# Patient Record
Sex: FEMALE | Race: WHITE
[De-identification: names, ages, dates, MRNs, and addresses within clinical notes are randomized per-mention and may not be internally consistent; named-entity substitution may affect disease eponyms.]

---

## 2017-08-09 ENCOUNTER — HOSPITAL ENCOUNTER (INPATIENT)
Dept: HOSPITAL 47 - EC | Age: 57
LOS: 13 days | Discharge: NURSING, REHAB OR LONG-TERM CARE | DRG: 314 | End: 2017-08-22
Attending: HOSPITALIST | Admitting: HOSPITALIST
Payer: MEDICARE

## 2017-08-09 VITALS — BODY MASS INDEX: 53.8 KG/M2

## 2017-08-09 DIAGNOSIS — T80.211A: Primary | ICD-10-CM

## 2017-08-09 DIAGNOSIS — F41.9: ICD-10-CM

## 2017-08-09 DIAGNOSIS — Z79.899: ICD-10-CM

## 2017-08-09 DIAGNOSIS — E78.5: ICD-10-CM

## 2017-08-09 DIAGNOSIS — Z86.73: ICD-10-CM

## 2017-08-09 DIAGNOSIS — Z87.891: ICD-10-CM

## 2017-08-09 DIAGNOSIS — F32.9: ICD-10-CM

## 2017-08-09 DIAGNOSIS — J44.9: ICD-10-CM

## 2017-08-09 DIAGNOSIS — Z88.1: ICD-10-CM

## 2017-08-09 DIAGNOSIS — D63.1: ICD-10-CM

## 2017-08-09 DIAGNOSIS — E87.5: ICD-10-CM
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
## 2017-08-09 DIAGNOSIS — I50.32: ICD-10-CM

## 2017-08-09 DIAGNOSIS — Z83.3: ICD-10-CM

## 2017-08-09 DIAGNOSIS — Z79.02: ICD-10-CM

## 2017-08-09 DIAGNOSIS — A41.81: ICD-10-CM

## 2017-08-09 DIAGNOSIS — N39.0: ICD-10-CM

## 2017-08-09 DIAGNOSIS — I13.2: ICD-10-CM

## 2017-08-09 DIAGNOSIS — T82.41XA: ICD-10-CM

## 2017-08-09 DIAGNOSIS — E11.22: ICD-10-CM

## 2017-08-09 DIAGNOSIS — Z79.4: ICD-10-CM

## 2017-08-09 DIAGNOSIS — E66.2: ICD-10-CM

## 2017-08-09 DIAGNOSIS — E03.9: ICD-10-CM

## 2017-08-09 DIAGNOSIS — E61.1: ICD-10-CM

## 2017-08-09 DIAGNOSIS — N18.6: ICD-10-CM
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
## 2017-08-09 DIAGNOSIS — I42.9: ICD-10-CM

## 2017-08-09 DIAGNOSIS — Z96.651: ICD-10-CM

## 2017-08-09 DIAGNOSIS — Z99.2: ICD-10-CM

## 2017-08-09 DIAGNOSIS — Z82.49: ICD-10-CM

## 2017-08-09 PROCEDURE — 80053 COMPREHEN METABOLIC PANEL: CPT

## 2017-08-09 PROCEDURE — 94760 N-INVAS EAR/PLS OXIMETRY 1: CPT

## 2017-08-09 PROCEDURE — 87077 CULTURE AEROBIC IDENTIFY: CPT

## 2017-08-09 PROCEDURE — 83540 ASSAY OF IRON: CPT

## 2017-08-09 PROCEDURE — 83605 ASSAY OF LACTIC ACID: CPT

## 2017-08-09 PROCEDURE — 94640 AIRWAY INHALATION TREATMENT: CPT

## 2017-08-09 PROCEDURE — 74176 CT ABD & PELVIS W/O CONTRAST: CPT

## 2017-08-09 PROCEDURE — 87086 URINE CULTURE/COLONY COUNT: CPT

## 2017-08-09 PROCEDURE — 76937 US GUIDE VASCULAR ACCESS: CPT

## 2017-08-09 PROCEDURE — 87186 SC STD MICRODIL/AGAR DIL: CPT

## 2017-08-09 PROCEDURE — 83036 HEMOGLOBIN GLYCOSYLATED A1C: CPT

## 2017-08-09 PROCEDURE — 85025 COMPLETE CBC W/AUTO DIFF WBC: CPT

## 2017-08-09 PROCEDURE — 82553 CREATINE MB FRACTION: CPT

## 2017-08-09 PROCEDURE — 87070 CULTURE OTHR SPECIMN AEROBIC: CPT

## 2017-08-09 PROCEDURE — 82728 ASSAY OF FERRITIN: CPT

## 2017-08-09 PROCEDURE — 93306 TTE W/DOPPLER COMPLETE: CPT

## 2017-08-09 PROCEDURE — 80061 LIPID PANEL: CPT

## 2017-08-09 PROCEDURE — 96374 THER/PROPH/DIAG INJ IV PUSH: CPT

## 2017-08-09 PROCEDURE — 84132 ASSAY OF SERUM POTASSIUM: CPT

## 2017-08-09 PROCEDURE — 99285 EMERGENCY DEPT VISIT HI MDM: CPT

## 2017-08-09 PROCEDURE — 77001 FLUOROGUIDE FOR VEIN DEVICE: CPT

## 2017-08-09 PROCEDURE — 80048 BASIC METABOLIC PNL TOTAL CA: CPT

## 2017-08-09 PROCEDURE — 87040 BLOOD CULTURE FOR BACTERIA: CPT

## 2017-08-09 PROCEDURE — 80202 ASSAY OF VANCOMYCIN: CPT

## 2017-08-09 PROCEDURE — 82550 ASSAY OF CK (CPK): CPT

## 2017-08-09 PROCEDURE — 71010: CPT

## 2017-08-09 PROCEDURE — 83550 IRON BINDING TEST: CPT

## 2017-08-09 PROCEDURE — 36558 INSERT TUNNELED CV CATH: CPT

## 2017-08-09 PROCEDURE — 83880 ASSAY OF NATRIURETIC PEPTIDE: CPT

## 2017-08-09 PROCEDURE — 84484 ASSAY OF TROPONIN QUANT: CPT

## 2017-08-09 PROCEDURE — 81001 URINALYSIS AUTO W/SCOPE: CPT

## 2017-08-09 PROCEDURE — 90935 HEMODIALYSIS ONE EVALUATION: CPT

## 2017-08-09 PROCEDURE — 84100 ASSAY OF PHOSPHORUS: CPT

## 2017-08-09 PROCEDURE — 93005 ELECTROCARDIOGRAM TRACING: CPT

## 2017-08-09 NOTE — ED
General Adult HPI





- General


Chief complaint: Chest Pain


Stated complaint: SOB


Time Seen by Provider: 17 23:11


Source: EMS, RN notes reviewed, old records reviewed


Mode of arrival: EMS


Limitations: physical limitation





- History of Present Illness


Initial comments: 





This is a 56-year-old female ER for evaluation.  Patient's brought in transfer 

for multiple medical issues including CHF, anemia, acute on chronic renal 

failure, fever and sepsis.  Patient at this time is complaining of just 

generalized body pain





- Related Data


 Home Medications











 Medication  Instructions  Recorded  Confirmed


 


ALPRAZolam [Xanax] 0.25 mg PO TID PRN 09/13/15 08/09/17


 


Atorvastatin [Lipitor] 80 mg PO HS@2100 09/13/15 08/09/17


 


Carvedilol [Coreg] 12.5 mg PO BID@1100,2300 09/13/15 08/09/17


 


Clopidogrel [Plavix] 75 mg PO DAILY@1100 09/13/15 08/09/17


 


Ferrous Sulfate [Iron (65  mg PO DAILY@1100 09/13/15 08/09/17





Elemental)]   


 


Gabapentin [Neurontin] 100 mg PO TID@1100,1800,2300 09/13/15 08/09/17


 


HYDROcodone/APAP 7.5-325MG [Norco 1 tab PO DAILY PRN 09/13/15 08/09/17





7.5-325]   


 


Levothyroxine Sodium [Synthroid] 350 mcg PO DAILY@0500 09/13/15 08/09/17


 


Montelukast [Singulair] 10 mg PO HS@2000 09/13/15 08/09/17


 


Cinacalcet [Sensipar] 30 mg PO TU@1100 17


 


Acetaminophen Tab [Tylenol Tab] 650 mg PO Q4H PRN 17


 


Albuterol Sulfate [Proair Hfa] 2 puff INHALATION RT-QID PRN 17


 


Bisacodyl 10 mg RECTAL DAILY PRN 17


 


Budesonide [Pulmicort] 0.5 mg INHALATION RT-BID@11,20 17


 


Calcium Acetate [Phoslo] 667 mg PO TID@0700,1200,1700 17/17


 


Famotidine [Pepcid] 20 mg PO BID@1100,1700 17


 


Folic Acid-Vit B Complex-Vit C 1 cap PO DAILY@1100 17





[Nephrocaps]   


 


HYDROcodone/APAP 7.5-325MG [Norco 1 tab PO TID@0500,1300,2100 17





7.5-325]   


 


Hydrocortisone [Anusol-Hc] 1 applic RECTAL BID PRN 17


 


Insulin Detemir [Levemir] 65 unit SQ DAILY@17017


 


Insulin Lispro [humaLOG Kwikpen] 15 unit SQ AC-TID@,,17


 


Ipratropium-Albuterol Nebulize 1 ml INHALATION RT-QID PRN 17





[Duoneb 0.5 mg-3 mg/3 ml Soln]   


 


L.acidoph,Paracasei, B.lactis 1 cap PO DAILY@1100 17





[Probiotic]   


 


Latanoprost [Xalatan 0.005%] 1 drop BOTH EYES HS@17


 


Levofloxacin [Levaquin] 500 mg PO DAILY 17


 


Omalizumab [Xolair] 150 mg SQ Q30D 17


 


Sennosides [Senna] 8.6 mg PO DAILY@17


 


Sodium Bicarbonate Tab 650 mg PO BID@1100,17


 


diphenhydrAMINE HCL [Benadryl] 25 mg PO Q8H PRN 17


 


hydrALAZINE HCL [Apresoline] 50 mg PO TID@0500,1100,1900 17











 Allergies











Allergy/AdvReac Type Severity Reaction Status Date / Time


 


erythromycin base Allergy  Unknown Verified 17 18:58





[Erythromycin Base]     


 


NSAIDS (Non-Steroidal Allergy  Unknown Verified 17 18:58





Anti-Inflamma     














Review of Systems


ROS Statement: 


Those systems with pertinent positive or pertinent negative responses have been 

documented in the HPI.





ROS Other: All systems not noted in ROS Statement are negative.





Past Medical History


Past Medical History: Asthma, Heart Failure, COPD, CVA/TIA, Diabetes Mellitus, 

Eye Disorder, Hyperlipidemia, Hypertension, Osteoarthritis (OA), Pneumonia, 

Renal Disease, Sleep Apnea/CPAP/BIPAP, Thyroid Disorder


Additional Past Medical History / Comment(s): sleep apnea, neuropathy, patient 

has one kidney, Stage III kidney failure


History of Any Multi-Drug Resistant Organisms: None Reported


Past Surgical History: Bariatric Surgery, Hernia Repair, Orthopedic Surgery, 

Tonsillectomy


Additional Past Surgical History / Comment(s): rt kidney removed, Rt knee 

replacement


Past Anesthesia/Blood Transfusion Reactions: No Reported Reaction


Additional Past Anesthesia/Blood Transfusion Reaction / Comment(s): Pt has 

never had a blood transfusion.


Past Psychological History: No Psychological Hx Reported


Smoking Status: Former smoker


Past Alcohol Use History: None Reported


Past Drug Use History: None Reported





- Past Family History


  ** Mother


Additional Family Medical History / Comment(s): skin CA, CHF, DM, thyroid 

disorder, HTN.





  ** Father


Family Medical History: Diabetes Mellitus, Hypertension, Thyroid Disorder


Additional Family Medical History / Comment(s): CHF.  Pt's father is .





General Exam


Limitations: physical limitation


General appearance: alert, anxious, in distress, obese


Head exam: Present: atraumatic, normocephalic, normal inspection


Eye exam: Present: normal appearance, PERRL, EOMI.  Absent: scleral icterus, 

conjunctival injection, periorbital swelling


ENT exam: Present: normal exam, mucous membranes moist


Neck exam: Present: normal inspection.  Absent: tenderness, meningismus, 

lymphadenopathy


Respiratory exam: Present: normal lung sounds bilaterally.  Absent: respiratory 

distress, wheezes, rales, rhonchi, stridor


Cardiovascular Exam: Present: regular rate, normal rhythm, normal heart sounds.

  Absent: systolic murmur, diastolic murmur, rubs, gallop, clicks


GI/Abdominal exam: Present: soft, normal bowel sounds.  Absent: distended, 

tenderness, guarding, rebound, rigid


Extremities exam: Present: normal inspection, full ROM, normal capillary 

refill.  Absent: tenderness, pedal edema, joint swelling, calf tenderness


Back exam: Present: normal inspection


Neurological exam: Present: alert, oriented X3, CN II-XII intact


Psychiatric exam: Present: normal affect, normal mood


Skin exam: Present: warm, dry, intact, normal color.  Absent: rash





Course





 Vital Signs











  17





  23:09 23:18


 


Temperature 102.9 F H 


 


Pulse Rate 88 


 


Respiratory 20 20





Rate  


 


Blood Pressure 147/64 


 


O2 Sat by Pulse 98 





Oximetry  














- Reevaluation(s)


Reevaluation #1: 





17 23:39


Transfer paper work is thoroughly reviewed





EKG Findings





- EKG Comments:


EKG Findings:: EKG shows sinus rhythm rate of 87, , QRS 10 2, 





Medical Decision Making





- Medical Decision Making





56 female the ER with multiple medical issues, and STEMI COPD sepsis.  Patient 

will be admitted for IV antibiotics, cardiopulmonary resuscitation and 

monitoring





Disposition


Clinical Impression: 


 Unstable angina, Elevated troponin, COPD (chronic obstructive pulmonary disease

), Fever





Disposition: ADMITTED AS IP TO THIS HOSP


Condition: Fair


Referrals: 


Itzel Leonard MD [Primary Care Provider] - 1-2 days

## 2017-08-10 LAB
ANION GAP SERPL CALC-SCNC: 10 MMOL/L
BASOPHILS # BLD AUTO: 0.1 K/UL (ref 0–0.2)
BASOPHILS NFR BLD AUTO: 1 %
BILIRUB UR QL STRIP.AUTO: (no result)
BUN SERPL-SCNC: 26 MG/DL (ref 7–17)
CALCIUM SPEC-MCNC: 9.3 MG/DL (ref 8.4–10.2)
CH: 26.1
CHCM: 29.9
CHLORIDE SERPL-SCNC: 94 MMOL/L (ref 98–107)
CHOLEST SERPL-MCNC: 125 MG/DL (ref ?–200)
CK SERPL-CCNC: 60 U/L (ref 30–135)
CK SERPL-CCNC: 64 U/L (ref 30–135)
CO2 SERPL-SCNC: 30 MMOL/L (ref 22–30)
EOSINOPHIL # BLD AUTO: 0.1 K/UL (ref 0–0.7)
EOSINOPHIL NFR BLD AUTO: 1 %
ERYTHROCYTE [DISTWIDTH] IN BLOOD BY AUTOMATED COUNT: 3 M/UL (ref 3.8–5.4)
ERYTHROCYTE [DISTWIDTH] IN BLOOD: 18.3 % (ref 11.5–15.5)
GLUCOSE BLD-MCNC: 147 MG/DL (ref 75–99)
GLUCOSE BLD-MCNC: 148 MG/DL (ref 75–99)
GLUCOSE BLD-MCNC: 155 MG/DL (ref 75–99)
GLUCOSE BLD-MCNC: 214 MG/DL (ref 75–99)
GLUCOSE SERPL-MCNC: 156 MG/DL (ref 74–99)
HCT VFR BLD AUTO: 26.4 % (ref 34–46)
HDLC SERPL-MCNC: 52 MG/DL (ref 40–60)
HDW: 3.32
HEMOGLOBIN A1C: 6.1 % (ref 4.2–6.1)
HGB BLD-MCNC: 8.1 GM/DL (ref 11.4–16)
IRON SERPL-MCNC: 16 UG/DL (ref 37–170)
LUC NFR BLD AUTO: 4 %
LYMPHOCYTES # SPEC AUTO: 0.7 K/UL (ref 1–4.8)
LYMPHOCYTES NFR SPEC AUTO: 7 %
MCH RBC QN AUTO: 26.9 PG (ref 25–35)
MCHC RBC AUTO-ENTMCNC: 30.6 G/DL (ref 31–37)
MCV RBC AUTO: 87.8 FL (ref 80–100)
MONOCYTES # BLD AUTO: 1.4 K/UL (ref 0–1)
MONOCYTES NFR BLD AUTO: 14 %
NEUTROPHILS # BLD AUTO: 7.5 K/UL (ref 1.3–7.7)
NEUTROPHILS NFR BLD AUTO: 74 %
NON-AFRICAN AMERICAN GFR(MDRD): 15
PARTICLE COUNT: (no result)
PH UR: 5.5 [PH] (ref 5–8)
POTASSIUM SERPL-SCNC: 4 MMOL/L (ref 3.5–5.1)
PROT UR QL: (no result)
RBC UR QL: >182 /HPF (ref 0–5)
SODIUM SERPL-SCNC: 134 MMOL/L (ref 137–145)
SP GR UR: 1.02 (ref 1–1.03)
SQUAMOUS UR QL AUTO: 4 /HPF (ref 0–4)
TIBC SERPL-MCNC: 214 UG/DL (ref 265–497)
TROPONIN I SERPL-MCNC: 0.53 NG/ML (ref 0–0.03)
TROPONIN I SERPL-MCNC: 0.7 NG/ML (ref 0–0.03)
UA BILLING (MACRO VS. MICRO): (no result)
UROBILINOGEN UR QL STRIP: 2 MG/DL (ref ?–2)
WBC # BLD AUTO: 0.45 10*3/UL
WBC # BLD AUTO: 10.3 K/UL (ref 3.8–10.6)
WBC #/AREA URNS HPF: 100 /HPF (ref 0–5)
WBC (PEROX): 10.95

## 2017-08-10 PROCEDURE — 5A1D60Z: ICD-10-PCS

## 2017-08-10 RX ADMIN — MORPHINE SULFATE PRN MG: 4 INJECTION, SOLUTION INTRAMUSCULAR; INTRAVENOUS at 00:10

## 2017-08-10 RX ADMIN — IOHEXOL PRN ML: 350 INJECTION, SOLUTION INTRAVENOUS at 18:49

## 2017-08-10 RX ADMIN — IOHEXOL PRN ML: 350 INJECTION, SOLUTION INTRAVENOUS at 19:58

## 2017-08-10 RX ADMIN — ACETAMINOPHEN SCH MLS/HR: 10 INJECTION, SOLUTION INTRAVENOUS at 23:58

## 2017-08-10 RX ADMIN — DARBEPOETIN ALFA SCH MCG: 40 INJECTION, SOLUTION INTRAVENOUS; SUBCUTANEOUS at 16:11

## 2017-08-10 RX ADMIN — CARVEDILOL SCH MG: 12.5 TABLET, FILM COATED ORAL at 21:09

## 2017-08-10 RX ADMIN — ACETAMINOPHEN SCH MLS/HR: 10 INJECTION, SOLUTION INTRAVENOUS at 18:16

## 2017-08-10 RX ADMIN — GABAPENTIN SCH MG: 100 CAPSULE ORAL at 18:13

## 2017-08-10 RX ADMIN — ACETAMINOPHEN SCH MLS/HR: 10 INJECTION, SOLUTION INTRAVENOUS at 10:47

## 2017-08-10 RX ADMIN — INSULIN LISPRO SCH: 100 INJECTION, SOLUTION INTRAVENOUS; SUBCUTANEOUS at 14:39

## 2017-08-10 RX ADMIN — Medication SCH MG: at 17:21

## 2017-08-10 RX ADMIN — INSULIN LISPRO SCH UNIT: 100 INJECTION, SOLUTION INTRAVENOUS; SUBCUTANEOUS at 21:15

## 2017-08-10 RX ADMIN — INSULIN LISPRO SCH: 100 INJECTION, SOLUTION INTRAVENOUS; SUBCUTANEOUS at 10:45

## 2017-08-10 RX ADMIN — MORPHINE SULFATE PRN MG: 4 INJECTION, SOLUTION INTRAMUSCULAR; INTRAVENOUS at 19:57

## 2017-08-10 RX ADMIN — CARVEDILOL SCH MG: 12.5 TABLET, FILM COATED ORAL at 10:49

## 2017-08-10 RX ADMIN — INSULIN LISPRO SCH: 100 INJECTION, SOLUTION INTRAVENOUS; SUBCUTANEOUS at 18:08

## 2017-08-10 RX ADMIN — GABAPENTIN SCH MG: 100 CAPSULE ORAL at 21:09

## 2017-08-10 RX ADMIN — ATORVASTATIN CALCIUM SCH MG: 80 TABLET, FILM COATED ORAL at 21:08

## 2017-08-10 RX ADMIN — BUDESONIDE SCH: 0.5 INHALANT ORAL at 20:13

## 2017-08-10 RX ADMIN — FUROSEMIDE SCH MG: 10 INJECTION, SOLUTION INTRAMUSCULAR; INTRAVENOUS at 10:48

## 2017-08-10 RX ADMIN — INSULIN DETEMIR SCH: 100 INJECTION, SOLUTION SUBCUTANEOUS at 18:08

## 2017-08-10 RX ADMIN — ASPIRIN 325 MG ORAL TABLET SCH MG: 325 PILL ORAL at 10:50

## 2017-08-10 RX ADMIN — FUROSEMIDE SCH MG: 10 INJECTION, SOLUTION INTRAMUSCULAR; INTRAVENOUS at 21:09

## 2017-08-10 NOTE — P.HPIM
History of Present Illness


H&P Date: 08/10/17


Chief Complaint: Fevers


This is a 56-year-old female well-known to our service who recently underwent a 

cardiopulmonary arrest thereafter was started on hemodialysis.  Patient was 

sent to a nursing home for rehabilitation





Patient has a history of diastolic dysfunction with CKD stage III to 4, severe 

obstructive sleep apnea and obesity hypoventilation syndrome prior to the 

previous CODE BLUE





Patient apparently woke up with a fever prior to her hemodialysis yesterday.  

Underwent hemodialysis was noted to have a temperature greater than 101F hence 

was triaged to the emergency room for ongoing care





Patient's only for an body in her is a hemodialysis catheter that is tunneled 

on her right side of the chest





At the time of my evaluation patient states that she has some chills feels 

weak.  States that she was improving was able to walk about 40 feet at the 

nursing home prior to this episode





T-max of heart 1.8 was noted.  No episodes of hypotension is appreciated here.














Review of Systems


All systems: negative (Noted in HPI)





Past Medical History


Past Medical History: Asthma, Heart Failure, COPD, CVA/TIA, Diabetes Mellitus, 

Eye Disorder, Hyperlipidemia, Hypertension, Osteoarthritis (OA), Pneumonia, 

Renal Disease, Sleep Apnea/CPAP/BIPAP, Thyroid Disorder


Additional Past Medical History / Comment(s): sleep apnea, neuropathy, patient 

has one kidney, Stage III kidney failure


History of Any Multi-Drug Resistant Organisms: None Reported


Past Surgical History: Bariatric Surgery, Hernia Repair, Orthopedic Surgery, 

Tonsillectomy


Additional Past Surgical History / Comment(s): rt kidney removed, Rt knee 

replacement


Past Anesthesia/Blood Transfusion Reactions: No Reported Reaction


Additional Past Anesthesia/Blood Transfusion Reaction / Comment(s): pt states 

she [facility], "it made her itchy and they 

gave bendyl"


Past Psychological History: Anxiety, Depression


Smoking Status: Former smoker


Past Alcohol Use History: None Reported


Past Drug Use History: None Reported





- Past Family History


  ** Mother


Additional Family Medical History / Comment(s): skin CA, CHF, DM, thyroid 

disorder, HTN.





  ** Father


Family Medical History: Diabetes Mellitus, Hypertension, Thyroid Disorder


Additional Family Medical History / Comment(s): CHF.  Pt's father is .





Medications and Allergies


 Home Medications











 Medication  Instructions  Recorded  Confirmed  Type


 


ALPRAZolam [Xanax] 0.25 mg PO TID PRN 09/13/15 08/09/17 History


 


Atorvastatin [Lipitor] 80 mg PO HS@2100 09/13/15 08/09/17 History


 


Carvedilol [Coreg] 12.5 mg PO BID@1100,2300 09/13/15 08/09/17 History


 


Clopidogrel [Plavix] 75 mg PO DAILY@1100 09/13/15 08/09/17 History


 


Ferrous Sulfate [Iron (65  mg PO DAILY@1100 09/13/15 08/09/17 History





Elemental)]    


 


Gabapentin [Neurontin] 100 mg PO TID@1100,1800,2300 09/13/15 08/09/17 History


 


HYDROcodone/APAP 7.5-325MG [Norco 1 tab PO DAILY PRN 09/13/15 08/09/17 History





7.5-325]    


 


Levothyroxine Sodium [Synthroid] 350 mcg PO DAILY@0500 09/13/15 08/09/17 History


 


Montelukast [Singulair] 10 mg PO HS@2000 09/13/15 08/09/17 History


 


Cinacalcet [Sensipar] 30 mg PO TU@17 History


 


Acetaminophen Tab [Tylenol Tab] 650 mg PO Q4H PRN 17 History


 


Albuterol Sulfate [Proair Hfa] 2 puff INHALATION RT-QID PRN 17 

History


 


Bisacodyl 10 mg RECTAL DAILY PRN 17 History


 


Budesonide [Pulmicort] 0.5 mg INHALATION RT-BID@11,20 17 History


 


Calcium Acetate [Phoslo] 667 mg PO TID@0700,1200,1700 17 History


 


Famotidine [Pepcid] 20 mg PO BID@1100,1700 17 History


 


Folic Acid-Vit B Complex-Vit C 1 cap PO DAILY@1100 17 History





[Nephrocaps]    


 


HYDROcodone/APAP 7.5-325MG [Norco 1 tab PO TID@0500,1300,2100 17 

History





7.5-325]    


 


Hydrocortisone [Anusol-Hc] 1 applic RECTAL BID PRN 17 History


 


Insulin Detemir [Levemir] 65 unit SQ DAILY@1700 17 History


 


Insulin Lispro [humaLOG Kwikpen] 15 unit SQ AC-TID@07,,17 

History


 


Ipratropium-Albuterol Nebulize 1 ml INHALATION RT-QID PRN 17 

History





[Duoneb 0.5 mg-3 mg/3 ml Soln]    


 


L.acidoph,Paracasei, B.lactis 1 cap PO DAILY@1100 17 History





[Probiotic]    


 


Latanoprost [Xalatan 0.005%] 1 drop BOTH EYES HS@17 History


 


Levofloxacin [Levaquin] 500 mg PO DAILY 17 History


 


Omalizumab [Xolair] 150 mg SQ Q30D 17 History


 


Sennosides [Senna] 8.6 mg PO DAILY@1100 17 History


 


Sodium Bicarbonate Tab 650 mg PO BID@1100,17 History


 


diphenhydrAMINE HCL [Benadryl] 25 mg PO Q8H PRN 17 History


 


hydrALAZINE HCL [Apresoline] 50 mg PO TID@0500,1100,1900 17 

History











 Allergies











Allergy/AdvReac Type Severity Reaction Status Date / Time


 


erythromycin base Allergy  Unknown Verified 17 18:58





[Erythromycin Base]     


 


NSAIDS (Non-Steroidal Allergy  Unknown Verified 17 18:58





Anti-Inflamma     














Physical Exam


Vitals: 


 Vital Signs











  Temp Pulse Resp BP BP Pulse Ox


 


 08/10/17 13:04  98 F     


 


 08/10/17 12:00    18   


 


 08/10/17 11:45  102 F H   18   121/56  95


 


 08/10/17 08:00  102.6 F H   18   166/69  97


 


 08/10/17 04:00    18   


 


 08/10/17 01:22  98.2 F  79  18  155/56   96


 


 08/10/17 00:16  99 F  90  20  168/84   97


 


 17 23:18    20   


 


 17 23:09  102.9 F H  88  20  147/64   98








 Intake and Output











 08/10/17 08/10/17 08/10/17





 06:59 14:59 22:59


 


Intake Total 0 0 


 


Output Total 1  


 


Balance -1 0 


 


Intake:   


 


  IV 0  


 


    ns 0  


 


  Oral  0 


 


Output:   


 


  Stool 1  


 


Other:   


 


  Voiding Method Indwelling Catheter Indwelling Catheter 


 


  Weight 132 kg  132 kg








 Patient Weight











 17





 06:59


 


Weight 132 kg











Gen. appearance alert oriented 3





Neck is supple no JVD.





There is some tenderness over the catheter





Lungs diminished breath sounds no rhonchi wheezing or crackles





Heart S1-S2 are regular rate and rhythm no murmurs appreciated





Abdomen soft nontender no organomegaly slightly obese





Neuro no focal motor or sensory deficits noted.








Results


CBC & Chem 7: 


 08/10/17 09:33





 08/10/17 09:33


Labs: 


 Abnormal Lab Results - Last 24 Hours (Table)











  08/10/17 08/10/17 08/10/17 Range/Units





  01:47 05:53 07:32 


 


RBC     (3.80-5.40)  m/uL


 


Hgb     (11.4-16.0)  gm/dL


 


Hct     (34.0-46.0)  %


 


MCHC     (31.0-37.0)  g/dL


 


RDW     (11.5-15.5)  %


 


Lymphocytes #     (1.0-4.8)  k/uL


 


Monocytes #     (0-1.0)  k/uL


 


Sodium     (137-145)  mmol/L


 


Chloride     ()  mmol/L


 


BUN     (7-17)  mg/dL


 


Creatinine     (0.52-1.04)  mg/dL


 


Glucose     (74-99)  mg/dL


 


POC Glucose (mg/dL)   147 H   (75-99)  mg/dL


 


Iron     ()  ug/dL


 


TIBC     (265-497)  ug/dL


 


% Saturation     (20-50)  %


 


Troponin I  0.703 H*   0.529 H*  (0.000-0.034)  ng/mL














  08/10/17 08/10/17 08/10/17 Range/Units





  09:33 09:33 09:33 


 


RBC  3.00 L    (3.80-5.40)  m/uL


 


Hgb  8.1 L    (11.4-16.0)  gm/dL


 


Hct  26.4 L    (34.0-46.0)  %


 


MCHC  30.6 L    (31.0-37.0)  g/dL


 


RDW  18.3 H    (11.5-15.5)  %


 


Lymphocytes #  0.7 L    (1.0-4.8)  k/uL


 


Monocytes #  1.4 H    (0-1.0)  k/uL


 


Sodium   134 L   (137-145)  mmol/L


 


Chloride   94 L   ()  mmol/L


 


BUN   26 H   (7-17)  mg/dL


 


Creatinine   3.15 H   (0.52-1.04)  mg/dL


 


Glucose   156 H   (74-99)  mg/dL


 


POC Glucose (mg/dL)     (75-99)  mg/dL


 


Iron    16 L  ()  ug/dL


 


TIBC    214 L  (265-497)  ug/dL


 


% Saturation    7.5 L  (20-50)  %


 


Troponin I     (0.000-0.034)  ng/mL














  08/10/17 Range/Units





  11:34 


 


RBC   (3.80-5.40)  m/uL


 


Hgb   (11.4-16.0)  gm/dL


 


Hct   (34.0-46.0)  %


 


MCHC   (31.0-37.0)  g/dL


 


RDW   (11.5-15.5)  %


 


Lymphocytes #   (1.0-4.8)  k/uL


 


Monocytes #   (0-1.0)  k/uL


 


Sodium   (137-145)  mmol/L


 


Chloride   ()  mmol/L


 


BUN   (7-17)  mg/dL


 


Creatinine   (0.52-1.04)  mg/dL


 


Glucose   (74-99)  mg/dL


 


POC Glucose (mg/dL)  155 H  (75-99)  mg/dL


 


Iron   ()  ug/dL


 


TIBC   (265-497)  ug/dL


 


% Saturation   (20-50)  %


 


Troponin I   (0.000-0.034)  ng/mL














Thrombosis Risk Factor Assmnt





- Choose All That Apply


Any of the Below Risk Factors Present?: Yes


Each Factor Represents 1 point: Abnormal pulmonary function (COPD), Age 41-60 

years, Heart failure (<1month), Obesity (BMI >25), Sepsis (< 1month), Swollen 

legs (current)


Other Risk Factors: No


Other congenital or acquired thrombophilia - If yes, enter type in comment: No


Thrombosis Risk Factor Assessment Total Risk Factor Score: 6


Thrombosis Risk Factor Assessment Level: High Risk





Assessment and Plan


Plan: 


#1 fever of unknown origin rule out catheter associated bacteremia with 

suspected sepsis





#2 ESRD on hemodialysis





#3 anemia of ESRD





#4 atypical chest pain





#5 essential hypertension





#6 severe obstructive sleep apnea





#7 obesity hypoventilation syndrome





Plan





Blood cultures 1 peripheral and one from the hemodialysis catheter





Empiric antibiotic therapy with vancomycin and cefepime to cover for 

Pseudomonas and MRSA





Nephrology consultation





Cardiology evaluation for chest pain was done





Repeat blood cultures





If patient appears to have bacteremia from the culture drawn from the HD 

catheter may need removal of the catheter at that time





UA and urine cultures will be also done

## 2017-08-10 NOTE — ECHOF
Referral Reason:chf



MEASUREMENTS

--------

HEIGHT: 162.6 cm

WEIGHT: 132.0 kg

BP: 

IVSd:   1.4 cm     (0.6 - 1.1)

LVIDd:   3.8 cm     (3.9 - 5.3)

LVPWd:   1.3 cm     (0.6 - 1.1)

IVSs:   2.0 cm

LVIDs:   2.3 cm

LVPWs:   1.9 cm

Ao Diam:   3.3 cm     (2.0 - 3.7)

AV Cusp:   2.2 cm     (1.5 - 2.6)

LA Diam:   2.9 cm     (2.7 - 3.8)

MV EXCURSION:   16.659 mm     (> 18.000)

MV EF SLOPE:   71 mm/s     (70 - 150)

EPSS:   0.5 cm

MV E Kyrie:   0.84 m/s

MV DecT:   199 ms

MV A Kyrie:   0.83 m/s

MV E/A Ratio:   1.01 

RAP:   5.00 mmHg

RVSP:   14.73 mmHg







FINDINGS

--------

Sinus rhythm.

This was a technically difficult study with suboptimal 

views. Limited views  Morbid Obesity. No apicals

There is moderate concentric left ventricular 

hypertrophy.   Overall left ventricular systolic 

function is normal with, an EF between 55 - 60 %.

The right ventricle is normal in size and function.

The left atrium is normal in size.

The right atrium was not well visualized.

The aortic valve is trileaflet, and appears 

structurally normal. No aortic stenosis or 

regurgitation.

The mitral valve leaflets are mildly thickened.   Mild 

mitral regurgitation is present.

Mild tricuspid regurgitation present.   The right 

ventricular systolic pressure, as measured by Doppler, 

is 14.73mmHg.

The pulmonic valve was not well visualized.

The aortic root size is normal.

The pericardium is normal.



CONCLUSIONS

--------

1. Sinus rhythm.

2. The mitral valve leaflets are mildly thickened.

3. Mild mitral regurgitation is present.

4. Mild tricuspid regurgitation present.

5. The right ventricular systolic pressure, as measured by 

Doppler, is 14.73mmHg.

6. The pulmonic valve was not well visualized.

7. The aortic root size is normal.

8. The pericardium is normal.

9. This was a technically difficult study with suboptimal 

views. Limited views. No apicals

10. Morbid Obesity

11. There is moderate concentric left ventricular 

hypertrophy.

12. Overall left ventricular systolic function is normal 

with, an EF between 55 - 60 %.

13. The right ventricle is normal in size and function.

14. The left atrium is normal in size.

15. The right atrium was not well visualized.

16. The aortic valve is trileaflet, and appears 

structurally normal. No aortic stenosis or 

regurgitation.





SONOGRAPHER: Wilda Sanderson RDCS

## 2017-08-10 NOTE — P.CRDCN
History of Present Illness


Consult date: 08/10/17


Requesting physician: Amarilis Welch


Consult reason: congestive heart failure


Chief complaint: Fever


History of present illness: 


This is a 56-year-old  female with known history of hypertension, 

diabetes, hyperlipidemia, prior CVA 2, COPD, renal failure, sleep apnea, 

obesity, hypothyroidism, who is currently at an extended care facility.  She 

was initially sent to [facility] because of elevated temperatures, 

because of an abnormal troponin she was transferred here to [facility] for cardiac 

evaluation.  According to the patient, her main complaint is that of feeling 

achy all over.  She denied any overt change in her breathing, no chest 

discomfort.  Patient does state that she has a cough, nonproductive.  Lab data 

[facility], WBC 12.6, hemoglobin 8.6, platelet count 241, troponin 0.86, 

BNP 8301, potassium 3.7, BUN 16, creatinine 2.3, patient was given a dose of IV 

Lasix prior to transfer here.  Temperature [place] 102.0.  Temperature on 

arrival here 102.9, blood pressure 147/60, heart rate in the 80s.  98% on 4 L 

of oxygen.  Temperature this morning 102.6, 97% on 4 L of oxygen.  I do not 

have a chest x-ray report.  Troponin 0.70, 0.52.  At the time of my examination 

this morning, patient complains of not feeling well at all.  She denies any 

overt shortness of breath, states that she feels achy all over.  Echocardiogram 

with Doppler study was performed in March of this year which revealed an 

ejection fraction of 55-60%.








Past Medical History


Past Medical History: Asthma, Heart Failure, COPD, CVA/TIA, Diabetes Mellitus, 

Eye Disorder, Hyperlipidemia, Hypertension, Osteoarthritis (OA), Pneumonia, 

Renal Disease, Sleep Apnea/CPAP/BIPAP, Thyroid Disorder


Additional Past Medical History / Comment(s): sleep apnea, neuropathy, patient 

has one kidney, Stage III kidney failure


History of Any Multi-Drug Resistant Organisms: None Reported


Past Surgical History: Bariatric Surgery, Hernia Repair, Orthopedic Surgery, 

Tonsillectomy


Additional Past Surgical History / Comment(s): rt kidney removed, Rt knee 

replacement


Past Anesthesia/Blood Transfusion Reactions: No Reported Reaction


Additional Past Anesthesia/Blood Transfusion Reaction / Comment(s): pt states 

she had [facility], "it made her itchy and they 

gave bendyl"


Past Psychological History: Anxiety, Depression


Smoking Status: Former smoker


Past Alcohol Use History: None Reported


Past Drug Use History: None Reported





- Past Family History


  ** Mother


Additional Family Medical History / Comment(s): skin CA, CHF, DM, thyroid 

disorder, HTN.





  ** Father


Family Medical History: Diabetes Mellitus, Hypertension, Thyroid Disorder


Additional Family Medical History / Comment(s): CHF.  Pt's father is .





Medications and Allergies


 Home Medications











 Medication  Instructions  Recorded  Confirmed  Type


 


ALPRAZolam [Xanax] 0.25 mg PO TID PRN 09/13/15 08/09/17 History


 


Atorvastatin [Lipitor] 80 mg PO HS@2100 09/13/15 08/09/17 History


 


Carvedilol [Coreg] 12.5 mg PO BID@1100,2300 09/13/15 08/09/17 History


 


Clopidogrel [Plavix] 75 mg PO DAILY@1100 09/13/15 08/09/17 History


 


Ferrous Sulfate [Iron (65  mg PO DAILY@1100 09/13/15 08/09/17 History





Elemental)]    


 


Gabapentin [Neurontin] 100 mg PO TID@1100,1800,2300 09/13/15 08/09/17 History


 


HYDROcodone/APAP 7.5-325MG [Norco 1 tab PO DAILY PRN 09/13/15 08/09/17 History





7.5-325]    


 


Levothyroxine Sodium [Synthroid] 350 mcg PO DAILY@0500 09/13/15 08/09/17 History


 


Montelukast [Singulair] 10 mg PO HS@2000 09/13/15 08/09/17 History


 


Cinacalcet [Sensipar] 30 mg PO TU@1100 17 History


 


Acetaminophen Tab [Tylenol Tab] 650 mg PO Q4H PRN 17 History


 


Albuterol Sulfate [Proair Hfa] 2 puff INHALATION RT-QID PRN 17 

History


 


Bisacodyl 10 mg RECTAL DAILY PRN 17 History


 


Budesonide [Pulmicort] 0.5 mg INHALATION RT-BID@20 17 History


 


Calcium Acetate [Phoslo] 667 mg PO TID@0700,1200,1700 17 History


 


Famotidine [Pepcid] 20 mg PO BID@1100,1700 17 History


 


Folic Acid-Vit B Complex-Vit C 1 cap PO DAILY@1100 17 History





[Nephrocaps]    


 


HYDROcodone/APAP 7.5-325MG [Norco 1 tab PO TID@0500,1300,2100 17 

History





7.5-325]    


 


Hydrocortisone [Anusol-Hc] 1 applic RECTAL BID PRN 17 History


 


Insulin Detemir [Levemir] 65 unit SQ DAILY@1700 17 History


 


Insulin Lispro [humaLOG Kwikpen] 15 unit SQ AC-TID@07,12,17 17 

History


 


Ipratropium-Albuterol Nebulize 1 ml INHALATION RT-QID PRN 17 

History





[Duoneb 0.5 mg-3 mg/3 ml Soln]    


 


L.acidoph,Paracasei, B.lactis 1 cap PO DAILY@1100 17 History





[Probiotic]    


 


Latanoprost [Xalatan 0.005%] 1 drop BOTH EYES HS@17 History


 


Levofloxacin [Levaquin] 500 mg PO DAILY 17 History


 


Omalizumab [Xolair] 150 mg SQ Q30D 17 History


 


Sennosides [Senna] 8.6 mg PO DAILY@1100 17 History


 


Sodium Bicarbonate Tab 650 mg PO BID@1100,17 History


 


diphenhydrAMINE HCL [Benadryl] 25 mg PO Q8H PRN 17 History


 


hydrALAZINE HCL [Apresoline] 50 mg PO TID@0500,1100,1900 17 

History











 Allergies











Allergy/AdvReac Type Severity Reaction Status Date / Time


 


erythromycin base Allergy  Unknown Verified 17 18:58





[Erythromycin Base]     


 


NSAIDS (Non-Steroidal Allergy  Unknown Verified 17 18:58





Anti-Inflamma     














Physical Exam


Vitals: 


 Vital Signs











  Temp Pulse Resp BP Pulse Ox


 


 08/10/17 08:00  102.6 F H   18   97


 


 08/10/17 04:00    18  


 


 08/10/17 01:22  98.2 F  79  18  155/56  96


 


 08/10/17 00:16  99 F  90  20  168/84  97


 


 17 23:18    20  


 


 17 23:09  102.9 F H  88  20  147/64  98








 Intake and Output











 08/09/17 08/10/17 08/10/17





 22:59 06:59 14:59


 


Intake Total  0 0


 


Output Total  1 


 


Balance  -1 0


 


Intake:   


 


  IV  0 


 


    ns  0 


 


  Oral   0


 


Output:   


 


  Stool  1 


 


Other:   


 


  Voiding Method  Indwelling Catheter Indwelling Catheter


 


  Weight  132 kg 














PHYSICAL EXAMINATION: 





HEENT: Head is atraumatic, normocephalic.  Pupils equal, round.  Neck is 

supple.  There is no elevated jugular venous pressure.





HEART EXAMINATION: Heart S1, S2 normal.  No murmur or gallop heard.





CHEST EXAMINATION: Lungs reveal scattered coarse rhonchi throughout, fine 

expiratory wheezing heard.





ABDOMEN:  Soft, obese, nontender. Bowel sounds are heard. No organomegaly noted.


 


EXTREMITIES:1+ peripheral pulses with  evidence of peripheral edema and no calf 

tenderness noted.





NEUROLOGIC patient is awake, alert and oriented -3.


 


.


 








Results


 Cardiac Enzymes











  08/10/17 08/10/17 Range/Units





  01:47 07:32 


 


CK-MB (CK-2)  0.3  <0.2  (0.0-2.4)  ng/mL


 


Troponin I  0.703 H*  0.529 H*  (0.000-0.034)  ng/mL








 Lipids











  08/10/17 Range/Units





  01:47 


 


Triglycerides  143  (<150)  mg/dL


 


Cholesterol  125  (<200)  mg/dL


 


HDL Cholesterol  52  (40-60)  mg/dL








 Current Medications











Generic Name Dose Route Start Last Admin





  Trade Name Freq  PRN Reason Stop Dose Admin


 


Albuterol/Ipratropium  3 ml  17 23:40  





  Duoneb 0.5 Mg-3 Mg/3 Ml Soln  INHALATION   





  RT-Q4H PRN   





  Shortness Of Breath Or Wheezing   


 


Aspirin  325 mg  08/10/17 09:00  





  Aspirin  PO   





  DAILY [facility]   


 


Insulin Human Lispro  15 unit  08/10/17 07:00  





  Humalog  SQ   





  AC-TID@ [facility]   


 


Insulin Human Lispro  0 unit  08/10/17 07:30  





  Humalog  SQ   





  ACHS [facility]   





  Protocol   


 


Morphine Sulfate  4 mg  17 23:40  08/10/17 00:10





  Morphine Sulfate (Inj)  IV   4 mg





  Q5M PRN   Administration





  Chest Pain   


 


Nitroglycerin  0.4 mg  17 23:40  





  Nitrostat  SUBLINGUAL   





  Q5M PRN   





  Chest Pain   








 Intake and Output











 08/09/17 08/10/17 08/10/17





 22:59 06:59 14:59


 


Intake Total  0 0


 


Output Total  1 


 


Balance  -1 0


 


Intake:   


 


  IV  0 


 


    ns  0 


 


  Oral   0


 


Output:   


 


  Stool  1 


 


Other:   


 


  Voiding Method  Indwelling Catheter Indwelling Catheter


 


  Weight  132 kg 














EKG Interpretations (text)





EKG shows a normal sinus rhythm with occasional PVC, no acute changes noted.





Assessment and Plan


Plan: 


Assessment and plan


#1 febrile illness, blood cultures pending.  Temperature 102.9 on arrival here


#2 elevated BNP, mild congestive cardiac failure, diastolic acute on chronic.  

Most recent echo was performed in March of this year which revealed an ejection 

fraction of 55-60%.


#3 abnormal troponin, patient denies having any chest discomfort, could be 

secondary to oxygen supply and demand mismatch and abnormal renal function.


#4 hypertension


Number 5 hyperlipidemia


#6 diabetes


#7 prior CVA 2


#8 COPD


#9 sleep apnea


#10 obesity


#11 hypothyroidism


#12 renal disease








Plan


We will repeat an echocardiogram with Doppler study here.  We will also obtain 

a chest x-ray.  Repeat BNP level.  We will resume the patient's Coreg, Lipitor, 

further recommendations to follow.








DNP note has been reviewed, I agree with a documented findings and plan of 

care.  Patient was seen and examined.

## 2017-08-10 NOTE — XR
EXAMINATION TYPE: XR chest 1V portable

 

DATE OF EXAM: 8/10/2017

 

COMPARISON: Prior chest x-ray 4/6/2017 and chest x-ray from outside institution 8/9/2017

 

HISTORY: Congestive heart failure, shortness of breath

 

TECHNIQUE: Single frontal view of the chest is obtained.

 

FINDINGS:  Right jugular central venous catheter is present with the right atrium. There is no eviden
t pneumothorax or pleural effusion. The heart is enlarged. Some improvement in aeration is noted in t
he left lung as compared to April exam, bandlike area of increased attenuation present likely in the 
lingula or left lower lobe. Central vascularity is prominent.

 

IMPRESSION:  There may be a component of volume overload, pulmonary venous hypertension and interstit
ial edema.

## 2017-08-10 NOTE — P.NPCON
History of Present Illness





- Reason for Consult


end stage renal disease





- History of Present Illness





Reason for consultation: End-stage renal disease





History of present illness:


Patient is a 56-year-old female seen in renal consultation for chronic renal 

failure.  Patient was started on hemodialysis last month after developing ATN 

from sepsis due to pneumonia.  Patient had underlying chronic kidney disease 

stage IV secondary to diabetic kidney disease and solitary left kidney to begin 

with.  She currently has a permacath in place.  Patient completed her 

hemodialysis yesterday.  She was noted to be weak and also complaining of chest 

pain.  She went to [facility] and was then transferred here for further 

cardiac care.  She is noted to have a fever of 102F.  Patient does admit to a 

cough but denies any sputum production.  No vomiting or diarrhea.  She makes 

minimal urine.  Denies shortness of breath.  She does have underlying COPD.  

Cultures have been sent and she is currently maintained on IV antibiotics.  No 

obvious drainage noted from permacath site.





Vital signs are stable.


General: The patient appeared well nourished and normally developed. 


HEENT: Head exam is unremarkable. Neck is without jugular venous distension.


LUNGS: Lungs are clear to auscultation and percussion. Breath sounds decreased.


HEART: Rate and Rhythm are regular. First and second heart sounds normal. No 

murmurs, rubs or gallops. 


ABDOMEN: Abdominal exam reveals normal bowel sounds. Non-tender and non-

distended. No evidence of peritonitis.


EXTREMITITES: No clubbing, cyanosis, or edema.





Past Medical History


Past Medical History: Asthma, Heart Failure, COPD, CVA/TIA, Diabetes Mellitus, 

Eye Disorder, Hyperlipidemia, Hypertension, Osteoarthritis (OA), Pneumonia, 

Renal Disease, Sleep Apnea/CPAP/BIPAP, Thyroid Disorder


Additional Past Medical History / Comment(s): sleep apnea, neuropathy, patient 

has one kidney, Stage III kidney failure


History of Any Multi-Drug Resistant Organisms: None Reported


Past Surgical History: Bariatric Surgery, Hernia Repair, Orthopedic Surgery, 

Tonsillectomy


Additional Past Surgical History / Comment(s): rt kidney removed, Rt knee 

replacement


Past Anesthesia/Blood Transfusion Reactions: No Reported Reaction


Additional Past Anesthesia/Blood Transfusion Reaction / Comment(s): pt states 

she had a blood transfusion [facility], "it made her itchy and they 

gave bendyl"


Past Psychological History: Anxiety, Depression


Smoking Status: Former smoker


Past Alcohol Use History: None Reported


Past Drug Use History: None Reported





- Past Family History


  ** Mother


Additional Family Medical History / Comment(s): skin CA, CHF, DM, thyroid 

disorder, HTN.





  ** Father


Family Medical History: Diabetes Mellitus, Hypertension, Thyroid Disorder


Additional Family Medical History / Comment(s): CHF.  Pt's father is .





Medications and Allergies


 Home Medications











 Medication  Instructions  Recorded  Confirmed  Type


 


ALPRAZolam [Xanax] 0.25 mg PO TID PRN 09/13/15 08/09/17 History


 


Atorvastatin [Lipitor] 80 mg PO HS@2100 09/13/15 08/09/17 History


 


Carvedilol [Coreg] 12.5 mg PO BID@1100,2300 09/13/15 08/09/17 History


 


Clopidogrel [Plavix] 75 mg PO DAILY@1100 09/13/15 08/09/17 History


 


Ferrous Sulfate [Iron (65  mg PO DAILY@1100 09/13/15 08/09/17 History





Elemental)]    


 


Gabapentin [Neurontin] 100 mg PO TID@1100,1800,2300 09/13/15 08/09/17 History


 


HYDROcodone/APAP 7.5-325MG [Norco 1 tab PO DAILY PRN 09/13/15 08/09/17 History





7.5-325]    


 


Levothyroxine Sodium [Synthroid] 350 mcg PO DAILY@0500 09/13/15 08/09/17 History


 


Montelukast [Singulair] 10 mg PO HS@2000 09/13/15 08/09/17 History


 


Cinacalcet [Sensipar] 30 mg PO TU@1100 17 History


 


Acetaminophen Tab [Tylenol Tab] 650 mg PO Q4H PRN 17 History


 


Albuterol Sulfate [Proair Hfa] 2 puff INHALATION RT-QID PRN 17 

History


 


Bisacodyl 10 mg RECTAL DAILY PRN 17 History


 


Budesonide [Pulmicort] 0.5 mg INHALATION RT-BID@11,20 17 History


 


Calcium Acetate [Phoslo] 667 mg PO TID@0700,1200,1700 17 History


 


Famotidine [Pepcid] 20 mg PO BID@1100,1700 17 History


 


Folic Acid-Vit B Complex-Vit C 1 cap PO DAILY@1100 17 History





[Nephrocaps]    


 


HYDROcodone/APAP 7.5-325MG [Norco 1 tab PO TID@0500,1300,2100 17 

History





7.5-325]    


 


Hydrocortisone [Anusol-Hc] 1 applic RECTAL BID PRN 17 History


 


Insulin Detemir [Levemir] 65 unit SQ DAILY@1700 17 History


 


Insulin Lispro [humaLOG Kwikpen] 15 unit SQ AC-TID@07,,17 

History


 


Ipratropium-Albuterol Nebulize 1 ml INHALATION RT-QID PRN 17 

History





[Duoneb 0.5 mg-3 mg/3 ml Soln]    


 


L.acidoph,Paracasei, B.lactis 1 cap PO DAILY@1100 17 History





[Probiotic]    


 


Latanoprost [Xalatan 0.005%] 1 drop BOTH EYES HS@17 History


 


Levofloxacin [Levaquin] 500 mg PO DAILY 17 History


 


Omalizumab [Xolair] 150 mg SQ Q30D 17 History


 


Sennosides [Senna] 8.6 mg PO DAILY@1100 17 History


 


Sodium Bicarbonate Tab 650 mg PO BID@1100,17 History


 


diphenhydrAMINE HCL [Benadryl] 25 mg PO Q8H PRN 17 History


 


hydrALAZINE HCL [Apresoline] 50 mg PO TID@0500,1100,1900 17 

History











 Allergies











Allergy/AdvReac Type Severity Reaction Status Date / Time


 


erythromycin base Allergy  Unknown Verified 17 18:58





[Erythromycin Base]     


 


NSAIDS (Non-Steroidal Allergy  Unknown Verified 17 18:58





Anti-Inflamma     














Physical Exam


Vitals: 


 Vital Signs











  Temp Pulse Resp BP BP Pulse Ox


 


 08/10/17 13:04  98 F     


 


 08/10/17 12:00    18   


 


 08/10/17 11:45  102 F H   18   121/56  95


 


 08/10/17 08:00  102.6 F H   18   166/69  97


 


 08/10/17 04:00    18   


 


 08/10/17 01:22  98.2 F  79  18  155/56   96


 


 08/10/17 00:16  99 F  90  20  168/84   97


 


 17 23:18    20   


 


 17 23:09  102.9 F H  88  20  147/64   98








 Intake and Output











 08/09/17 08/10/17 08/10/17





 22:59 06:59 14:59


 


Intake Total  0 0


 


Output Total  1 


 


Balance  -1 0


 


Intake:   


 


  IV  0 


 


    ns  0 


 


  Oral   0


 


Output:   


 


  Stool  1 


 


Other:   


 


  Voiding Method  Indwelling Catheter Indwelling Catheter


 


  Weight  132 kg 














Results





- Lab Results


 Most recent lab results











Calcium  9.3 mg/dL (8.4-10.2)   08/10/17  09:33    














 08/10/17 09:33





 08/10/17 09:33





Assessment and Plan


Plan: 





Assessment:


#1.  Chronic kidney disease stage IV secondary to solitary left kidney and 

diabetic kidney disease progressed now likely to end-stage renal disease from 

sepsis in 2017.  Currently hemodialysis dependent and maintained on a 

 schedule via permacath.


#2.  Anemia of chronic kidney disease.  Rule out iron deficiency.


#3.  Diastolic CHF.


#4.  Fever.  Patient does have a permacath and need to make sure that line 

sepsis is not the cause.


#5.  Insulin-dependent diabetes mellitus.





Plan:


Hemodialysis tomorrow with goal 3 L ultrafiltration.


Check iron studies.


Start Aranesp.


Check phosphorus level.


Follow-up cultures.


Also check a set of culture from permacath.


Maintain IV antibiotics.





Sedation.  I will continue to follow the patient with you during her hospital 

stay.

## 2017-08-10 NOTE — CT
EXAMINATION TYPE: CT abdomen pelvis wo con

 

DATE OF EXAM: 8/10/2017

 

COMPARISON: NONE

 

HISTORY: Fever, abdominal pain

 

CT DLP: 2504.4 mGycm. Automated exposure control for dose reduction was used.

 

TECHNIQUE:  Helical acquisition of images was performed from the lung bases through the pelvis.

 

FINDINGS: 

LUNG BASES: No significant abnormality is appreciated.

LIVER/GB: No significant abnormality is appreciated.

PANCREAS: No significant abnormality is seen.

SPLEEN: No significant abnormality is seen.

ADRENALS: No significant abnormality is seen.

KIDNEYS: No significant abnormality is seen.

FREE AIR:  No free air is visualized

RETROPERITONEAL ADENOPATHY:  None visualized

REPRODUCTIVE ORGANS: No significant abnormality is seen

URINARY BLADDER:  No significant abnormality is seen.

PELVIC ADENOPATHY:  None visualized.

OSSEOUS STRUCTURES:  No significant abnormality is seen.

BOWEL: Gastric lap band is in place. Hollow viscera examination is unremarkable.

 

IMPRESSION: 

NO ACUTE PROCESS. NO FINDINGS TO CORRELATE WITH THE PATIENT'S FEVER AND RIGHT UPPER QUADRANT PAIN.

## 2017-08-11 LAB
ANION GAP SERPL CALC-SCNC: 11 MMOL/L
BUN SERPL-SCNC: 38 MG/DL (ref 7–17)
CALCIUM SPEC-MCNC: 9.4 MG/DL (ref 8.4–10.2)
CHLORIDE SERPL-SCNC: 93 MMOL/L (ref 98–107)
CO2 SERPL-SCNC: 26 MMOL/L (ref 22–30)
GLUCOSE BLD-MCNC: 154 MG/DL (ref 75–99)
GLUCOSE BLD-MCNC: 172 MG/DL (ref 75–99)
GLUCOSE BLD-MCNC: 202 MG/DL (ref 75–99)
GLUCOSE BLD-MCNC: 249 MG/DL (ref 75–99)
GLUCOSE SERPL-MCNC: 146 MG/DL (ref 74–99)
NON-AFRICAN AMERICAN GFR(MDRD): 14
POTASSIUM SERPL-SCNC: 4.2 MMOL/L (ref 3.5–5.1)
SODIUM SERPL-SCNC: 130 MMOL/L (ref 137–145)

## 2017-08-11 RX ADMIN — ATORVASTATIN CALCIUM SCH MG: 80 TABLET, FILM COATED ORAL at 22:10

## 2017-08-11 RX ADMIN — BUDESONIDE SCH: 0.5 INHALANT ORAL at 19:30

## 2017-08-11 RX ADMIN — Medication SCH MG: at 11:56

## 2017-08-11 RX ADMIN — MORPHINE SULFATE PRN MG: 4 INJECTION, SOLUTION INTRAMUSCULAR; INTRAVENOUS at 08:43

## 2017-08-11 RX ADMIN — Medication SCH MG: at 17:19

## 2017-08-11 RX ADMIN — LEVOTHYROXINE SODIUM SCH MCG: 100 TABLET ORAL at 06:01

## 2017-08-11 RX ADMIN — BUDESONIDE SCH: 0.5 INHALANT ORAL at 07:55

## 2017-08-11 RX ADMIN — MORPHINE SULFATE PRN MG: 4 INJECTION, SOLUTION INTRAMUSCULAR; INTRAVENOUS at 18:52

## 2017-08-11 RX ADMIN — CARVEDILOL SCH MG: 12.5 TABLET, FILM COATED ORAL at 08:28

## 2017-08-11 RX ADMIN — SENNOSIDES SCH MG: 8.6 TABLET, FILM COATED ORAL at 08:29

## 2017-08-11 RX ADMIN — Medication SCH MG: at 07:08

## 2017-08-11 RX ADMIN — ASPIRIN 325 MG ORAL TABLET SCH MG: 325 PILL ORAL at 08:28

## 2017-08-11 RX ADMIN — INSULIN DETEMIR SCH: 100 INJECTION, SOLUTION SUBCUTANEOUS at 17:22

## 2017-08-11 RX ADMIN — INSULIN LISPRO SCH UNIT: 100 INJECTION, SOLUTION INTRAVENOUS; SUBCUTANEOUS at 17:18

## 2017-08-11 RX ADMIN — GABAPENTIN SCH MG: 100 CAPSULE ORAL at 17:18

## 2017-08-11 RX ADMIN — CARVEDILOL SCH MG: 12.5 TABLET, FILM COATED ORAL at 22:11

## 2017-08-11 RX ADMIN — FUROSEMIDE SCH MG: 10 INJECTION, SOLUTION INTRAMUSCULAR; INTRAVENOUS at 08:28

## 2017-08-11 RX ADMIN — INSULIN LISPRO SCH UNIT: 100 INJECTION, SOLUTION INTRAVENOUS; SUBCUTANEOUS at 11:57

## 2017-08-11 RX ADMIN — GABAPENTIN SCH MG: 100 CAPSULE ORAL at 08:28

## 2017-08-11 RX ADMIN — INSULIN LISPRO SCH UNIT: 100 INJECTION, SOLUTION INTRAVENOUS; SUBCUTANEOUS at 17:19

## 2017-08-11 RX ADMIN — INSULIN LISPRO SCH UNIT: 100 INJECTION, SOLUTION INTRAVENOUS; SUBCUTANEOUS at 07:09

## 2017-08-11 RX ADMIN — INSULIN LISPRO SCH UNIT: 100 INJECTION, SOLUTION INTRAVENOUS; SUBCUTANEOUS at 22:17

## 2017-08-11 RX ADMIN — INSULIN LISPRO SCH UNIT: 100 INJECTION, SOLUTION INTRAVENOUS; SUBCUTANEOUS at 07:08

## 2017-08-11 RX ADMIN — FUROSEMIDE SCH MG: 10 INJECTION, SOLUTION INTRAMUSCULAR; INTRAVENOUS at 22:10

## 2017-08-11 RX ADMIN — MORPHINE SULFATE PRN MG: 4 INJECTION, SOLUTION INTRAMUSCULAR; INTRAVENOUS at 03:54

## 2017-08-11 RX ADMIN — GABAPENTIN SCH MG: 100 CAPSULE ORAL at 22:11

## 2017-08-11 RX ADMIN — ACETAMINOPHEN SCH MLS/HR: 10 INJECTION, SOLUTION INTRAVENOUS at 06:32

## 2017-08-11 NOTE — CONS
DATE OF SERVICE:  08/10/2017



REASON FOR CONSULTATION:  Fever.



HISTORY OF PRESENT ILLNESS:  The patient is a 56-year-old  female with 
a past medical history of end-stage renal disease on hemodialysis through a 
right chest wall pulmonary catheter.  Apparently, the patient developed a fever 
yesterday and after dialysis the patient is feeling weak and tired for which 
the patient was sent to the [facility] for evaluation.  Patient did have 
a fever of 102 degrees Fahrenheit there.  He subsequently was transferred to 
the [facility] for further management.  In the ER, the patient did have 
a fever of 102.  Patient did have a chest x-ray which was suggestive of a 
volume overload.  Patient has been complaining of some shortness of breath, 
very minimal cough and not bringing up any sputum.  No significant nausea, 
vomiting or any diarrhea with a small amount of urine.  Subsequently, the 
patient was on cefepime and vancomycin and ID was consulted for further 
recommendation and antibiotic therapy.  Patient overall is not a very good 
historian so most of the information has been obtained from the nursing staff 
and review of her chart.  



REVIEW OF SYSTEMS:  Could not be reliably obtained but the positive points have 
been reported in the HPI.



PAST MEDICAL HISTORY:  Significant for heart failure, COPD, CVA, TIA, diabetes 
mellitus, hypertension, hyperlipidemia, osteoarthritis and pneumonia, end-stage 
renal disease on dialysis.  



PAST SURGICAL HISTORY:  Significant for hernia repair, tonsillectomy, right 
knee replacement, right kidney removed, bariatric surgery and a right chest 
wall pulmonary catheter placement.



SOCIAL HISTORY:  Former smoker, no drinking or drug use.



FAMILY HISTORY:  Mother had history of skin cancer, diabetes and cardiovascular 
heart failure.  Father had history of diabetes and hypertension.



Allergies to NON-STEROIDAL ANTI-INFLAMMATORY MEDICATION and ERYTHROMYCIN. 



Medications the patient currently on:  Tylenol, DuoNeb, Xanax, aspirin, Lipitor
, Pulmicort, PhosLo, Coreg, cefepime 1 q.12, Aranesp, Lasix and Neurontin, 
hydralazine, Levemir, Humalog, Synthroid, vancomycin Pharmacy to dose, morphine 
sulfate, Nitrostat, Senokot.  



On examination, her blood pressure 154/67 with a pulse of 82, temperature of 
100.3 with a T-max of 102.9.  She is 93% on 4 L nasal cannula.

General description is a middle-aged female lying in bed in no distress.  No 
tachypnea or accessory muscle for respiration use.  

HEENT examination shows slight pallor, no scleral icterus.  Oral mucosa is dry.
  Neck shows no thyromegaly.  Lungs, unlabored breathing, clear to auscultation 
anteriorly, no wheeze or crackle. 

HEART: S1, S2, regular rate and rhythm.

ABDOMEN:  Soft, slightly distended.  (    ) No guarding, no rigidity, CBD is ( 
  ).

SKIN EXAMINATION:  No rash or mass palpable.  A right chest wall Pulmo-Cath in 
the (    ) with no evidence of any abdominal infection.

NEUROLOGICAL:  Patient awake, slightly lethargic but easily arousable.  (    ) 
are normal.



LABS:  Hemoglobin is 8.1, white count 10.3 with a BUN of 26, creatinine of 
3.15.  Chest report negative for any pneumonia.  



DIAGNOSTIC IMPRESSION AND PLAN:  Patient with a fever.  Overall (    ).  It 
started when the patient did have dialysis with the possible pulmonary catheter 
infection and need to be stopped on the left.  Patient also noticed to be 
tender in the right upper quadrant area, underlying intra-abdominal pathology 
not entirely excluded.  Patient without evidence of any cellulitis, joint 
swelling and no evidence of pneumonia.  Clinically are on the chest x-ray.



PLAN:

1.  We will obtain a CT of the abdomen and pelvis with oral contrast only.

2.  Continue patient on vancomycin, switch to Fortaz.

3.  Depending on the clinical response and culture (    ).



Thank you for this consultation, will follow this patient along with you.  
KRIS

## 2017-08-12 LAB
ANION GAP SERPL CALC-SCNC: 9 MMOL/L
BUN SERPL-SCNC: 24 MG/DL (ref 7–17)
CALCIUM SPEC-MCNC: 9 MG/DL (ref 8.4–10.2)
CELLS COUNTED: 200
CH: 26.1
CHCM: 29.5
CHLORIDE SERPL-SCNC: 96 MMOL/L (ref 98–107)
CO2 SERPL-SCNC: 28 MMOL/L (ref 22–30)
ERYTHROCYTE [DISTWIDTH] IN BLOOD BY AUTOMATED COUNT: 2.92 M/UL (ref 3.8–5.4)
ERYTHROCYTE [DISTWIDTH] IN BLOOD: 18.2 % (ref 11.5–15.5)
GLUCOSE BLD-MCNC: 125 MG/DL (ref 75–99)
GLUCOSE BLD-MCNC: 142 MG/DL (ref 75–99)
GLUCOSE BLD-MCNC: 163 MG/DL (ref 75–99)
GLUCOSE BLD-MCNC: 171 MG/DL (ref 75–99)
GLUCOSE BLD-MCNC: 241 MG/DL (ref 75–99)
GLUCOSE SERPL-MCNC: 113 MG/DL (ref 74–99)
HCT VFR BLD AUTO: 26 % (ref 34–46)
HDW: 3.47
HGB BLD-MCNC: 7.9 GM/DL (ref 11.4–16)
MCH RBC QN AUTO: 27 PG (ref 25–35)
MCHC RBC AUTO-ENTMCNC: 30.4 G/DL (ref 31–37)
MCV RBC AUTO: 88.8 FL (ref 80–100)
NON-AFRICAN AMERICAN GFR(MDRD): 21
POTASSIUM SERPL-SCNC: 3.9 MMOL/L (ref 3.5–5.1)
SODIUM SERPL-SCNC: 133 MMOL/L (ref 137–145)
WBC # BLD AUTO: 6.4 K/UL (ref 3.8–10.6)
WBC (PEROX): 6.68

## 2017-08-12 RX ADMIN — MORPHINE SULFATE PRN MG: 4 INJECTION, SOLUTION INTRAMUSCULAR; INTRAVENOUS at 20:54

## 2017-08-12 RX ADMIN — BUDESONIDE SCH: 0.5 INHALANT ORAL at 20:54

## 2017-08-12 RX ADMIN — ATORVASTATIN CALCIUM SCH MG: 80 TABLET, FILM COATED ORAL at 20:50

## 2017-08-12 RX ADMIN — Medication SCH MG: at 19:33

## 2017-08-12 RX ADMIN — DOCUSATE SODIUM PRN MG: 100 CAPSULE, LIQUID FILLED ORAL at 20:50

## 2017-08-12 RX ADMIN — LEVOTHYROXINE SODIUM SCH MCG: 100 TABLET ORAL at 06:11

## 2017-08-12 RX ADMIN — GABAPENTIN SCH MG: 100 CAPSULE ORAL at 11:00

## 2017-08-12 RX ADMIN — INSULIN DETEMIR SCH UNIT: 100 INJECTION, SOLUTION SUBCUTANEOUS at 19:31

## 2017-08-12 RX ADMIN — INSULIN LISPRO SCH UNIT: 100 INJECTION, SOLUTION INTRAVENOUS; SUBCUTANEOUS at 07:43

## 2017-08-12 RX ADMIN — ASPIRIN 325 MG ORAL TABLET SCH MG: 325 PILL ORAL at 08:49

## 2017-08-12 RX ADMIN — INSULIN LISPRO SCH UNIT: 100 INJECTION, SOLUTION INTRAVENOUS; SUBCUTANEOUS at 22:59

## 2017-08-12 RX ADMIN — FUROSEMIDE SCH MG: 10 INJECTION, SOLUTION INTRAMUSCULAR; INTRAVENOUS at 08:49

## 2017-08-12 RX ADMIN — INSULIN LISPRO SCH UNIT: 100 INJECTION, SOLUTION INTRAVENOUS; SUBCUTANEOUS at 13:01

## 2017-08-12 RX ADMIN — INSULIN LISPRO SCH: 100 INJECTION, SOLUTION INTRAVENOUS; SUBCUTANEOUS at 18:40

## 2017-08-12 RX ADMIN — CARVEDILOL SCH MG: 12.5 TABLET, FILM COATED ORAL at 13:00

## 2017-08-12 RX ADMIN — INSULIN LISPRO SCH UNIT: 100 INJECTION, SOLUTION INTRAVENOUS; SUBCUTANEOUS at 19:32

## 2017-08-12 RX ADMIN — MORPHINE SULFATE PRN MG: 4 INJECTION, SOLUTION INTRAMUSCULAR; INTRAVENOUS at 00:04

## 2017-08-12 RX ADMIN — GABAPENTIN SCH MG: 100 CAPSULE ORAL at 22:59

## 2017-08-12 RX ADMIN — BUDESONIDE SCH: 0.5 INHALANT ORAL at 07:22

## 2017-08-12 RX ADMIN — INSULIN LISPRO SCH: 100 INJECTION, SOLUTION INTRAVENOUS; SUBCUTANEOUS at 07:43

## 2017-08-12 RX ADMIN — CARVEDILOL SCH MG: 12.5 TABLET, FILM COATED ORAL at 22:59

## 2017-08-12 RX ADMIN — GABAPENTIN SCH MG: 100 CAPSULE ORAL at 19:32

## 2017-08-12 RX ADMIN — Medication SCH MG: at 08:49

## 2017-08-12 RX ADMIN — MORPHINE SULFATE PRN MG: 4 INJECTION, SOLUTION INTRAMUSCULAR; INTRAVENOUS at 14:49

## 2017-08-12 RX ADMIN — FUROSEMIDE SCH MG: 10 INJECTION, SOLUTION INTRAMUSCULAR; INTRAVENOUS at 20:50

## 2017-08-12 RX ADMIN — INSULIN LISPRO SCH UNIT: 100 INJECTION, SOLUTION INTRAVENOUS; SUBCUTANEOUS at 13:00

## 2017-08-12 RX ADMIN — Medication SCH MG: at 13:00

## 2017-08-12 RX ADMIN — SENNOSIDES SCH MG: 8.6 TABLET, FILM COATED ORAL at 13:00

## 2017-08-12 NOTE — PN
DATE OF SERVICE:  08/11/2017



This 56-year-old woman who was admitted with features of fever, is being 
closely monitored at this time.  The patient had bacteremia and the blood 
culture and the gram stain shows gram positive cocci in chains and catheter 
associated sepsis suspected.  Patient admitted for further evaluation and 
treatment at this time.  There is no history of any fevers or rigors.  No 
history of headache, loss of consciousness, seizures. 



PAST MEDICAL HISTORY:  History of asthma, history of COPD, CVA, TIA, diabetes 
mellitus.



CURRENT MEDICATIONS:

1.  DuoNeb q.i.d. and p.r.n.

2.  Xanax 0.25 mg.

3.  Lipitor.

4.  Pulmicort. 

5.  PhosLo.

6.  Ceftazidime 2 gm IV q8.

7.  Neurontin.

9.  Humalog.

10. Synthroid.

11. Vancomycin 

12.  P.r.n. medication.



REVIEW OF SYSTEMS:

CARDIOVASCULAR:  No angina or palpitation.

RESPIRATORY:  As mentioned earlier.

GI:  No nausea..

:  As mentioned earlier..

NERVOUS SYSTEM:  No numbness or weakness.



PHYSICAL EXAMINATION:  Alert and oriented x3.  Pulse 62, blood pressure 113/56, 
respirations 18, temperature 97.8, pulse ox 98% on 4 liters.

HEENT:  Conjunctivae normal.  Oral mucosa moist.

NECK:  No jugular venous distention.  No thyroid enlargement, no lymph node 
enlargement.

CARDIOVASCULAR:  S1/.S2 muffled.

RESPIRATIONS:  Diminished breath sounds, especially at the bases. Scattered 
rhonchi and crackles.  

ABDOMEN:  Soft, nontender.  No mass palpable.

LEGS:  No edema, no swelling.

NERVOUS SYSTEM:  Higher function as mentioned. Moves all four limbs.  Mild 
diffuse weakness.  No focal deficits.



LABS:  WBC 10, hemoglobin 8.1.  Creatinine 3.40.



ASSESSMENT:

1.  Fever with possible sepsis and catheter associated bacteremia, change.

2.  End stage renal disease on hemodialysis.

3.  Anemia secondary to .

4.  Atypical chest pain.

5.  Hypertension.

7.  Obesity hypoventilation syndrome.

8.  Diabetes mellitus type 2.

9.  Congestive heart failure with chronic diastolic dysfunction.

10. Hypothyroidism.



RECOMMENDATIONS AND DISCUSSION:  This 56-year-old woman presented with multiple 
complex medical issues, will monitor the patient closely, continue current 
medication, continue symptomatic treatment, continue broad spectrum IV 
antibiotics, repeat cultures. Also, recommend repeat labs and as well as 
nephrology, infectious disease and cardiology consultations.  Guarded prognosis 
because of multiple complex medical issues.  Further recommendations to follow.
  See orders for details.
MTDD

## 2017-08-12 NOTE — PN
The patient is seen for follow-up for end stage renal disease. She was admitted 
to the hospital yesterday with a fever of 102 degrees.  Her blood cultures are 
growing gram positive cocci.  The patient does have Perm-A-Cath for dialysis. 
She has received Vancomycin and she states she is feeling better. 



On examination today, blood pressure is 118/58.    Heart rate is 76 per minute.
   She is afebrile. 



Examination of the heart, S1, S2.   Examination of the lungs bilateral 
decreased breath sounds at the bases.  Abdomen is soft.  Obese.  Examination of 
the lower extremities shows chronic skin changes, trace edema,  bilaterally.    
CNS exam is grossly intact.   



Labs shows UA showing WBC about 100, potassium 4.2, sodium 130.



ASSESSMENT: 

1.   End stage renal disease on hemodialysis on a Monday, Wednesday, Friday 
schedule.  The patient will be dialyzed today.  

2.   Pyuria, rule out urinary tract infection.

3.   Fever, rule out line infection . The patients blood cultures are growing 
gram positive cocci.  She has received Vancomycin. We are awaiting final 
susceptibility and identification.   

4.   Cardiomyopathy.   

5.   Morbid obesity.  

  



PLAN:   Hemodialysis today.  Continue antibiotics.  Await final culture 
results.   
KRIS

## 2017-08-12 NOTE — P.PN
Subjective





Patient is seen in follow-up for end-stage renal disease.  She is maintained on 

hemodialysis on a Monday Wednesday Friday schedule via right chest permacath.  

Patient presented with fever.  Her blood cultures are positive for group D 

enterococcus.  Urine cultures positive for gram-negative bacilli.  Patient's 

currently resting in bed.  Denies any chest pain or shortness of breath.  She 

underwent hemodialysis yesterday.  No active complaints at this time.





Vital signs are stable.


General: The patient appeared well nourished and normally developed. 


HEENT: Head exam is unremarkable. Neck is without jugular venous distension.


LUNGS: Lungs are clear to auscultation and percussion. Breath sounds decreased.


HEART: Rate and Rhythm are regular. First and second heart sounds normal. No 

murmurs, rubs or gallops. 


ABDOMEN: Abdominal exam reveals normal bowel sounds. Non-tender and non-

distended. No evidence of peritonitis.


EXTREMITITES: No clubbing, cyanosis, or edema.











Objective





- Vital Signs


Vital signs: 


 Vital Signs











Temp  97.3 F L  08/12/17 08:00


 


Pulse  59 L  08/12/17 08:00


 


Resp  20   08/12/17 08:00


 


BP  136/62   08/12/17 08:00


 


Pulse Ox  100   08/12/17 08:00








 Intake & Output











 08/11/17 08/12/17 08/12/17





 18:59 06:59 18:59


 


Intake Total 237 360 


 


Output Total 1 350 


 


Balance 236 10 


 


Weight  132.5 kg 


 


Intake:   


 


  Oral 237 360 


 


Output:   


 


  Urine  350 


 


  Stool 1  


 


Other:   


 


  Voiding Method Indwelling Catheter Indwelling Catheter 














- Labs


CBC & Chem 7: 


 08/12/17 06:58





 08/12/17 06:58


Labs: 


 Abnormal Lab Results - Last 24 Hours (Table)











  08/11/17 08/11/17 08/11/17 Range/Units





  11:49 16:47 20:42 


 


RBC     (3.80-5.40)  m/uL


 


Hgb     (11.4-16.0)  gm/dL


 


Hct     (34.0-46.0)  %


 


MCHC     (31.0-37.0)  g/dL


 


RDW     (11.5-15.5)  %


 


Lymphocytes # (Manual)     (1.0-4.8)  k/uL


 


Monocytes # (Manual)     (0-1.0)  k/uL


 


Sodium     (137-145)  mmol/L


 


Chloride     ()  mmol/L


 


BUN     (7-17)  mg/dL


 


Creatinine     (0.52-1.04)  mg/dL


 


Glucose     (74-99)  mg/dL


 


POC Glucose (mg/dL)  249 H  202 H  154 H  (75-99)  mg/dL














  08/12/17 08/12/17 08/12/17 Range/Units





  06:58 06:58 07:19 


 


RBC  2.92 L    (3.80-5.40)  m/uL


 


Hgb  7.9 L    (11.4-16.0)  gm/dL


 


Hct  26.0 L    (34.0-46.0)  %


 


MCHC  30.4 L    (31.0-37.0)  g/dL


 


RDW  18.2 H    (11.5-15.5)  %


 


Lymphocytes # (Manual)  0.64 L    (1.0-4.8)  k/uL


 


Monocytes # (Manual)  1.28 H    (0-1.0)  k/uL


 


Sodium   133 L   (137-145)  mmol/L


 


Chloride   96 L   ()  mmol/L


 


BUN   24 H   (7-17)  mg/dL


 


Creatinine   2.37 H   (0.52-1.04)  mg/dL


 


Glucose   113 H   (74-99)  mg/dL


 


POC Glucose (mg/dL)    125 H  (75-99)  mg/dL








 Microbiology - Last 24 Hours (Table)











 08/10/17 17:20 Urine Culture - Preliminary





 Urine,Catheterized    Gram Neg Bacilli


 


 08/10/17 18:20 Blood Culture Gram Stain - Preliminary





 Blood Blood Culture - Preliminary





    Group D Enterococcus


 


 08/10/17 09:33 Blood Culture Gram Stain - Preliminary





 Blood Blood Culture - Preliminary





    Group D Enterococcus


 


 08/10/17 09:33 Blood Culture - Final





 Blood 














Assessment and Plan


Plan: 





Assessment:


#1.  Chronic kidney disease stage IV secondary to solitary left kidney and 

diabetic kidney disease progressed now progressed to end-stage renal disease 

from sepsis in July 2017.  Currently hemodialysis dependent and maintained on a 

Monday Wednesday Friday schedule via permacath.


#2.  Anemia of chronic kidney disease.  Iron deficiency noted.


#3.  Diastolic CHF.


#4.  Bacteremia with blood cultures positive for group D enterococcus.  Urine 

culture positive for gram-negative bacilli.  CT of the abdomen benign.


#5.  Insulin-dependent diabetes mellitus.





Plan:


Hemodialysis today with goal 3 L ultrafiltration.


Discontinue permacatheter after dialysis today.


Hold off on IV iron due to bacteremia.


Maintain Aranesp.


Antibiotics per infectious disease recommendations.


Repeat another set of blood cultures after dialysis catheter removed today.


We will need a new access once bacteremia cleared.


Case was discussed with infectious disease.

## 2017-08-12 NOTE — PN
DATE OF SERVICE:  08/11/2017



REASON FOR FOLLOW UP:  Fever and bacteremia. 



INTERVAL HISTORY: The patient overall fever pattern has improved. She is 
afebrile. She is slightly more awake, alert. She is breathing comfortably. 
Denies significant chest pain. Occasional cough. No abdominal pain, nausea, 
vomiting or any diarrhea. 



On examination: Blood pressure 113/53 with pulse 62. Temperature 98.3.  She is 
98% on CPAP. General description is a middle aged female lying in bed in no 
distress. 

RESPIRATORY: Unlabored breathing. Clear to auscultation anteriorly. 

HEART:  S1, S2 regular rate and rhythm. 

ABDOMEN:  Soft, no tenderness. 



LABS:   BUN 38 with creatinine 3.40.  Blood culture positive for Gram-positive 
cocci. 



DIAGNOSTIC IMPRESSION AND PLAN:



Patient admitted to the hospital with sepsis and now with gram positive 
bacteremia. Source is likely PermCath infection in patient with no other 
clinical focus of infection. She is currently on vancomycin, which will be 
continued. Blood culture will be repeated to make sure (    ) bacteremia. 
However, the patient will likely need removal of this PermCath in order to 
completely clear up this infection. 
KRIS

## 2017-08-13 LAB
ANION GAP SERPL CALC-SCNC: 10 MMOL/L
BUN SERPL-SCNC: 17 MG/DL (ref 7–17)
CALCIUM SPEC-MCNC: 9.2 MG/DL (ref 8.4–10.2)
CELLS COUNTED: 200
CH: 26.1
CHCM: 29.4
CHLORIDE SERPL-SCNC: 99 MMOL/L (ref 98–107)
CO2 SERPL-SCNC: 28 MMOL/L (ref 22–30)
ERYTHROCYTE [DISTWIDTH] IN BLOOD BY AUTOMATED COUNT: 2.9 M/UL (ref 3.8–5.4)
ERYTHROCYTE [DISTWIDTH] IN BLOOD: 18.1 % (ref 11.5–15.5)
GLUCOSE BLD-MCNC: 104 MG/DL (ref 75–99)
GLUCOSE BLD-MCNC: 116 MG/DL (ref 75–99)
GLUCOSE BLD-MCNC: 127 MG/DL (ref 75–99)
GLUCOSE BLD-MCNC: 180 MG/DL (ref 75–99)
GLUCOSE SERPL-MCNC: 88 MG/DL (ref 74–99)
HCT VFR BLD AUTO: 25.9 % (ref 34–46)
HDW: 3.46
HGB BLD-MCNC: 7.8 GM/DL (ref 11.4–16)
MCH RBC QN AUTO: 26.8 PG (ref 25–35)
MCHC RBC AUTO-ENTMCNC: 30.1 G/DL (ref 31–37)
MCV RBC AUTO: 89.3 FL (ref 80–100)
NON-AFRICAN AMERICAN GFR(MDRD): 24
POTASSIUM SERPL-SCNC: 3.7 MMOL/L (ref 3.5–5.1)
SODIUM SERPL-SCNC: 137 MMOL/L (ref 137–145)
WBC # BLD AUTO: 6 K/UL (ref 3.8–10.6)
WBC (PEROX): 5.92

## 2017-08-13 RX ADMIN — CARVEDILOL SCH MG: 12.5 TABLET, FILM COATED ORAL at 22:16

## 2017-08-13 RX ADMIN — GABAPENTIN SCH MG: 100 CAPSULE ORAL at 17:56

## 2017-08-13 RX ADMIN — FUROSEMIDE SCH MG: 10 INJECTION, SOLUTION INTRAMUSCULAR; INTRAVENOUS at 22:16

## 2017-08-13 RX ADMIN — FUROSEMIDE SCH MG: 10 INJECTION, SOLUTION INTRAMUSCULAR; INTRAVENOUS at 10:01

## 2017-08-13 RX ADMIN — Medication SCH MG: at 10:22

## 2017-08-13 RX ADMIN — CARVEDILOL SCH MG: 12.5 TABLET, FILM COATED ORAL at 13:01

## 2017-08-13 RX ADMIN — MORPHINE SULFATE PRN MG: 4 INJECTION, SOLUTION INTRAMUSCULAR; INTRAVENOUS at 11:23

## 2017-08-13 RX ADMIN — GABAPENTIN SCH MG: 100 CAPSULE ORAL at 13:01

## 2017-08-13 RX ADMIN — ASPIRIN 325 MG ORAL TABLET SCH MG: 325 PILL ORAL at 13:01

## 2017-08-13 RX ADMIN — Medication SCH MG: at 17:56

## 2017-08-13 RX ADMIN — INSULIN LISPRO SCH UNIT: 100 INJECTION, SOLUTION INTRAVENOUS; SUBCUTANEOUS at 13:02

## 2017-08-13 RX ADMIN — Medication SCH MG: at 13:02

## 2017-08-13 RX ADMIN — MORPHINE SULFATE PRN MG: 4 INJECTION, SOLUTION INTRAMUSCULAR; INTRAVENOUS at 03:35

## 2017-08-13 RX ADMIN — GABAPENTIN SCH MG: 100 CAPSULE ORAL at 22:16

## 2017-08-13 RX ADMIN — INSULIN LISPRO SCH: 100 INJECTION, SOLUTION INTRAVENOUS; SUBCUTANEOUS at 13:54

## 2017-08-13 RX ADMIN — BUDESONIDE SCH: 0.5 INHALANT ORAL at 22:10

## 2017-08-13 RX ADMIN — ATORVASTATIN CALCIUM SCH MG: 80 TABLET, FILM COATED ORAL at 22:16

## 2017-08-13 RX ADMIN — SENNOSIDES SCH MG: 8.6 TABLET, FILM COATED ORAL at 13:01

## 2017-08-13 RX ADMIN — DOCUSATE SODIUM PRN MG: 100 CAPSULE, LIQUID FILLED ORAL at 23:07

## 2017-08-13 RX ADMIN — LEVOTHYROXINE SODIUM SCH MCG: 100 TABLET ORAL at 05:41

## 2017-08-13 RX ADMIN — INSULIN LISPRO SCH: 100 INJECTION, SOLUTION INTRAVENOUS; SUBCUTANEOUS at 23:02

## 2017-08-13 RX ADMIN — BUDESONIDE SCH: 0.5 INHALANT ORAL at 07:55

## 2017-08-13 RX ADMIN — AMPICILLIN SODIUM AND SULBACTAM SODIUM SCH MLS/HR: 2; 1 INJECTION, POWDER, FOR SOLUTION INTRAMUSCULAR; INTRAVENOUS at 11:27

## 2017-08-13 RX ADMIN — INSULIN LISPRO SCH UNIT: 100 INJECTION, SOLUTION INTRAVENOUS; SUBCUTANEOUS at 17:56

## 2017-08-13 RX ADMIN — INSULIN LISPRO SCH: 100 INJECTION, SOLUTION INTRAVENOUS; SUBCUTANEOUS at 12:45

## 2017-08-13 RX ADMIN — MORPHINE SULFATE PRN MG: 4 INJECTION, SOLUTION INTRAMUSCULAR; INTRAVENOUS at 22:15

## 2017-08-13 RX ADMIN — INSULIN LISPRO SCH: 100 INJECTION, SOLUTION INTRAVENOUS; SUBCUTANEOUS at 09:58

## 2017-08-13 RX ADMIN — INSULIN LISPRO SCH UNIT: 100 INJECTION, SOLUTION INTRAVENOUS; SUBCUTANEOUS at 10:22

## 2017-08-13 RX ADMIN — INSULIN LISPRO SCH UNIT: 100 INJECTION, SOLUTION INTRAVENOUS; SUBCUTANEOUS at 14:50

## 2017-08-13 RX ADMIN — INSULIN DETEMIR SCH UNIT: 100 INJECTION, SOLUTION SUBCUTANEOUS at 17:57

## 2017-08-13 NOTE — PN
DATE OF SERVICE:  08/12/2017



This 56-year-old woman who was admitted with fever and possible sepsis and 
catheter associated bacteremia, also had group B Enterococcus grown from the 
cultures and Gram negative bacilli in the urine. Enterococcus persistent. 
Patient is currently on vancomycin as well as ceftazidime. Infectious Disease 
is following the patient closely also. 



PAST MEDICAL HISTORY:  Reviewed. 



REVIEW OF SYSTEMS: 

CARDIOVASCULAR:  No angina. 

RESPIRATORY:  As mentioned earlier.

GI: No nausea. 

:  No dysuria. 

NERVOUS SYSTEM: No numbness or weakness. 



Current medications are reviewed and include:  

1.   DuoNeb q.i.d and p.r.n 

2.   Xanax.

3.   Aspirin. 

4.   Lipitor. 

5.   Pulmicort. 

6.   PhosLo.

7.   Coreg. 

8.   Ceftazidime. 

9.   Aranesp.

10.   Colace. 

11.   Lasix. 

12.   Neurontin. 

13.   Apresoline. 

14.   Humalog scale. 

15.   Synthroid. 



PHYSICAL EXAMINATION: The patient is alert, oriented x3. Pulse 58, blood 
pressure 130/66, respirations 20, temperature 97.8, pulse ox 97% on 4-L. 

HEENT: Conjunctivae normal. 

NECK: No jugular venous distention.

CARDIOVASCULAR:  S1, S2. 

RESPIRATORY:  Breath sounds diminished at the bases. A few scattered rhonchi 
and crackles. 

ABDOMEN: Soft, nontender. 

LEGS:  No edema, no swelling. 

NERVOUS SYSTEM: No focal deficits. 



LABS:  WBC 6, hemoglobin 7, sodium 133. 



ASSESSMENT:  

1.   Fever with possible sepsis with catheter associated bacteremia with group 
D Enterococcus. 

2.   Endstage renal disease on hemodialysis. 

3.   Anemia secondary to chronic kidney disease. 

4.   Atypical chest pain. 

5.   Hypertension. 

7.   Diabetes mellitus type 2. 

8.   History of congestive heart failure with chronic diastolic dysfunction. 

9.   Hypothyroidism. 



RECOMMENDATIONS AND DISCUSSION:   I recommend to continue the current 
medications, continue to monitor, continue symptomatic treatment. Otherwise I 
recommend to monitor fluid and electrolyte balance closely.  Continue with 
antibiotics. Closely follow with Infectious Disease. Guarded prognosis. Further 
recommendations to follow. 
MTDD

## 2017-08-13 NOTE — P.PN
Subjective





Patient is seen in follow-up for end-stage renal disease.  She is maintained on 

hemodialysis on a Monday Wednesday Friday schedule via right chest permacath.  

Patient presented with fever.  Her blood cultures are positive for enteroccus 

faecalis.  Urine cultures positive for E.coli and candida..  Patient's 

currently resting in bed.  Denies any chest pain or shortness of breath.  She 

underwent hemodialysis yesterday.  No active complaints at this time.





Vital signs are stable.


General: The patient appeared well nourished and normally developed. 


HEENT: Head exam is unremarkable. Neck is without jugular venous distension.


LUNGS: Lungs are clear to auscultation and percussion. Breath sounds decreased.


HEART: Rate and Rhythm are regular. First and second heart sounds normal. No 

murmurs, rubs or gallops. 


ABDOMEN: Abdominal exam reveals normal bowel sounds. Non-tender and non-

distended. No evidence of peritonitis.


EXTREMITITES: No clubbing, cyanosis, or edema.











Objective





- Vital Signs


Vital signs: 


 Vital Signs











Temp  97 F L  08/13/17 07:00


 


Pulse  54 L  08/13/17 08:00


 


Resp  16   08/13/17 08:00


 


BP  117/55   08/13/17 07:00


 


Pulse Ox  100   08/13/17 07:00








 Intake & Output











 08/12/17 08/13/17 08/13/17





 18:59 06:59 18:59


 


Intake Total 640 540 


 


Output Total 1704  0


 


Balance -1064 540 0


 


Weight 132.5 kg 136.5 kg 


 


Intake:   


 


  Intake, IV Titration 100  





  Amount   


 


    cefTAZidime 2 gm In 100  





    Sodium Chloride 0.9% 100   





    ml @ 100 mls/hr IVPB   





    Q24HR [facility] Rx#:[id number]   


 


  Oral 540 540 


 


Output:   


 


  Urine 1700  


 


  Stool 4  0


 


Other:   


 


  Voiding Method Indwelling Catheter Indwelling Catheter Indwelling Catheter














- Labs


CBC & Chem 7: 


 08/13/17 06:41





 08/13/17 06:41


Labs: 


 Abnormal Lab Results - Last 24 Hours (Table)











  08/12/17 08/12/17 08/12/17 Range/Units





  12:32 17:33 19:41 


 


RBC     (3.80-5.40)  m/uL


 


Hgb     (11.4-16.0)  gm/dL


 


Hct     (34.0-46.0)  %


 


MCHC     (31.0-37.0)  g/dL


 


RDW     (11.5-15.5)  %


 


Lymphocytes # (Manual)     (1.0-4.8)  k/uL


 


Monocytes # (Manual)     (0-1.0)  k/uL


 


Creatinine     (0.52-1.04)  mg/dL


 


POC Glucose (mg/dL)  241 H  163 H  142 H  (75-99)  mg/dL














  08/12/17 08/13/17 08/13/17 Range/Units





  22:20 06:41 06:41 


 


RBC   2.90 L   (3.80-5.40)  m/uL


 


Hgb   7.8 L   (11.4-16.0)  gm/dL


 


Hct   25.9 L   (34.0-46.0)  %


 


MCHC   30.1 L   (31.0-37.0)  g/dL


 


RDW   18.1 H   (11.5-15.5)  %


 


Lymphocytes # (Manual)   0.84 L   (1.0-4.8)  k/uL


 


Monocytes # (Manual)   1.32 H   (0-1.0)  k/uL


 


Creatinine    2.16 H  (0.52-1.04)  mg/dL


 


POC Glucose (mg/dL)  171 H    (75-99)  mg/dL














  08/13/17 Range/Units





  07:36 


 


RBC   (3.80-5.40)  m/uL


 


Hgb   (11.4-16.0)  gm/dL


 


Hct   (34.0-46.0)  %


 


MCHC   (31.0-37.0)  g/dL


 


RDW   (11.5-15.5)  %


 


Lymphocytes # (Manual)   (1.0-4.8)  k/uL


 


Monocytes # (Manual)   (0-1.0)  k/uL


 


Creatinine   (0.52-1.04)  mg/dL


 


POC Glucose (mg/dL)  104 H  (75-99)  mg/dL








 Microbiology - Last 24 Hours (Table)











 08/10/17 17:20 Urine Culture - Final





 Urine,Catheterized    Escherichia coli





    Alejandrina albicans


 


 08/10/17 18:20 Blood Culture Gram Stain - Final





 Blood Blood Culture - Final





    Enterococcus faecalis


 


 08/10/17 09:33 Blood Culture Gram Stain - Final





 Blood Blood Culture - Final





    Enterococcus faecalis


 


 08/11/17 12:40 Blood Culture Gram Stain - Preliminary





 Blood Blood Culture - Preliminary





    Group D Enterococcus


 


 08/11/17 12:40 Blood Culture - Final





 Blood 














Assessment and Plan


Plan: 





Assessment:


#1.  Chronic kidney disease stage IV secondary to solitary left kidney and 

diabetic kidney disease progressed now progressed to end-stage renal disease 

from sepsis in July 2017.  Currently hemodialysis dependent and maintained on a 

Monday Wednesday Friday schedule via permacath.


#2.  Anemia of chronic kidney disease.  Iron deficiency noted.


#3.  Diastolic CHF.


#4.  Bacteremia with blood cultures positive for enterococcus facialis.  Urine 

culture positive for E. coli and Candida albicans.  CT of the abdomen benign.


#5.  Insulin-dependent diabetes mellitus.





Plan:


Permacatheter to be discontinued today.


Hold off on IV iron due to bacteremia.


Maintain Aranesp.


Antibiotics per infectious disease recommendations.


We will need a new access once bacteremia cleared.


Case was discussed with infectious disease.


Continue to check labs daily.

## 2017-08-14 LAB
ANION GAP SERPL CALC-SCNC: 8 MMOL/L
BUN SERPL-SCNC: 29 MG/DL (ref 7–17)
CALCIUM SPEC-MCNC: 9.7 MG/DL (ref 8.4–10.2)
CELLS COUNTED: 200
CH: 25.9
CHCM: 29
CHLORIDE SERPL-SCNC: 99 MMOL/L (ref 98–107)
CO2 SERPL-SCNC: 29 MMOL/L (ref 22–30)
ERYTHROCYTE [DISTWIDTH] IN BLOOD BY AUTOMATED COUNT: 2.94 M/UL (ref 3.8–5.4)
ERYTHROCYTE [DISTWIDTH] IN BLOOD: 18 % (ref 11.5–15.5)
GLUCOSE BLD-MCNC: 139 MG/DL (ref 75–99)
GLUCOSE BLD-MCNC: 180 MG/DL (ref 75–99)
GLUCOSE BLD-MCNC: 212 MG/DL (ref 75–99)
GLUCOSE BLD-MCNC: 215 MG/DL (ref 75–99)
GLUCOSE SERPL-MCNC: 117 MG/DL (ref 74–99)
HCT VFR BLD AUTO: 26.3 % (ref 34–46)
HDW: 3.32
HGB BLD-MCNC: 7.8 GM/DL (ref 11.4–16)
MCH RBC QN AUTO: 26.7 PG (ref 25–35)
MCHC RBC AUTO-ENTMCNC: 29.8 G/DL (ref 31–37)
MCV RBC AUTO: 89.8 FL (ref 80–100)
NON-AFRICAN AMERICAN GFR(MDRD): 18
POTASSIUM SERPL-SCNC: 4.1 MMOL/L (ref 3.5–5.1)
SODIUM SERPL-SCNC: 136 MMOL/L (ref 137–145)
WBC # BLD AUTO: 8.2 K/UL (ref 3.8–10.6)
WBC (PEROX): 8.44

## 2017-08-14 PROCEDURE — 02PYX3Z REMOVAL OF INFUSION DEVICE FROM GREAT VESSEL, EXTERNAL APPROACH: ICD-10-PCS

## 2017-08-14 RX ADMIN — Medication SCH MG: at 18:04

## 2017-08-14 RX ADMIN — ATORVASTATIN CALCIUM SCH MG: 80 TABLET, FILM COATED ORAL at 20:00

## 2017-08-14 RX ADMIN — Medication SCH MG: at 13:21

## 2017-08-14 RX ADMIN — CARVEDILOL SCH MG: 12.5 TABLET, FILM COATED ORAL at 22:31

## 2017-08-14 RX ADMIN — FUROSEMIDE SCH MG: 10 INJECTION, SOLUTION INTRAMUSCULAR; INTRAVENOUS at 20:00

## 2017-08-14 RX ADMIN — ASPIRIN 325 MG ORAL TABLET SCH: 325 PILL ORAL at 09:20

## 2017-08-14 RX ADMIN — GABAPENTIN SCH MG: 100 CAPSULE ORAL at 22:31

## 2017-08-14 RX ADMIN — FUROSEMIDE SCH MG: 10 INJECTION, SOLUTION INTRAMUSCULAR; INTRAVENOUS at 09:22

## 2017-08-14 RX ADMIN — MORPHINE SULFATE PRN MG: 4 INJECTION, SOLUTION INTRAMUSCULAR; INTRAVENOUS at 19:58

## 2017-08-14 RX ADMIN — INSULIN DETEMIR SCH UNIT: 100 INJECTION, SOLUTION SUBCUTANEOUS at 18:02

## 2017-08-14 RX ADMIN — GABAPENTIN SCH MG: 100 CAPSULE ORAL at 10:31

## 2017-08-14 RX ADMIN — Medication SCH MG: at 08:07

## 2017-08-14 RX ADMIN — GABAPENTIN SCH MG: 100 CAPSULE ORAL at 18:04

## 2017-08-14 RX ADMIN — AMPICILLIN SODIUM AND SULBACTAM SODIUM SCH MLS/HR: 2; 1 INJECTION, POWDER, FOR SOLUTION INTRAMUSCULAR; INTRAVENOUS at 09:17

## 2017-08-14 RX ADMIN — LEVOTHYROXINE SODIUM SCH MCG: 100 TABLET ORAL at 05:35

## 2017-08-14 RX ADMIN — INSULIN LISPRO SCH UNIT: 100 INJECTION, SOLUTION INTRAVENOUS; SUBCUTANEOUS at 18:19

## 2017-08-14 RX ADMIN — MORPHINE SULFATE PRN MG: 4 INJECTION, SOLUTION INTRAMUSCULAR; INTRAVENOUS at 09:14

## 2017-08-14 RX ADMIN — INSULIN LISPRO SCH UNIT: 100 INJECTION, SOLUTION INTRAVENOUS; SUBCUTANEOUS at 13:19

## 2017-08-14 RX ADMIN — BUDESONIDE SCH: 0.5 INHALANT ORAL at 08:06

## 2017-08-14 RX ADMIN — INSULIN LISPRO SCH UNIT: 100 INJECTION, SOLUTION INTRAVENOUS; SUBCUTANEOUS at 21:16

## 2017-08-14 RX ADMIN — BUDESONIDE SCH: 0.5 INHALANT ORAL at 20:50

## 2017-08-14 RX ADMIN — INSULIN LISPRO SCH UNIT: 100 INJECTION, SOLUTION INTRAVENOUS; SUBCUTANEOUS at 07:53

## 2017-08-14 RX ADMIN — INSULIN LISPRO SCH UNIT: 100 INJECTION, SOLUTION INTRAVENOUS; SUBCUTANEOUS at 13:20

## 2017-08-14 RX ADMIN — CARVEDILOL SCH MG: 12.5 TABLET, FILM COATED ORAL at 10:31

## 2017-08-14 RX ADMIN — SENNOSIDES SCH MG: 8.6 TABLET, FILM COATED ORAL at 10:31

## 2017-08-14 NOTE — P.PN
Subjective





Patient is seen in follow-up for end-stage renal disease.  She is maintained on 

hemodialysis on a Monday Wednesday Friday schedule via right chest permacath.  

Patient presented with fever.  Her blood cultures are positive for enteroccus 

faecalis.  Urine cultures positive for E.coli and candida..  Patient's 

currently resting in bed.  Denies any chest pain or shortness of breath.  Last 

HD was on Saturday, August 12.  No active complaints at this time.





Vital signs are stable.


General: The patient appeared well nourished and normally developed. 


HEENT: Head exam is unremarkable. Neck is without jugular venous distension.


LUNGS: Lungs are clear to auscultation and percussion. Breath sounds decreased.


HEART: Rate and Rhythm are regular. First and second heart sounds normal. No 

murmurs, rubs or gallops. 


ABDOMEN: Abdominal exam reveals normal bowel sounds. Non-tender and non-

distended. No evidence of peritonitis.


EXTREMITITES: No clubbing, cyanosis, or edema.











Objective





- Vital Signs


Vital signs: 


 Vital Signs











Temp  97.7 F   08/14/17 07:00


 


Pulse  53 L  08/14/17 08:00


 


Resp  18   08/14/17 08:00


 


BP  127/60   08/14/17 07:00


 


Pulse Ox  98   08/14/17 08:07








 Intake & Output











 08/13/17 08/14/17 08/14/17





 18:59 06:59 18:59


 


Intake Total   340


 


Output Total 200  200


 


Balance -200  140


 


Weight  135 kg 135 kg


 


Intake:   


 


  Intake, IV Titration   100





  Amount   


 


    Ampicillin-Sulbactam 3 gm   100





    In Sodium Chloride 0.9%   





    100 ml @ 100 mls/hr IVPB   





    Q24HR [facility] Rx#:[id number]   


 


  Oral   240


 


Output:   


 


  Urine 200  200


 


  Stool 0  0


 


Other:   


 


  Voiding Method Indwelling Catheter Indwelling Catheter Indwelling Catheter


 


  # Bowel Movements 0 1 














- Labs


CBC & Chem 7: 


 08/14/17 07:36





 08/14/17 07:36


Labs: 


 Abnormal Lab Results - Last 24 Hours (Table)











  08/13/17 08/13/17 08/13/17 Range/Units





  12:24 16:22 20:24 


 


RBC     (3.80-5.40)  m/uL


 


Hgb     (11.4-16.0)  gm/dL


 


Hct     (34.0-46.0)  %


 


MCHC     (31.0-37.0)  g/dL


 


RDW     (11.5-15.5)  %


 


Sodium     (137-145)  mmol/L


 


BUN     (7-17)  mg/dL


 


Creatinine     (0.52-1.04)  mg/dL


 


Glucose     (74-99)  mg/dL


 


POC Glucose (mg/dL)  116 H  180 H  127 H  (75-99)  mg/dL














  08/14/17 08/14/17 08/14/17 Range/Units





  06:54 07:36 07:36 


 


RBC   2.94 L   (3.80-5.40)  m/uL


 


Hgb   7.8 L   (11.4-16.0)  gm/dL


 


Hct   26.3 L   (34.0-46.0)  %


 


MCHC   29.8 L   (31.0-37.0)  g/dL


 


RDW   18.0 H   (11.5-15.5)  %


 


Sodium    136 L  (137-145)  mmol/L


 


BUN    29 H  (7-17)  mg/dL


 


Creatinine    2.71 H  (0.52-1.04)  mg/dL


 


Glucose    117 H  (74-99)  mg/dL


 


POC Glucose (mg/dL)  139 H    (75-99)  mg/dL








 Microbiology - Last 24 Hours (Table)











 08/11/17 12:40 Blood Culture Gram Stain - Final





 Blood Blood Culture - Final





    Enterococcus faecalis














Assessment and Plan


Plan: 





Assessment:


#1.  Chronic kidney disease stage IV secondary to solitary left kidney and 

diabetic kidney disease progressed now progressed to end-stage renal disease 

from sepsis in July 2017.  Currently hemodialysis dependent and maintained on a 

Monday Wednesday Friday schedule via permacath.


#2.  Anemia of chronic kidney disease.  Iron deficiency noted.


#3.  Diastolic CHF.


#4.  Bacteremia with blood cultures positive for enterococcus facialis.  Urine 

culture positive for E. coli and Candida albicans.  CT of the abdomen benign.


#5.  Insulin-dependent diabetes mellitus.





Plan:


Permacatheter discontinued on 8/14.


Hold off on IV iron due to bacteremia.


Maintain Aranesp.


Antibiotics per infectious disease recommendations.


We will need a new access once bacteremia cleared.


Case was discussed with infectious disease.


Continue to check labs daily.


F/u repeat cultures. 


Increase lasix to 80 mg IV bid.

## 2017-08-14 NOTE — P.PCN
Preoperative Diagnosis: 





Postoperative Diagnosis: 





Procedure(s) Performed: 





Implants: 





Indications for Procedure: 





Operative Findings: 





Description of Procedure: 


History of renal failure, positive blood culture





Removal of dialysis catheter patient was seen in the room and neck was prepped 

and draped applied 1% lidocaine for infected small incision was made at the 

exit site of the catheter catheter removed pressure dressing was applied tip of 

the catheter is sent for culture and sensitivity

## 2017-08-14 NOTE — P.GSCN
History of Present Illness


History of present illness: 





56-year-old female, history of obesity, history of diabetes, history of renal 

failure, patient had a dialysis catheter placed 2 months ago patient came with 

positive blood culture I was consulted for removal of the dialysis catheter





Medical history history of diabetes, history of obesity, history of sleep apnea

, history of renal failure,





Neck examination neck is supple no bruit appreciated and no redness noted no 

discharge noted





Chest is clear first and second sound normal





Abdomen soft nontender





Plan is removal of the dialysis catheter





Past Medical History


Past Medical History: Asthma, Heart Failure, COPD, CVA/TIA, Diabetes Mellitus, 

Eye Disorder, Hyperlipidemia, Hypertension, Osteoarthritis (OA), Pneumonia, 

Renal Disease, Sleep Apnea/CPAP/BIPAP, Thyroid Disorder


Additional Past Medical History / Comment(s): sleep apnea, neuropathy, patient 

has one kidney, Stage III kidney failure


History of Any Multi-Drug Resistant Organisms: None Reported


Past Surgical History: Bariatric Surgery, Hernia Repair, Orthopedic Surgery, 

Tonsillectomy


Additional Past Surgical History / Comment(s): rt kidney removed, Rt knee 

replacement


Past Anesthesia/Blood Transfusion Reactions: No Reported Reaction


Additional Past Anesthesia/Blood Transfusion Reaction / Comm: pt states [facility] 

[facility], "it made her itchy and they gave 

bendyl"


Past Psychological History: Anxiety, Depression


Smoking Status: Former smoker


Past Alcohol Use History: None Reported


Past Drug Use History: None Reported





- Past Family History


  ** Mother


Additional Family Medical History / Comment(s): skin CA, CHF, DM, thyroid 

disorder, HTN.





  ** Father


Family Medical History: Diabetes Mellitus, Hypertension, Thyroid Disorder


Additional Family Medical History / Comment(s): CHF.  Pt's father is .





Medications and Allergies


 Home Medications











 Medication  Instructions  Recorded  Confirmed  Type


 


ALPRAZolam [Xanax] 0.25 mg PO TID PRN 09/13/15 08/09/17 History


 


Atorvastatin [Lipitor] 80 mg PO HS@2100 09/13/15 08/09/17 History


 


Carvedilol [Coreg] 12.5 mg PO BID@1100,2300 09/13/15 08/09/17 History


 


Clopidogrel [Plavix] 75 mg PO DAILY@1100 09/13/15 08/09/17 History


 


Ferrous Sulfate [Iron (65  mg PO DAILY@1100 09/13/15 08/09/17 History





Elemental)]    


 


Gabapentin [Neurontin] 100 mg PO TID@1100,1800,2300 09/13/15 08/09/17 History


 


HYDROcodone/APAP 7.5-325MG [Norco 1 tab PO DAILY PRN 09/13/15 08/09/17 History





7.5-325]    


 


Levothyroxine Sodium [Synthroid] 350 mcg PO DAILY@0500 09/13/15 08/09/17 History


 


Montelukast [Singulair] 10 mg PO HS@2000 09/13/15 08/09/17 History


 


Cinacalcet [Sensipar] 30 mg PO TU@1100 17 History


 


Acetaminophen Tab [Tylenol Tab] 650 mg PO Q4H PRN 17 History


 


Albuterol Sulfate [Proair Hfa] 2 puff INHALATION RT-QID PRN 17 

History


 


Bisacodyl 10 mg RECTAL DAILY PRN 17 History


 


Budesonide [Pulmicort] 0.5 mg INHALATION RT-BID@17 History


 


Calcium Acetate [Phoslo] 667 mg PO TID@0700,1200,1700 17 History


 


Famotidine [Pepcid] 20 mg PO BID@1100,1700 17 History


 


Folic Acid-Vit B Complex-Vit C 1 cap PO DAILY@1100 17 History





[Nephrocaps]    


 


HYDROcodone/APAP 7.5-325MG [Norco 1 tab PO TID@0500,1300,2100 17 

History





7.5-325]    


 


Hydrocortisone [Anusol-Hc] 1 applic RECTAL BID PRN 17 History


 


Insulin Detemir [Levemir] 65 unit SQ DAILY@1700 17 History


 


Insulin Lispro [humaLOG Kwikpen] 15 unit SQ AC-TID@07,12,17 17 

History


 


Ipratropium-Albuterol Nebulize 1 ml INHALATION RT-QID PRN 17 

History





[Duoneb 0.5 mg-3 mg/3 ml Soln]    


 


L.acidoph,Paracasei, B.lactis 1 cap PO DAILY@17 History





[Probiotic]    


 


Latanoprost [Xalatan 0.005%] 1 drop BOTH EYES HS@17 History


 


Levofloxacin [Levaquin] 500 mg PO DAILY 17 History


 


Omalizumab [Xolair] 150 mg SQ Q30D 17 History


 


Sennosides [Senna] 8.6 mg PO DAILY@17 History


 


Sodium Bicarbonate Tab 650 mg PO BID@,17 History


 


diphenhydrAMINE HCL [Benadryl] 25 mg PO Q8H PRN 17 History


 


hydrALAZINE HCL [Apresoline] 50 mg PO TID@0500,1100,1900 17 

History











 Allergies











Allergy/AdvReac Type Severity Reaction Status Date / Time


 


erythromycin base Allergy  Unknown Verified 17 18:58





[Erythromycin Base]     


 


NSAIDS (Non-Steroidal Allergy  Unknown Verified 17 18:58





Anti-Inflamma     














Surgical - Exam


 Vital Signs











Temp Pulse Resp BP Pulse Ox


 


 102.9 F H  88   20   147/64   98 


 


 17 23:09  17 23:09  17 23:09  17 23:09  17 23:09














Results





- Labs





 17 07:36





 17 07:36


 Abnormal Lab Results - Last 24 Hours (Table)











  17 Range/Units





  12:24 16:22 20:24 


 


RBC     (3.80-5.40)  m/uL


 


Hgb     (11.4-16.0)  gm/dL


 


Hct     (34.0-46.0)  %


 


MCHC     (31.0-37.0)  g/dL


 


RDW     (11.5-15.5)  %


 


Sodium     (137-145)  mmol/L


 


BUN     (7-17)  mg/dL


 


Creatinine     (0.52-1.04)  mg/dL


 


Glucose     (74-99)  mg/dL


 


POC Glucose (mg/dL)  116 H  180 H  127 H  (75-99)  mg/dL














  17 Range/Units





  06:54 07:36 07:36 


 


RBC   2.94 L   (3.80-5.40)  m/uL


 


Hgb   7.8 L   (11.4-16.0)  gm/dL


 


Hct   26.3 L   (34.0-46.0)  %


 


MCHC   29.8 L   (31.0-37.0)  g/dL


 


RDW   18.0 H   (11.5-15.5)  %


 


Sodium    136 L  (137-145)  mmol/L


 


BUN    29 H  (7-17)  mg/dL


 


Creatinine    2.71 H  (0.52-1.04)  mg/dL


 


Glucose    117 H  (74-99)  mg/dL


 


POC Glucose (mg/dL)  139 H    (75-99)  mg/dL








 Microbiology - Last 24 Hours (Table)











 17 12:40 Blood Culture Gram Stain - Final





 Blood Blood Culture - Final





    Enterococcus faecalis


 


 08/10/17 17:20 Urine Culture - Final





 Urine,Catheterized    Escherichia coli





    Candida albicans








 Diabetes panel











  17 Range/Units





  07:36 


 


Sodium  136 L  (137-145)  mmol/L


 


Potassium  4.1  (3.5-5.1)  mmol/L


 


Chloride  99  ()  mmol/L


 


Carbon Dioxide  29  (22-30)  mmol/L


 


BUN  29 H  (7-17)  mg/dL


 


Creatinine  2.71 H  (0.52-1.04)  mg/dL


 


Glucose  117 H  (74-99)  mg/dL


 


Calcium  9.7  (8.4-10.2)  mg/dL








 Calcium panel











  17 Range/Units





  07:36 


 


Calcium  9.7  (8.4-10.2)  mg/dL








 Pituitary panel











  17 Range/Units





  07:36 


 


Sodium  136 L  (137-145)  mmol/L


 


Potassium  4.1  (3.5-5.1)  mmol/L


 


Chloride  99  ()  mmol/L


 


Carbon Dioxide  29  (22-30)  mmol/L


 


BUN  29 H  (7-17)  mg/dL


 


Creatinine  2.71 H  (0.52-1.04)  mg/dL


 


Glucose  117 H  (74-99)  mg/dL


 


Calcium  9.7  (8.4-10.2)  mg/dL








 Adrenal panel











  17 Range/Units





  07:36 


 


Sodium  136 L  (137-145)  mmol/L


 


Potassium  4.1  (3.5-5.1)  mmol/L


 


Chloride  99  ()  mmol/L


 


Carbon Dioxide  29  (22-30)  mmol/L


 


BUN  29 H  (7-17)  mg/dL


 


Creatinine  2.71 H  (0.52-1.04)  mg/dL


 


Glucose  117 H  (74-99)  mg/dL


 


Calcium  9.7  (8.4-10.2)  mg/dL

## 2017-08-14 NOTE — PN
DATE OF SERVICE:  08/13/2017



REASON FOR FOLLOW UP:  Enterococcus faecalis bacteremia.  



INTERVAL HISTORY: The patient is afebrile. He is breathing slightly 
comfortably. The patient denies having significant chest pain. No shortness of 
breath or cough. No abdominal pain, nausea or any diarrhea.

 

On examination: Blood pressure 132/63 with pulse 63, temperature 96.8. She is 99
% on 4-L nasal cannula. General description is a middle aged female lying in 
bed in no distress. 

RESPIRATORY: Unlabored breathing. Clear to auscultation anteriorly. 

HEART:  S1, S2 regular rate and rhythm. 

ABDOMEN:  Soft, no tenderness. 



LABS:   Hemoglobin 7.9, white count 6.0 with BUN 17, creatinine 2.16. 



DIAGNOSTIC IMPRESSION AND PLAN:

Patient with Enterococcus faecalis bacteremia source likely the right IJ, right 
subclavian PermCath. Wait for it to be discontinued. She will continue on 
Unasyn. Blood culture has been repeated. (    ) blood culture negative at 72 
hours. She will continue dialysis access catheter.
MTDD

## 2017-08-14 NOTE — PN
DATE OF SERVICE:  08/13/2017



This 56-year-old woman who was admitted with fever, had possibly bacteremia 
with vancomycin sensitive (    ). The dialysis catheter is planned to be taken 
out today.  Antibiotics have been changed to Unasyn. Multiple consultants are 
following the patient closely.  



PAST MEDICAL HISTORY:   Reviewed. 



REVIEW OF SYSTEMS: 

CARDIOVASCULAR:  No angina. 

RESPIRATORY:  As mentioned earlier.

GI: No nausea. 

:  No dysuria. 

NERVOUS SYSTEM: No numbness or weakness. 



Current medications reviewed and include:  

1.   DuoNeb q.i.d and p.r.n 

2.   Xanax.

3.   Unasyn 3 grams IV q.24h.

4.   Aspirin 325 mg daily.

5.   Lipitor 80 mg q.6.

6.   Pulmicort 0.5 b.i.d. 

7.   PhosLo 667 t.i.d 

8.   Coreg 12.5 mg b.i.d. 

10.   Colace.

11.   Lasix. 

12.   Neurontin. 

13.   Levemir.

14.   Humalog scale. 

15.   Synthroid. 

16.   Senokot. 



PHYSICAL EXAMINATION: The patient is alert and oriented x3. Pulse 57, blood 
pressure 130/62, respirations 16, temperature 97.7, pulse ox 100% on 4-L. 

HEENT: Conjunctivae normal. 

NECK: No jugular venous distention. 

CARDIOVASCULAR:  S1, S2. 

RESPIRATORY:  Breath sounds diminished at the bases. A few scattered rhonchi. 

ABDOMEN: Soft, obese, nontender. 

LEGS:  No edema, no swelling. 

NERVOUS SYSTEM: No focal deficits. 



LABS: WBC 6, hemoglobin 7.8. Creatinine 2.16.



ASSESSMENT:  

1.   Fever with possible Enterococcus sepsis, group D, vancomycin sensitive 
with bacteremia. 

2.   Endstage of renal disease on hemodialysis. 

3.   Anemia secondary to chronic kidney disease, 

4.   E. coli urinary tract infection. 



RECOMMENDATIONS AND DISCUSSION:   In this 56-year-old woman who presented with 
multiple complex medical issues, will monitor the patient closely. Continue the 
current medications, continue symptomatic treatment. I recommend to continue 
with Unasyn. Continue the rest of the medications. Will repeat cultures. 2-D 
echo with Doppler. The prognosis is guarded. The most recent blood cultures 
done on 8/11 are still positive. I would recommend repeat cultures today and 
continue to monitor.  The prognosis is guarded because of multiple complex 
medical issues. Further recommendations to follow.
MTDD

## 2017-08-14 NOTE — PN
DATE OF SERVICE:  08/14/17



REASON FOR FOLLOW UP:    Enterococcus fecal (    ) secondary to (    ) 
infection.



INTERVAL HISTORY: The patient is afebrile. The patient is breathing 
comfortably.  Denies significant chest pain. No shortness of breath or 
abdominal pain.  Perm catheter was discontinued this morning. 



On examination, blood pressure 127/60, pulse  53, temperature 97.7.     She is 
98% on 4 L nasal cannula.  



General description is a middle aged female lying in the bed in no distress.  
Respiratory system:  Unlabored breathing.  Clear to auscultation anteriorly.  
Heart:  S1, S2 regular rate and rhythm.  Abdomen soft, no tenderness.      



LABS:   Hemoglobin 7.1, white count 8.2.   BUN 29, creatinine 2.71.      Blood 
cultures so far negative. 

DIAGNOSTIC IMPRESSION AND PLAN:      The patient with Enterococcus faecalis (   
) source is Perm Catheter that was discontinued this morning.  Blood cultures 
done yesterday.   It will be repeated today after discontinuation of the Perm 
Catheter.   (    ) negative next 72 hours, she will be able to get another perm 
catheter,  keep the patient on Unasyn and continue supportive care. 
KRIS

## 2017-08-15 LAB
ANION GAP SERPL CALC-SCNC: 11 MMOL/L
BASOPHILS # BLD AUTO: 0.1 K/UL (ref 0–0.2)
BASOPHILS NFR BLD AUTO: 1 %
BUN SERPL-SCNC: 38 MG/DL (ref 7–17)
CALCIUM SPEC-MCNC: 9.5 MG/DL (ref 8.4–10.2)
CH: 25.8
CHCM: 29
CHLORIDE SERPL-SCNC: 101 MMOL/L (ref 98–107)
CO2 SERPL-SCNC: 25 MMOL/L (ref 22–30)
EOSINOPHIL # BLD AUTO: 0.4 K/UL (ref 0–0.7)
EOSINOPHIL NFR BLD AUTO: 4 %
ERYTHROCYTE [DISTWIDTH] IN BLOOD BY AUTOMATED COUNT: 2.95 M/UL (ref 3.8–5.4)
ERYTHROCYTE [DISTWIDTH] IN BLOOD: 18 % (ref 11.5–15.5)
GLUCOSE BLD-MCNC: 150 MG/DL (ref 75–99)
GLUCOSE BLD-MCNC: 159 MG/DL (ref 75–99)
GLUCOSE BLD-MCNC: 161 MG/DL (ref 75–99)
GLUCOSE BLD-MCNC: 240 MG/DL (ref 75–99)
GLUCOSE SERPL-MCNC: 136 MG/DL (ref 74–99)
HCT VFR BLD AUTO: 26.4 % (ref 34–46)
HDW: 3.27
HGB BLD-MCNC: 7.8 GM/DL (ref 11.4–16)
LUC NFR BLD AUTO: 4 %
LYMPHOCYTES # SPEC AUTO: 1 K/UL (ref 1–4.8)
LYMPHOCYTES NFR SPEC AUTO: 10 %
MCH RBC QN AUTO: 26.4 PG (ref 25–35)
MCHC RBC AUTO-ENTMCNC: 29.5 G/DL (ref 31–37)
MCV RBC AUTO: 89.5 FL (ref 80–100)
MONOCYTES # BLD AUTO: 1 K/UL (ref 0–1)
MONOCYTES NFR BLD AUTO: 10 %
NEUTROPHILS # BLD AUTO: 7.1 K/UL (ref 1.3–7.7)
NEUTROPHILS NFR BLD AUTO: 70 %
NON-AFRICAN AMERICAN GFR(MDRD): 16
POTASSIUM SERPL-SCNC: 4.5 MMOL/L (ref 3.5–5.1)
SODIUM SERPL-SCNC: 137 MMOL/L (ref 137–145)
WBC # BLD AUTO: 0.39 10*3/UL
WBC # BLD AUTO: 10.1 K/UL (ref 3.8–10.6)
WBC (PEROX): 10.61

## 2017-08-15 RX ADMIN — CARVEDILOL SCH MG: 12.5 TABLET, FILM COATED ORAL at 12:02

## 2017-08-15 RX ADMIN — Medication SCH MG: at 08:14

## 2017-08-15 RX ADMIN — LACTULOSE SCH GM: 20 SOLUTION ORAL at 14:11

## 2017-08-15 RX ADMIN — INSULIN LISPRO SCH UNIT: 100 INJECTION, SOLUTION INTRAVENOUS; SUBCUTANEOUS at 12:05

## 2017-08-15 RX ADMIN — SENNOSIDES SCH: 8.6 TABLET, FILM COATED ORAL at 12:03

## 2017-08-15 RX ADMIN — MORPHINE SULFATE PRN MG: 4 INJECTION, SOLUTION INTRAMUSCULAR; INTRAVENOUS at 22:34

## 2017-08-15 RX ADMIN — INSULIN LISPRO SCH UNIT: 100 INJECTION, SOLUTION INTRAVENOUS; SUBCUTANEOUS at 22:19

## 2017-08-15 RX ADMIN — INSULIN LISPRO SCH UNIT: 100 INJECTION, SOLUTION INTRAVENOUS; SUBCUTANEOUS at 08:14

## 2017-08-15 RX ADMIN — AMPICILLIN SODIUM AND SULBACTAM SODIUM SCH MLS/HR: 2; 1 INJECTION, POWDER, FOR SOLUTION INTRAMUSCULAR; INTRAVENOUS at 08:13

## 2017-08-15 RX ADMIN — ATORVASTATIN CALCIUM SCH MG: 80 TABLET, FILM COATED ORAL at 22:14

## 2017-08-15 RX ADMIN — MORPHINE SULFATE PRN MG: 4 INJECTION, SOLUTION INTRAMUSCULAR; INTRAVENOUS at 14:10

## 2017-08-15 RX ADMIN — GABAPENTIN SCH MG: 100 CAPSULE ORAL at 12:02

## 2017-08-15 RX ADMIN — ASPIRIN 325 MG ORAL TABLET SCH MG: 325 PILL ORAL at 08:13

## 2017-08-15 RX ADMIN — BUDESONIDE SCH: 0.5 INHALANT ORAL at 07:12

## 2017-08-15 RX ADMIN — INSULIN LISPRO SCH UNIT: 100 INJECTION, SOLUTION INTRAVENOUS; SUBCUTANEOUS at 08:15

## 2017-08-15 RX ADMIN — Medication SCH MG: at 17:22

## 2017-08-15 RX ADMIN — BUDESONIDE SCH: 0.5 INHALANT ORAL at 19:32

## 2017-08-15 RX ADMIN — LACTULOSE SCH: 20 SOLUTION ORAL at 22:14

## 2017-08-15 RX ADMIN — INSULIN DETEMIR SCH UNIT: 100 INJECTION, SOLUTION SUBCUTANEOUS at 17:22

## 2017-08-15 RX ADMIN — GABAPENTIN SCH MG: 100 CAPSULE ORAL at 22:14

## 2017-08-15 RX ADMIN — FUROSEMIDE SCH MG: 10 INJECTION, SOLUTION INTRAMUSCULAR; INTRAVENOUS at 22:14

## 2017-08-15 RX ADMIN — IPRATROPIUM BROMIDE AND ALBUTEROL SULFATE PRN ML: .5; 3 SOLUTION RESPIRATORY (INHALATION) at 19:31

## 2017-08-15 RX ADMIN — LEVOTHYROXINE SODIUM SCH MCG: 100 TABLET ORAL at 04:51

## 2017-08-15 RX ADMIN — FUROSEMIDE SCH MG: 10 INJECTION, SOLUTION INTRAMUSCULAR; INTRAVENOUS at 08:14

## 2017-08-15 RX ADMIN — LACTULOSE SCH GM: 20 SOLUTION ORAL at 12:00

## 2017-08-15 RX ADMIN — GABAPENTIN SCH MG: 100 CAPSULE ORAL at 17:22

## 2017-08-15 RX ADMIN — INSULIN LISPRO SCH UNIT: 100 INJECTION, SOLUTION INTRAVENOUS; SUBCUTANEOUS at 17:23

## 2017-08-15 RX ADMIN — MORPHINE SULFATE PRN MG: 4 INJECTION, SOLUTION INTRAMUSCULAR; INTRAVENOUS at 04:54

## 2017-08-15 RX ADMIN — LACTULOSE SCH GM: 20 SOLUTION ORAL at 17:23

## 2017-08-15 RX ADMIN — Medication SCH MG: at 12:03

## 2017-08-15 RX ADMIN — CARVEDILOL SCH MG: 12.5 TABLET, FILM COATED ORAL at 22:14

## 2017-08-15 NOTE — PN
DATE OF SERVICE:  08/14/2017



This 56-year-old woman who was admitted with fever and possible bacteremia, had 
vancomycin-sensitive enterococci. The Perma catheter on the right chest was 
removed this morning.  The cultures are negative within 72 hours.  Another 
catheter may be inserted per infectious disease.  The most recent culture on 8/
13 is negative.  The catheter preliminary culture is in progress.



PAST MEDICAL HISTORY:  Reviewed.



REVIEW OF SYSTEMS:

CARDIOVASCULAR:  No angina, no palpitations.

RESPIRATORY:  As mentioned earlier.

GI:  As mentioned earlier.

:  No dysuria.

NERVOUS SYSTEM:  No numbness or weakness.



CURRENT MEDICATIONS:

1.  DuoNeb q.i.d. and p.r.n.

2.  Xanax 0.5 t.i.d.

3.  Unasyn.

4.  Lipitor.

5.  Pulmicort.

6.  Coreg.

7.  Benadryl.

8.  Colace.

9.  Neurontin.

10. Humalog.

11. Synthroid.



PHYSICAL EXAMINATION:  Patient is alert and oriented x3.  Pulse 60, blood 
pressure 139/58, respirations 16, temperature 97.8, pulse ox 97% on 4 liters.

HEENT:  Conjunctivae normal.

NECK:  No JVD.

CARDIOVASCULAR:  S1/S2.

RESPIRATORY:  Diminished breath sounds, especially at the bases. Scattered 
rhonchi and crackles.

ABDOMEN:  Soft, nontender.  No mass palpable.

LEGS:  No edema.

NERVOUS SYSTEM:  Diffusely weak.



LABS:  WBC 8.2, hemoglobin 7.8.  Creatinine is 2.71.



ASSESSMENT:

1.  Fever with possible vancomycin-sensitive enterococci group D with 
bacteremia and sepsis.

2.  End stage renal disease on hemodialysis.

3.  Anemia secondary to chronic kidney disease.

4.  Escherichia coli urinary tract infection.



RECOMMENDATIONS AND DISCUSSION:  Recommend to continue current medication, 
continue to monitor, continue symptomatic treatment.  Otherwise, at this time 
will recommend repeat labs and follow the cultures closely.  When the cultures 
are negative a Permacath can be reinserted.  Prognosis guarded because of the 
multiple complex medical issues.  Further recommendations to follow.  Discussed 
with the patient.
KRIS

## 2017-08-15 NOTE — PN
DATE OF SERVICE: 08/15/2017



REASON FOR FOLLOWUP: Enterococcus faecalis bacteremia secondary to Perm-A-Cath 
infection.



INTERVAL HISTORY: The patient is afebrile. She is currently breathing 
comfortably. The patient denies significant chest pain. No shortness of breath 
or cough. No abdominal pain. No nausea, vomiting or any diarrhea. 



On examination, blood pressure is 120/56 with a pulse of 54, temperature 98.1. 
She is 100% on room air.

General description is a middle-aged female lying in bed in no distress. 

RESPIRATORY SYSTEM: Unlabored breathing. Clear to auscultation anteriorly.   

HEART: S1, S2. Regular rate and rhythm. 

ABDOMEN: Soft. No tenderness.



LABS: Hemoglobin is 7.8 with a white count of 10.1, BUN 38, creatinine of 3.09. 
Blood culture followup 8/13, 8/14 has been negative so far.



DIAGNOSTIC IMPRESSION AND PLAN: Patient with Enterococcus faecalis bacteremia. 
Source is the Perm-A-Cath. It has been discontinued. Blood culture negative 
once the Perm-A-Cath has been discontinued. The blood culture remains negative. 
By Friday she (      ) for a new Perm-A-Cath. Continue patient on Unasyn at 
this point. Continue supportive care. 
MTDD

## 2017-08-15 NOTE — P.PN
Subjective





Patient is seen in follow-up for end-stage renal disease.  She is maintained on 

hemodialysis on a Monday Wednesday Friday schedule via right chest permacath.  

Patient presented with fever.  Her blood cultures are positive for enteroccus 

faecalis.  Urine cultures positive for E.coli and candida..  Patient's 

currently resting in bed.  Denies any chest pain or shortness of breath.  Last 

HD was on Saturday, August 12.  No active complaints at this time.





Vital signs are stable.


General: The patient appeared well nourished and normally developed. 


HEENT: Head exam is unremarkable. Neck is without jugular venous distension.


LUNGS: Lungs are clear to auscultation and percussion. Breath sounds decreased.


HEART: Rate and Rhythm are regular. First and second heart sounds normal. No 

murmurs, rubs or gallops. 


ABDOMEN: Abdominal exam reveals normal bowel sounds. Non-tender and non-

distended. No evidence of peritonitis.


EXTREMITITES: No clubbing, cyanosis, or edema.











Objective





- Vital Signs


Vital signs: 


 Vital Signs











Temp  97.6 F   08/15/17 07:00


 


Pulse  60   08/15/17 08:00


 


Resp  18   08/15/17 08:00


 


BP  132/54   08/15/17 07:00


 


Pulse Ox  98   08/15/17 07:00








 Intake & Output











 08/14/17 08/15/17 08/15/17





 18:59 06:59 18:59


 


Intake Total 940 650 


 


Output Total 700 600 300


 


Balance 240 50 -300


 


Weight 135 kg 135 kg 


 


Intake:   


 


  IV  50 


 


    ns  50 


 


  Intake, IV Titration 100 600 





  Amount   


 


    Ampicillin-Sulbactam 3 gm 100 100 





    In Sodium Chloride 0.9%   





    100 ml @ 100 mls/hr IVPB   





    Q24HR [facility] Rx#:[id number]   


 


    Vancomycin 2,000 mg In  500 





    Sodium Chloride 0.9% 500   





    ml @ 167 mls/hr IVPB ONCE   





    ONE Rx#:[id number]   


 


  Oral 840  


 


Output:   


 


  Urine 700 600 300


 


    Uretheral (Hong) 300 300 300


 


  Stool 0 0 0


 


Other:   


 


  Voiding Method Indwelling Catheter Indwelling Catheter Indwelling Catheter














- Labs


CBC & Chem 7: 


 08/15/17 08:23





 08/15/17 08:23


Labs: 


 Abnormal Lab Results - Last 24 Hours (Table)











  08/14/17 08/14/17 08/15/17 Range/Units





  16:48 20:23 07:15 


 


RBC     (3.80-5.40)  m/uL


 


Hgb     (11.4-16.0)  gm/dL


 


Hct     (34.0-46.0)  %


 


MCHC     (31.0-37.0)  g/dL


 


RDW     (11.5-15.5)  %


 


BUN     (7-17)  mg/dL


 


Creatinine     (0.52-1.04)  mg/dL


 


Glucose     (74-99)  mg/dL


 


POC Glucose (mg/dL)  180 H  212 H  161 H  (75-99)  mg/dL














  08/15/17 08/15/17 08/15/17 Range/Units





  08:23 08:23 11:36 


 


RBC  2.95 L    (3.80-5.40)  m/uL


 


Hgb  7.8 L    (11.4-16.0)  gm/dL


 


Hct  26.4 L    (34.0-46.0)  %


 


MCHC  29.5 L    (31.0-37.0)  g/dL


 


RDW  18.0 H    (11.5-15.5)  %


 


BUN   38 H   (7-17)  mg/dL


 


Creatinine   3.09 H   (0.52-1.04)  mg/dL


 


Glucose   136 H   (74-99)  mg/dL


 


POC Glucose (mg/dL)    240 H  (75-99)  mg/dL








 Microbiology - Last 24 Hours (Table)











 08/14/17 09:00 Catheter Tip Culture - Preliminary





 Catheter Tip 


 


 08/14/17 08:00 Urine Culture - Preliminary





 Urine,Catheterized 


 


 08/13/17 10:34 Blood Culture - Preliminary





 Blood    No Growth after 24 hours














Assessment and Plan


Plan: 





Assessment:


#1.  Chronic kidney disease stage IV secondary to solitary left kidney and 

diabetic kidney disease progressed now progressed to end-stage renal disease 

from sepsis in July 2017.  Currently hemodialysis dependent and maintained on a 

Monday Wednesday Friday schedule via permacath.


#2.  Anemia of chronic kidney disease.  Iron deficiency noted.


#3.  Diastolic CHF.


#4.  Bacteremia with blood cultures positive for enterococcus facialis.  Urine 

culture positive for E. coli and Candida albicans.  CT of the abdomen benign.


#5.  Insulin-dependent diabetes mellitus.





Plan:


Permacatheter discontinued on 8/14.


Hold off on IV iron due to bacteremia.


Maintain Aranesp.


Antibiotics per infectious disease recommendations.


Will need a new access once bacteremia cleared.


Case was discussed with infectious disease.


Continue to check labs daily.


F/u repeat cultures. 


Maintain lasix to 80 mg IV bid. 


No urgent need for RRT at this time.

## 2017-08-16 LAB
ANION GAP SERPL CALC-SCNC: 13 MMOL/L
BASOPHILS # BLD AUTO: 0.1 K/UL (ref 0–0.2)
BASOPHILS NFR BLD AUTO: 1 %
BUN SERPL-SCNC: 47 MG/DL (ref 7–17)
CALCIUM SPEC-MCNC: 9.6 MG/DL (ref 8.4–10.2)
CH: 26.4
CHCM: 28.7
CHLORIDE SERPL-SCNC: 103 MMOL/L (ref 98–107)
CO2 SERPL-SCNC: 23 MMOL/L (ref 22–30)
EOSINOPHIL # BLD AUTO: 0.4 K/UL (ref 0–0.7)
EOSINOPHIL NFR BLD AUTO: 4 %
ERYTHROCYTE [DISTWIDTH] IN BLOOD BY AUTOMATED COUNT: 2.91 M/UL (ref 3.8–5.4)
ERYTHROCYTE [DISTWIDTH] IN BLOOD: 18.7 % (ref 11.5–15.5)
GLUCOSE BLD-MCNC: 143 MG/DL (ref 75–99)
GLUCOSE BLD-MCNC: 176 MG/DL (ref 75–99)
GLUCOSE BLD-MCNC: 195 MG/DL (ref 75–99)
GLUCOSE BLD-MCNC: 243 MG/DL (ref 75–99)
GLUCOSE SERPL-MCNC: 124 MG/DL (ref 74–99)
HCT VFR BLD AUTO: 27 % (ref 34–46)
HDW: 3.14
HGB BLD-MCNC: 7.5 GM/DL (ref 11.4–16)
LUC NFR BLD AUTO: 3 %
LYMPHOCYTES # SPEC AUTO: 1 K/UL (ref 1–4.8)
LYMPHOCYTES NFR SPEC AUTO: 9 %
MCH RBC QN AUTO: 25.6 PG (ref 25–35)
MCHC RBC AUTO-ENTMCNC: 27.6 G/DL (ref 31–37)
MCV RBC AUTO: 92.7 FL (ref 80–100)
MONOCYTES # BLD AUTO: 0.8 K/UL (ref 0–1)
MONOCYTES NFR BLD AUTO: 8 %
NEUTROPHILS # BLD AUTO: 8 K/UL (ref 1.3–7.7)
NEUTROPHILS NFR BLD AUTO: 77 %
NON-AFRICAN AMERICAN GFR(MDRD): 15
POTASSIUM SERPL-SCNC: 4.8 MMOL/L (ref 3.5–5.1)
SODIUM SERPL-SCNC: 139 MMOL/L (ref 137–145)
WBC # BLD AUTO: 0.26 10*3/UL
WBC # BLD AUTO: 10.5 K/UL (ref 3.8–10.6)
WBC (PEROX): 10.84

## 2017-08-16 RX ADMIN — AMPICILLIN SODIUM AND SULBACTAM SODIUM SCH MLS/HR: 2; 1 INJECTION, POWDER, FOR SOLUTION INTRAMUSCULAR; INTRAVENOUS at 08:44

## 2017-08-16 RX ADMIN — CARVEDILOL SCH MG: 12.5 TABLET, FILM COATED ORAL at 21:04

## 2017-08-16 RX ADMIN — INSULIN LISPRO SCH UNIT: 100 INJECTION, SOLUTION INTRAVENOUS; SUBCUTANEOUS at 21:04

## 2017-08-16 RX ADMIN — Medication SCH MG: at 08:51

## 2017-08-16 RX ADMIN — LACTULOSE SCH: 20 SOLUTION ORAL at 12:30

## 2017-08-16 RX ADMIN — ATORVASTATIN CALCIUM SCH MG: 80 TABLET, FILM COATED ORAL at 21:04

## 2017-08-16 RX ADMIN — FUROSEMIDE SCH MG: 10 INJECTION, SOLUTION INTRAMUSCULAR; INTRAVENOUS at 08:43

## 2017-08-16 RX ADMIN — LEVOTHYROXINE SODIUM SCH MCG: 100 TABLET ORAL at 06:22

## 2017-08-16 RX ADMIN — INSULIN LISPRO SCH UNIT: 100 INJECTION, SOLUTION INTRAVENOUS; SUBCUTANEOUS at 08:56

## 2017-08-16 RX ADMIN — INSULIN LISPRO SCH UNIT: 100 INJECTION, SOLUTION INTRAVENOUS; SUBCUTANEOUS at 18:22

## 2017-08-16 RX ADMIN — INSULIN LISPRO SCH UNIT: 100 INJECTION, SOLUTION INTRAVENOUS; SUBCUTANEOUS at 12:35

## 2017-08-16 RX ADMIN — INSULIN LISPRO SCH UNIT: 100 INJECTION, SOLUTION INTRAVENOUS; SUBCUTANEOUS at 12:34

## 2017-08-16 RX ADMIN — BUDESONIDE SCH: 0.5 INHALANT ORAL at 07:22

## 2017-08-16 RX ADMIN — FUROSEMIDE SCH MG: 10 INJECTION, SOLUTION INTRAMUSCULAR; INTRAVENOUS at 21:03

## 2017-08-16 RX ADMIN — IPRATROPIUM BROMIDE AND ALBUTEROL SULFATE PRN ML: .5; 3 SOLUTION RESPIRATORY (INHALATION) at 07:15

## 2017-08-16 RX ADMIN — INSULIN DETEMIR SCH UNIT: 100 INJECTION, SOLUTION SUBCUTANEOUS at 18:28

## 2017-08-16 RX ADMIN — LACTULOSE SCH: 20 SOLUTION ORAL at 04:19

## 2017-08-16 RX ADMIN — GABAPENTIN SCH MG: 100 CAPSULE ORAL at 12:34

## 2017-08-16 RX ADMIN — LACTULOSE SCH: 20 SOLUTION ORAL at 21:04

## 2017-08-16 RX ADMIN — MORPHINE SULFATE PRN MG: 4 INJECTION, SOLUTION INTRAMUSCULAR; INTRAVENOUS at 12:01

## 2017-08-16 RX ADMIN — GABAPENTIN SCH MG: 100 CAPSULE ORAL at 18:20

## 2017-08-16 RX ADMIN — GABAPENTIN SCH MG: 100 CAPSULE ORAL at 21:08

## 2017-08-16 RX ADMIN — Medication SCH MG: at 12:33

## 2017-08-16 RX ADMIN — BUDESONIDE SCH: 0.5 INHALANT ORAL at 19:05

## 2017-08-16 RX ADMIN — Medication SCH MG: at 18:20

## 2017-08-16 RX ADMIN — CARVEDILOL SCH MG: 12.5 TABLET, FILM COATED ORAL at 12:34

## 2017-08-16 RX ADMIN — MORPHINE SULFATE PRN MG: 4 INJECTION, SOLUTION INTRAMUSCULAR; INTRAVENOUS at 20:02

## 2017-08-16 RX ADMIN — IPRATROPIUM BROMIDE AND ALBUTEROL SULFATE PRN ML: .5; 3 SOLUTION RESPIRATORY (INHALATION) at 11:13

## 2017-08-16 RX ADMIN — ASPIRIN 325 MG ORAL TABLET SCH MG: 325 PILL ORAL at 08:51

## 2017-08-16 RX ADMIN — SENNOSIDES SCH: 8.6 TABLET, FILM COATED ORAL at 12:31

## 2017-08-16 RX ADMIN — INSULIN LISPRO SCH UNIT: 100 INJECTION, SOLUTION INTRAVENOUS; SUBCUTANEOUS at 08:57

## 2017-08-16 RX ADMIN — LACTULOSE SCH: 20 SOLUTION ORAL at 08:44

## 2017-08-16 RX ADMIN — INSULIN LISPRO SCH UNIT: 100 INJECTION, SOLUTION INTRAVENOUS; SUBCUTANEOUS at 18:21

## 2017-08-16 NOTE — PN
DATE OF SERVICE: 08/16/2017



REASON FOR FOLLOWUP: Enterococcus faecalis bacteremia secondary to Perm-A-Cath 
infection.



INTERVAL HISTORY: The patient is afebrile. She is breathing comfortably. Denies 
significant chest pain, shortness of breath or cough and no abdominal pain or 
any diarrhea. 



On examination, blood pressure is 124/50 with a pulse of 66, temperature 98.6. 
She is 100% on room air.

General description is a middle-aged female lying in bed in no distress. 

RESPIRATORY SYSTEM: Unlabored breathing. Clear to auscultation anteriorly.   

HEART: S1, S2. Regular rate and rhythm. 

ABDOMEN: Soft. No tenderness.



LABS: Hemoglobin is 7.5, white count 10.5 with a BUN of 47, creatinine 3.13. 
Blood cultures repeat 8/13 and 8/14 negative.



DIAGNOSTIC IMPRESSION AND PLAN: Patient with Enterococcus faecalis bacteremia 
secondary to Perm-A-Cath. That has been discontinued. Follow-up blood cultures 
of 8/13 and 8/14 have been negative. She will be clear to go for a Perm-A-Cath 
placement on 8/18. She will need continuing IV antibiotic for at least 2 weeks. 
Continue supportive care. 
KRIS

## 2017-08-16 NOTE — P.PN
Subjective





Patient is seen in follow-up for end-stage renal disease.  She is maintained on 

hemodialysis on a Monday Wednesday Friday schedule via right chest permacath.  

Patient presented with fever.  Her blood cultures are positive for enteroccus 

faecalis.  Urine cultures positive for E.coli and candida..  Patient's 

currently resting in bed.  Denies any chest pain or shortness of breath.  Last 

HD was on Saturday, August 12.  No active complaints at this time.





Vital signs are stable.


General: The patient appeared well nourished and normally developed. 


HEENT: Head exam is unremarkable. Neck is without jugular venous distension.


LUNGS: Lungs are clear to auscultation and percussion. Breath sounds decreased.


HEART: Rate and Rhythm are regular. First and second heart sounds normal. No 

murmurs, rubs or gallops. 


ABDOMEN: Abdominal exam reveals normal bowel sounds. Non-tender and non-

distended. No evidence of peritonitis.


EXTREMITITES: No clubbing, cyanosis, or edema.











Objective





- Vital Signs


Vital signs: 


 Vital Signs











Temp  98.6 F   08/15/17 22:40


 


Pulse  72   08/16/17 11:25


 


Resp  20   08/16/17 07:00


 


BP  124/50   08/16/17 07:00


 


Pulse Ox  100   08/16/17 07:00








 Intake & Output











 08/15/17 08/16/17 08/16/17





 18:59 06:59 18:59


 


Intake Total 200 320 


 


Output Total 450 229 


 


Balance -250 91 


 


Weight 135 kg 139 kg 


 


Intake:   


 


   80 


 


    ns 200 80 


 


  Oral  240 


 


Output:   


 


  Urine 450 225 


 


    Uretheral (Hong) 300 225 


 


  Stool 0 4 


 


Other:   


 


  Voiding Method Indwelling Catheter Indwelling Catheter 


 


  # Bowel Movements  3 1














- Labs


CBC & Chem 7: 


 08/16/17 07:30





 08/16/17 07:36


Labs: 


 Abnormal Lab Results - Last 24 Hours (Table)











  08/15/17 08/15/17 08/16/17 Range/Units





  17:17 20:43 07:26 


 


RBC     (3.80-5.40)  m/uL


 


Hgb     (11.4-16.0)  gm/dL


 


Hct     (34.0-46.0)  %


 


MCHC     (31.0-37.0)  g/dL


 


RDW     (11.5-15.5)  %


 


Neutrophils #     (1.3-7.7)  k/uL


 


BUN     (7-17)  mg/dL


 


Creatinine     (0.52-1.04)  mg/dL


 


Glucose     (74-99)  mg/dL


 


POC Glucose (mg/dL)  150 H  159 H  143 H  (75-99)  mg/dL














  08/16/17 08/16/17 Range/Units





  07:30 07:36 


 


RBC  2.91 L   (3.80-5.40)  m/uL


 


Hgb  7.5 L   (11.4-16.0)  gm/dL


 


Hct  27.0 L   (34.0-46.0)  %


 


MCHC  27.6 L   (31.0-37.0)  g/dL


 


RDW  18.7 H   (11.5-15.5)  %


 


Neutrophils #  8.0 H   (1.3-7.7)  k/uL


 


BUN   47 H  (7-17)  mg/dL


 


Creatinine   3.13 H  (0.52-1.04)  mg/dL


 


Glucose   124 H  (74-99)  mg/dL


 


POC Glucose (mg/dL)    (75-99)  mg/dL








 Microbiology - Last 24 Hours (Table)











 08/14/17 09:00 Catheter Tip Culture - Final





 Catheter Tip 


 


 08/14/17 08:00 Urine Culture - Final





 Urine,Catheterized    Candida albicans


 


 08/14/17 10:36 Blood Culture - Preliminary





 Blood    No Growth after 24 hours


 


 08/13/17 10:34 Blood Culture - Preliminary





 Blood    No Growth after 48 hours














Assessment and Plan


Plan: 





Assessment:


#1.  Chronic kidney disease stage IV secondary to solitary left kidney and 

diabetic kidney disease progressed now progressed to end-stage renal disease 

from sepsis in July 2017.  Currently hemodialysis dependent and maintained on a 

Monday Wednesday Friday schedule via permacath.


#2.  Anemia of chronic kidney disease.  Iron deficiency noted.


#3.  Diastolic CHF.


#4.  Bacteremia with blood cultures positive for enterococcus facialis.  Urine 

culture positive for E. coli and Candida albicans.  CT of the abdomen benign.


#5.  Insulin-dependent diabetes mellitus.





Plan:


Permacatheter discontinued on 8/14.


Hold off on IV iron due to bacteremia.


Maintain Aranesp.


Antibiotics per infectious disease recommendations.


Will need a new access once bacteremia cleared - will schedule for Friday.


Case was discussed with infectious disease.


Continue to check labs daily.


F/u repeat cultures. 


Maintain lasix to 80 mg IV bid. 


No urgent need for RRT at this time.

## 2017-08-16 NOTE — PN
DATE OF SERVICE:  08/15/17



This 56-year-old woman was admitted with fever and Vancomycin and enterococcal 
sepsis being closely monitored.  No chest pain.   No palpitations. No fever.   
Perm-A-Cath has been removed. 



On exam, the patient is alert and oriented times three.   Pulse 64.  Blood 
pressure 120/57.  Respiratory rate 18.  Temperature 98.2 degrees.   Pulse ox 100
% on room air.   HEENT: Conjunctivae normal.   Oral mucosa moist.  Neck:  No 
JVD.   No carotid bruit.  No lymph node enlargement.   CARDIOVASCULAR: S1, S2 
muffled.  Respiratory: Breath sounds diminished at the bases.  A few scattered 
rhonchi.  Abdomen soft, nontender.  LEGS: No edema. No swelling.   CNS: No 
focal deficits. 



LABS:  Hemoglobin 7.8,  creatinine 3.09.   



ASSESSMENT:

1.    Fever with possible Vancomycin sensitivity and group  with bacteremia and 
sepsis.   

2.   End stage renal disease, on hemodialysis. 

3.   Anemia secondary to chronic kidney disease. 

4.   E. coli urinary tract infection.



RECOMMENDATIONS AND DISCUSSION:  Recommend to continue the current medications, 
continue symptomatic treatment, otherwise Dr. Loya is following the patient 
closely as well as infectious disease and nephrology as well.   The most recent 
blood cultures are negative at this time.   The patient on IV Unasyn. Perm-A-
Cath is being planned once the cultures are negative. 
KRIS

## 2017-08-17 LAB
ANION GAP SERPL CALC-SCNC: 11 MMOL/L
BASOPHILS # BLD AUTO: 0.1 K/UL (ref 0–0.2)
BASOPHILS NFR BLD AUTO: 1 %
BUN SERPL-SCNC: 56 MG/DL (ref 7–17)
CALCIUM SPEC-MCNC: 10 MG/DL (ref 8.4–10.2)
CH: 26.1
CHCM: 29.5
CHLORIDE SERPL-SCNC: 101 MMOL/L (ref 98–107)
CO2 SERPL-SCNC: 25 MMOL/L (ref 22–30)
EOSINOPHIL # BLD AUTO: 0.6 K/UL (ref 0–0.7)
EOSINOPHIL NFR BLD AUTO: 5 %
ERYTHROCYTE [DISTWIDTH] IN BLOOD BY AUTOMATED COUNT: 2.95 M/UL (ref 3.8–5.4)
ERYTHROCYTE [DISTWIDTH] IN BLOOD: 18.1 % (ref 11.5–15.5)
GLUCOSE BLD-MCNC: 134 MG/DL (ref 75–99)
GLUCOSE BLD-MCNC: 162 MG/DL (ref 75–99)
GLUCOSE BLD-MCNC: 177 MG/DL (ref 75–99)
GLUCOSE BLD-MCNC: 258 MG/DL (ref 75–99)
GLUCOSE SERPL-MCNC: 104 MG/DL (ref 74–99)
HCT VFR BLD AUTO: 26.2 % (ref 34–46)
HDW: 3.23
HGB BLD-MCNC: 7.9 GM/DL (ref 11.4–16)
LUC NFR BLD AUTO: 3 %
LYMPHOCYTES # SPEC AUTO: 1.3 K/UL (ref 1–4.8)
LYMPHOCYTES NFR SPEC AUTO: 11 %
MCH RBC QN AUTO: 26.8 PG (ref 25–35)
MCHC RBC AUTO-ENTMCNC: 30.2 G/DL (ref 31–37)
MCV RBC AUTO: 88.8 FL (ref 80–100)
MONOCYTES # BLD AUTO: 1 K/UL (ref 0–1)
MONOCYTES NFR BLD AUTO: 8 %
NEUTROPHILS # BLD AUTO: 8.8 K/UL (ref 1.3–7.7)
NEUTROPHILS NFR BLD AUTO: 73 %
NON-AFRICAN AMERICAN GFR(MDRD): 14
POTASSIUM SERPL-SCNC: 5.1 MMOL/L (ref 3.5–5.1)
SODIUM SERPL-SCNC: 137 MMOL/L (ref 137–145)
WBC # BLD AUTO: 0.37 10*3/UL
WBC # BLD AUTO: 12.1 K/UL (ref 3.8–10.6)
WBC (PEROX): 12.36

## 2017-08-17 RX ADMIN — ASPIRIN 325 MG ORAL TABLET SCH MG: 325 PILL ORAL at 07:35

## 2017-08-17 RX ADMIN — INSULIN LISPRO SCH UNIT: 100 INJECTION, SOLUTION INTRAVENOUS; SUBCUTANEOUS at 13:11

## 2017-08-17 RX ADMIN — GABAPENTIN SCH MG: 100 CAPSULE ORAL at 22:30

## 2017-08-17 RX ADMIN — ATORVASTATIN CALCIUM SCH MG: 80 TABLET, FILM COATED ORAL at 20:38

## 2017-08-17 RX ADMIN — AMPICILLIN SCH: 2 INJECTION, POWDER, FOR SOLUTION INTRAVENOUS at 09:23

## 2017-08-17 RX ADMIN — CARVEDILOL SCH MG: 12.5 TABLET, FILM COATED ORAL at 22:30

## 2017-08-17 RX ADMIN — INSULIN LISPRO SCH UNIT: 100 INJECTION, SOLUTION INTRAVENOUS; SUBCUTANEOUS at 07:37

## 2017-08-17 RX ADMIN — MORPHINE SULFATE PRN MG: 4 INJECTION, SOLUTION INTRAMUSCULAR; INTRAVENOUS at 12:12

## 2017-08-17 RX ADMIN — GABAPENTIN SCH MG: 100 CAPSULE ORAL at 17:32

## 2017-08-17 RX ADMIN — INSULIN LISPRO SCH UNIT: 100 INJECTION, SOLUTION INTRAVENOUS; SUBCUTANEOUS at 17:31

## 2017-08-17 RX ADMIN — CARVEDILOL SCH MG: 12.5 TABLET, FILM COATED ORAL at 11:03

## 2017-08-17 RX ADMIN — INSULIN LISPRO SCH UNIT: 100 INJECTION, SOLUTION INTRAVENOUS; SUBCUTANEOUS at 17:32

## 2017-08-17 RX ADMIN — INSULIN LISPRO SCH: 100 INJECTION, SOLUTION INTRAVENOUS; SUBCUTANEOUS at 13:11

## 2017-08-17 RX ADMIN — DARBEPOETIN ALFA SCH MCG: 40 INJECTION, SOLUTION INTRAVENOUS; SUBCUTANEOUS at 17:30

## 2017-08-17 RX ADMIN — AMPICILLIN SODIUM AND SULBACTAM SODIUM SCH MLS/HR: 2; 1 INJECTION, POWDER, FOR SOLUTION INTRAMUSCULAR; INTRAVENOUS at 07:31

## 2017-08-17 RX ADMIN — Medication SCH MG: at 11:03

## 2017-08-17 RX ADMIN — MORPHINE SULFATE PRN MG: 4 INJECTION, SOLUTION INTRAMUSCULAR; INTRAVENOUS at 17:38

## 2017-08-17 RX ADMIN — INSULIN LISPRO SCH UNIT: 100 INJECTION, SOLUTION INTRAVENOUS; SUBCUTANEOUS at 20:43

## 2017-08-17 RX ADMIN — FUROSEMIDE SCH MG: 10 INJECTION, SOLUTION INTRAMUSCULAR; INTRAVENOUS at 07:35

## 2017-08-17 RX ADMIN — FENTANYL CITRATE ONE MCG: 50 INJECTION, SOLUTION INTRAMUSCULAR; INTRAVENOUS at 16:19

## 2017-08-17 RX ADMIN — IPRATROPIUM BROMIDE AND ALBUTEROL SULFATE PRN ML: .5; 3 SOLUTION RESPIRATORY (INHALATION) at 07:52

## 2017-08-17 RX ADMIN — Medication SCH MG: at 17:33

## 2017-08-17 RX ADMIN — SENNOSIDES SCH MG: 8.6 TABLET, FILM COATED ORAL at 11:03

## 2017-08-17 RX ADMIN — MORPHINE SULFATE PRN MG: 4 INJECTION, SOLUTION INTRAMUSCULAR; INTRAVENOUS at 07:30

## 2017-08-17 RX ADMIN — BUDESONIDE SCH MG: 0.5 INHALANT ORAL at 07:52

## 2017-08-17 RX ADMIN — MORPHINE SULFATE PRN MG: 4 INJECTION, SOLUTION INTRAMUSCULAR; INTRAVENOUS at 09:17

## 2017-08-17 RX ADMIN — MORPHINE SULFATE PRN MG: 4 INJECTION, SOLUTION INTRAMUSCULAR; INTRAVENOUS at 15:14

## 2017-08-17 RX ADMIN — GABAPENTIN SCH MG: 100 CAPSULE ORAL at 11:04

## 2017-08-17 RX ADMIN — Medication SCH MG: at 07:35

## 2017-08-17 RX ADMIN — BUDESONIDE SCH: 0.5 INHALANT ORAL at 21:31

## 2017-08-17 RX ADMIN — AMPICILLIN SCH MLS/HR: 2 INJECTION, POWDER, FOR SOLUTION INTRAVENOUS at 09:18

## 2017-08-17 RX ADMIN — LEVOTHYROXINE SODIUM SCH MCG: 100 TABLET ORAL at 06:36

## 2017-08-17 RX ADMIN — INSULIN DETEMIR SCH UNIT: 100 INJECTION, SOLUTION SUBCUTANEOUS at 17:38

## 2017-08-17 RX ADMIN — MORPHINE SULFATE PRN MG: 4 INJECTION, SOLUTION INTRAMUSCULAR; INTRAVENOUS at 22:30

## 2017-08-17 RX ADMIN — FENTANYL CITRATE ONE MCG: 50 INJECTION, SOLUTION INTRAMUSCULAR; INTRAVENOUS at 16:01

## 2017-08-17 RX ADMIN — FUROSEMIDE SCH MG: 10 INJECTION, SOLUTION INTRAMUSCULAR; INTRAVENOUS at 22:30

## 2017-08-17 RX ADMIN — AMPICILLIN SCH MLS/HR: 2 INJECTION, POWDER, FOR SOLUTION INTRAVENOUS at 20:37

## 2017-08-17 RX ADMIN — LACTULOSE SCH GM: 20 SOLUTION ORAL at 20:38

## 2017-08-17 RX ADMIN — LACTULOSE SCH: 20 SOLUTION ORAL at 07:36

## 2017-08-17 NOTE — XR
EXAMINATION TYPE: XR chest 1V portable

 

DATE OF EXAM: 8/17/2017

 

COMPARISON: 8/10/2017

 

HISTORY: Postop hemodialysis

 

TECHNIQUE: Single frontal view of the chest is obtained.

 

FINDINGS:  There is no heart failure. There is linear density in the left lower lobe. There is a left
 central venous catheter with the tip over the right atrium. There is no pneumothorax. There are ches
t leads.

 

IMPRESSION:  There is patchy atelectasis in the left lower lobe there is worse than last exam. Cathet
er revision.

## 2017-08-17 NOTE — IR
Fluoroscopy

 

HISTORY: Pain

 

3.8 minutes fluoroscopy time supplied to the referring clinician.  464 intraoperative C-arm images do
cument the procedure. See dictated report from vascular surgery.

## 2017-08-17 NOTE — PN
DATE OF SERVICE: 08/17/2017



REASON FOR FOLLOWUP: Enterococcus faecalis Perm-A-Catheter infection. 



INTERVAL HISTORY: The patient is afebrile. She is breathing comfortably. Denies 
significant chest pain, shortness of breath or cough. No abdominal pain. No 
nausea, vomiting or any diarrhea.



On examination, blood pressure is 134/59 with a pulse of 72, temperature 97.9. 
She is 99% on 4 L nasal cannula. 

General description is a middle-aged female lying in bed in no distress. 

RESPIRATORY SYSTEM: Unlabored breathing. Clear to auscultation anteriorly.   

HEART: S1, S2. Regular rate and rhythm. 

ABDOMEN: Soft. No tenderness.



LABS: BUN of 56 with a creatinine of 3.41.



DIAGNOSTIC IMPRESSION AND PLAN: Patient with Enterococcus faecalis bacteremia 
secondary to Perm-A-Cath infection. Repeat blood cultures of 8/14 and 8/15 and 8
/13 have been negative. Patient is clear to go for a Perm-A-Cath insertion 
tomorrow. Antibiotic to be switched to ampicillin 2 grams q.12 hours. Continue 
supportive care. 
MTDD

## 2017-08-17 NOTE — PN
DATE OF SERVICE: 08/17/2017



This 56-year-old woman who was admitted with a fever with vancomycin-sensitive 
enterococcus and sepsis is being closely monitored. The most recent cultures 
are negative at this time. Nephrology and Infectious Disease are recommending 
Perm-A-Cath insertion. No fever. No cough. Facial puffiness if noted. 



On exam, alert and oriented x3. Pulse is 58, blood pressure 131/49, respiration 
16, temperature 97 degrees, pulse ox 99% on room air.

HEENT: Conjunctivae normal. 

NECK: No jugular venous distention.

CARDIOVASCULAR: S1, S2 muffled.

RESPIRATORY: Breath sounds diminished at the bases. A few scattered rhonchi and 
crackles.

ABDOMEN: Soft. Non-tender. 

LEGS: No edema. No swelling.

NERVOUS SYSTEM: No focal deficit. 



LABS: Hemoglobin 7.5. Creatinine is 3.41. 



ASSESSMENT: 

1. Fever with possible vancomycin-sensitive enterococcus with bacteremia and 
sepsis.

2. End-stage renal disease; chronic kidney disease, stage V, on hemodialysis. 

3. Anemia secondary to chronic kidney disease.

4. Escherichia coli urinary tract infection.



RECOMMENDATIONS AND DISCUSSION: I recommend to current medications, continue 
with symptomatic treatment. Continue the IV antibiotics. Monitor closely. The 
final culture is negative at this time. Nephrology would recommend Perm-A-Cath 
and hemodialysis also. Further recommendations to follow. 

MTDD

## 2017-08-17 NOTE — P.PN
Subjective





Patient is seen in follow-up for end-stage renal disease.  She is maintained on 

hemodialysis on a Monday Wednesday Friday schedule via right chest permacath.  

Patient presented with fever.  Her blood cultures are positive for enteroccus 

faecalis.  Urine cultures positive for E.coli and candida..  Patient's 

currently resting in bed.  Denies any chest pain or shortness of breath.  Last 

HD was on Saturday, August 12.  No active complaints at this time.





Vital signs are stable.


General: The patient appeared well nourished and normally developed. 


HEENT: Head exam is unremarkable. Neck is without jugular venous distension.  

Face more swollen today.


LUNGS: Lungs are clear to auscultation and percussion. Breath sounds decreased.


HEART: Rate and Rhythm are regular. First and second heart sounds normal. No 

murmurs, rubs or gallops. 


ABDOMEN: Abdominal exam reveals normal bowel sounds. Non-tender and non-

distended. No evidence of peritonitis.


EXTREMITITES:  1+ edema.











Objective





- Vital Signs


Vital signs: 


 Vital Signs











Temp  97.9 F   08/17/17 07:00


 


Pulse  72   08/17/17 08:29


 


Resp  18   08/17/17 08:00


 


BP  134/59   08/17/17 07:00


 


Pulse Ox  99   08/17/17 07:00








 Intake & Output











 08/16/17 08/17/17 08/17/17





 18:59 06:59 18:59


 


Intake Total  110 


 


Output Total 154  


 


Balance -154 110 


 


Weight 139 kg 142.5 kg 142.5 kg


 


Intake:   


 


  IV  10 


 


    ns  10 


 


  Oral  100 


 


Output:   


 


  Urine 150  


 


  Stool 4  


 


Other:   


 


  Voiding Method Indwelling Catheter Indwelling Catheter Indwelling Catheter


 


  # Voids 1  1


 


  # Bowel Movements 1  














- Labs


CBC & Chem 7: 


 08/16/17 07:30





 08/17/17 07:04


Labs: 


 Abnormal Lab Results - Last 24 Hours (Table)











  08/16/17 08/16/17 08/16/17 Range/Units





  12:00 17:19 20:01 


 


BUN     (7-17)  mg/dL


 


Creatinine     (0.52-1.04)  mg/dL


 


Glucose     (74-99)  mg/dL


 


POC Glucose (mg/dL)  176 H  195 H  243 H  (75-99)  mg/dL














  08/17/17 08/17/17 08/17/17 Range/Units





  06:57 07:04 11:34 


 


BUN   56 H   (7-17)  mg/dL


 


Creatinine   3.41 H   (0.52-1.04)  mg/dL


 


Glucose   104 H   (74-99)  mg/dL


 


POC Glucose (mg/dL)  134 H   258 H  (75-99)  mg/dL








 Microbiology - Last 24 Hours (Table)











 08/15/17 10:42 Blood Culture - Preliminary





 Blood    No Growth after 24 hours


 


 08/14/17 10:36 Blood Culture - Preliminary





 Blood    No Growth after 48 hours


 


 08/13/17 10:34 Blood Culture - Preliminary





 Blood    No Growth after 72 hours


 


 08/14/17 09:00 Catheter Tip Culture - Final





 Catheter Tip 














Assessment and Plan


Plan: 





Assessment:


#1.  Chronic kidney disease stage IV secondary to solitary left kidney and 

diabetic kidney disease progressed now progressed to end-stage renal disease 

from sepsis in July 2017.  Currently hemodialysis dependent and maintained on a 

Monday Wednesday Friday schedule via permacath.


#2.  Anemia of chronic kidney disease.  Iron deficiency noted.


#3.  Diastolic CHF.


#4.  Bacteremia with blood cultures positive for enterococcus facialis.  Urine 

culture positive for E. coli and Candida albicans.  CT of the abdomen benign.


#5.  Insulin-dependent diabetes mellitus.





Plan:


Permacatheter discontinued on 8/14.


Hold off on IV iron due to bacteremia.


Maintain Aranesp.


Antibiotics per infectious disease recommendations.


Permacath to be inserted this afternoon.


Case was discussed with infectious disease.


Maintain lasix to 80 mg IV bid. 


Hemodialysis today and again tomorrow with goal 3-4 L ultrafiltration.


Potential discharge tomorrow.

## 2017-08-18 LAB
ANION GAP SERPL CALC-SCNC: 9 MMOL/L
BUN SERPL-SCNC: 37 MG/DL (ref 7–17)
CALCIUM SPEC-MCNC: 9 MG/DL (ref 8.4–10.2)
CELLS COUNTED: 200
CH: 26
CHCM: 29.2
CHLORIDE SERPL-SCNC: 100 MMOL/L (ref 98–107)
CO2 SERPL-SCNC: 26 MMOL/L (ref 22–30)
ERYTHROCYTE [DISTWIDTH] IN BLOOD BY AUTOMATED COUNT: 2.74 M/UL (ref 3.8–5.4)
ERYTHROCYTE [DISTWIDTH] IN BLOOD: 17.8 % (ref 11.5–15.5)
GLUCOSE BLD-MCNC: 152 MG/DL (ref 75–99)
GLUCOSE BLD-MCNC: 169 MG/DL (ref 75–99)
GLUCOSE BLD-MCNC: 177 MG/DL (ref 75–99)
GLUCOSE BLD-MCNC: 247 MG/DL (ref 75–99)
GLUCOSE SERPL-MCNC: 167 MG/DL (ref 74–99)
HCT VFR BLD AUTO: 24.5 % (ref 34–46)
HDW: 3.19
HGB BLD-MCNC: 7.2 GM/DL (ref 11.4–16)
MCH RBC QN AUTO: 26.4 PG (ref 25–35)
MCHC RBC AUTO-ENTMCNC: 29.5 G/DL (ref 31–37)
MCV RBC AUTO: 89.5 FL (ref 80–100)
NON-AFRICAN AMERICAN GFR(MDRD): 19
POTASSIUM SERPL-SCNC: 4.9 MMOL/L (ref 3.5–5.1)
SODIUM SERPL-SCNC: 135 MMOL/L (ref 137–145)
WBC # BLD AUTO: 10.7 K/UL (ref 3.8–10.6)
WBC (PEROX): 11.4

## 2017-08-18 PROCEDURE — 02HV33Z INSERTION OF INFUSION DEVICE INTO SUPERIOR VENA CAVA, PERCUTANEOUS APPROACH: ICD-10-PCS

## 2017-08-18 RX ADMIN — GABAPENTIN SCH MG: 100 CAPSULE ORAL at 11:21

## 2017-08-18 RX ADMIN — ASPIRIN 325 MG ORAL TABLET SCH MG: 325 PILL ORAL at 08:49

## 2017-08-18 RX ADMIN — Medication SCH MG: at 08:49

## 2017-08-18 RX ADMIN — LACTULOSE SCH: 20 SOLUTION ORAL at 20:03

## 2017-08-18 RX ADMIN — LEVOTHYROXINE SODIUM SCH MCG: 100 TABLET ORAL at 06:04

## 2017-08-18 RX ADMIN — INSULIN DETEMIR SCH UNIT: 100 INJECTION, SOLUTION SUBCUTANEOUS at 17:09

## 2017-08-18 RX ADMIN — AMPICILLIN SCH MLS/HR: 2 INJECTION, POWDER, FOR SOLUTION INTRAVENOUS at 11:15

## 2017-08-18 RX ADMIN — CARVEDILOL SCH MG: 12.5 TABLET, FILM COATED ORAL at 11:21

## 2017-08-18 RX ADMIN — MORPHINE SULFATE PRN MG: 4 INJECTION, SOLUTION INTRAMUSCULAR; INTRAVENOUS at 05:50

## 2017-08-18 RX ADMIN — INSULIN LISPRO SCH UNIT: 100 INJECTION, SOLUTION INTRAVENOUS; SUBCUTANEOUS at 17:10

## 2017-08-18 RX ADMIN — CARVEDILOL SCH MG: 12.5 TABLET, FILM COATED ORAL at 22:32

## 2017-08-18 RX ADMIN — INSULIN LISPRO SCH UNIT: 100 INJECTION, SOLUTION INTRAVENOUS; SUBCUTANEOUS at 17:41

## 2017-08-18 RX ADMIN — GABAPENTIN SCH MG: 100 CAPSULE ORAL at 17:10

## 2017-08-18 RX ADMIN — AMPICILLIN SCH MLS/HR: 2 INJECTION, POWDER, FOR SOLUTION INTRAVENOUS at 22:32

## 2017-08-18 RX ADMIN — INSULIN LISPRO SCH UNIT: 100 INJECTION, SOLUTION INTRAVENOUS; SUBCUTANEOUS at 08:49

## 2017-08-18 RX ADMIN — BUDESONIDE SCH: 0.5 INHALANT ORAL at 07:37

## 2017-08-18 RX ADMIN — ATORVASTATIN CALCIUM SCH MG: 80 TABLET, FILM COATED ORAL at 22:32

## 2017-08-18 RX ADMIN — FUROSEMIDE SCH MG: 10 INJECTION, SOLUTION INTRAMUSCULAR; INTRAVENOUS at 08:49

## 2017-08-18 RX ADMIN — SENNOSIDES SCH: 8.6 TABLET, FILM COATED ORAL at 11:20

## 2017-08-18 RX ADMIN — IPRATROPIUM BROMIDE AND ALBUTEROL SULFATE PRN ML: .5; 3 SOLUTION RESPIRATORY (INHALATION) at 07:34

## 2017-08-18 RX ADMIN — GABAPENTIN SCH MG: 100 CAPSULE ORAL at 22:33

## 2017-08-18 RX ADMIN — MORPHINE SULFATE PRN MG: 4 INJECTION, SOLUTION INTRAMUSCULAR; INTRAVENOUS at 19:45

## 2017-08-18 RX ADMIN — INSULIN LISPRO SCH UNIT: 100 INJECTION, SOLUTION INTRAVENOUS; SUBCUTANEOUS at 22:34

## 2017-08-18 RX ADMIN — INSULIN LISPRO SCH UNIT: 100 INJECTION, SOLUTION INTRAVENOUS; SUBCUTANEOUS at 12:29

## 2017-08-18 RX ADMIN — FUROSEMIDE SCH MG: 10 INJECTION, SOLUTION INTRAMUSCULAR; INTRAVENOUS at 22:32

## 2017-08-18 RX ADMIN — Medication SCH MG: at 17:10

## 2017-08-18 RX ADMIN — BUDESONIDE SCH: 0.5 INHALANT ORAL at 19:50

## 2017-08-18 RX ADMIN — INSULIN LISPRO SCH UNIT: 100 INJECTION, SOLUTION INTRAVENOUS; SUBCUTANEOUS at 12:30

## 2017-08-18 RX ADMIN — Medication SCH MG: at 11:21

## 2017-08-18 RX ADMIN — MORPHINE SULFATE PRN MG: 4 INJECTION, SOLUTION INTRAMUSCULAR; INTRAVENOUS at 14:41

## 2017-08-18 RX ADMIN — LACTULOSE SCH: 20 SOLUTION ORAL at 08:48

## 2017-08-18 NOTE — PN
DATE OF SERVICE:  08/18/2017



REASON FOR FOLLOW UP VISIT:  Enterococcus faecalis bacteremia secondary to Perm-
A-Cath. 



INTERVAL HISTORY:  The patient is afebrile.  He is breathing comfortably.  He 
did get a Perm-A-Cath in the left chest wall yesterday.  The patient tolerated 
the procedure.  He was dialyzed yesterday and getting dialyzed today.  Denies 
significant chest pain, shortness of breath or cough.  No abdominal pain or 
diarrhea.



On examination, blood pressure 124/53 with a pulse of 70, temperature 97.2.  
She is 99% on 4 liters nasal cannula.

GENERAL DESCRIPTION:  Middle-aged female lying in bed in no distress.

RESPIRATORY:  Unlabored breathing.  Clear to auscultation anteriorly.

HEART:  S1/S2 regular rate.

ABDOMEN:  Soft.  No tenderness.



LABS:  White count 10.7.  Follow up blood cultures have been negative.



DIAGNOSTIC IMPRESSION:  Patient with Enterococcus faecalis bacteremia secondary 
to Perm-A-Cath that has been discontinued.  She is currently on ampicillin, 
however, the patient is a dialysis patient and needs IV access site for future 
dialysis and I have recommended to get PICC line placement.  Hence, antibiotic 
will be switched over to vancomycin pharmacy to dose for the dialysis for 
another three weeks after discharge.  Continue supportive care.
MTDD

## 2017-08-18 NOTE — OP
PREOPERATIVE DIAGNOSIS:  Acute on chronic renal failure.



PROCEDURE:  Placement of the 32 cm precurved dialysis catheter, left internal 
jugular approach.



The patient was brought to the cath lab and left side of the neck was prepped 
and drapes were applied in sterile manner.  Ultrasound guided needle introduced 
to the left internal jugular vein.  Micropuncture guide was passed, checked 
with the C-arm.  Then 4 Bhutanese dilator advanced up the guidewire.  Then the 
tunnel was created. We brought 32 cm dialysis catheter and tunnel was created.  
Then we passed dilator and sheath on top of the guidewire and catheter was 
placed at the junction of the superior vena cava and atrium.  Flushed with 
heparin and saline and secured with 3-0 nylon.  The patient tolerated the 
procedure well.
KRIS

## 2017-08-18 NOTE — P.PN
Subjective





Patient is seen in follow-up for end-stage renal disease.  She is maintained on 

hemodialysis on a Monday Wednesday Friday schedule via right chest permacath.  

Patient presented with fever.  Her blood cultures are positive for enteroccus 

faecalis.  Urine cultures positive for E.coli and candida..  Patient's 

currently resting in bed.  Denies any chest pain or shortness of breath.  No 

active complaints at this time.  She had a permacath placed on August 17 and 

underwent hemodialysis.





Vital signs are stable.


General: The patient appeared well nourished and normally developed. 


HEENT: Head exam is unremarkable. Neck is without jugular venous distension.  


LUNGS: Lungs are clear to auscultation and percussion. Breath sounds decreased.


HEART: Rate and Rhythm are regular. First and second heart sounds normal. No 

murmurs, rubs or gallops. 


ABDOMEN: Abdominal exam reveals normal bowel sounds. Non-tender and non-

distended. No evidence of peritonitis.


EXTREMITITES:  1+ edema.











Objective





- Vital Signs


Vital signs: 


 Vital Signs











Temp  97.2 F L  08/18/17 07:00


 


Pulse  60   08/18/17 08:00


 


Resp  20   08/18/17 08:00


 


BP  124/53   08/18/17 07:00


 


Pulse Ox  99   08/18/17 07:00








 Intake & Output











 08/17/17 08/18/17 08/18/17





 18:59 06:59 18:59


 


Intake Total 370 240 


 


Output Total 654 60 


 


Balance -284 180 


 


Weight 142.5 kg 140.5 kg 


 


Intake:   


 


   140 


 


    ns  140 


 


  Intake, IV Titration 100 100 





  Amount   


 


    Ampicillin 2,000 mg In 0  





    Sodium Chloride 0.9% 100   





    ml @ 200 mls/hr IVPB   





    Q12HR [facility] Rx#:[id number]   


 


    Ampicillin-Sulbactam 3 gm 100 100 





    In Sodium Chloride 0.9%   





    100 ml @ 100 mls/hr IVPB   





    Q24HR [facility] Rx#:[id number]   


 


  Oral 120  


 


Output:   


 


  Urine 650 60 


 


    Uretheral (Hong)  60 


 


  Stool 4  


 


Other:   


 


  Voiding Method Indwelling Catheter Indwelling Catheter Indwelling Catheter


 


  # Voids 1  


 


  # Bowel Movements 1 1 














- Labs


CBC & Chem 7: 


 08/18/17 06:17





 08/18/17 06:17


Labs: 


 Abnormal Lab Results - Last 24 Hours (Table)











  08/17/17 08/17/17 08/17/17 Range/Units





  17:07 17:47 20:43 


 


WBC   12.1 H   (3.8-10.6)  k/uL


 


RBC   2.95 L   (3.80-5.40)  m/uL


 


Hgb   7.9 L   (11.4-16.0)  gm/dL


 


Hct   26.2 L   (34.0-46.0)  %


 


MCHC   30.2 L   (31.0-37.0)  g/dL


 


RDW   18.1 H   (11.5-15.5)  %


 


Neutrophils #   8.8 H   (1.3-7.7)  k/uL


 


Neutrophils # (Manual)     (1.3-7.7)  k/uL


 


Lymphocytes # (Manual)     (1.0-4.8)  k/uL


 


Metamyelocytes # (Man)     (0)  k/uL


 


Myelocytes # (Manual)     (0)  k/uL


 


Sodium     (137-145)  mmol/L


 


BUN     (7-17)  mg/dL


 


Creatinine     (0.52-1.04)  mg/dL


 


Glucose     (74-99)  mg/dL


 


POC Glucose (mg/dL)  177 H   162 H  (75-99)  mg/dL














  08/18/17 08/18/17 08/18/17 Range/Units





  06:17 06:17 07:33 


 


WBC   10.7 H   (3.8-10.6)  k/uL


 


RBC   2.74 L   (3.80-5.40)  m/uL


 


Hgb   7.2 L   (11.4-16.0)  gm/dL


 


Hct   24.5 L   (34.0-46.0)  %


 


MCHC   29.5 L   (31.0-37.0)  g/dL


 


RDW   17.8 H   (11.5-15.5)  %


 


Neutrophils #     (1.3-7.7)  k/uL


 


Neutrophils # (Manual)   8.35 H   (1.3-7.7)  k/uL


 


Lymphocytes # (Manual)   0.86 L   (1.0-4.8)  k/uL


 


Metamyelocytes # (Man)   0.21 H   (0)  k/uL


 


Myelocytes # (Manual)   0.21 H   (0)  k/uL


 


Sodium  135 L    (137-145)  mmol/L


 


BUN  37 H    (7-17)  mg/dL


 


Creatinine  2.59 H    (0.52-1.04)  mg/dL


 


Glucose  167 H    (74-99)  mg/dL


 


POC Glucose (mg/dL)    177 H  (75-99)  mg/dL














  08/18/17 Range/Units





  12:05 


 


WBC   (3.8-10.6)  k/uL


 


RBC   (3.80-5.40)  m/uL


 


Hgb   (11.4-16.0)  gm/dL


 


Hct   (34.0-46.0)  %


 


MCHC   (31.0-37.0)  g/dL


 


RDW   (11.5-15.5)  %


 


Neutrophils #   (1.3-7.7)  k/uL


 


Neutrophils # (Manual)   (1.3-7.7)  k/uL


 


Lymphocytes # (Manual)   (1.0-4.8)  k/uL


 


Metamyelocytes # (Man)   (0)  k/uL


 


Myelocytes # (Manual)   (0)  k/uL


 


Sodium   (137-145)  mmol/L


 


BUN   (7-17)  mg/dL


 


Creatinine   (0.52-1.04)  mg/dL


 


Glucose   (74-99)  mg/dL


 


POC Glucose (mg/dL)  247 H  (75-99)  mg/dL








 Microbiology - Last 24 Hours (Table)











 08/13/17 10:34 Blood Culture - Preliminary





 Blood    No Growth after 120 hours


 


 08/15/17 10:42 Blood Culture - Preliminary





 Blood    No Growth after 48 hours


 


 08/14/17 10:36 Blood Culture - Preliminary





 Blood    No Growth after 72 hours














Assessment and Plan


Plan: 





Assessment:


#1.  Chronic kidney disease stage IV secondary to solitary left kidney and 

diabetic kidney disease progressed now progressed to end-stage renal disease 

from sepsis in July 2017.  Currently hemodialysis dependent and maintained on a 

Monday Wednesday Friday schedule via permacat.


#2.  Anemia of chronic kidney disease.  Iron deficiency noted.


#3.  Diastolic CHF.


#4.  Bacteremia with blood cultures positive for enterococcus facialis.  Urine 

culture positive for E. coli and Candida albicans.  CT of the abdomen benign.


#5.  Insulin-dependent diabetes mellitus.





Plan:


Hold off on IV iron due to bacteremia.


Maintain Aranesp.


Antibiotics per infectious disease recommendations.


Maintain lasix to 80 mg IV bid - may transition to oral upon discharge.


Hemodialysis today with goal 3 L ultrafiltration.


Potential discharge after dialysis today.

## 2017-08-19 LAB
BASOPHILS # BLD AUTO: 0.1 K/UL (ref 0–0.2)
BASOPHILS NFR BLD AUTO: 1 %
CH: 26.2
CHCM: 29.1
EOSINOPHIL # BLD AUTO: 0.5 K/UL (ref 0–0.7)
EOSINOPHIL NFR BLD AUTO: 5 %
ERYTHROCYTE [DISTWIDTH] IN BLOOD BY AUTOMATED COUNT: 2.9 M/UL (ref 3.8–5.4)
ERYTHROCYTE [DISTWIDTH] IN BLOOD: 17.8 % (ref 11.5–15.5)
GLUCOSE BLD-MCNC: 116 MG/DL (ref 75–99)
GLUCOSE BLD-MCNC: 119 MG/DL (ref 75–99)
GLUCOSE BLD-MCNC: 158 MG/DL (ref 75–99)
GLUCOSE BLD-MCNC: 164 MG/DL (ref 75–99)
HCT VFR BLD AUTO: 26.2 % (ref 34–46)
HDW: 3.29
HGB BLD-MCNC: 7.8 GM/DL (ref 11.4–16)
LUC NFR BLD AUTO: 3 %
LYMPHOCYTES # SPEC AUTO: 1.2 K/UL (ref 1–4.8)
LYMPHOCYTES NFR SPEC AUTO: 12 %
MCH RBC QN AUTO: 26.8 PG (ref 25–35)
MCHC RBC AUTO-ENTMCNC: 29.7 G/DL (ref 31–37)
MCV RBC AUTO: 90.3 FL (ref 80–100)
MONOCYTES # BLD AUTO: 0.8 K/UL (ref 0–1)
MONOCYTES NFR BLD AUTO: 8 %
NEUTROPHILS # BLD AUTO: 7.5 K/UL (ref 1.3–7.7)
NEUTROPHILS NFR BLD AUTO: 72 %
WBC # BLD AUTO: 0.32 10*3/UL
WBC # BLD AUTO: 10.5 K/UL (ref 3.8–10.6)
WBC (PEROX): 11.27

## 2017-08-19 RX ADMIN — LACTULOSE SCH: 20 SOLUTION ORAL at 21:43

## 2017-08-19 RX ADMIN — AMPICILLIN SCH MLS/HR: 2 INJECTION, POWDER, FOR SOLUTION INTRAVENOUS at 07:55

## 2017-08-19 RX ADMIN — Medication SCH MG: at 11:58

## 2017-08-19 RX ADMIN — INSULIN DETEMIR SCH UNIT: 100 INJECTION, SOLUTION SUBCUTANEOUS at 18:08

## 2017-08-19 RX ADMIN — FUROSEMIDE SCH MG: 10 INJECTION, SOLUTION INTRAMUSCULAR; INTRAVENOUS at 07:55

## 2017-08-19 RX ADMIN — BUDESONIDE SCH: 0.5 INHALANT ORAL at 07:35

## 2017-08-19 RX ADMIN — BUDESONIDE SCH: 0.5 INHALANT ORAL at 19:32

## 2017-08-19 RX ADMIN — CARVEDILOL SCH MG: 12.5 TABLET, FILM COATED ORAL at 21:55

## 2017-08-19 RX ADMIN — MORPHINE SULFATE PRN MG: 4 INJECTION, SOLUTION INTRAMUSCULAR; INTRAVENOUS at 01:28

## 2017-08-19 RX ADMIN — INSULIN LISPRO SCH UNIT: 100 INJECTION, SOLUTION INTRAVENOUS; SUBCUTANEOUS at 07:56

## 2017-08-19 RX ADMIN — GABAPENTIN SCH MG: 100 CAPSULE ORAL at 21:55

## 2017-08-19 RX ADMIN — GABAPENTIN SCH MG: 100 CAPSULE ORAL at 11:58

## 2017-08-19 RX ADMIN — MORPHINE SULFATE PRN MG: 4 INJECTION, SOLUTION INTRAMUSCULAR; INTRAVENOUS at 13:30

## 2017-08-19 RX ADMIN — MORPHINE SULFATE PRN MG: 4 INJECTION, SOLUTION INTRAMUSCULAR; INTRAVENOUS at 19:46

## 2017-08-19 RX ADMIN — INSULIN LISPRO SCH: 100 INJECTION, SOLUTION INTRAVENOUS; SUBCUTANEOUS at 12:01

## 2017-08-19 RX ADMIN — ASPIRIN 325 MG ORAL TABLET SCH MG: 325 PILL ORAL at 07:55

## 2017-08-19 RX ADMIN — LACTULOSE SCH: 20 SOLUTION ORAL at 07:54

## 2017-08-19 RX ADMIN — INSULIN LISPRO SCH UNIT: 100 INJECTION, SOLUTION INTRAVENOUS; SUBCUTANEOUS at 12:00

## 2017-08-19 RX ADMIN — MORPHINE SULFATE PRN MG: 4 INJECTION, SOLUTION INTRAMUSCULAR; INTRAVENOUS at 08:11

## 2017-08-19 RX ADMIN — FUROSEMIDE SCH MG: 10 INJECTION, SOLUTION INTRAMUSCULAR; INTRAVENOUS at 21:56

## 2017-08-19 RX ADMIN — GABAPENTIN SCH MG: 100 CAPSULE ORAL at 18:03

## 2017-08-19 RX ADMIN — INSULIN LISPRO SCH: 100 INJECTION, SOLUTION INTRAVENOUS; SUBCUTANEOUS at 07:57

## 2017-08-19 RX ADMIN — Medication SCH MG: at 18:03

## 2017-08-19 RX ADMIN — ATORVASTATIN CALCIUM SCH MG: 80 TABLET, FILM COATED ORAL at 21:55

## 2017-08-19 RX ADMIN — INSULIN LISPRO SCH UNIT: 100 INJECTION, SOLUTION INTRAVENOUS; SUBCUTANEOUS at 18:03

## 2017-08-19 RX ADMIN — AMPICILLIN SCH MLS/HR: 2 INJECTION, POWDER, FOR SOLUTION INTRAVENOUS at 21:52

## 2017-08-19 RX ADMIN — Medication SCH MG: at 07:56

## 2017-08-19 RX ADMIN — LEVOTHYROXINE SODIUM SCH MCG: 100 TABLET ORAL at 06:19

## 2017-08-19 RX ADMIN — CARVEDILOL SCH MG: 12.5 TABLET, FILM COATED ORAL at 11:59

## 2017-08-19 RX ADMIN — SENNOSIDES SCH: 8.6 TABLET, FILM COATED ORAL at 11:58

## 2017-08-19 RX ADMIN — INSULIN LISPRO SCH UNIT: 100 INJECTION, SOLUTION INTRAVENOUS; SUBCUTANEOUS at 22:04

## 2017-08-19 NOTE — P.PN
Subjective


6-year-old female with history of heart failure with preserved EF obesity 

hypoventilation syndrome objective sleep apnea recently had a cardiopulmonary 

arrest thereafter was triaged to F comes back in the hospital with a fever.  

Patient was noted to have a catheter associated bacteremia with E faecalis.  

Patient's catheter was removed and thereafter repeat cultures were negative





Patient has a new catheter placed on the left jose a-chest





Continue serial 4 L supplemental oxygen and using BiPAP as needed





No new overnight events.








Objective





- Vital Signs


Vital signs: 


 Vital Signs











Temp  98.4 F   08/19/17 15:00


 


Pulse  61   08/19/17 15:00


 


Resp  18   08/19/17 15:00


 


BP  129/65   08/19/17 15:00


 


Pulse Ox  99   08/19/17 15:00








 Intake & Output











 08/18/17 08/19/17 08/19/17





 18:59 06:59 18:59


 


Intake Total 340 410 440


 


Output Total 2754 104 420


 


Balance -2414 306 20


 


Weight  136 kg 136 kg


 


Intake:   


 


  IV  310 


 


    ns  310 


 


  Intake, IV Titration 100 100 200





  Amount   


 


    Ampicillin 2,000 mg In 100 100 100





    Sodium Chloride 0.9% 100   





    ml @ 200 mls/hr IVPB   





    Q12HR [facility] Rx#:[id number]   


 


    Sodium Ferric Gluconat-   100





    Sucrose 62.5 mg In Sodium   





    Chloride 0.9% 100 ml @   





    100 mls/hr IVPB ONCE ONE   





    Rx#:[id number]   


 


  Oral 240  240


 


Output:   


 


  Urine 250 100 420


 


    Uretheral (Hong)  50 210


 


  Stool 4 4 


 


  Other 2500  


 


Other:   


 


  Voiding Method Indwelling Catheter Indwelling Catheter Indwelling Catheter


 


  # Voids   1














- Exam


Physical exam Gen. appearance oriented 3 in no distress





Neck is supple no JVD





Lungs good air entry clear to auscultation no rhonchi or wheezing


Her noted on the left side no significant tenderness catheter


Heart S1-S2 heard regular rate and rhythm no murmurs appreciated 





Abdomen is soft nontender no organomegaly bowel sounds are intact





Neurologically cranial nerves II-12 grossly intact no focal motor or sensory 

deficits noted





Skin no abnormalities appreciated








- Labs


CBC & Chem 7: 


 08/19/17 06:53





 08/18/17 06:17


Labs: 


 Abnormal Lab Results - Last 24 Hours (Table)











  08/18/17 08/18/17 08/19/17 Range/Units





  17:35 20:18 06:53 


 


RBC    2.90 L  (3.80-5.40)  m/uL


 


Hgb    7.8 L  (11.4-16.0)  gm/dL


 


Hct    26.2 L  (34.0-46.0)  %


 


MCHC    29.7 L  (31.0-37.0)  g/dL


 


RDW    17.8 H  (11.5-15.5)  %


 


POC Glucose (mg/dL)  152 H  169 H   (75-99)  mg/dL














  08/19/17 08/19/17 Range/Units





  07:27 11:24 


 


RBC    (3.80-5.40)  m/uL


 


Hgb    (11.4-16.0)  gm/dL


 


Hct    (34.0-46.0)  %


 


MCHC    (31.0-37.0)  g/dL


 


RDW    (11.5-15.5)  %


 


POC Glucose (mg/dL)  116 H  119 H  (75-99)  mg/dL








 Microbiology - Last 24 Hours (Table)











 08/15/17 10:42 Blood Culture - Preliminary





 Blood    No Growth after 96 hours


 


 08/14/17 10:36 Blood Culture - Preliminary





 Blood    No Growth after 120 hours


 


 08/13/17 10:34 Blood Culture - Final





 Blood    No Growth after 144 hours














Assessment and Plan


Plan: 


#1 fever of unknown origin rule out catheter associated bacteremia , catheter 

related #2 ESRD on hemodialysis





#3 anemia of ESRD





#4 atypical chest pain





#5 essential hypertension





#6 severe obstructive sleep apnea





#7 obesity hypoventilation syndrome





#8 heart failure PEF





#9 hypothyroidism





#10 anemia of ESRD with underlying iron deficiency





Plan








Continue iron infusions





Vitals are stable will likely be discharged back to [facility] with the next 24-48 

hours

## 2017-08-19 NOTE — P.PN
Subjective


Principal diagnosis: 





This is a 56-year-old old obese female with ESRD on dialysis Monday Wednesday Friday.  She was admitted with chest pain was found to have bacteremia with 

blood cultures growing Enterococcus faecalis and the urine growing E. coli and 

Candida.  She had a permacath on the right side that was removed on 08/14/2017.

  Further blood cultures on 814 and 8:15 are negative therefore a new catheter 

was placed on 08/17/2017 2 days ago and she has been dialyzed yesterday and day 

before yesterday.  She had a total of 5.2 L ultrafiltrate at.





She is feeling back to her normal self she has sleep apnea and is currently on 

CPAP, history of diabetes COPD asthma.  She lives in nursing home in [place].





Appetite is good back to normal.  No chest pain shortness of breath fever 

chills nausea vomiting diarrhea abdominal pain.





Objective





- Vital Signs


Vital signs: 


 Vital Signs











Temp  98.0 F   08/19/17 07:00


 


Pulse  56 L  08/19/17 07:00


 


Resp  18   08/19/17 07:00


 


BP  142/64   08/19/17 07:00


 


Pulse Ox  94 L  08/19/17 07:00








 Intake & Output











 08/18/17 08/19/17 08/19/17





 18:59 06:59 18:59


 


Intake Total 340 410 


 


Output Total 2754 104 


 


Balance -2414 306 


 


Weight  136 kg 


 


Intake:   


 


  IV  310 


 


    ns  310 


 


  Intake, IV Titration 100 100 





  Amount   


 


    Ampicillin 2,000 mg In 100 100 





    Sodium Chloride 0.9% 100   





    ml @ 200 mls/hr IVPB   





    Q12HR [facility] Rx#:[id number]   


 


  Oral 240  


 


Output:   


 


  Urine 250 100 


 


    Uretheral (Hong)  50 


 


  Stool 4 4 


 


  Other 2500  


 


Other:   


 


  Voiding Method Indwelling Catheter Indwelling Catheter 








On examination she is morbidly obese currently on CPAP





HEENT exam difficult to see but no JVP neck is supple no facial asymmetry





Lungs are clear to auscultation percussion fair air entry bilaterally





Heart sounds are unremarkable for any murmur rub gallop





A permacath is on the left side with some mild amount of blood on the dressing.





Abdomen is soft nontender obese





Extremity exam was minimal edema





Neurologically awake alert oriented comfortable but has generalized weakness.





- Labs


CBC & Chem 7: 


 08/19/17 06:53





 08/18/17 06:17


Labs: 


 Abnormal Lab Results - Last 24 Hours (Table)











  08/18/17 08/18/17 08/18/17 Range/Units





  12:05 17:35 20:18 


 


RBC     (3.80-5.40)  m/uL


 


Hgb     (11.4-16.0)  gm/dL


 


Hct     (34.0-46.0)  %


 


MCHC     (31.0-37.0)  g/dL


 


RDW     (11.5-15.5)  %


 


POC Glucose (mg/dL)  247 H  152 H  169 H  (75-99)  mg/dL














  08/19/17 08/19/17 Range/Units





  06:53 07:27 


 


RBC  2.90 L   (3.80-5.40)  m/uL


 


Hgb  7.8 L   (11.4-16.0)  gm/dL


 


Hct  26.2 L   (34.0-46.0)  %


 


MCHC  29.7 L   (31.0-37.0)  g/dL


 


RDW  17.8 H   (11.5-15.5)  %


 


POC Glucose (mg/dL)   116 H  (75-99)  mg/dL








 Microbiology - Last 24 Hours (Table)











 08/15/17 10:42 Blood Culture - Preliminary





 Blood    No Growth after 72 hours


 


 08/14/17 10:36 Blood Culture - Preliminary





 Blood    No Growth after 96 hours


 


 08/13/17 10:34 Blood Culture - Preliminary





 Blood    No Growth after 120 hours














Assessment and Plan


Plan: 





Impression.





1.  ESRD on dialysis on Monday Wednesday Friday.etiology is Chronic kidney 

disease stage IV secondary to solitary left kidney and diabetic kidney disease 

progressed now likely to end-stage renal disease from sepsis in July 2017.  

Currently hemodialysis dependent and maintained on a Monday Wednesday Friday 

schedule via permacath.





2.  Admitted with bacteremia with blood cultures positive for Enterococcus 

faecalis dated 08/11/2017 and 08/10/2017.  Repeat culture after removal of 

catheter is negative 08/14/2017 and 08/15/2017.





3.  P catheter removed 08/14/2017 and new catheter placed 08/17/2017.





4.  Urinary tract infection with Candida and E. coli, a computed tomography 

scan of the abdomen showed no abnormalities in the kidneys abdomen and pelvis 

computed tomography scan were unremarkable.





5.  Severe obesity, COPD, sleep apnea, nursing home resident.





6.  Anemia with hemoglobin of 7.8, iron saturation 7.5% dated 08/10/2017.





Recommendation.





Antibiotics as per infectious disease recommendations.  She needs to have a 

dialysis access placed.


We can proceed with small dose of Ferrlecit 1 dose of 62.5 mg as she has 

overcome her bacteremia and is culture negative and is stable

## 2017-08-20 LAB
BASOPHILS # BLD AUTO: 0.1 K/UL (ref 0–0.2)
BASOPHILS NFR BLD AUTO: 1 %
CH: 26.7
CHCM: 28.7
EOSINOPHIL # BLD AUTO: 0.6 K/UL (ref 0–0.7)
EOSINOPHIL NFR BLD AUTO: 5 %
ERYTHROCYTE [DISTWIDTH] IN BLOOD BY AUTOMATED COUNT: 3.1 M/UL (ref 3.8–5.4)
ERYTHROCYTE [DISTWIDTH] IN BLOOD: 18.2 % (ref 11.5–15.5)
GLUCOSE BLD-MCNC: 119 MG/DL (ref 75–99)
GLUCOSE BLD-MCNC: 143 MG/DL (ref 75–99)
GLUCOSE BLD-MCNC: 156 MG/DL (ref 75–99)
GLUCOSE BLD-MCNC: 193 MG/DL (ref 75–99)
HCT VFR BLD AUTO: 29.1 % (ref 34–46)
HDW: 3.15
HGB BLD-MCNC: 8.2 GM/DL (ref 11.4–16)
LUC NFR BLD AUTO: 2 %
LYMPHOCYTES # SPEC AUTO: 1.2 K/UL (ref 1–4.8)
LYMPHOCYTES NFR SPEC AUTO: 10 %
MCH RBC QN AUTO: 26.3 PG (ref 25–35)
MCHC RBC AUTO-ENTMCNC: 28.1 G/DL (ref 31–37)
MCV RBC AUTO: 93.6 FL (ref 80–100)
MONOCYTES # BLD AUTO: 1 K/UL (ref 0–1)
MONOCYTES NFR BLD AUTO: 8 %
NEUTROPHILS # BLD AUTO: 8.3 K/UL (ref 1.3–7.7)
NEUTROPHILS NFR BLD AUTO: 73 %
WBC # BLD AUTO: 0.26 10*3/UL
WBC # BLD AUTO: 11.3 K/UL (ref 3.8–10.6)
WBC (PEROX): 12.06

## 2017-08-20 RX ADMIN — BUDESONIDE SCH MG: 0.5 INHALANT ORAL at 20:32

## 2017-08-20 RX ADMIN — MORPHINE SULFATE PRN MG: 4 INJECTION, SOLUTION INTRAMUSCULAR; INTRAVENOUS at 19:37

## 2017-08-20 RX ADMIN — LACTULOSE SCH: 20 SOLUTION ORAL at 15:46

## 2017-08-20 RX ADMIN — IPRATROPIUM BROMIDE AND ALBUTEROL SULFATE PRN ML: .5; 3 SOLUTION RESPIRATORY (INHALATION) at 07:43

## 2017-08-20 RX ADMIN — INSULIN DETEMIR SCH UNIT: 100 INJECTION, SOLUTION SUBCUTANEOUS at 18:01

## 2017-08-20 RX ADMIN — ASPIRIN 325 MG ORAL TABLET SCH MG: 325 PILL ORAL at 08:33

## 2017-08-20 RX ADMIN — Medication SCH MG: at 17:55

## 2017-08-20 RX ADMIN — INSULIN LISPRO SCH UNIT: 100 INJECTION, SOLUTION INTRAVENOUS; SUBCUTANEOUS at 17:55

## 2017-08-20 RX ADMIN — LACTULOSE SCH GM: 20 SOLUTION ORAL at 08:32

## 2017-08-20 RX ADMIN — GABAPENTIN SCH MG: 100 CAPSULE ORAL at 13:02

## 2017-08-20 RX ADMIN — CARVEDILOL SCH MG: 12.5 TABLET, FILM COATED ORAL at 13:02

## 2017-08-20 RX ADMIN — MORPHINE SULFATE PRN MG: 4 INJECTION, SOLUTION INTRAMUSCULAR; INTRAVENOUS at 08:35

## 2017-08-20 RX ADMIN — FUROSEMIDE SCH MG: 10 INJECTION, SOLUTION INTRAMUSCULAR; INTRAVENOUS at 22:10

## 2017-08-20 RX ADMIN — Medication SCH MG: at 08:33

## 2017-08-20 RX ADMIN — MORPHINE SULFATE PRN MG: 4 INJECTION, SOLUTION INTRAMUSCULAR; INTRAVENOUS at 01:05

## 2017-08-20 RX ADMIN — INSULIN LISPRO SCH: 100 INJECTION, SOLUTION INTRAVENOUS; SUBCUTANEOUS at 08:33

## 2017-08-20 RX ADMIN — BUDESONIDE SCH MG: 0.5 INHALANT ORAL at 07:43

## 2017-08-20 RX ADMIN — INSULIN LISPRO SCH UNIT: 100 INJECTION, SOLUTION INTRAVENOUS; SUBCUTANEOUS at 13:00

## 2017-08-20 RX ADMIN — ATORVASTATIN CALCIUM SCH MG: 80 TABLET, FILM COATED ORAL at 22:10

## 2017-08-20 RX ADMIN — AMPICILLIN SCH MLS/HR: 2 INJECTION, POWDER, FOR SOLUTION INTRAVENOUS at 22:09

## 2017-08-20 RX ADMIN — AMPICILLIN SCH MLS/HR: 2 INJECTION, POWDER, FOR SOLUTION INTRAVENOUS at 08:32

## 2017-08-20 RX ADMIN — INSULIN LISPRO SCH UNIT: 100 INJECTION, SOLUTION INTRAVENOUS; SUBCUTANEOUS at 08:33

## 2017-08-20 RX ADMIN — LEVOTHYROXINE SODIUM SCH MCG: 100 TABLET ORAL at 06:26

## 2017-08-20 RX ADMIN — LACTULOSE SCH: 20 SOLUTION ORAL at 22:09

## 2017-08-20 RX ADMIN — GABAPENTIN SCH MG: 100 CAPSULE ORAL at 22:11

## 2017-08-20 RX ADMIN — Medication SCH MG: at 13:01

## 2017-08-20 RX ADMIN — IPRATROPIUM BROMIDE AND ALBUTEROL SULFATE PRN ML: .5; 3 SOLUTION RESPIRATORY (INHALATION) at 20:32

## 2017-08-20 RX ADMIN — SENNOSIDES SCH MG: 8.6 TABLET, FILM COATED ORAL at 13:02

## 2017-08-20 RX ADMIN — GABAPENTIN SCH MG: 100 CAPSULE ORAL at 17:55

## 2017-08-20 RX ADMIN — INSULIN LISPRO SCH UNIT: 100 INJECTION, SOLUTION INTRAVENOUS; SUBCUTANEOUS at 22:12

## 2017-08-20 RX ADMIN — CARVEDILOL SCH MG: 12.5 TABLET, FILM COATED ORAL at 22:11

## 2017-08-20 RX ADMIN — BUDESONIDE SCH: 0.5 INHALANT ORAL at 20:36

## 2017-08-20 RX ADMIN — FUROSEMIDE SCH MG: 10 INJECTION, SOLUTION INTRAMUSCULAR; INTRAVENOUS at 08:32

## 2017-08-20 NOTE — P.PN
Subjective


Principal diagnosis: 





This is a 56-year-old old obese female with ESRD on dialysis Monday Wednesday Friday.  She was admitted with chest pain was found to have at associated 

bacteremia with blood cultures growing Enterococcus faecalis and the urine 

growing E. coli and Candida.  She had a permacath on the right side that was 

removed on 08/14/2017.  Further blood cultures on 814 and 8:15 are negative 

therefore a new catheter was placed on 08/17/2017 2 days ago and she has been 

dialyzed 2 days on 8/17 and 08/18/2017.  





She has a mild cough this morning.  No fever or chills no nausea vomiting good 

appetite no abdominal pain or diarrhea.


Other than this She is feeling back to her normal self she has sleep apnea and 

is currently on CPAP, history of diabetes COPD asthma.  She lives in nursing 

home in [place].








Objective





- Vital Signs


Vital signs: 


 Vital Signs











Temp  97.7 F   08/20/17 07:00


 


Pulse  66   08/20/17 07:56


 


Resp  18   08/20/17 07:00


 


BP  137/61   08/20/17 07:00


 


Pulse Ox  99   08/20/17 07:00








 Intake & Output











 08/19/17 08/20/17 08/20/17





 18:59 06:59 18:59


 


Intake Total 440 650 


 


Output Total 420 40 


 


Balance 20 610 


 


Weight 136 kg 138.5 kg 


 


Intake:   


 


  IV  310 


 


    ns  310 


 


  Intake, IV Titration 200 100 





  Amount   


 


    Ampicillin 2,000 mg In 100 100 





    Sodium Chloride 0.9% 100   





    ml @ 200 mls/hr IVPB   





    Q12HR [facility] Rx#:[id number]   


 


    Sodium Ferric Gluconat- 100  





    Sucrose 62.5 mg In Sodium   





    Chloride 0.9% 100 ml @   





    100 mls/hr IVPB ONCE ONE   





    Rx#:[id number]   


 


  Oral 240 240 


 


Output:   


 


  Urine 420 40 


 


    Uretheral (Hong) 210 40 


 


Other:   


 


  Voiding Method Indwelling Catheter Indwelling Catheter 


 


  # Voids 1  








On examination she is on her BiPAP via nasal oxygen.





HEENT exam is difficult to see her neck veins.  Neck is supple no facial 

asymmetry





Lungs are clear to auscultation percussion good air entry bilaterally





Heart sounds are unremarkable for any murmur rub gallop





Abdomen soft nontender obese





Extremities exam reveals mild edema





Neurologically awake alert oriented but generalized weakness





- Labs


CBC & Chem 7: 


 08/20/17 06:58





 08/18/17 06:17


Labs: 


 Abnormal Lab Results - Last 24 Hours (Table)











  08/19/17 08/19/17 08/19/17 Range/Units





  11:24 17:06 19:51 


 


WBC     (3.8-10.6)  k/uL


 


RBC     (3.80-5.40)  m/uL


 


Hgb     (11.4-16.0)  gm/dL


 


Hct     (34.0-46.0)  %


 


MCHC     (31.0-37.0)  g/dL


 


RDW     (11.5-15.5)  %


 


Neutrophils #     (1.3-7.7)  k/uL


 


POC Glucose (mg/dL)  119 H  158 H  164 H  (75-99)  mg/dL














  08/20/17 08/20/17 Range/Units





  06:58 07:17 


 


WBC  11.3 H   (3.8-10.6)  k/uL


 


RBC  3.10 L   (3.80-5.40)  m/uL


 


Hgb  8.2 L   (11.4-16.0)  gm/dL


 


Hct  29.1 L   (34.0-46.0)  %


 


MCHC  28.1 L   (31.0-37.0)  g/dL


 


RDW  18.2 H   (11.5-15.5)  %


 


Neutrophils #  8.3 H   (1.3-7.7)  k/uL


 


POC Glucose (mg/dL)   119 H  (75-99)  mg/dL








 Microbiology - Last 24 Hours (Table)











 08/15/17 10:42 Blood Culture - Preliminary





 Blood    No Growth after 96 hours


 


 08/14/17 10:36 Blood Culture - Preliminary





 Blood    No Growth after 120 hours


 


 08/13/17 10:34 Blood Culture - Final





 Blood    No Growth after 144 hours














Assessment and Plan


Plan: 





Impression.





1.  ESRD on dialysis on Monday Wednesday Friday.etiology is Chronic kidney 

disease stage IV secondary to solitary left kidney and diabetic kidney disease 

progressed now likely to end-stage renal disease from sepsis in July 2017.  

Currently hemodialysis dependent and maintained on a Monday Wednesday Friday 

schedule via permacath.





2.  Admitted with bacteremia with blood cultures positive for Enterococcus 

faecalis dated 08/11/2017 and 08/10/2017.  Repeat culture after removal of 

catheter is negative 08/14/2017 and 08/15/2017.





3.  P catheter removed 08/14/2017 and new catheter placed 08/17/2017.





4.  Urinary tract infection with Candida and E. coli, a computed tomography 

scan of the abdomen showed no abnormalities in the kidneys abdomen and pelvis 

computed tomography scan were unremarkable.





5.  Severe obesity, COPD, sleep apnea, nursing home resident.





6.  Anemia with hemoglobin of 7.8, iron saturation 7.5% dated 08/10/2017.





7.  New cough starting today 08/20/2017





Recommendation.





Maintain antibiotic coverage as per ID recommendation.


Dialysis will be tomorrow Monday she can be discharged to her nursing home

## 2017-08-20 NOTE — P.PN
Subjective


6-year-old female with history of heart failure with preserved EF obesity 

hypoventilation syndrome objective sleep apnea recently had a cardiopulmonary 

arrest thereafter was triaged to ECF comes back in the hospital with a fever.  

Patient was noted to have a catheter associated bacteremia with E faecalis.  

Patient's catheter was removed and thereafter repeat cultures were negative





Patient has a new catheter placed on the left jose a-chest





Continue serial 4 L supplemental oxygen and using BiPAP as needed





No new overnight events.





7/20/17





no change





states to have a cough this am, non productive





no overnight events











Objective





- Vital Signs


Vital signs: 


 Vital Signs











Temp  98.1 F   08/20/17 15:00


 


Pulse  63   08/20/17 19:33


 


Resp  18   08/20/17 15:00


 


BP  129/57   08/20/17 19:33


 


Pulse Ox  99   08/20/17 15:00








 Intake & Output











 08/20/17 08/20/17 08/21/17





 06:59 18:59 06:59


 


Intake Total 650  


 


Output Total 40 300 


 


Balance 610 -300 


 


Weight 138.5 kg  


 


Intake:   


 


    


 


    ns 310  


 


  Intake, IV Titration 100  





  Amount   


 


    Ampicillin 2,000 mg In 100  





    Sodium Chloride 0.9% 100   





    ml @ 200 mls/hr IVPB   





    Q12HR [facility] Rx#:[id number]   


 


  Oral 240  


 


Output:   


 


  Urine 40 300 


 


    Uretheral (Hong) 40  


 


Other:   


 


  Voiding Method Indwelling Catheter Indwelling Catheter 














- Exam


Physical exam Gen. appearance oriented 3 in no distress





Neck is supple no JVD





Lungs good air entry clear to auscultation no rhonchi or wheezing


Her noted on the left side no significant tenderness catheter


Heart S1-S2 heard regular rate and rhythm no murmurs appreciated 





Abdomen is soft nontender no organomegaly bowel sounds are intact





Neurologically cranial nerves II-12 grossly intact no focal motor or sensory 

deficits noted





Skin no abnormalities appreciated








- Labs


CBC & Chem 7: 


 08/20/17 06:58





 08/18/17 06:17


Labs: 


 Abnormal Lab Results - Last 24 Hours (Table)











  08/19/17 08/20/17 08/20/17 Range/Units





  19:51 06:58 07:17 


 


WBC   11.3 H   (3.8-10.6)  k/uL


 


RBC   3.10 L   (3.80-5.40)  m/uL


 


Hgb   8.2 L   (11.4-16.0)  gm/dL


 


Hct   29.1 L   (34.0-46.0)  %


 


MCHC   28.1 L   (31.0-37.0)  g/dL


 


RDW   18.2 H   (11.5-15.5)  %


 


Neutrophils #   8.3 H   (1.3-7.7)  k/uL


 


POC Glucose (mg/dL)  164 H   119 H  (75-99)  mg/dL














  08/20/17 08/20/17 Range/Units





  11:20 17:37 


 


WBC    (3.8-10.6)  k/uL


 


RBC    (3.80-5.40)  m/uL


 


Hgb    (11.4-16.0)  gm/dL


 


Hct    (34.0-46.0)  %


 


MCHC    (31.0-37.0)  g/dL


 


RDW    (11.5-15.5)  %


 


Neutrophils #    (1.3-7.7)  k/uL


 


POC Glucose (mg/dL)  143 H  156 H  (75-99)  mg/dL








 Microbiology - Last 24 Hours (Table)











 08/15/17 10:42 Blood Culture - Preliminary





 Blood    No Growth after 120 hours


 


 08/14/17 10:36 Blood Culture - Final





 Blood    No Growth after 144 hours














Assessment and Plan


Plan: 


#1 fever of unknown origin rule out catheter associated bacteremia , catheter 

related #2 ESRD on hemodialysis





#3 anemia of ESRD





#4 atypical chest pain





#5 essential hypertension





#6 severe obstructive sleep apnea





#7 obesity hypoventilation syndrome





#8 heart failure PEF





#9 hypothyroidism





#10 anemia of ESRD with underlying iron deficiency





Plan








Continue iron infusions





Vitals are stable will likely be discharged back to [facility] with the next 24-48 

hours


repeat cxr mary kate

## 2017-08-20 NOTE — PN
DATE OF SERVICE:  08/19/17



REASON FOR FOLLOW UP:    Enterococcus faecalis bacteremia secondary to Perm-A-
Cath infection. 





INTERVAL HISTORY: The patient is afebrile.  She is breathing comfortably.    
Denies significant chest pain or shortness of breath or cough.  No abdominal 
pain or any diarrhea. 



On examination, blood pressure 129/65 with a pulse of 61.  Temperature 98.4.   
She is 99% on room air.  General description is a middle aged female lying in 
the bed in no distress.    Respiratory system unlabored breathing.  Clear to 
auscultation anteriorly.   Heart S1, S2 regular rate and rhythm.  Abdomen soft.
   No tenderness.  



LABS:   Hemoglobin 7.1, white count 10.5.  





DIAGNOSTIC IMPRESSION AND PLAN:    The patient with Enterococcus faecalis 
bacteremia, secondary to the Perm-A-Cath which has been discontinued.  The 
patient is currently on Ampicillin that will be continued.  However, going to 
switch to Vancomycin to (    ) PICC line placement (    ) risk of urinary tract 
infection. 
MTDD

## 2017-08-21 LAB
ALP SERPL-CCNC: 60 U/L (ref 38–126)
ALT SERPL-CCNC: 26 U/L (ref 9–52)
ANION GAP SERPL CALC-SCNC: 13 MMOL/L
AST SERPL-CCNC: 16 U/L (ref 14–36)
BASOPHILS # BLD AUTO: 0.1 K/UL (ref 0–0.2)
BASOPHILS NFR BLD AUTO: 1 %
BUN SERPL-SCNC: 63 MG/DL (ref 7–17)
CALCIUM SPEC-MCNC: 9.5 MG/DL (ref 8.4–10.2)
CH: 26.4
CHCM: 29.8
CHLORIDE SERPL-SCNC: 101 MMOL/L (ref 98–107)
CO2 SERPL-SCNC: 24 MMOL/L (ref 22–30)
EOSINOPHIL # BLD AUTO: 0.6 K/UL (ref 0–0.7)
EOSINOPHIL NFR BLD AUTO: 5 %
ERYTHROCYTE [DISTWIDTH] IN BLOOD BY AUTOMATED COUNT: 2.77 M/UL (ref 3.8–5.4)
ERYTHROCYTE [DISTWIDTH] IN BLOOD: 17.8 % (ref 11.5–15.5)
GLUCOSE BLD-MCNC: 122 MG/DL (ref 75–99)
GLUCOSE BLD-MCNC: 123 MG/DL (ref 75–99)
GLUCOSE BLD-MCNC: 157 MG/DL (ref 75–99)
GLUCOSE BLD-MCNC: 166 MG/DL (ref 75–99)
GLUCOSE SERPL-MCNC: 112 MG/DL (ref 74–99)
HCT VFR BLD AUTO: 24.7 % (ref 34–46)
HDW: 3.13
HGB BLD-MCNC: 7.4 GM/DL (ref 11.4–16)
LUC NFR BLD AUTO: 2 %
LYMPHOCYTES # SPEC AUTO: 1.3 K/UL (ref 1–4.8)
LYMPHOCYTES NFR SPEC AUTO: 11 %
MCH RBC QN AUTO: 26.8 PG (ref 25–35)
MCHC RBC AUTO-ENTMCNC: 30.1 G/DL (ref 31–37)
MCV RBC AUTO: 89.1 FL (ref 80–100)
MONOCYTES # BLD AUTO: 0.9 K/UL (ref 0–1)
MONOCYTES NFR BLD AUTO: 7 %
NEUTROPHILS # BLD AUTO: 9.4 K/UL (ref 1.3–7.7)
NEUTROPHILS NFR BLD AUTO: 74 %
NON-AFRICAN AMERICAN GFR(MDRD): 12
POTASSIUM SERPL-SCNC: 6.4 MMOL/L (ref 3.5–5.1)
PROT SERPL-MCNC: 5.4 G/DL (ref 6.3–8.2)
SODIUM SERPL-SCNC: 138 MMOL/L (ref 137–145)
WBC # BLD AUTO: 0.31 10*3/UL
WBC # BLD AUTO: 12.6 K/UL (ref 3.8–10.6)
WBC (PEROX): 12.26

## 2017-08-21 RX ADMIN — INSULIN LISPRO SCH UNIT: 100 INJECTION, SOLUTION INTRAVENOUS; SUBCUTANEOUS at 07:27

## 2017-08-21 RX ADMIN — INSULIN LISPRO SCH: 100 INJECTION, SOLUTION INTRAVENOUS; SUBCUTANEOUS at 21:35

## 2017-08-21 RX ADMIN — IPRATROPIUM BROMIDE AND ALBUTEROL SULFATE PRN ML: .5; 3 SOLUTION RESPIRATORY (INHALATION) at 12:13

## 2017-08-21 RX ADMIN — INSULIN LISPRO SCH UNIT: 100 INJECTION, SOLUTION INTRAVENOUS; SUBCUTANEOUS at 17:00

## 2017-08-21 RX ADMIN — BUDESONIDE SCH MG: 0.5 INHALANT ORAL at 20:22

## 2017-08-21 RX ADMIN — MORPHINE SULFATE PRN MG: 4 INJECTION, SOLUTION INTRAMUSCULAR; INTRAVENOUS at 07:36

## 2017-08-21 RX ADMIN — LEVOTHYROXINE SODIUM SCH MCG: 100 TABLET ORAL at 06:17

## 2017-08-21 RX ADMIN — IPRATROPIUM BROMIDE AND ALBUTEROL SULFATE PRN ML: .5; 3 SOLUTION RESPIRATORY (INHALATION) at 20:22

## 2017-08-21 RX ADMIN — ASPIRIN 325 MG ORAL TABLET SCH MG: 325 PILL ORAL at 07:26

## 2017-08-21 RX ADMIN — INSULIN DETEMIR SCH UNIT: 100 INJECTION, SOLUTION SUBCUTANEOUS at 17:04

## 2017-08-21 RX ADMIN — MORPHINE SULFATE PRN MG: 4 INJECTION, SOLUTION INTRAMUSCULAR; INTRAVENOUS at 18:28

## 2017-08-21 RX ADMIN — INSULIN LISPRO SCH UNIT: 100 INJECTION, SOLUTION INTRAVENOUS; SUBCUTANEOUS at 11:57

## 2017-08-21 RX ADMIN — MORPHINE SULFATE PRN MG: 4 INJECTION, SOLUTION INTRAMUSCULAR; INTRAVENOUS at 23:57

## 2017-08-21 RX ADMIN — Medication SCH MG: at 16:59

## 2017-08-21 RX ADMIN — GABAPENTIN SCH MG: 100 CAPSULE ORAL at 22:08

## 2017-08-21 RX ADMIN — MORPHINE SULFATE PRN MG: 4 INJECTION, SOLUTION INTRAMUSCULAR; INTRAVENOUS at 00:44

## 2017-08-21 RX ADMIN — IPRATROPIUM BROMIDE AND ALBUTEROL SULFATE PRN ML: .5; 3 SOLUTION RESPIRATORY (INHALATION) at 07:23

## 2017-08-21 RX ADMIN — BUDESONIDE SCH: 0.5 INHALANT ORAL at 07:23

## 2017-08-21 RX ADMIN — GABAPENTIN SCH MG: 100 CAPSULE ORAL at 16:59

## 2017-08-21 RX ADMIN — SENNOSIDES SCH MG: 8.6 TABLET, FILM COATED ORAL at 11:56

## 2017-08-21 RX ADMIN — FUROSEMIDE SCH MG: 10 INJECTION, SOLUTION INTRAMUSCULAR; INTRAVENOUS at 20:15

## 2017-08-21 RX ADMIN — Medication SCH MG: at 11:56

## 2017-08-21 RX ADMIN — GABAPENTIN SCH MG: 100 CAPSULE ORAL at 11:56

## 2017-08-21 RX ADMIN — ATORVASTATIN CALCIUM SCH MG: 80 TABLET, FILM COATED ORAL at 20:15

## 2017-08-21 RX ADMIN — INSULIN LISPRO SCH: 100 INJECTION, SOLUTION INTRAVENOUS; SUBCUTANEOUS at 07:26

## 2017-08-21 RX ADMIN — CARVEDILOL SCH MG: 12.5 TABLET, FILM COATED ORAL at 11:57

## 2017-08-21 RX ADMIN — AMPICILLIN SCH MLS/HR: 2 INJECTION, POWDER, FOR SOLUTION INTRAVENOUS at 07:25

## 2017-08-21 RX ADMIN — AMPICILLIN SCH MLS/HR: 2 INJECTION, POWDER, FOR SOLUTION INTRAVENOUS at 20:15

## 2017-08-21 RX ADMIN — LACTULOSE SCH: 20 SOLUTION ORAL at 20:20

## 2017-08-21 RX ADMIN — FUROSEMIDE SCH MG: 10 INJECTION, SOLUTION INTRAMUSCULAR; INTRAVENOUS at 07:25

## 2017-08-21 RX ADMIN — CARVEDILOL SCH MG: 12.5 TABLET, FILM COATED ORAL at 22:10

## 2017-08-21 RX ADMIN — MORPHINE SULFATE PRN MG: 4 INJECTION, SOLUTION INTRAMUSCULAR; INTRAVENOUS at 08:25

## 2017-08-21 RX ADMIN — LACTULOSE SCH: 20 SOLUTION ORAL at 07:26

## 2017-08-21 RX ADMIN — Medication SCH MG: at 07:26

## 2017-08-21 NOTE — PN
DATE OF SERVICE:  08/20/2017



Reason for followup is Enterococcus faecalis bacteremia. 



INTERVAL HISTORY:  The patient is afebrile.  She is breathing comfortable.  The 
patient denies significant chest pain, shortness of breath, cough. No abdominal 
pain or any diarrhea. 



On examination, blood pressure is 136/55 with a pulse of 70, temperature 98.8.  
She is 96% on CPAP. 

General description is a middle-aged female lying in bed in no distress.  

RESPIRATORY SYSTEM:  Unlabored breathing, clear to auscultation anteriorly.  

HEART:  S1 and S2 regular rate and rhythm. 

ABDOMEN:  Soft, no tenderness. 



LABS:  Hemoglobin 8.2, white count of 11.3.  



DIAGNOSTIC IMPRESSION AND PLAN:  Patient with Enterococcus faecalis bacteremia 
secondary to the Permacath infection that has been discontinued. Repeat blood 
cultures were negative.  Patient to get another Permacath.  She will continue 
Ampicillin and plan to finish therapy with vancomycin through dialysis so the 
patient can avoid getting a PICC line (         ) of her IV access. Once her ( 
        ). 
MTDD

## 2017-08-21 NOTE — P.PN
Subjective





Patient is seen in follow-up for end-stage renal disease.  She is maintained on 

hemodialysis on a Monday Wednesday Friday schedule via right chest permacath.  

Patient presented with fever.  Her blood cultures are positive for enteroccus 

faecalis.  Urine cultures positive for E.coli and candida..  Patient's 

currently resting in bed.  Denies any chest pain or shortness of breath.  No 

active complaints at this time.  She had a permacath placed on August 17 and 

was undergoing hemodialysis morning.  She received 1 hour of treatment due to 

catheter malfunction.





Vital signs are stable.


General: The patient appeared well nourished and normally developed. 


HEENT: Head exam is unremarkable. Neck is without jugular venous distension.  


LUNGS: Lungs are clear to auscultation and percussion. Breath sounds decreased.


HEART: Rate and Rhythm are regular. First and second heart sounds normal. No 

murmurs, rubs or gallops. 


ABDOMEN: Abdominal exam reveals normal bowel sounds. Non-tender and non-

distended. No evidence of peritonitis.


EXTREMITITES:  1+ edema.











Objective





- Vital Signs


Vital signs: 


 Vital Signs











Temp  97.7 F   08/21/17 07:00


 


Pulse  56 L  08/21/17 08:00


 


Resp  16   08/21/17 08:00


 


BP  127/55   08/21/17 07:00


 


Pulse Ox  95   08/21/17 07:00








 Intake & Output











 08/20/17 08/21/17 08/21/17





 18:59 06:59 18:59


 


Intake Total  230 


 


Output Total 300  150


 


Balance -300 230 -150


 


Weight  145 kg 145 kg


 


Intake:   


 


  IV  230 


 


    ns  230 


 


Output:   


 


  Urine 300  150


 


    Uretheral (Hong)   150


 


Other:   


 


  Voiding Method Indwelling Catheter Indwelling Catheter Bedpan














- Labs


CBC & Chem 7: 


 08/21/17 07:01





 08/21/17 07:01


Labs: 


 Abnormal Lab Results - Last 24 Hours (Table)











  08/20/17 08/20/17 08/20/17 Range/Units





  11:20 17:37 19:58 


 


WBC     (3.8-10.6)  k/uL


 


RBC     (3.80-5.40)  m/uL


 


Hgb     (11.4-16.0)  gm/dL


 


Hct     (34.0-46.0)  %


 


MCHC     (31.0-37.0)  g/dL


 


RDW     (11.5-15.5)  %


 


Neutrophils #     (1.3-7.7)  k/uL


 


Potassium     (3.5-5.1)  mmol/L


 


BUN     (7-17)  mg/dL


 


Creatinine     (0.52-1.04)  mg/dL


 


Glucose     (74-99)  mg/dL


 


POC Glucose (mg/dL)  143 H  156 H  193 H  (75-99)  mg/dL


 


Total Protein     (6.3-8.2)  g/dL


 


Albumin     (3.5-5.0)  g/dL














  08/21/17 08/21/17 08/21/17 Range/Units





  07:01 07:01 07:03 


 


WBC  12.6 H    (3.8-10.6)  k/uL


 


RBC  2.77 L    (3.80-5.40)  m/uL


 


Hgb  7.4 L    (11.4-16.0)  gm/dL


 


Hct  24.7 L    (34.0-46.0)  %


 


MCHC  30.1 L    (31.0-37.0)  g/dL


 


RDW  17.8 H    (11.5-15.5)  %


 


Neutrophils #  9.4 H    (1.3-7.7)  k/uL


 


Potassium   6.4 H*   (3.5-5.1)  mmol/L


 


BUN   63 H   (7-17)  mg/dL


 


Creatinine   3.81 H   (0.52-1.04)  mg/dL


 


Glucose   112 H   (74-99)  mg/dL


 


POC Glucose (mg/dL)    123 H  (75-99)  mg/dL


 


Total Protein   5.4 L   (6.3-8.2)  g/dL


 


Albumin   3.1 L   (3.5-5.0)  g/dL








 Microbiology - Last 24 Hours (Table)











 08/15/17 10:42 Blood Culture - Preliminary





 Blood    No Growth after 120 hours


 


 08/14/17 10:36 Blood Culture - Final





 Blood    No Growth after 144 hours














Assessment and Plan


Plan: 





Assessment:


#1.  Chronic kidney disease stage IV secondary to solitary left kidney and 

diabetic kidney disease progressed now progressed to end-stage renal disease 

from sepsis in July 2017.  Currently hemodialysis dependent and maintained on a 

Monday Wednesday Friday schedule via permacath.


#2.  Anemia of chronic kidney disease.  Iron deficiency noted.


#3.  Diastolic CHF.


#4.  Bacteremia with blood cultures positive for enterococcus facialis.  Urine 

culture positive for E. coli and Candida albicans.  CT of the abdomen benign.


#5.  Insulin-dependent diabetes mellitus.


#6.  Malfunctioning permacatheter.


#7.  Hyperkalemia secondary to CKD.  No evidence of hyperglycemia or metabolic 

acidosis.





Plan:


Hold off on IV iron due to bacteremia.


Maintain Aranesp.


Antibiotics per infectious disease recommendations.


Maintain lasix to 80 mg IV bid - may transition to oral upon discharge.


Hemodialysis tomorrow after new permacath placed.


Potential discharge tomorrow after dialysis.


Repeat potassium level now and again at 6 PM today.


Maintain low potassium diet.

## 2017-08-21 NOTE — XR
EXAMINATION TYPE: XR chest 1V portable

 

DATE OF EXAM: 8/21/2017

 

COMPARISON: 8/17/2017

 

INDICATION: Difficulty breathing

 

TECHNIQUE: Single frontal view of the chest is obtained.

 

FINDINGS:  

The heart size is enlarged.  

The pulmonary vasculature is normal.  

The lungs are clear.  

Port is present on the left with catheter tips in the superior vena cava region

 

IMPRESSION:  

1. Mild cardiomegaly

## 2017-08-21 NOTE — P.PN
Subjective











Progress Note  being dictated for Dr. Hogan.








Interval history: This is a 56-year-old female with history of heart failure 

with preserved EF obesity hypoventilation syndrome objective sleep apnea 

recently had a cardiopulmonary arrest thereafter was triaged to ECF comes back 

in the hospital with a fever.  Patient was noted to have a catheter associated 

bacteremia with E faecalis.  Patient's catheter was removed and thereafter 

repeat cultures were negative





Patient has a new catheter placed on the left jose a-chest





Continue serial 4 L supplemental oxygen and using BiPAP as needed





No new overnight events.





8/20/17





no change





states to have a cough this am, non productive





no overnight events














08/21/2017.  Malfunctioning hemodialysis catheter, only able to complete half 

of  hemodialysis session with approximately 1 L removed.  Hyperkalemic, 

potassium down to 5.5 post dialysis.  Scheduled for hemodialysis catheter 

replacement tomorrow afternoon with Dr. Loya.  Chest x-ray reporting mild 

cardiomegaly, pulmonary vasculature normal. Denies chest pain, palpitations, or 

increased shortness of breath.





Objective





- Vital Signs


Vital signs: 


 Vital Signs











Temp  97.7 F   08/21/17 07:00


 


Pulse  78   08/21/17 12:39


 


Resp  16   08/21/17 08:00


 


BP  127/55   08/21/17 07:00


 


Pulse Ox  95   08/21/17 07:00








 Intake & Output











 08/20/17 08/21/17 08/21/17





 18:59 06:59 18:59


 


Intake Total  230 340


 


Output Total 300  150


 


Balance -300 230 190


 


Weight  145 kg 145 kg


 


Intake:   


 


  IV  230 


 


    ns  230 


 


  Intake, IV Titration   100





  Amount   


 


    Ampicillin 2,000 mg In   100





    Sodium Chloride 0.9% 100   





    ml @ 200 mls/hr IVPB   





    [facility][id number]   


 


  Oral   240


 


Output:   


 


  Urine 300  150


 


    Uretheral (Hong)   150


 


Other:   


 


  Voiding Method Indwelling Catheter Indwelling Catheter Bedpan














- Exam


PHYSICAL EXAM:


VITAL SIGNS: As above


GENERAL: [Sitting up in bed, no acute distress]


HEENT: [Pupils equal conjunctiva normal.  Oral mucosa moist]


NECK: [Supple, no JVD]


RESPIRATORY EFFORT:[ Normal]


LUNGS:  [Diminished, no rhonchi wheezing or crackles]


CARDIOVASCULAR[ regular S1 and S2, no murmurs rubs or gallops]


GI: [Abdomen soft, nontender, positive bowel sounds.  No guarding, no rigidity]


PSYCH: [Alert and oriented -3, mood and affect normal.]


SKIN: No abnormalities appreciated.


NEURO: [No focal deficits, generalized weakness.]





 Microbiology





08/15/17 10:42   Blood   Blood Culture - Final


                            No Growth after 144 hours


08/14/17 10:36   Blood   Blood Culture - Final


                            No Growth after 144 hours


08/13/17 10:34   Blood   Blood Culture - Final


                            No Growth after 144 hours


08/14/17 09:00   Catheter Tip   Catheter Tip Culture - Final


08/14/17 08:00   Urine,Catheterized   Urine Culture - Final


                            Alejandrina albicans


08/11/17 12:40   Blood   Blood Culture Gram Stain - Final


08/11/17 12:40   Blood   Blood Culture - Final


                            Enterococcus faecalis


08/10/17 17:20   Urine,Catheterized   Urine Culture - Final


                            Escherichia coli


                            Alejandrina albicans


08/10/17 18:20   Blood   Blood Culture Gram Stain - Final


08/10/17 18:20   Blood   Blood Culture - Final


                            Enterococcus faecalis


08/10/17 09:33   Blood   Blood Culture Gram Stain - Final


08/10/17 09:33   Blood   Blood Culture - Final


                            Enterococcus faecalis


08/11/17 12:40   Blood   Blood Culture - Final


08/10/17 09:33   Blood   Blood Culture - Final


08/10/17 18:20   Blood   Blood Culture - Preliminary











- Labs


CBC & Chem 7: 


 08/21/17 07:01





 08/21/17 09:51


Labs: 


 Abnormal Lab Results - Last 24 Hours (Table)











  08/20/17 08/20/17 08/21/17 Range/Units





  17:37 19:58 07:01 


 


WBC    12.6 H  (3.8-10.6)  k/uL


 


RBC    2.77 L  (3.80-5.40)  m/uL


 


Hgb    7.4 L  (11.4-16.0)  gm/dL


 


Hct    24.7 L  (34.0-46.0)  %


 


MCHC    30.1 L  (31.0-37.0)  g/dL


 


RDW    17.8 H  (11.5-15.5)  %


 


Neutrophils #    9.4 H  (1.3-7.7)  k/uL


 


Potassium     (3.5-5.1)  mmol/L


 


BUN     (7-17)  mg/dL


 


Creatinine     (0.52-1.04)  mg/dL


 


Glucose     (74-99)  mg/dL


 


POC Glucose (mg/dL)  156 H  193 H   (75-99)  mg/dL


 


Total Protein     (6.3-8.2)  g/dL


 


Albumin     (3.5-5.0)  g/dL














  08/21/17 08/21/17 08/21/17 Range/Units





  07:01 07:03 09:51 


 


WBC     (3.8-10.6)  k/uL


 


RBC     (3.80-5.40)  m/uL


 


Hgb     (11.4-16.0)  gm/dL


 


Hct     (34.0-46.0)  %


 


MCHC     (31.0-37.0)  g/dL


 


RDW     (11.5-15.5)  %


 


Neutrophils #     (1.3-7.7)  k/uL


 


Potassium  6.4 H*   5.5 H  (3.5-5.1)  mmol/L


 


BUN  63 H    (7-17)  mg/dL


 


Creatinine  3.81 H    (0.52-1.04)  mg/dL


 


Glucose  112 H    (74-99)  mg/dL


 


POC Glucose (mg/dL)   123 H   (75-99)  mg/dL


 


Total Protein  5.4 L    (6.3-8.2)  g/dL


 


Albumin  3.1 L    (3.5-5.0)  g/dL














  08/21/17 Range/Units





  11:27 


 


WBC   (3.8-10.6)  k/uL


 


RBC   (3.80-5.40)  m/uL


 


Hgb   (11.4-16.0)  gm/dL


 


Hct   (34.0-46.0)  %


 


MCHC   (31.0-37.0)  g/dL


 


RDW   (11.5-15.5)  %


 


Neutrophils #   (1.3-7.7)  k/uL


 


Potassium   (3.5-5.1)  mmol/L


 


BUN   (7-17)  mg/dL


 


Creatinine   (0.52-1.04)  mg/dL


 


Glucose   (74-99)  mg/dL


 


POC Glucose (mg/dL)  166 H  (75-99)  mg/dL


 


Total Protein   (6.3-8.2)  g/dL


 


Albumin   (3.5-5.0)  g/dL








 Microbiology - Last 24 Hours (Table)











 08/15/17 10:42 Blood Culture - Final





 Blood    No Growth after 144 hours


 


 08/14/17 10:36 Blood Culture - Final





 Blood    No Growth after 144 hours














Assessment and Plan


Plan: 


#1 fever of unknown origin rule out catheter associated bacteremia , catheter 

related #2 ESRD on hemodialysis





#3 anemia of ESRD





#4 atypical chest pain





#5 essential hypertension





#6 severe obstructive sleep apnea





#7 obesity hypoventilation syndrome





#8 heart failure PEF





#9 hypothyroidism





#10 anemia of ESRD with underlying iron deficiency











Plan: Continue on current medication regime ,monitoring and symptomatic 

treatment.  Maintain antibiotics of ampicillin, vancomycin as per infectious 

disease. Close monitoring of electrolytes with repeat potassium level at 1700.  

As mentioned above scheduled for replacement of hemodialysis catheter tomorrow 

afternoon.  Hemodialysis to be repeated after hemodialysis catheter placed as 

per nephrology.  Discharge planning in progress following completion of 

hemodialysis after new catheter placed.














The impression and plan of care has been dictated as directed.





:


I performed a H&P examination of this patient and discussed the same with the 

dictator.  I agree with the dictator's note.  Any additional findings/opinions/

etc. will be noted.

## 2017-08-22 VITALS — HEART RATE: 66 BPM

## 2017-08-22 VITALS — DIASTOLIC BLOOD PRESSURE: 49 MMHG | TEMPERATURE: 98.2 F | SYSTOLIC BLOOD PRESSURE: 111 MMHG

## 2017-08-22 VITALS — RESPIRATION RATE: 16 BRPM

## 2017-08-22 LAB
ANION GAP SERPL CALC-SCNC: 12 MMOL/L
BASOPHILS # BLD AUTO: 0.1 K/UL (ref 0–0.2)
BASOPHILS NFR BLD AUTO: 1 %
BUN SERPL-SCNC: 60 MG/DL (ref 7–17)
CALCIUM SPEC-MCNC: 9.1 MG/DL (ref 8.4–10.2)
CH: 25.9
CHCM: 28.4
CHLORIDE SERPL-SCNC: 101 MMOL/L (ref 98–107)
CO2 SERPL-SCNC: 22 MMOL/L (ref 22–30)
EOSINOPHIL # BLD AUTO: 0.6 K/UL (ref 0–0.7)
EOSINOPHIL NFR BLD AUTO: 5 %
ERYTHROCYTE [DISTWIDTH] IN BLOOD BY AUTOMATED COUNT: 2.66 M/UL (ref 3.8–5.4)
ERYTHROCYTE [DISTWIDTH] IN BLOOD: 17.4 % (ref 11.5–15.5)
GLUCOSE BLD-MCNC: 135 MG/DL (ref 75–99)
GLUCOSE BLD-MCNC: 149 MG/DL (ref 75–99)
GLUCOSE SERPL-MCNC: 121 MG/DL (ref 74–99)
HCT VFR BLD AUTO: 24.3 % (ref 34–46)
HDW: 3.14
HGB BLD-MCNC: 7.2 GM/DL (ref 11.4–16)
LUC NFR BLD AUTO: 3 %
LYMPHOCYTES # SPEC AUTO: 0.9 K/UL (ref 1–4.8)
LYMPHOCYTES NFR SPEC AUTO: 9 %
MCH RBC QN AUTO: 27.1 PG (ref 25–35)
MCHC RBC AUTO-ENTMCNC: 29.6 G/DL (ref 31–37)
MCV RBC AUTO: 91.6 FL (ref 80–100)
MONOCYTES # BLD AUTO: 0.7 K/UL (ref 0–1)
MONOCYTES NFR BLD AUTO: 7 %
NEUTROPHILS # BLD AUTO: 7.8 K/UL (ref 1.3–7.7)
NEUTROPHILS NFR BLD AUTO: 75 %
NON-AFRICAN AMERICAN GFR(MDRD): 12
POTASSIUM SERPL-SCNC: 6.1 MMOL/L (ref 3.5–5.1)
SODIUM SERPL-SCNC: 135 MMOL/L (ref 137–145)
WBC # BLD AUTO: 0.29 10*3/UL
WBC # BLD AUTO: 10.4 K/UL (ref 3.8–10.6)
WBC (PEROX): 10.84

## 2017-08-22 PROCEDURE — 02HV33Z INSERTION OF INFUSION DEVICE INTO SUPERIOR VENA CAVA, PERCUTANEOUS APPROACH: ICD-10-PCS

## 2017-08-22 RX ADMIN — MORPHINE SULFATE PRN MG: 4 INJECTION, SOLUTION INTRAMUSCULAR; INTRAVENOUS at 11:13

## 2017-08-22 RX ADMIN — LACTULOSE SCH: 20 SOLUTION ORAL at 07:32

## 2017-08-22 RX ADMIN — FENTANYL CITRATE ONE MCG: 50 INJECTION, SOLUTION INTRAMUSCULAR; INTRAVENOUS at 10:20

## 2017-08-22 RX ADMIN — ASPIRIN 325 MG ORAL TABLET SCH: 325 PILL ORAL at 07:34

## 2017-08-22 RX ADMIN — AMPICILLIN SCH MLS/HR: 2 INJECTION, POWDER, FOR SOLUTION INTRAVENOUS at 07:32

## 2017-08-22 RX ADMIN — FENTANYL CITRATE ONE MCG: 50 INJECTION, SOLUTION INTRAMUSCULAR; INTRAVENOUS at 10:37

## 2017-08-22 RX ADMIN — MORPHINE SULFATE PRN MG: 4 INJECTION, SOLUTION INTRAMUSCULAR; INTRAVENOUS at 07:43

## 2017-08-22 RX ADMIN — MORPHINE SULFATE PRN MG: 4 INJECTION, SOLUTION INTRAMUSCULAR; INTRAVENOUS at 11:51

## 2017-08-22 RX ADMIN — Medication SCH MG: at 07:31

## 2017-08-22 RX ADMIN — ASPIRIN 325 MG ORAL TABLET SCH MG: 325 PILL ORAL at 07:33

## 2017-08-22 RX ADMIN — FUROSEMIDE SCH MG: 10 INJECTION, SOLUTION INTRAMUSCULAR; INTRAVENOUS at 07:33

## 2017-08-22 RX ADMIN — LEVOTHYROXINE SODIUM SCH MCG: 100 TABLET ORAL at 06:24

## 2017-08-22 RX ADMIN — SENNOSIDES SCH MG: 8.6 TABLET, FILM COATED ORAL at 11:16

## 2017-08-22 RX ADMIN — IPRATROPIUM BROMIDE AND ALBUTEROL SULFATE PRN ML: .5; 3 SOLUTION RESPIRATORY (INHALATION) at 08:47

## 2017-08-22 RX ADMIN — INSULIN LISPRO SCH: 100 INJECTION, SOLUTION INTRAVENOUS; SUBCUTANEOUS at 08:56

## 2017-08-22 RX ADMIN — INSULIN LISPRO SCH: 100 INJECTION, SOLUTION INTRAVENOUS; SUBCUTANEOUS at 08:57

## 2017-08-22 RX ADMIN — BUDESONIDE SCH MG: 0.5 INHALANT ORAL at 08:47

## 2017-08-22 RX ADMIN — Medication SCH MG: at 15:00

## 2017-08-22 RX ADMIN — CARVEDILOL SCH MG: 12.5 TABLET, FILM COATED ORAL at 11:14

## 2017-08-22 RX ADMIN — GABAPENTIN SCH MG: 100 CAPSULE ORAL at 11:15

## 2017-08-22 RX ADMIN — INSULIN LISPRO SCH UNIT: 100 INJECTION, SOLUTION INTRAVENOUS; SUBCUTANEOUS at 12:00

## 2017-08-22 NOTE — XR
EXAMINATION TYPE: XR chest 1V portable

 

DATE OF EXAM: 8/22/2017

 

HISTORY: new hemodialysis catheter placement

 

 

COMPARISON: August 21, 2017

 

TECHNIQUE: Single view of the chest is submitted.

 

FINDINGS:

 

Left IJ central venous line demonstrates its distal tip overlying the SVC. No evidence for pneumothor
ax.

 

Demonstrated are scattered senescent parenchymal change.  

 

There is no evidence for focal infiltrate. 

 

The heart is stable.

 

Hilar and mediastinal structures are within normal limits.  

 

Degenerative changes are seen of the dorsal spine. 

 

 IMPRESSION: 

 

1.  Chronic changes without evidence for acute pulmonary disease.

## 2017-08-22 NOTE — PN
DATE OF SERVICE:  08/22/17



REASON FOR FOLLOW UP:  Enterococcus faecalis bacteremia.  





INTERVAL HISTORY: The patient is afebrile.   The patient did get a new Perm-
Catheter for her dialysis.   The patient denies significant chest pain, 
shortness of breath, cough, no abdominal pain or diarrhea.   



On examination, blood pressure 111/49 with a pulse of 66.  Temperature 98.2.   
   She is 97% on  4 L nasal cannula.   General description is a middle aged 
female lying in the bed in no distress.    Respiratory system unlabored 
breathing.   Clear to auscultation anteriorly.   Heart S1, S2 regular rate and 
rhythm.  Abdomen soft.    No tenderness.   



LABS:   Hemoglobin 7.1, white count 10.4. 



DIAGNOSTIC IMPRESSION AND PLAN:   The patient with Enterococcus faecalis 
bacteremia with Perm-A-Cath infection that has been discontinued. Follow-up 
blood cultures have been negative.  She is currently on Ampicillin.  Plan to 
finish therapy with Vancomycin through the dialysis (    ) the patient  (    )  
getting a PICC line for another 3 weeks with outpatient follow-up. 
KRIS

## 2017-08-22 NOTE — P.PCN
Preoperative Diagnosis: 





Postoperative Diagnosis: 





Procedure(s) Performed: 





Implants: 





Indications for Procedure: 





Operative Findings: 





Description of Procedure: 


Preoperative diagnoses is acute chronic renal failure and malfunctioning 

dialysis catheter





Placement of a 32 cm straight calluses catheter left jugular approach





Procedure this patient was brought to the Cath Lab left-sided the neck chest 

and catheter was prepped and draped applied.   manner and percent lidocaine was 

infiltrated at the exit site of the catheter.  And then was made in the neck 

and catheter was identified was divided and a Glidewire was passed under 

fluoroscopy control catheter was parked at the inferior vena cava a new tunnel 

was created we brought 32 cm straight dialysis catheter and then the dilator 

was advanced on the top of Glidewire under fluoroscopy control after that 

sheath was advanced.  The Glidewire through the sheath we introduced a 2 cm 

dialysis catheter tip of the catheter was vena cava and atrium there was a free 

flow noted and flushed with heparin saline and Hep-Lock catheter was secured 

with 3-0 nylon dressing applied patient tolerated the procedure well

## 2017-08-22 NOTE — PN
DATE OF SERVICE: 08/21/2017



REASON FOR FOLLOWUP: Enterococcus faecalis bacteremia secondary to Perm-A-Cath 
infection. 



INTERVAL HISTORY: The patient is afebrile. She is breathing comfortably. Denies 
significant chest pain or shortness of breath or cough. No abdominal pain or 
any diarrhea. Currently the patient seems to have (      ) problem with the new 
Perm-A-Cath catheter. 



On examination, blood pressure is 138/53 with a pulse of 63, temperature 97.3. 
She is 93% on 4 L nasal cannula.

General description is a middle-aged female lying in bed in no distress.

RESPIRATORY SYSTEM: Unlabored breathing. Clear to auscultation anteriorly.

HEART: S1, S2. Regular rate and rhythm. 

ABDOMEN: Soft. No tenderness.



LABS: Hemoglobin 7.4, white count 12.6.



DIAGNOSTIC IMPRESSION AND PLAN: Patient with Enterococcus faecalis bacteremia 
secondary to Perm-A-Cath infection. (      ) continued and new Perm-A-Cath that 
was placed (      ) blood cultures have been negative for more than 96 hours. 
Problem with the new Perm catheter is going to be evaluated by Vascular 
Surgery. She will continue on ampicillin with plan to finish therapy with 
vancomycin through the dialysis so the patient can avoid a PICC line. Continue 
supportive care. 
KRIS

## 2017-08-22 NOTE — IR
Fluoroscopy

 

HISTORY: Dialysis catheter placement

 

4.8 minutes fluoroscopy time supplied to the referring clinician.  63 intraoperative C-arm images doc
ument the procedure. See dictated report from vascular surgery..

## 2017-08-22 NOTE — P.DS
Providers


Date of admission: 


08/09/17 23:41





Expected date of discharge: 08/22/17


Attending physician: 


Amarilis Hogan


Consults: 





 





08/09/17 23:40


Consult Physician Routine 


   Consulting Provider: Luciana Lopez


   Consult Reason/Comments: chf


   Do you want consulting provider notified?: Yes


Consult Physician Routine 


   Consulting Provider: Albina Esquivel


   Consult Reason/Comments: renalFail


   Do you want consulting provider notified?: Yes





08/10/17 13:38


Consult Physician Routine 


   Consulting Provider: Madhav Ramey


   Consult Reason/Comments: fever


   Do you want consulting provider notified?: Yes





08/12/17 17:46


Consult Physician Urgent 


   Consulting Provider: Cirilo Loya


   Consult Reason/Comments: Dialysis port removal


   Do you want consulting provider notified?: Yes











Primary care physician: 


Itzel Leonard





Hospital Course: 


Final Diagnoses:





#1 fever secondary to catheter associated bacteremia with E faecalis.  Urine 

culture positive for E. coli and Candida albicans.  Repeat urine culture 

reports Candida albicans.  Status post PermCath dialysis catheter placement 2.





#2 ESRD on hemodialysis





#3 anemia of ESRD





#4 atypical chest pain





#5 essential hypertension





#6 severe obstructive sleep apnea





#7 obesity hypoventilation syndrome





#8 heart failure PEF





#9 hypothyroidism





#10 anemia of ESRD with underlying iron deficiency

















Hospital course: This is a 56-year-old female with history of heart failure 

with preserved EF obesity hypoventilation syndrome objective sleep apnea 

recently had a cardiopulmonary arrest thereafter was triaged to ECF comes back 

in the hospital with a fever.  Patient was noted to have a catheter associated 

bacteremia  Evaluated by nephrology, vascular surgery, and infectious disease.  

Maintained on IV antibiotics. Patient's catheter was removed and thereafter 

repeat cultures were negative. New hemodialysis catheter, malfunctioning and 

replaced.  Significant clinical improvement.  Cleared by all consults for 

discharge.  Patient is being discharged to [facility] in a 

stable condition with guarded prognosis.














The impression and plan of care has been dictated as directed as a lauren Nicholas:


I performed a H&P examination of this patient and discussed the same with the 

dictator.  I agree with the dictator's note.  Any additional findings/opinions/

etc. will be noted.











Patient Condition at Discharge: Stable





Plan - Discharge Summary


New Discharge Prescriptions: 


New


   Ipratropium-Albuterol Nebulize [Duoneb 0.5 mg-3 mg/3 ml Soln] 3 ml 

INHALATION QID #1 neb


   Aspirin 325 mg PO DAILY  tab


   Darbepoetin Dl [Aranesp] 40 mcg SQ Q7D  syr


   diphenhydrAMINE [Benadryl] 25 mg PO Q6HR PRN  cap


     PRN Reason: Itching


   INSULIN LISPRO (HumaLOG) [humaLOG] 0 unit SQ ACHS #1 vial


   Furosemide [Lasix] 80 mg PO BID #1 tablet


   Vancomycin 0 mg IVPB AS DIRECTED #1 bag





Continue


   Montelukast [Singulair] 10 mg PO HS@2000


   Gabapentin [Neurontin] 100 mg PO TID@1100,1800,2300


   Ferrous Sulfate [Iron (65 MG Elemental)] 325 mg PO DAILY@1100


   Carvedilol [Coreg] 12.5 mg PO BID@1100,2300


   Atorvastatin [Lipitor] 80 mg PO HS@2100


   Levothyroxine Sodium [Synthroid] 350 mcg PO DAILY@0500


   Cinacalcet [Sensipar] 30 mg PO TU@1100


   L.acidoph,Paracasei, B.lactis [Probiotic] 1 cap PO DAILY@1100


   Insulin Detemir [Levemir] 65 unit SQ DAILY@1700


   Sennosides [Senna] 8.6 mg PO DAILY@1100


   HYDROcodone/APAP 7.5-325MG [Norco 7.5-325] 1 tab PO TID@0500,1300,2100


   Latanoprost [Xalatan 0.005%] 1 drop BOTH EYES HS@2000


   Folic Acid-Vit B Complex-Vit C [Nephrocaps] 1 cap PO DAILY@1100


   Sodium Bicarbonate Tab 650 mg PO BID@1100,2000


   Acetaminophen Tab [Tylenol] 650 mg PO Q4H PRN


     PRN Reason: Fever And/ Or Pain


   Hydrocortisone [Anusol-Hc] 1 applic RECTAL BID PRN


     PRN Reason: Hemorrhoids


   Insulin Lispro [humaLOG Kwikpen] 15 unit SQ AC-TID@07,12,17


   Calcium Acetate [PhosLo] 667 mg PO TID@0700,1200,1700


   Budesonide [Pulmicort] 0.5 mg INHALATION RT-BID@11,20


   Famotidine [Pepcid] 20 mg PO BID@1100,1700


   Omalizumab [Xolair] 150 mg SQ Q30D


   Bisacodyl 10 mg RECTAL DAILY PRN


     PRN Reason: Constipation


   hydrALAZINE HCL [Apresoline] 50 mg PO TID@0500,1100,1900


   ALPRAZolam [Xanax] 0.25 mg PO TID PRN #20 


     PRN Reason: Anxiety


   HYDROcodone/APAP 7.5-325MG [Norco 7.5-325] 1 tab PO DAILY PRN #20 


     PRN Reason: Pain





Discontinued


   Clopidogrel [Plavix] 75 mg PO DAILY@1100


   Levofloxacin [Levaquin] 500 mg PO DAILY


   diphenhydrAMINE HCL [Benadryl] 25 mg PO Q8H PRN


     PRN Reason: Itching


   Albuterol Sulfate [Proair Hfa] 2 puff INHALATION RT-QID PRN


     PRN Reason: Pain


   Ipratropium-Albuterol Nebulize [Duoneb 0.5 mg-3 mg/3 ml Soln] 1 ml 

INHALATION RT-QID PRN


     PRN Reason: Shortness Of Breath


Discharge Medication List





Atorvastatin [Lipitor] 80 mg PO HS@2100 09/13/15 [History]


Carvedilol [Coreg] 12.5 mg PO BID@1100,2300 09/13/15 [History]


Ferrous Sulfate [Iron (65 MG Elemental)] 325 mg PO DAILY@1100 09/13/15 [History]


Gabapentin [Neurontin] 100 mg PO TID@1100,1800,2300 09/13/15 [History]


Levothyroxine Sodium [Synthroid] 350 mcg PO DAILY@0500 09/13/15 [History]


Montelukast [Singulair] 10 mg PO HS@2000 09/13/15 [History]


Cinacalcet [Sensipar] 30 mg PO TU@1100 03/25/17 [History]


Acetaminophen Tab [Tylenol] 650 mg PO Q4H PRN 08/09/17 [History]


Bisacodyl 10 mg RECTAL DAILY PRN 08/09/17 [History]


Budesonide [Pulmicort] 0.5 mg INHALATION RT-BID@11,20 08/09/17 [History]


Calcium Acetate [PhosLo] 667 mg PO TID@0700,1200,1700 08/09/17 [History]


Famotidine [Pepcid] 20 mg PO BID@1100,1700 08/09/17 [History]


Folic Acid-Vit B Complex-Vit C [Nephrocaps] 1 cap PO DAILY@1100 08/09/17 [

History]


HYDROcodone/APAP 7.5-325MG [Norco 7.5-325] 1 tab PO TID@0500,1300,2100 08/09/17 

[History]


Hydrocortisone [Anusol-Hc] 1 applic RECTAL BID PRN 08/09/17 [History]


Insulin Detemir [Levemir] 65 unit SQ DAILY@1700 08/09/17 [History]


Insulin Lispro [humaLOG Kwikpen] 15 unit SQ AC-TID@07,12,17 08/09/17 [History]


L.acidoph,Paracasei, B.lactis [Probiotic] 1 cap PO DAILY@1100 08/09/17 [History]


Latanoprost [Xalatan 0.005%] 1 drop BOTH EYES HS@2000 08/09/17 [History]


Omalizumab [Xolair] 150 mg SQ Q30D 08/09/17 [History]


Sennosides [Senna] 8.6 mg PO DAILY@1100 08/09/17 [History]


Sodium Bicarbonate Tab 650 mg PO BID@1100,2000 08/09/17 [History]


hydrALAZINE HCL [Apresoline] 50 mg PO TID@0500,1100,1900 08/09/17 [History]


ALPRAZolam [Xanax] 0.25 mg PO TID PRN #20  08/22/17 [Rx]


Aspirin 325 mg PO DAILY  tab 08/22/17 [Rx]


Darbepoetin Dl [Aranesp] 40 mcg SQ Q7D  syr 08/22/17 [Rx]


Furosemide [Lasix] 80 mg PO BID #1 tablet 08/22/17 [Rx]


HYDROcodone/APAP 7.5-325MG [Norco 7.5-325] 1 tab PO DAILY PRN #20  08/22/17 [Rx]


INSULIN LISPRO (HumaLOG) [humaLOG] 0 unit SQ ACHS #1 vial 08/22/17 [Rx]


Ipratropium-Albuterol Nebulize [Duoneb 0.5 mg-3 mg/3 ml Soln] 3 ml INHALATION 

QID #1 neb 08/22/17 [Rx]


Vancomycin 0 mg IVPB AS DIRECTED #1 bag 08/22/17 [Rx]


diphenhydrAMINE [Benadryl] 25 mg PO Q6HR PRN  cap 08/22/17 [Rx]








Follow up Appointment(s)/Referral(s): 


Lyndon Oviedo MD [REFERRING] - 3 Days (at [facility])


Itzel Leonard MD [Primary Care Provider] - 1 Week (After DC from [facility])


Jc Dobbs DO [STAFF PHYSICIAN] - 1 Week


Activity/Diet/Wound Care/Special Instructions: 


Vancomycin IV, pharmacy to dose, administered during dialysis cath 3 weeks as 

per ID











Diet: renal





Hemodialysis as per nephrology








Activity: as tolerated








cbc,bmp,phosphorus in 3 days


Discharge Disposition: TRANSFER TO SNF/ECF

## 2017-08-22 NOTE — P.PN
Subjective





Patient is seen in follow-up for end-stage renal disease.  She is maintained on 

hemodialysis on a Monday Wednesday Friday schedule via right chest permacath.  

Patient presented with fever.  Her blood cultures are positive for enteroccus 

faecalis.  Urine cultures positive for E.coli and candida..  Patient's 

currently resting in bed.  Denies any chest pain.  Admits to wheezing today.  

She did receive a nebulizer treatment this morning.  She had a new permacath 

placed on August 17 which was malfunctioning.  She received 1 hour of 

hemodialysis yesterday.





Vital signs are stable.


General: The patient appeared well nourished and normally developed. 


HEENT: Head exam is unremarkable. Neck is without jugular venous distension.  


LUNGS: Lungs are clear to auscultation and percussion. Breath sounds decreased.


HEART: Rate and Rhythm are regular. First and second heart sounds normal. No 

murmurs, rubs or gallops. 


ABDOMEN: Abdominal exam reveals normal bowel sounds. Non-tender and non-

distended. No evidence of peritonitis.


EXTREMITITES:  1+ edema.











Objective





- Vital Signs


Vital signs: 


 Vital Signs











Temp  98.2 F   08/22/17 07:00


 


Pulse  66   08/22/17 08:48


 


Resp  16   08/22/17 08:48


 


BP  111/49   08/22/17 07:00


 


Pulse Ox  99   08/22/17 08:48








 Intake & Output











 08/21/17 08/22/17 08/22/17





 18:59 06:59 18:59


 


Intake Total 340 230 


 


Output Total 450  


 


Balance -110 230 


 


Weight 145 kg 143 kg 143 kg


 


Intake:   


 


  IV  230 


 


    ns  230 


 


  Intake, IV Titration 100  





  Amount   


 


    Ampicillin 2,000 mg In 100  





    Sodium Chloride 0.9% 100   





    ml @ 200 mls/hr IVPB   





    Q12HR ANDERS Rx#:[id number]   


 


  Oral 240  


 


Output:   


 


  Urine 450  


 


    Uretheral (Hong) 150  


 


Other:   


 


  Voiding Method Bedpan Bedpan Bedpan





  Diaper Diaper


 


  # Voids 1  1














- Labs


CBC & Chem 7: 


 08/22/17 07:09





 08/22/17 07:09


Labs: 


 Abnormal Lab Results - Last 24 Hours (Table)











  08/21/17 08/21/17 08/21/17 Range/Units





  09:51 11:27 16:44 


 


RBC     (3.80-5.40)  m/uL


 


Hgb     (11.4-16.0)  gm/dL


 


Hct     (34.0-46.0)  %


 


MCHC     (31.0-37.0)  g/dL


 


RDW     (11.5-15.5)  %


 


Neutrophils #     (1.3-7.7)  k/uL


 


Lymphocytes #     (1.0-4.8)  k/uL


 


Sodium     (137-145)  mmol/L


 


Potassium  5.5 H    (3.5-5.1)  mmol/L


 


BUN     (7-17)  mg/dL


 


Creatinine     (0.52-1.04)  mg/dL


 


Glucose     (74-99)  mg/dL


 


POC Glucose (mg/dL)   166 H  157 H  (75-99)  mg/dL














  08/21/17 08/21/17 08/22/17 Range/Units





  18:38 20:40 07:09 


 


RBC    2.66 L  (3.80-5.40)  m/uL


 


Hgb    7.2 L  (11.4-16.0)  gm/dL


 


Hct    24.3 L  (34.0-46.0)  %


 


MCHC    29.6 L  (31.0-37.0)  g/dL


 


RDW    17.4 H  (11.5-15.5)  %


 


Neutrophils #    7.8 H  (1.3-7.7)  k/uL


 


Lymphocytes #    0.9 L  (1.0-4.8)  k/uL


 


Sodium     (137-145)  mmol/L


 


Potassium  5.4 H    (3.5-5.1)  mmol/L


 


BUN     (7-17)  mg/dL


 


Creatinine     (0.52-1.04)  mg/dL


 


Glucose     (74-99)  mg/dL


 


POC Glucose (mg/dL)   122 H   (75-99)  mg/dL














  08/22/17 08/22/17 Range/Units





  07:09 07:17 


 


RBC    (3.80-5.40)  m/uL


 


Hgb    (11.4-16.0)  gm/dL


 


Hct    (34.0-46.0)  %


 


MCHC    (31.0-37.0)  g/dL


 


RDW    (11.5-15.5)  %


 


Neutrophils #    (1.3-7.7)  k/uL


 


Lymphocytes #    (1.0-4.8)  k/uL


 


Sodium  135 L   (137-145)  mmol/L


 


Potassium  6.1 H   (3.5-5.1)  mmol/L


 


BUN  60 H   (7-17)  mg/dL


 


Creatinine  3.81 H   (0.52-1.04)  mg/dL


 


Glucose  121 H   (74-99)  mg/dL


 


POC Glucose (mg/dL)   135 H  (75-99)  mg/dL








 Microbiology - Last 24 Hours (Table)











 08/15/17 10:42 Blood Culture - Final





 Blood    No Growth after 144 hours














Assessment and Plan


Plan: 





Assessment:


#1.  Chronic kidney disease stage IV secondary to solitary left kidney and 

diabetic kidney disease progressed now progressed to end-stage renal disease 

from sepsis in July 2017.  Currently hemodialysis dependent and maintained on a 

Monday Wednesday Friday schedule via permacath.


#2.  Anemia of chronic kidney disease.  Iron deficiency noted.


#3.  Diastolic CHF.


#4.  Bacteremia with blood cultures positive for enterococcus facialis.  Urine 

culture positive for E. coli and Candida albicans.  CT of the abdomen benign.


#5.  Insulin-dependent diabetes mellitus.


#6.  Malfunctioning permacatheter.


#7.  Hyperkalemia secondary to CKD.  No evidence of hyperglycemia or metabolic 

acidosis.





Plan:


Hold off on IV iron due to bacteremia.


Maintain Aranesp.


Antibiotics per infectious disease recommendations.


30 g of Kayexalate once now.


10 units of IV regular insulin with an amp of D50 now.


Maintain lasix to 80 mg IV bid - may transition to oral upon discharge.


Hemodialysis today with goal 4 L ultrafiltration after dialysis catheter placed.


Potential discharge after dialysis.


Maintain low potassium 


Solu-Medrol 60 mg IV once.

## 2018-07-04 ENCOUNTER — HOSPITAL ENCOUNTER (INPATIENT)
Dept: HOSPITAL 47 - EC | Age: 58
LOS: 3 days | Discharge: HOME | DRG: 291 | End: 2018-07-07
Payer: MEDICARE

## 2018-07-04 VITALS — BODY MASS INDEX: 51.6 KG/M2

## 2018-07-04 DIAGNOSIS — E78.5: ICD-10-CM

## 2018-07-04 DIAGNOSIS — G47.33: ICD-10-CM

## 2018-07-04 DIAGNOSIS — Z79.890: ICD-10-CM

## 2018-07-04 DIAGNOSIS — I13.2: Primary | ICD-10-CM

## 2018-07-04 DIAGNOSIS — Z86.73: ICD-10-CM

## 2018-07-04 DIAGNOSIS — R79.1: ICD-10-CM

## 2018-07-04 DIAGNOSIS — F32.9: ICD-10-CM

## 2018-07-04 DIAGNOSIS — E03.9: ICD-10-CM

## 2018-07-04 DIAGNOSIS — F41.9: ICD-10-CM

## 2018-07-04 DIAGNOSIS — D63.1: ICD-10-CM

## 2018-07-04 DIAGNOSIS — Z79.4: ICD-10-CM

## 2018-07-04 DIAGNOSIS — Z90.5: ICD-10-CM

## 2018-07-04 DIAGNOSIS — J44.1: ICD-10-CM

## 2018-07-04 DIAGNOSIS — E66.01: ICD-10-CM

## 2018-07-04 DIAGNOSIS — I50.33: ICD-10-CM

## 2018-07-04 DIAGNOSIS — R77.8: ICD-10-CM

## 2018-07-04 DIAGNOSIS — Z79.51: ICD-10-CM

## 2018-07-04 DIAGNOSIS — Z83.3: ICD-10-CM

## 2018-07-04 DIAGNOSIS — Z98.84: ICD-10-CM

## 2018-07-04 DIAGNOSIS — Z99.2: ICD-10-CM

## 2018-07-04 DIAGNOSIS — Z79.82: ICD-10-CM

## 2018-07-04 DIAGNOSIS — J96.21: ICD-10-CM

## 2018-07-04 DIAGNOSIS — Z79.891: ICD-10-CM

## 2018-07-04 DIAGNOSIS — Z96.651: ICD-10-CM

## 2018-07-04 DIAGNOSIS — Z88.1: ICD-10-CM

## 2018-07-04 DIAGNOSIS — E11.22: ICD-10-CM

## 2018-07-04 DIAGNOSIS — Z82.49: ICD-10-CM

## 2018-07-04 DIAGNOSIS — Z88.6: ICD-10-CM

## 2018-07-04 DIAGNOSIS — Z87.891: ICD-10-CM

## 2018-07-04 DIAGNOSIS — M89.9: ICD-10-CM

## 2018-07-04 DIAGNOSIS — D72.1: ICD-10-CM

## 2018-07-04 DIAGNOSIS — E87.5: ICD-10-CM

## 2018-07-04 DIAGNOSIS — N18.6: ICD-10-CM

## 2018-07-04 DIAGNOSIS — E11.65: ICD-10-CM

## 2018-07-04 DIAGNOSIS — Z79.02: ICD-10-CM

## 2018-07-04 DIAGNOSIS — Z79.899: ICD-10-CM

## 2018-07-04 DIAGNOSIS — E11.40: ICD-10-CM

## 2018-07-04 LAB
ALBUMIN SERPL-MCNC: 3.7 G/DL (ref 3.5–5)
ALP SERPL-CCNC: 63 U/L (ref 38–126)
ALT SERPL-CCNC: 24 U/L (ref 9–52)
ANION GAP SERPL CALC-SCNC: 13 MMOL/L
APTT BLD: 22.6 SEC (ref 22–30)
AST SERPL-CCNC: 17 U/L (ref 14–36)
BASOPHILS # BLD AUTO: 0 K/UL (ref 0–0.2)
BASOPHILS NFR BLD AUTO: 0 %
BUN SERPL-SCNC: 34 MG/DL (ref 7–17)
CALCIUM SPEC-MCNC: 9 MG/DL (ref 8.4–10.2)
CHLORIDE SERPL-SCNC: 93 MMOL/L (ref 98–107)
CK SERPL-CCNC: 46 U/L (ref 30–135)
CK SERPL-CCNC: 62 U/L (ref 30–135)
CK SERPL-CCNC: 63 U/L (ref 30–135)
CO2 SERPL-SCNC: 30 MMOL/L (ref 22–30)
D DIMER PPP FEU-MCNC: 0.75 MG/L FEU (ref ?–0.6)
EOSINOPHIL # BLD AUTO: 0.4 K/UL (ref 0–0.7)
EOSINOPHIL NFR BLD AUTO: 5 %
ERYTHROCYTE [DISTWIDTH] IN BLOOD BY AUTOMATED COUNT: 2.81 M/UL (ref 3.8–5.4)
ERYTHROCYTE [DISTWIDTH] IN BLOOD: 15.7 % (ref 11.5–15.5)
GLUCOSE SERPL-MCNC: 168 MG/DL (ref 74–99)
HCO3 BLDV-SCNC: 30 MMOL/L (ref 24–28)
HCT VFR BLD AUTO: 28.1 % (ref 34–46)
HGB BLD-MCNC: 8.7 GM/DL (ref 11.4–16)
INR PPP: 1 (ref ?–1.2)
LYMPHOCYTES # SPEC AUTO: 0.8 K/UL (ref 1–4.8)
LYMPHOCYTES NFR SPEC AUTO: 10 %
MCH RBC QN AUTO: 30.8 PG (ref 25–35)
MCHC RBC AUTO-ENTMCNC: 30.9 G/DL (ref 31–37)
MCV RBC AUTO: 99.9 FL (ref 80–100)
MONOCYTES # BLD AUTO: 0.6 K/UL (ref 0–1)
MONOCYTES NFR BLD AUTO: 7 %
NEUTROPHILS # BLD AUTO: 5.8 K/UL (ref 1.3–7.7)
NEUTROPHILS NFR BLD AUTO: 75 %
PCO2 BLDV: 52 MMHG (ref 37–51)
PH BLDV: 7.39 [PH] (ref 7.31–7.41)
PLATELET # BLD AUTO: 253 K/UL (ref 150–450)
POTASSIUM SERPL-SCNC: 4.8 MMOL/L (ref 3.5–5.1)
PROT SERPL-MCNC: 6 G/DL (ref 6.3–8.2)
PT BLD: 10.1 SEC (ref 9–12)
SODIUM SERPL-SCNC: 136 MMOL/L (ref 137–145)
TROPONIN I SERPL-MCNC: 0.02 NG/ML (ref 0–0.03)
TROPONIN I SERPL-MCNC: 0.04 NG/ML (ref 0–0.03)
WBC # BLD AUTO: 7.8 K/UL (ref 3.8–10.6)

## 2018-07-04 PROCEDURE — 94760 N-INVAS EAR/PLS OXIMETRY 1: CPT

## 2018-07-04 PROCEDURE — 83550 IRON BINDING TEST: CPT

## 2018-07-04 PROCEDURE — 80053 COMPREHEN METABOLIC PANEL: CPT

## 2018-07-04 PROCEDURE — 82553 CREATINE MB FRACTION: CPT

## 2018-07-04 PROCEDURE — 36415 COLL VENOUS BLD VENIPUNCTURE: CPT

## 2018-07-04 PROCEDURE — 90935 HEMODIALYSIS ONE EVALUATION: CPT

## 2018-07-04 PROCEDURE — 82728 ASSAY OF FERRITIN: CPT

## 2018-07-04 PROCEDURE — 84100 ASSAY OF PHOSPHORUS: CPT

## 2018-07-04 PROCEDURE — 94640 AIRWAY INHALATION TREATMENT: CPT

## 2018-07-04 PROCEDURE — 93005 ELECTROCARDIOGRAM TRACING: CPT

## 2018-07-04 PROCEDURE — 71045 X-RAY EXAM CHEST 1 VIEW: CPT

## 2018-07-04 PROCEDURE — 96374 THER/PROPH/DIAG INJ IV PUSH: CPT

## 2018-07-04 PROCEDURE — 5A1D70Z PERFORMANCE OF URINARY FILTRATION, INTERMITTENT, LESS THAN 6 HOURS PER DAY: ICD-10-PCS

## 2018-07-04 PROCEDURE — 99285 EMERGENCY DEPT VISIT HI MDM: CPT

## 2018-07-04 PROCEDURE — 87070 CULTURE OTHR SPECIMN AEROBIC: CPT

## 2018-07-04 PROCEDURE — 85730 THROMBOPLASTIN TIME PARTIAL: CPT

## 2018-07-04 PROCEDURE — 83540 ASSAY OF IRON: CPT

## 2018-07-04 PROCEDURE — 87205 SMEAR GRAM STAIN: CPT

## 2018-07-04 PROCEDURE — 82550 ASSAY OF CK (CPK): CPT

## 2018-07-04 PROCEDURE — 85379 FIBRIN DEGRADATION QUANT: CPT

## 2018-07-04 PROCEDURE — 84484 ASSAY OF TROPONIN QUANT: CPT

## 2018-07-04 PROCEDURE — 96375 TX/PRO/DX INJ NEW DRUG ADDON: CPT

## 2018-07-04 PROCEDURE — 85025 COMPLETE CBC W/AUTO DIFF WBC: CPT

## 2018-07-04 PROCEDURE — 85610 PROTHROMBIN TIME: CPT

## 2018-07-04 PROCEDURE — 82803 BLOOD GASES ANY COMBINATION: CPT

## 2018-07-04 PROCEDURE — 80061 LIPID PANEL: CPT

## 2018-07-04 PROCEDURE — 78582 LUNG VENTILAT&PERFUS IMAGING: CPT

## 2018-07-04 PROCEDURE — 93306 TTE W/DOPPLER COMPLETE: CPT

## 2018-07-04 PROCEDURE — 71046 X-RAY EXAM CHEST 2 VIEWS: CPT

## 2018-07-04 PROCEDURE — 83880 ASSAY OF NATRIURETIC PEPTIDE: CPT

## 2018-07-04 RX ADMIN — LATANOPROST SCH DROPS: 50 SOLUTION OPHTHALMIC at 20:34

## 2018-07-04 RX ADMIN — CARVEDILOL SCH MG: 12.5 TABLET, FILM COATED ORAL at 20:34

## 2018-07-04 RX ADMIN — Medication SCH: at 12:02

## 2018-07-04 RX ADMIN — MONTELUKAST SODIUM SCH: 10 TABLET, FILM COATED ORAL at 21:03

## 2018-07-04 RX ADMIN — Medication SCH: at 21:02

## 2018-07-04 RX ADMIN — Medication SCH MG: at 20:34

## 2018-07-04 RX ADMIN — GABAPENTIN SCH: 100 CAPSULE ORAL at 12:03

## 2018-07-04 RX ADMIN — Medication SCH: at 12:04

## 2018-07-04 RX ADMIN — INSULIN ASPART SCH: 100 INJECTION, SOLUTION INTRAVENOUS; SUBCUTANEOUS at 20:29

## 2018-07-04 RX ADMIN — INSULIN DETEMIR SCH: 100 INJECTION, SOLUTION SUBCUTANEOUS at 20:30

## 2018-07-04 RX ADMIN — ENOXAPARIN SODIUM SCH MG: 40 INJECTION SUBCUTANEOUS at 20:34

## 2018-07-04 RX ADMIN — FAMOTIDINE SCH: 20 TABLET, FILM COATED ORAL at 12:01

## 2018-07-04 RX ADMIN — IPRATROPIUM BROMIDE AND ALBUTEROL SULFATE SCH ML: .5; 3 SOLUTION RESPIRATORY (INHALATION) at 20:47

## 2018-07-04 RX ADMIN — HYDROCODONE BITARTRATE AND ACETAMINOPHEN SCH EACH: 7.5; 325 TABLET ORAL at 20:42

## 2018-07-04 RX ADMIN — IPRATROPIUM BROMIDE AND ALBUTEROL SULFATE SCH ML: .5; 3 SOLUTION RESPIRATORY (INHALATION) at 16:33

## 2018-07-04 RX ADMIN — MONTELUKAST SODIUM SCH MG: 10 TABLET, FILM COATED ORAL at 20:34

## 2018-07-04 RX ADMIN — ATORVASTATIN CALCIUM SCH MG: 80 TABLET, FILM COATED ORAL at 20:34

## 2018-07-04 RX ADMIN — BUDESONIDE SCH MG: 0.5 INHALANT ORAL at 20:47

## 2018-07-04 RX ADMIN — LACTOBACILLUS ACIDOPHILUS / LACTOBACILLUS BULGARICUS SCH: 100 MILLION CFU STRENGTH GRANULES at 12:03

## 2018-07-04 RX ADMIN — CARVEDILOL SCH: 12.5 TABLET, FILM COATED ORAL at 12:01

## 2018-07-04 RX ADMIN — SENNOSIDES SCH: 8.6 TABLET, FILM COATED ORAL at 12:04

## 2018-07-04 RX ADMIN — HYDROCODONE BITARTRATE AND ACETAMINOPHEN SCH EACH: 7.5; 325 TABLET ORAL at 15:11

## 2018-07-04 RX ADMIN — FAMOTIDINE SCH MG: 20 TABLET, FILM COATED ORAL at 20:34

## 2018-07-04 RX ADMIN — IPRATROPIUM BROMIDE AND ALBUTEROL SULFATE SCH: .5; 3 SOLUTION RESPIRATORY (INHALATION) at 16:17

## 2018-07-04 RX ADMIN — BUDESONIDE SCH: 0.5 INHALANT ORAL at 20:51

## 2018-07-04 RX ADMIN — Medication SCH: at 20:30

## 2018-07-04 RX ADMIN — GABAPENTIN SCH MG: 100 CAPSULE ORAL at 20:34

## 2018-07-04 RX ADMIN — FUROSEMIDE SCH MG: 80 TABLET ORAL at 20:34

## 2018-07-04 RX ADMIN — BUDESONIDE SCH: 0.5 INHALANT ORAL at 16:17

## 2018-07-04 RX ADMIN — INSULIN ASPART SCH: 100 INJECTION, SOLUTION INTRAVENOUS; SUBCUTANEOUS at 20:32

## 2018-07-04 RX ADMIN — GABAPENTIN SCH: 100 CAPSULE ORAL at 20:32

## 2018-07-04 RX ADMIN — CARVEDILOL SCH: 12.5 TABLET, FILM COATED ORAL at 21:02

## 2018-07-04 NOTE — NM
EXAMINATION TYPE: NM pul vent and perfuse

 

DATE OF EXAM: 7/4/2018

 

COMPARISON: Chest x-ray same date

 

HISTORY: Elevated d-dimer, difficulty breathing, COPD

 

TECHNIQUE:  Utilizing inhalation of 70.2 mCi Tc 99m DTPA aerosol and intravenous injection of 5.4 mCi
 of Tc 99m MAA, ventilation and perfusion images are acquired post injection in multiple projections.


 

FINDINGS: 

Normal radiotracer distribution is noted in the lungs. There is no evidence of mismatched defects.

 

IMPRESSION: 

Low probability for pulmonary embolism.

## 2018-07-04 NOTE — ED
SOB HPI





- General


Chief Complaint: Shortness of Breath


Stated Complaint: SOB


Time Seen by Provider: 18 08:11


Source: patient


Mode of arrival: wheelchair


Limitations: no limitations





- History of Present Illness


Initial Comments: 


57 years O female history of firm renal failure on dialysis and she is short-

winded this morning she gets dialysis on and she was at the dialysis center 

they noticed that she was coughing they sent her to the ER she denies any chest 

pain she is short-winded she denies any pleuritic chest pain denies any fever 

no chills no purulent cough she is ex-smoker and she does use inhalers at home.








- Related Data


 Home Medications











 Medication  Instructions  Recorded  Confirmed


 


Atorvastatin [Lipitor] 80 mg PO HS@2100 09/13/15 08/09/17


 


Carvedilol [Coreg] 12.5 mg PO BID@1100,2300 09/13/15 08/09/17


 


Ferrous Sulfate [Iron (65  mg PO DAILY@1100 09/13/15 08/09/17





Elemental)]   


 


Gabapentin [Neurontin] 100 mg PO TID@1100,1800,2300 09/13/15 08/09/17


 


Levothyroxine Sodium [Synthroid] 350 mcg PO DAILY@0500 09/13/15 08/09/17


 


Montelukast [Singulair] 10 mg PO HS@2000 09/13/15 08/09/17


 


Cinacalcet [Sensipar] 30 mg PO TU@1100 17


 


Acetaminophen Tab [Tylenol] 650 mg PO Q4H PRN 17


 


Bisacodyl 10 mg RECTAL DAILY PRN 17


 


Budesonide [Pulmicort] 0.5 mg INHALATION RT-BID@11,20 17


 


Calcium Acetate [PhosLo] 667 mg PO TID@0700,1200,1700 17


 


Famotidine [Pepcid] 20 mg PO BID@1100,1700 17


 


Folic Acid-Vit B Complex-Vit C 1 cap PO DAILY@1100 17





[Nephrocaps]   


 


HYDROcodone/APAP 7.5-325MG [Norco 1 tab PO TID@0500,1300,2100 17





7.5-325]   


 


Hydrocortisone [Anusol-Hc] 1 applic RECTAL BID PRN 17


 


Insulin Detemir [Levemir] 65 unit SQ DAILY@1700 17


 


Insulin Lispro [humaLOG Kwikpen] 15 unit SQ AC-TID@07,,17


 


L.acidoph,Paracasei, B.lactis 1 cap PO DAILY@1100 17





[Probiotic]   


 


Latanoprost [Xalatan 0.005%] 1 drop BOTH EYES HS@17


 


Omalizumab [Xolair] 150 mg SQ Q30D 17


 


Sennosides [Senna] 8.6 mg PO DAILY@1100 17


 


Sodium Bicarbonate Tab 650 mg PO BID@1100,17


 


hydrALAZINE HCL [Apresoline] 50 mg PO TID@0500,1100,1900 17








 Previous Rx's











 Medication  Instructions  Recorded


 


ALPRAZolam [Xanax] 0.25 mg PO TID PRN #20 17


 


Aspirin 325 mg PO DAILY  tab 17


 


Darbepoetin Dl [Aranesp] 40 mcg SQ Q7D  syr 17


 


Furosemide [Lasix] 80 mg PO BID #1 tablet 17


 


HYDROcodone/APAP 7.5-325MG [Norco 1 tab PO DAILY PRN #20 17





7.5-325]  


 


INSULIN LISPRO (HumaLOG) [humaLOG] 0 unit SQ ACHS #1 vial 17


 


Ipratropium-Albuterol Nebulize 3 ml INHALATION QID #1 neb 17





[Duoneb 0.5 mg-3 mg/3 ml Soln]  


 


Vancomycin 0 mg IVPB AS DIRECTED #1 bag 17


 


diphenhydrAMINE [Benadryl] 25 mg PO Q6HR PRN  cap 17











 Allergies











Allergy/AdvReac Type Severity Reaction Status Date / Time


 


erythromycin base Allergy  Unknown Verified 18 07:52





[Erythromycin Base]     


 


NSAIDS (Non-Steroidal Allergy  Unknown Verified 18 07:52





Anti-Inflamma     














Review of Systems


ROS Statement: 


Those systems with pertinent positive or pertinent negative responses have been 

documented in the HPI.





ROS Other: All systems not noted in ROS Statement are negative.





Past Medical History


Past Medical History: Asthma, Heart Failure, COPD, CVA/TIA, Diabetes Mellitus, 

Eye Disorder, Hyperlipidemia, Hypertension, Osteoarthritis (OA), Pneumonia, 

Renal Disease, Sleep Apnea/CPAP/BIPAP, Thyroid Disorder


Additional Past Medical History / Comment(s): sleep apnea, neuropathy, patient 

has one kidney, Stage III kidney failure


History of Any Multi-Drug Resistant Organisms: None Reported


Past Surgical History: Bariatric Surgery, Hernia Repair, Orthopedic Surgery, 

Tonsillectomy


Additional Past Surgical History / Comment(s): rt kidney removed, Rt knee 

replacement


Past Anesthesia/Blood Transfusion Reactions: No Reported Reaction


Additional Past Anesthesia/Blood Transfusion Reaction / Comment(s): pt states 

she had a blood transfusion at [facility], "it made her itchy and they 

gave bendyl"


Past Psychological History: Anxiety, Depression


Smoking Status: Former smoker


Past Alcohol Use History: None Reported


Past Drug Use History: None Reported





- Past Family History


  ** Mother


Additional Family Medical History / Comment(s): skin CA, CHF, DM, thyroid 

disorder, HTN.





  ** Father


Family Medical History: Diabetes Mellitus, Hypertension, Thyroid Disorder


Additional Family Medical History / Comment(s): CHF.  Pt's father is .





General Exam





- General Exam Comments


Initial Comments: 


General:  The patient is awake and alert, in no distress, and does not appear 

acutely ill. 


Skin:  Skin is warm and dry and no rashes or lesions are noted. 


Eye:  Pupils are equal, round and reactive to light, extra-ocular movements are 

intact; there is normal conjunctiva bilaterally.  


Ears, nose, mouth and throat:  There are moist mucous membranes and no oral 

lesions. 


Neck:  The neck is supple, there is no tenderness  or JVD.  


Cardiovascular:  There is a regular rate and rhythm. No murmur, rub or gallop 

is appreciated.


Respiratory: To auscultation bilateral, decreased breath sounds noticed some 

wheezing


Gastrointestinal:  Soft, non-distended, non-tender abdomen without masses or 

organomegaly noted. There is no rebound or guarding present. Bowel sounds are 

unremarkable. 


Back:  There is no tenderness to palpation in the midline. There is no obvious 

deformity.


Musculoskeletal:  Normal ROM, no tenderness, There is no pedal edema. There is 

no calf tenderness or swelling. No cords were appreciated.  


Neurological:  CN II-XII intact, Cranial nerves III through XII are intact. 

There are no obvious motor or sensory deficits. Coordination appears grossly 

intact. Speech is normal.


Psychiatric:  Cooperative, appropriate mood & affect, normal judgment.  








Limitations: no limitations





Course


 Vital Signs











  18





  07:50 08:12 08:46


 


Temperature 98.0 F  


 


Pulse Rate 67  60


 


Respiratory 20 20 





Rate   


 


Blood Pressure 144/71  


 


O2 Sat by Pulse 99  





Oximetry   














  18





  08:54 09:08


 


Temperature  


 


Pulse Rate 60 64


 


Respiratory 18 





Rate  


 


Blood Pressure 140/63 


 


O2 Sat by Pulse 100 





Oximetry  








EKG is a sinus rhythm with a first-degree AV block ventricular rate is 62 OH 

interval is 224 QRS duration is 106 QT/QTc is 412 421 review of this EKG does 

not reveal any ST elevation or ST depression this EKG was compared with EKG 

from 2017


Adverse changes noticed in this EKG





Labs are reviewed, chest x-rays consistent with a fluid overload CBC, pH, 

bicarb are within normal range creatinine is 5.3 for d-dimer is quite elevated 

troponin is elevated as well EKG was reviewed in its dictated troponin 0.041 

patient be admitted to Dr. Cuadra with the Dr. Esquivel be consulted and VQ scan 

is ordered cardiology be consulted





Medical Decision Making





- Lab Data


Result diagrams: 


 18 08:29





 18 08:29


 Lab Results











  18 Range/Units





  08:29 08:29 08:29 


 


WBC   7.8   (3.8-10.6)  k/uL


 


RBC   2.81 L   (3.80-5.40)  m/uL


 


Hgb   8.7 L   (11.4-16.0)  gm/dL


 


Hct   28.1 L   (34.0-46.0)  %


 


MCV   99.9   (80.0-100.0)  fL


 


MCH   30.8   (25.0-35.0)  pg


 


MCHC   30.9 L   (31.0-37.0)  g/dL


 


RDW   15.7 H   (11.5-15.5)  %


 


Plt Count   253   (150-450)  k/uL


 


Neutrophils %   75   %


 


Lymphocytes %   10   %


 


Monocytes %   7   %


 


Eosinophils %   5   %


 


Basophils %   0   %


 


Neutrophils #   5.8   (1.3-7.7)  k/uL


 


Lymphocytes #   0.8 L   (1.0-4.8)  k/uL


 


Monocytes #   0.6   (0-1.0)  k/uL


 


Eosinophils #   0.4   (0-0.7)  k/uL


 


Basophils #   0.0   (0-0.2)  k/uL


 


Hypochromasia   Moderate   


 


Macrocytosis   Slight   


 


PT     (9.0-12.0)  sec


 


INR     (<1.2)  


 


APTT     (22.0-30.0)  sec


 


D-Dimer     (<0.60)  mg/L FEU


 


VBG pH     (7.31-7.41)  


 


VBG pCO2     (37-51)  mmHg


 


VBG HCO3     (24-28)  mmol/L


 


Sodium    136 L  (137-145)  mmol/L


 


Potassium    4.8  (3.5-5.1)  mmol/L


 


Chloride    93 L  ()  mmol/L


 


Carbon Dioxide    30  (22-30)  mmol/L


 


Anion Gap    13  mmol/L


 


BUN    34 H  (7-17)  mg/dL


 


Creatinine    5.34 H*  (0.52-1.04)  mg/dL


 


Est GFR (CKD-EPI)AfAm    10  (>60 ml/min/1.73 sqM)  


 


Est GFR (CKD-EPI)NonAf    8  (>60 ml/min/1.73 sqM)  


 


Glucose    168 H  (74-99)  mg/dL


 


Calcium    9.0  (8.4-10.2)  mg/dL


 


Total Bilirubin    0.6  (0.2-1.3)  mg/dL


 


AST    17  (14-36)  U/L


 


ALT    24  (9-52)  U/L


 


Alkaline Phosphatase    63  ()  U/L


 


Total Creatine Kinase  63    ()  U/L


 


CK-MB (CK-2)  1.4    (0.0-2.4)  ng/mL


 


CK-MB (CK-2) Rel Index  2.2    


 


Troponin I  0.041 H*    (0.000-0.034)  ng/mL


 


NT-Pro-B Natriuret Pep     pg/mL


 


Total Protein    6.0 L  (6.3-8.2)  g/dL


 


Albumin    3.7  (3.5-5.0)  g/dL














  18 Range/Units





  08:29 08:29 08:29 


 


WBC     (3.8-10.6)  k/uL


 


RBC     (3.80-5.40)  m/uL


 


Hgb     (11.4-16.0)  gm/dL


 


Hct     (34.0-46.0)  %


 


MCV     (80.0-100.0)  fL


 


MCH     (25.0-35.0)  pg


 


MCHC     (31.0-37.0)  g/dL


 


RDW     (11.5-15.5)  %


 


Plt Count     (150-450)  k/uL


 


Neutrophils %     %


 


Lymphocytes %     %


 


Monocytes %     %


 


Eosinophils %     %


 


Basophils %     %


 


Neutrophils #     (1.3-7.7)  k/uL


 


Lymphocytes #     (1.0-4.8)  k/uL


 


Monocytes #     (0-1.0)  k/uL


 


Eosinophils #     (0-0.7)  k/uL


 


Basophils #     (0-0.2)  k/uL


 


Hypochromasia     


 


Macrocytosis     


 


PT  10.1    (9.0-12.0)  sec


 


INR  1.0    (<1.2)  


 


APTT  22.6    (22.0-30.0)  sec


 


D-Dimer  0.75 H    (<0.60)  mg/L FEU


 


VBG pH    7.39  (7.31-7.41)  


 


VBG pCO2    52 H  (37-51)  mmHg


 


VBG HCO3    30 H  (24-28)  mmol/L


 


Sodium     (137-145)  mmol/L


 


Potassium     (3.5-5.1)  mmol/L


 


Chloride     ()  mmol/L


 


Carbon Dioxide     (22-30)  mmol/L


 


Anion Gap     mmol/L


 


BUN     (7-17)  mg/dL


 


Creatinine     (0.52-1.04)  mg/dL


 


Est GFR (CKD-EPI)AfAm     (>60 ml/min/1.73 sqM)  


 


Est GFR (CKD-EPI)NonAf     (>60 ml/min/1.73 sqM)  


 


Glucose     (74-99)  mg/dL


 


Calcium     (8.4-10.2)  mg/dL


 


Total Bilirubin     (0.2-1.3)  mg/dL


 


AST     (14-36)  U/L


 


ALT     (9-52)  U/L


 


Alkaline Phosphatase     ()  U/L


 


Total Creatine Kinase     ()  U/L


 


CK-MB (CK-2)     (0.0-2.4)  ng/mL


 


CK-MB (CK-2) Rel Index     


 


Troponin I     (0.000-0.034)  ng/mL


 


NT-Pro-B Natriuret Pep   3050   pg/mL


 


Total Protein     (6.3-8.2)  g/dL


 


Albumin     (3.5-5.0)  g/dL














Disposition


Clinical Impression: 


 Shortness of breath, Congestive heart failure, Elevated troponin, Elevated d-

dimer





Disposition: ADMITTED AS IP TO THIS HOSP


Is patient prescribed a controlled substance at d/c from ED?: No


Referrals: 


Balbina Pugh DO [Primary Care Provider] - 1-2 days

## 2018-07-04 NOTE — P.HPIM
History of Present Illness


H&P Date: 18


Chief Complaint: Shortness of breath





Bree Poon is a 57-year-old female who presented to [facility] emergency room with a chief complaint of worsening shortness of breath

, patient has a known history of end-stage renal disease on hemodialysis for 

the last 1 year, she went to dialysis Center today however due to shortness of 

breath she was referred to emergency room.  Patient denies any chest pain, 

denies any fever or chills, patient stated that she has been using her left arm 

fistula for dialysis until recently when it stopped working, currently she is 

using her right subclavian catheter.


In the emergency room patient was evaluated by Dr. Ramey, vitals were stable, 

troponin level was slightly elevated and d-dimer was slightly elevated 

creatinine was up at 5.38 chest x-ray revealed evidence of pleural effusion 

patient was admitted to telemetry floor, cardiology and pulmonary consultation 

were requested , nephrology consultation was requested and patient will have 

dialysis today . VQ scan was ordered


Patient stated that 1 year ago she had a stroke and was in a coma for a 

prolonged period of time at that time she had renal failure and resulted in end-

stage renal disease and has been on dialysis since then.


Patient has a known history of insulin-dependent diabetes mellitus, she also 

has known history of congestive heart failure, history of COPD, history of 

hypertension, and history of hyperlipidemia





Past Medical History


Past Medical History: Asthma, Heart Failure, COPD, CVA/TIA, Diabetes Mellitus, 

Eye Disorder, Hyperlipidemia, Hypertension, Osteoarthritis (OA), Pneumonia, 

Renal Disease, Sleep Apnea/CPAP/BIPAP, Thyroid Disorder


Additional Past Medical History / Comment(s): sleep apnea, neuropathy, patient 

has one kidney, Stage III kidney failure


History of Any Multi-Drug Resistant Organisms: None Reported


Past Surgical History: Bariatric Surgery, Hernia Repair, Orthopedic Surgery, 

Tonsillectomy


Additional Past Surgical History / Comment(s): rt kidney removed, Rt knee 

replacement


Past Anesthesia/Blood Transfusion Reactions: No Reported Reaction


Additional Past Anesthesia/Blood Transfusion Reaction / Comment(s): pt states 

she had a blood transfusion at [facility], "it made her itchy and they 

gave bendyl"


Past Psychological History: Anxiety, Depression


Smoking Status: Former smoker


Past Alcohol Use History: None Reported


Past Drug Use History: None Reported





- Past Family History


  ** Mother


Additional Family Medical History / Comment(s): skin CA, CHF, DM, thyroid 

disorder, HTN.





  ** Father


Family Medical History: Diabetes Mellitus, Hypertension, Thyroid Disorder


Additional Family Medical History / Comment(s): CHF.  Pt's father is .





Medications and Allergies


 Home Medications











 Medication  Instructions  Recorded  Confirmed  Type


 


Atorvastatin [Lipitor] 80 mg PO HS 09/13/15 07/04/18 History


 


Gabapentin [Neurontin] 200 mg PO TID 09/13/15 07/04/18 History


 


Levothyroxine Sodium [Synthroid] 350 mcg PO DAILY 09/13/15 07/04/18 History


 


Acetaminophen Tab [Tylenol] 650 mg PO Q4H PRN 17 History


 


Budesonide [Pulmicort] 0.5 mg INHALATION RT-BID 17 History


 


Famotidine [Pepcid] 20 mg PO BID 17 History


 


Folic Acid-Vit B Complex-Vit C 1 cap PO DAILY 17 History





[Nephrocaps]    


 


HYDROcodone/APAP 7.5-325MG [Norco 1 tab PO TID PRN 17 History





7.5-325]    


 


Hydrocortisone [Anusol-Hc] 1 applic RECTAL BID PRN 17 History


 


Latanoprost [Xalatan 0.005%] 1 drop BOTH EYES HS 17 History


 


Omalizumab [Xolair] 150 mg SQ Q30D 17 History


 


Aspirin 325 mg PO DAILY  tab 17 Rx


 


Furosemide [Lasix] 80 mg PO BID #1 tablet 17 Rx


 


diphenhydrAMINE [Benadryl] 25 mg PO Q6HR PRN  cap 17 Rx


 


Clopidogrel [Plavix] 75 mg PO DAILY 18 History


 


Insulin Regular, Human [NovoLIN R] See Protocol SQ AC-TID 18 

History


 


Insuln Asp Prt/Insulin Aspart 75 unit SQ BID 18 History





[NovoLOG MIX 70-30 VIAL]    


 


Ipratropium-Albuterol Nebulize 3 ml INHALATION RT-QID PRN 18 

History





[Duoneb 0.5 mg-3 mg/3 ml Soln]    











 Allergies











Allergy/AdvReac Type Severity Reaction Status Date / Time


 


erythromycin base Allergy  Unknown Verified 18 10:18





[Erythromycin Base]     


 


NSAIDS (Non-Steroidal Allergy  Unknown Verified 18 10:18





Anti-Inflamma     


 


PAPER TAPE Allergy  Rash/Hives Uncoded 18 10:18














Physical Exam


Vitals: 


 Vital Signs











  Temp Pulse Pulse Resp BP BP Pulse Ox


 


 18 11:25  97.1 F L   77  16   142/68  98


 


 18 10:26  98 F  72   18  139/64   98


 


 18 09:50   68   20  139/78   99


 


 18 09:08   64     


 


 18 08:54   60   18  140/63   100


 


 18 08:46   60     


 


 18 08:12     20   


 


 18 07:50  98.0 F  67   20  144/71   99








 Intake and Output











 18





 22:59 06:59 14:59


 


Other:   


 


  Weight   133.81 kg














In general patient is alert and oriented 3 in no apparent distress


Neck is supple no JVD no goiter no lymphadenopathy


Chest exam reveals a few scattered crackles no wheezing


Cardiac exam reveals regular heart sounds no gallops no murmurs


Abdomen is soft nontender no organomegaly with normal bowel sounds


Extremity exam reveals mild edema in both feet no pretibial edema, pulses are 

palpable and symmetrical


No cyanosis or clubbing





Results


CBC & Chem 7: 


 18 08:29





 18 08:29


Labs: 


 Abnormal Lab Results - Last 24 Hours (Table)











  18 Range/Units





  08:29 08:29 08:29 


 


RBC   2.81 L   (3.80-5.40)  m/uL


 


Hgb   8.7 L   (11.4-16.0)  gm/dL


 


Hct   28.1 L   (34.0-46.0)  %


 


MCHC   30.9 L   (31.0-37.0)  g/dL


 


RDW   15.7 H   (11.5-15.5)  %


 


Lymphocytes #   0.8 L   (1.0-4.8)  k/uL


 


D-Dimer     (<0.60)  mg/L FEU


 


VBG pCO2     (37-51)  mmHg


 


VBG HCO3     (24-28)  mmol/L


 


Sodium    136 L  (137-145)  mmol/L


 


Chloride    93 L  ()  mmol/L


 


BUN    34 H  (7-17)  mg/dL


 


Creatinine    5.34 H*  (0.52-1.04)  mg/dL


 


Glucose    168 H  (74-99)  mg/dL


 


Troponin I  0.041 H*    (0.000-0.034)  ng/mL


 


Total Protein    6.0 L  (6.3-8.2)  g/dL














  18 Range/Units





  08:29 08:29 


 


RBC    (3.80-5.40)  m/uL


 


Hgb    (11.4-16.0)  gm/dL


 


Hct    (34.0-46.0)  %


 


MCHC    (31.0-37.0)  g/dL


 


RDW    (11.5-15.5)  %


 


Lymphocytes #    (1.0-4.8)  k/uL


 


D-Dimer  0.75 H   (<0.60)  mg/L FEU


 


VBG pCO2   52 H  (37-51)  mmHg


 


VBG HCO3   30 H  (24-28)  mmol/L


 


Sodium    (137-145)  mmol/L


 


Chloride    ()  mmol/L


 


BUN    (7-17)  mg/dL


 


Creatinine    (0.52-1.04)  mg/dL


 


Glucose    (74-99)  mg/dL


 


Troponin I    (0.000-0.034)  ng/mL


 


Total Protein    (6.3-8.2)  g/dL














Assessment and Plan


Plan: 





#1 shortness of breath multifactorial most likely related to acute congestive 

heart failure exacerbation was fluid overload, with evidence of pleural effusion

, and underlying history of COPD, currently patient is scheduled for dialysis 

today, and will be evaluated by pulmonary in regard to COPD and pleural effusion





#2 slight elevation in troponin level without any history of chest pain, 

cardiology consultation was requested, doubt acute coronary syndrome





#3 slight elevation in d-dimer, VQ scan was ordered





#4 insulin-dependent diabetes mellitus will resume home insulin





#5 underlying history of hypertension





#6 underlying history of hyperlipidemia





At this time will continue with current management


Add subcu Lovenox for DVT prophylaxis, add Pepcid for GI prophylaxis


Recheck labs in a.m.


Will follow closely

## 2018-07-04 NOTE — P.CRDCN
History of Present Illness


Consult date: 18


Chief complaint: Shortness of breath


History of present illness: 





This is a pleasant 57-year-old  female patient with a past medical 

history significant for end stage renal disease on hemodialysis as well as 

congestive heart failure secondary to diastolic dysfunction was referred from 

the dialysis center today to the hospital for further evaluation of shortness 

of breath.





The patient stated that she has been experiencing worsening shortness of breath 

for the last 4 days.  Today at the dialysis center the shortness of breath was 

worse just before she started dialysis.  She did not have any symptoms of chest 

pain or chest discomfort, dizziness or dizziness, or syncope.  She did develop 

while lateral lower extremities edema mainly in the feet.  She stated that she 

was not compliant with her diet and her fluid and over the weekend she has been 

drinking excessively water because of the weather was so hot.  She stated that 

she was compliant with her dialysis and she does dialysis on regular basis.





The EKG showed sinus rhythm without any significant ST or T-wave abnormalities.

  The chest x-ray showed findings consistent with CHF.  The BNP came in to be 

elevated.  The d-dimer also came in to be alleviated but the V/Q scan of the 

chest showed no PE.  The troponin also is slightly elevated but the patient did 

not have any symptoms of chest discomfort and the EKG did not show any ischemic 

changes.  Beside that the patient does not have any history of coronary artery 

disease according to her.





The patient was seen in the hospital here in 2017 where she underwent an 

echocardiogram at that point and that revealed normal LV function without any 

significant valvular abnormalities.





The patient already underwent dialysis today and 2 L of fluid were removed she 

is already feeling better.  Also she was restarted on her Lasix by mouth at 80 

mg by mouth twice a day beach she does make small amount of urine.





Past Medical History


Past Medical History: Asthma, Heart Failure, COPD, CVA/TIA, Diabetes Mellitus, 

Eye Disorder, Hyperlipidemia, Hypertension, Osteoarthritis (OA), Pneumonia, 

Renal Disease, Sleep Apnea/CPAP/BIPAP, Thyroid Disorder


Additional Past Medical History / Comment(s): sleep apnea, neuropathy, patient 

has one kidney, Stage III kidney failure


History of Any Multi-Drug Resistant Organisms: None Reported


Past Surgical History: Bariatric Surgery, Hernia Repair, Orthopedic Surgery, 

Tonsillectomy


Additional Past Surgical History / Comment(s): rt kidney removed, Rt knee 

replacement


Past Anesthesia/Blood Transfusion Reactions: No Reported Reaction


Additional Past Anesthesia/Blood Transfusion Reaction / Comment(s): pt states 

[facility], "it made her itchy and they 

gave bendyl"


Past Psychological History: Anxiety, Depression


Smoking Status: Former smoker


Past Alcohol Use History: None Reported


Past Drug Use History: None Reported





- Past Family History


  ** Mother


Additional Family Medical History / Comment(s): skin CA, CHF, DM, thyroid 

disorder, HTN.





  ** Father


Family Medical History: Diabetes Mellitus, Hypertension, Thyroid Disorder


Additional Family Medical History / Comment(s): CHF.  Pt's father is .





Medications and Allergies


 Home Medications











 Medication  Instructions  Recorded  Confirmed  Type


 


Atorvastatin [Lipitor] 80 mg PO HS 09/13/15 07/04/18 History


 


Gabapentin [Neurontin] 200 mg PO TID 09/13/15 07/04/18 History


 


Levothyroxine Sodium [Synthroid] 350 mcg PO DAILY 09/13/15 07/04/18 History


 


Acetaminophen Tab [Tylenol] 650 mg PO Q4H PRN 17 History


 


Budesonide [Pulmicort] 0.5 mg INHALATION RT-BID 17 History


 


Famotidine [Pepcid] 20 mg PO BID 17 History


 


Folic Acid-Vit B Complex-Vit C 1 cap PO DAILY 17 History





[Nephrocaps]    


 


HYDROcodone/APAP 7.5-325MG [Norco 1 tab PO TID PRN 17 History





7.5-325]    


 


Hydrocortisone [Anusol-Hc] 1 applic RECTAL BID PRN 17 History


 


Latanoprost [Xalatan 0.005%] 1 drop BOTH EYES HS 17 History


 


Omalizumab [Xolair] 150 mg SQ Q30D 17 History


 


Aspirin 325 mg PO DAILY  tab 17 Rx


 


Furosemide [Lasix] 80 mg PO BID #1 tablet 17 Rx


 


diphenhydrAMINE [Benadryl] 25 mg PO Q6HR PRN  cap 08/22/17 07/04/18 Rx


 


Clopidogrel [Plavix] 75 mg PO DAILY 18 History


 


Insulin Regular, Human [NovoLIN R] See Protocol SQ AC-TID 18 

History


 


Insuln Asp Prt/Insulin Aspart 75 unit SQ BID 18 History





[NovoLOG MIX 70-30 VIAL]    


 


Ipratropium-Albuterol Nebulize 3 ml INHALATION RT-QID PRN 18 

History





[Duoneb 0.5 mg-3 mg/3 ml Soln]    











 Allergies











Allergy/AdvReac Type Severity Reaction Status Date / Time


 


erythromycin base Allergy  Unknown Verified 18 10:18





[Erythromycin Base]     


 


NSAIDS (Non-Steroidal Allergy  Unknown Verified 18 10:18





Anti-Inflamma     


 


PAPER TAPE Allergy  Rash/Hives Uncoded 18 10:18














Physical Exam


Vitals: 


 Vital Signs











  Temp Pulse Pulse Resp BP BP Pulse Ox


 


 18 15:55    77  16   


 


 18 11:25  97.1 F L   77  16   142/68  98


 


 18 10:26  98 F  72   18  139/64   98


 


 18 09:50   68   20  139/78   99


 


 18 09:08   64     


 


 18 08:54   60   18  140/63   100


 


 18 08:46   60     


 


 18 08:12     20   


 


 18 07:50  98.0 F  67   20  144/71   99








 Intake and Output











 18





 06:59 14:59 22:59


 


Other:   


 


  Weight  133.81 kg 














- Constitutional


General appearance: no acute distress





- Respiratory


Respiratory: bilateral: diminished





- Cardiovascular


Rhythm: regular


Heart sounds: normal: S1, S2





Results





 18 08:29





 18 08:29


 Cardiac Enzymes











  18 Range/Units





  08:29 08:29 


 


AST   17  (14-36)  U/L


 


CK-MB (CK-2)  1.4   (0.0-2.4)  ng/mL


 


Troponin I  0.041 H*   (0.000-0.034)  ng/mL








 Coagulation











  18 Range/Units





  08:29 


 


PT  10.1  (9.0-12.0)  sec


 


APTT  22.6  (22.0-30.0)  sec








 CBC











  18 Range/Units





  08:29 


 


WBC  7.8  (3.8-10.6)  k/uL


 


RBC  2.81 L  (3.80-5.40)  m/uL


 


Hgb  8.7 L  (11.4-16.0)  gm/dL


 


Hct  28.1 L  (34.0-46.0)  %


 


Plt Count  253  (150-450)  k/uL








 Comprehensive Metabolic Panel











  18 Range/Units





  08:29 


 


Sodium  136 L  (137-145)  mmol/L


 


Potassium  4.8  (3.5-5.1)  mmol/L


 


Chloride  93 L  ()  mmol/L


 


Carbon Dioxide  30  (22-30)  mmol/L


 


BUN  34 H  (7-17)  mg/dL


 


Creatinine  5.34 H*  (0.52-1.04)  mg/dL


 


Glucose  168 H  (74-99)  mg/dL


 


Calcium  9.0  (8.4-10.2)  mg/dL


 


AST  17  (14-36)  U/L


 


ALT  24  (9-52)  U/L


 


Alkaline Phosphatase  63  ()  U/L


 


Total Protein  6.0 L  (6.3-8.2)  g/dL


 


Albumin  3.7  (3.5-5.0)  g/dL








 Current Medications











Generic Name Dose Route Start Last Admin





  Trade Name Freq  PRN Reason Stop Dose Admin


 


Acetaminophen  650 mg  18 10:01  





  Tylenol Tab  PO   





  Q4H PRN   





  Fever and/ or Pain   





     





     





     


 


Hydrocodone Bitart/Acetaminophen  1 each  18 10:01  





  [place] 7.5-325  PO   





  DAILY PRN   





  Pain   





     





     





     


 


Hydrocodone Bitart/Acetaminophen  1 each  18 13:00  18 15:11





  [place] 7.5-325  PO   1 each





  TID@0500,1300,2100 ANDERS   Administration





     





     





     





     


 


Albuterol/Ipratropium  3 ml  18 12:00  18 16:17





  Duoneb 0.5 Mg-3 Mg/3 Ml Soln  INHALATION   Not Given





  RT-QID ANDERS   





     





     





     





     


 


Alprazolam  0.25 mg  18 10:01  





  Xanax  PO   





  TID PRN   





  Anxiety   





     





     





     


 


Aspirin  325 mg  18 09:00  





  Aspirin  PO   





  DAILY ANDERS   





     





     





     





     


 


Atorvastatin Calcium  80 mg  18 21:00  





  Lipitor  PO   





  HS@2100 [facility]   





     





     





     





     


 


Bisacodyl  10 mg  18 10:01  





  Dulcolax  RECTAL   





  DAILY PRN   





  Constipation   





     





     





     


 


Budesonide  0.5 mg  18 11:00  18 16:17





  Pulmicort  INHALATION   Not Given





  RT-BID@11,20 [facility]   





     





     





     





     


 


Calcium Acetate  667 mg  18 12:00  18 12:04





  Phoslo  PO   Not Given





  TID@0700,1200,1700 [facility]   





     





     





     





     


 


Carvedilol  12.5 mg  18 11:00  18 12:01





  Coreg  PO   Not Given





  BID@1100,2300 [facility]   





     





     





     





     


 


Cinacalcet  30 mg  07/10/18 11:00  





  Sensipar  PO   





  TU@1100 [facility]   





     





     





     





     


 


Diphenhydramine HCl  25 mg  18 10:01  





  Benadryl  PO   





  Q6HR PRN   





  Itching   





     





     





     


 


Enoxaparin Sodium  40 mg  18 13:30  





  Lovenox  SQ   





  DAILY [facility]   





     





     





     





     


 


Famotidine  20 mg  18 11:00  18 12:01





  Pepcid  PO   Not Given





  BID@1100,1700 [facility]   





     





     





     





     


 


Ferrous Sulfate  325 mg  18 11:00  18 12:02





  Feosol  PO   Not Given





  DAILY@1100 [facility]   





     





     





     





     


 


Furosemide  80 mg  18 16:00  





  Lasix  PO   





  BID@0900,1600 [facility]   





     





     





     





     


 


Gabapentin  100 mg  18 11:00  18 12:03





  Neurontin  PO   Not Given





  TID@1100,1800,2300 [facility]   





     





     





     





     


 


Hydralazine HCl  50 mg  18 11:00  18 12:03





  Apresoline  PO   Not Given





  TID@0500,1100,1900 [facility]   





     





     





     





     


 


Hydrocortisone  1 applic  18 10:01  





  Proctosol-Hc 2.5%  RECTAL   





  BID PRN   





  Hemorrhoids   





     





     





     


 


Insulin Aspart  15 unit  18 12:00  





  Novolog  SQ   





  AC-TID@ [facility]   





     





     





     





     


 


Insulin Detemir  65 unit  18 17:00  





  Levemir  SQ   





  DAILY@1700 [facility]   





     





     





     





     


 


Lactobacillus Acidoph/Bulgaricus  1 each  18 11:00  18 12:03





  Lactinex  PO   Not Given





  DAILY@1100 [facility]   





     





     





     





     


 


Latanoprost  1 drops  18 20:00  





  Xalatan 0.005%  BOTH EYES   





  HS@2000 [facility]   





     





     





     





     


 


Levothyroxine Sodium  350 mcg  18 05:00  





  Synthroid  PO   





  DAILY@0500 [facility]   





     





     





     





     


 


Montelukast Sodium  10 mg  18 20:00  





  Singulair  PO   





  HS@2000 [facility]   





     





     





     





     


 


Morphine Sulfate  2 mg  18 09:57  





  Morphine Sulfate (Inj)  IV   





  Q4HR PRN   





  Severe Pain   





     





     





     


 


Multivit/Ca Carb/B Cmplx/FA/Prenat  1 each  18 11:00  18 12:02





  Nephrocaps  PO   Not Given





  DAILY@1100 [facility]   





     





     





     





     


 


Naloxone HCl  0.2 mg  18 09:57  





  Narcan  IV   





  Q2M PRN   





  Opioid Reversal   





     





     





     


 


Nitroglycerin  0.4 mg  18 10:15  





  Nitrostat  SUBLINGUAL   





  Q5M PRN   





  Chest Pain   





     





     





     


 


Ondansetron HCl  4 mg  18 09:57  





  Zofran  IVP   





  Q8HR PRN   





  Nausea And Vomiting   





     





     





     


 


Senna  8.6 mg  18 11:00  18 12:04





  Senokot  PO   Not Given





  DAILY@1100 [facility]   





     





     





     





     


 


Sodium Bicarbonate  650 mg  18 11:00  18 12:04





  Sodium Bicarbonate Tab  PO   Not Given





  BID@1100,2000 [facility]   





     





     





     





     








 Intake and Output











 18





 06:59 14:59 22:59


 


Other:   


 


  Weight  133.81 kg 








 Patient Weight











 18





 06:59


 


Weight 133.81 kg








 





 18 08:29 





 18 08:29 











Assessment and Plan


Assessment: 





Assessment


#1 congestive heart failure secondary to diastolic dysfunction.


#2 end-stage renal disease on hemodialysis


#3 mildly abnormal cardiac enzymes likely secondary to renal dysfunction in the 

absence of any chest pain


#4 multiple comorbid conditions including diabetes, hypertension, dyslipidemia





Plan


#1 the patient already underwent dialysis with removal of 2 L of fluid.  She is 

feeling better


#2 she was restarted on Lasix by mouth at 80 mg by mouth twice a day next 


#3 she is on aspirin and statin we'll continue that


#4 we'll obtain an echocardiogram was Doppler


#5 follow-up with the patient.


#6 I would consider medical treatment for the mildly abnormal cardiac enzymes 

and probably she'll benefit from a stress test as an outpatient.





Thank you for allowing us participate her care and we will continue following 

up with the patient

## 2018-07-04 NOTE — CONS
CONSULTATION



REASON FOR CONSULT:

End-stage renal disease.



HISTORY OF PRESENT ILLNESS:

The patient is a 57-year-old female with end-stage renal disease, on hemodialysis on a

Monday, Wednesday, Friday schedule at [facility].  The patient was admitted to

the hospital with complaints of shortness of breath.  She had switched her dialysis

from Monday to Tuesday as outpatient.  The patient states that she had about 4.5 L of

ultrafiltration yesterday.  The patient denies any fever or chills.  No nausea,

vomiting.  No significant abdominal pain or diarrhea.  The patient was dialyzed today.

We had about 2 L of ultrafiltration in the hospital.



PAST MEDICAL HISTORY:

COPD, obesity, asthma, CHF, history of CVA, TIA, type 2 diabetes, hypertension,

osteoarthritis, obstructive sleep apnea, hyperlipidemia, hypothyroidism.



PAST SURGICAL HISTORY:

Bariatric surgery, hernia repair, orthopedic surgery, tonsillectomy, right nephrectomy,

right knee arthroplasty.



SOCIAL HISTORY:

Patient is a former smoker.  No history of drug abuse or alcohol abuse.



MEDICATIONS AT HOME PRIOR TO ADMISSION:

Include:

1. Lipitor.

2. Neurontin.

3. Synthroid.

4. Tylenol.

5. Pulmicort.

6. Pepcid.

7. Norco.

8. Lasix.

9. Aspirin.

10.Benadryl.

11.Plavix.

12.Insulin.



ALLERGIES:

Include ERYTHROMYCIN, NSAIDS, TAPE.



REVIEW OF SYSTEMS:

As per HPI.  Other systems negative.



EXAMINATION:

Patient is comfortable, awake, alert, oriented x3.  She is not in any acute distress.

Blood pressure is 142/68, heart rate 72 per minute.  Patient is afebrile.

HEART:  S1, S2.

LUNGS:  Bilateral breath sounds are heard.  No crackles or wheezing is heard.  Abdomen

is soft, nontender.  Examination lower extremities show no significant edema.  Chronic

skin changes are noted.



LABS:

Show sodium of 136, potassium 4.8, chloride 93, BUN 34, serum creatinine 5.34.  Albumin

3.7.  Hemoglobin 8.7 g/dL.



ASSESSMENT:

1. End-stage renal disease, on hemodialysis on a Monday, Wednesday, Friday schedule

    via right internal jugular PermCath, status post dialysis today.

2. Dyspnea, possibly related to some degree of volume overload.  We will repeat a

    chest x-ray tomorrow, as the patient has had hemodialysis with about 2 L of

    ultrafiltration today.

3. Morbid obesity.

4. Type 2 diabetes.

5. History of cerebrovascular accident.

6. Chronic kidney disease mineral bone disorder.

7. Anemia of chronic disease.  No active bleeding noted.  Will maintain patient on

    Aranesp.



PLAN:

Start Aranesp.  Repeat chest x-ray tomorrow and consider an extra treatment of

hemodialysis based on the results of chest x-ray.



Thank you for this consultation.  Will continue to follow the patient with you during

her hospitalization.





MMODL / IJN: [id number] / Job#: [id number]

## 2018-07-04 NOTE — XR
EXAMINATION TYPE: XR chest 2V

 

DATE OF EXAM: 7/4/2018

 

COMPARISON: Prior chest x-ray dated 8/22/2017.

 

HISTORY: Difficulty breathing history COPD. Dialysis catheter was placed and we could go.

 

TECHNIQUE:  Frontal and lateral views of the chest are obtained.

 

FINDINGS: Interval placement of the radiopaque double lumen catheter with the tip projecting at the j
unction of the superior vena cava with the right atrium. No pneumothorax. 

 

Pulmonary vascularity appears slightly less accentuated than when compared with the prior study. Ther
e is accumulation of fluid within the major fissure as it is bilateral projection. Pulmonary vascular
ity remains accentuated although the thickness of the pulmonary vessels appear less prominent than wh
en compared with the prior exam.

 

IMPRESSION:  

No pneumothorax.

Persistent accentuation of the pulmonary vascular markings suggestive of the composition changes.

Pleural effusion as visualized on the lateral projection of the liver the major fissure.

## 2018-07-05 LAB
ALBUMIN SERPL-MCNC: 3.5 G/DL (ref 3.5–5)
ALP SERPL-CCNC: 72 U/L (ref 38–126)
ALT SERPL-CCNC: 25 U/L (ref 9–52)
ANION GAP SERPL CALC-SCNC: 14 MMOL/L
AST SERPL-CCNC: 15 U/L (ref 14–36)
BASOPHILS # BLD AUTO: 0 K/UL (ref 0–0.2)
BASOPHILS NFR BLD AUTO: 0 %
BUN SERPL-SCNC: 28 MG/DL (ref 7–17)
CALCIUM SPEC-MCNC: 9 MG/DL (ref 8.4–10.2)
CHLORIDE SERPL-SCNC: 96 MMOL/L (ref 98–107)
CHOLEST SERPL-MCNC: 134 MG/DL (ref ?–200)
CO2 SERPL-SCNC: 30 MMOL/L (ref 22–30)
EOSINOPHIL # BLD AUTO: 0.1 K/UL (ref 0–0.7)
EOSINOPHIL NFR BLD AUTO: 1 %
ERYTHROCYTE [DISTWIDTH] IN BLOOD BY AUTOMATED COUNT: 2.67 M/UL (ref 3.8–5.4)
ERYTHROCYTE [DISTWIDTH] IN BLOOD: 15.6 % (ref 11.5–15.5)
GLUCOSE BLD-MCNC: 202 MG/DL (ref 75–99)
GLUCOSE BLD-MCNC: 210 MG/DL (ref 75–99)
GLUCOSE BLD-MCNC: 233 MG/DL (ref 75–99)
GLUCOSE BLD-MCNC: 233 MG/DL (ref 75–99)
GLUCOSE SERPL-MCNC: 227 MG/DL (ref 74–99)
HCT VFR BLD AUTO: 26.9 % (ref 34–46)
HDLC SERPL-MCNC: 44 MG/DL (ref 40–60)
HGB BLD-MCNC: 8.1 GM/DL (ref 11.4–16)
LDLC SERPL CALC-MCNC: 51 MG/DL (ref 0–99)
LYMPHOCYTES # SPEC AUTO: 0.8 K/UL (ref 1–4.8)
LYMPHOCYTES NFR SPEC AUTO: 12 %
MCH RBC QN AUTO: 30.5 PG (ref 25–35)
MCHC RBC AUTO-ENTMCNC: 30.3 G/DL (ref 31–37)
MCV RBC AUTO: 100.8 FL (ref 80–100)
MONOCYTES # BLD AUTO: 0.7 K/UL (ref 0–1)
MONOCYTES NFR BLD AUTO: 10 %
NEUTROPHILS # BLD AUTO: 5 K/UL (ref 1.3–7.7)
NEUTROPHILS NFR BLD AUTO: 74 %
PLATELET # BLD AUTO: 247 K/UL (ref 150–450)
POTASSIUM SERPL-SCNC: 4.3 MMOL/L (ref 3.5–5.1)
PROT SERPL-MCNC: 5.8 G/DL (ref 6.3–8.2)
SODIUM SERPL-SCNC: 140 MMOL/L (ref 137–145)
TRIGL SERPL-MCNC: 196 MG/DL (ref ?–150)
WBC # BLD AUTO: 6.7 K/UL (ref 3.8–10.6)

## 2018-07-05 RX ADMIN — ATORVASTATIN CALCIUM SCH MG: 80 TABLET, FILM COATED ORAL at 20:05

## 2018-07-05 RX ADMIN — INSULIN ASPART SCH UNIT: 100 INJECTION, SOLUTION INTRAVENOUS; SUBCUTANEOUS at 06:55

## 2018-07-05 RX ADMIN — SENNOSIDES SCH: 8.6 TABLET, FILM COATED ORAL at 12:37

## 2018-07-05 RX ADMIN — SEVELAMER CARBONATE SCH MG: 800 TABLET, FILM COATED ORAL at 06:55

## 2018-07-05 RX ADMIN — FAMOTIDINE SCH MG: 20 TABLET, FILM COATED ORAL at 12:28

## 2018-07-05 RX ADMIN — SEVELAMER CARBONATE SCH MG: 800 TABLET, FILM COATED ORAL at 12:27

## 2018-07-05 RX ADMIN — INSULIN ASPART SCH UNIT: 100 INJECTION, SOLUTION INTRAVENOUS; SUBCUTANEOUS at 16:49

## 2018-07-05 RX ADMIN — GABAPENTIN SCH MG: 100 CAPSULE ORAL at 12:28

## 2018-07-05 RX ADMIN — LACTOBACILLUS ACIDOPHILUS / LACTOBACILLUS BULGARICUS SCH EACH: 100 MILLION CFU STRENGTH GRANULES at 12:27

## 2018-07-05 RX ADMIN — FUROSEMIDE SCH MG: 80 TABLET ORAL at 16:44

## 2018-07-05 RX ADMIN — SEVELAMER CARBONATE SCH MG: 800 TABLET, FILM COATED ORAL at 16:44

## 2018-07-05 RX ADMIN — FUROSEMIDE SCH MG: 80 TABLET ORAL at 08:03

## 2018-07-05 RX ADMIN — Medication SCH MG: at 08:03

## 2018-07-05 RX ADMIN — IPRATROPIUM BROMIDE AND ALBUTEROL SULFATE SCH ML: .5; 3 SOLUTION RESPIRATORY (INHALATION) at 08:00

## 2018-07-05 RX ADMIN — HYDROCODONE BITARTRATE AND ACETAMINOPHEN SCH EACH: 7.5; 325 TABLET ORAL at 05:22

## 2018-07-05 RX ADMIN — ENOXAPARIN SODIUM SCH MG: 40 INJECTION SUBCUTANEOUS at 08:03

## 2018-07-05 RX ADMIN — GABAPENTIN SCH MG: 100 CAPSULE ORAL at 16:44

## 2018-07-05 RX ADMIN — NYSTATIN SCH APPLIC: 100000 POWDER TOPICAL at 20:05

## 2018-07-05 RX ADMIN — IPRATROPIUM BROMIDE AND ALBUTEROL SULFATE SCH ML: .5; 3 SOLUTION RESPIRATORY (INHALATION) at 15:37

## 2018-07-05 RX ADMIN — LEVOTHYROXINE SODIUM SCH MCG: 100 TABLET ORAL at 05:23

## 2018-07-05 RX ADMIN — HYDROCODONE BITARTRATE AND ACETAMINOPHEN SCH EACH: 7.5; 325 TABLET ORAL at 21:18

## 2018-07-05 RX ADMIN — IPRATROPIUM BROMIDE AND ALBUTEROL SULFATE SCH ML: .5; 3 SOLUTION RESPIRATORY (INHALATION) at 19:24

## 2018-07-05 RX ADMIN — IPRATROPIUM BROMIDE AND ALBUTEROL SULFATE SCH: .5; 3 SOLUTION RESPIRATORY (INHALATION) at 11:36

## 2018-07-05 RX ADMIN — INSULIN ASPART SCH UNIT: 100 INJECTION, SOLUTION INTRAVENOUS; SUBCUTANEOUS at 12:35

## 2018-07-05 RX ADMIN — INSULIN DETEMIR SCH UNIT: 100 INJECTION, SOLUTION SUBCUTANEOUS at 16:49

## 2018-07-05 RX ADMIN — LATANOPROST SCH DROPS: 50 SOLUTION OPHTHALMIC at 21:17

## 2018-07-05 RX ADMIN — HYDROCODONE BITARTRATE AND ACETAMINOPHEN SCH EACH: 7.5; 325 TABLET ORAL at 12:35

## 2018-07-05 RX ADMIN — GABAPENTIN SCH MG: 100 CAPSULE ORAL at 21:18

## 2018-07-05 RX ADMIN — Medication SCH EACH: at 12:27

## 2018-07-05 NOTE — P.PN
Subjective


Progress Note Date: 07/05/18





This is a pleasant 57-year-old  female patient with a past medical 

history significant for end stage renal disease on hemodialysis as well as 

congestive heart failure secondary to diastolic dysfunction was referred from 

the dialysis center to the hospital for further evaluation of shortness of 

breath.The patient stated that she has been experiencing worsening shortness of 

breath for the last 4 days. At the dialysis center the shortness of breath was 

worse just before she started dialysis.  She did not have any symptoms of chest 

pain or chest discomfort, dizziness or dizziness, or syncope.  She did develop 

while lateral lower extremities edema mainly in the feet.  She stated that she 

was not compliant with her diet and her fluid and over the weekend she has been 

drinking excessively water because of the weather was so hot.  She stated that 

she was compliant with her dialysis and she does dialysis on regular basis.The 

EKG showed sinus rhythm without any significant ST or T-wave abnormalities.  

The chest x-ray showed findings consistent with CHF.  The BNP came in to be 

elevated.  The d-dimer also came in to be alleviated but the V/Q scan of the 

chest showed no PE.  The troponin also is slightly elevated but the patient did 

not have any symptoms of chest discomfort and the EKG did not show any ischemic 

changes.  Beside that the patient does not have any history of coronary artery 

disease according to her.The patient was seen in the hospital here in August 2017 where she underwent an echocardiogram at that point and that revealed 

normal LV function without any significant valvular abnormalities.  At the time 

of my examination this morning, patient states her breathing is overall stable.

  Blood pressure 140/70 with a heart rate in the 60s, 99% on 4 L of oxygen.  No 

lab data today.








Objective





- Vital Signs


Vital signs: 


 Vital Signs











Temp  98.4 F   07/05/18 08:00


 


Pulse  72   07/05/18 08:15


 


Resp  16   07/05/18 11:33


 


BP  140/71   07/05/18 08:00


 


Pulse Ox  99   07/05/18 04:00








 Intake & Output











 07/04/18 07/05/18 07/05/18





 18:59 06:59 18:59


 


Intake Total 720 510 0


 


Output Total  50 


 


Balance 720 460 0


 


Weight 133.81 kg 136.5 kg 136.5 kg


 


Intake:   


 


  IV  30 


 


    Invasive Line 1  30 


 


  Oral 720 480 0


 


Output:   


 


  Urine  50 


 


Other:   


 


  Voiding Method  Toilet Toilet














- Exam


PHYSICAL EXAMINATION: 





GENERAL: 57-year-old  female in no acute distress at the time of my 

examination





HEENT: Head is atraumatic, normocephalic.  Pupils equal, round.  Sclera 

anicteric. Conjunctiva are clear.  Mucous membranes of the mouth are moist.  

Neck is supple.  There is no elevated jugular venous pressure.]  bruit is heard.





HEART EXAMINATION: Heart S1, S2 normal.  No murmur or gallop heard.





CHEST EXAMINATION: Lungs are clear to auscultation with mild diminished air 

entry to the bases  No chest wall tenderness is noted on palpation or with deep 

breathing.





ABDOMEN:  Soft, obese, nontender. Bowel sounds are heard. No organomegaly noted.


 


EXTREMITIES: 2+ peripheral pulses with no evidence of peripheral edema and no 

calf tenderness noted.





NEUROLOGIC patient is awake, alert and oriented ?-3.


 


.


 











- Labs


CBC & Chem 7: 


 07/05/18 05:22





 07/05/18 05:22


Labs: 


 Abnormal Lab Results - Last 24 Hours (Table)











  07/05/18 07/05/18 07/05/18 Range/Units





  05:22 05:22 05:22 


 


RBC   2.67 L   (3.80-5.40)  m/uL


 


Hgb   8.1 L   (11.4-16.0)  gm/dL


 


Hct   26.9 L   (34.0-46.0)  %


 


MCV   100.8 H   (80.0-100.0)  fL


 


MCHC   30.3 L   (31.0-37.0)  g/dL


 


RDW   15.6 H   (11.5-15.5)  %


 


Lymphocytes #   0.8 L   (1.0-4.8)  k/uL


 


Chloride  96 L    ()  mmol/L


 


BUN  28 H    (7-17)  mg/dL


 


Creatinine  4.45 H    (0.52-1.04)  mg/dL


 


Glucose  227 H    (74-99)  mg/dL


 


POC Glucose (mg/dL)     (75-99)  mg/dL


 


Phosphorus    6.1 H  (2.5-4.5)  mg/dL


 


Total Protein  5.8 L    (6.3-8.2)  g/dL


 


Triglycerides  196 H    (<150)  mg/dL














  07/05/18 07/05/18 Range/Units





  06:39 11:46 


 


RBC    (3.80-5.40)  m/uL


 


Hgb    (11.4-16.0)  gm/dL


 


Hct    (34.0-46.0)  %


 


MCV    (80.0-100.0)  fL


 


MCHC    (31.0-37.0)  g/dL


 


RDW    (11.5-15.5)  %


 


Lymphocytes #    (1.0-4.8)  k/uL


 


Chloride    ()  mmol/L


 


BUN    (7-17)  mg/dL


 


Creatinine    (0.52-1.04)  mg/dL


 


Glucose    (74-99)  mg/dL


 


POC Glucose (mg/dL)  233 H  210 H  (75-99)  mg/dL


 


Phosphorus    (2.5-4.5)  mg/dL


 


Total Protein    (6.3-8.2)  g/dL


 


Triglycerides    (<150)  mg/dL














Assessment and Plan


Plan: 


Assessment and plan


#1 congestive heart failure secondary to diastolic dysfunction.  Acute on 

chronic


#2 end-stage renal disease on hemodialysis


#3 mildly abnormal cardiac enzymes likely secondary to renal dysfunction in the 

absence of any chest pain


#4 multiple comorbid conditions including diabetes, hypertension, dyslipidemia








Plan


From cardiology standpoint, we will recommend to continue current dose of oral 

Lasix.  We will decrease aspirin 81 mg daily, patient may be able to be 

discharged once cleared by primary.  We'll make her a follow-up appointment in 

the office post discharge.








DNP note has been reviewed, I agree with a documented findings and plan of 

care.  Patient was seen and examined.

## 2018-07-05 NOTE — XR
EXAMINATION TYPE: XR chest 1V

 

DATE OF EXAM: 7/5/2018

 

COMPARISON: 7/4/2018

 

HISTORY: 57-year-old female CHF and shortness of breath

 

TECHNIQUE: Single frontal view of the chest is obtained.

 

FINDINGS:  

Right-sided double-lumen hemodialysis IJ CVC tip at the mid to lower SVC. Heart remains enlarged. Int
erstitial densities persist. No anat consolidation or pleural effusion.

 

IMPRESSION:  

Similar cardiomegaly. Mild pulmonary vascular congestion is also stable.

## 2018-07-05 NOTE — P.PN
Subjective





Patient is seen in follow-up for end-stage renal disease.  She is maintained on 

hemodialysis on a Monday Wednesday Friday schedule.  Tolerated hemodialysis 

well yesterday.  Dyspnea is improved.  She is eager to go home.  No active 

complaints at this time.  





Vital signs are stable.


General: The patient appeared well nourished and normally developed. 


HEENT: Head exam is unremarkable. Neck is without jugular venous distension.


LUNGS: Lungs are clear to auscultation and percussion. Breath sounds decreased.


HEART: Rate and Rhythm are regular. First and second heart sounds normal. No 

murmurs, rubs or gallops. 


ABDOMEN: Abdominal exam reveals normal bowel sounds. Non-tender and non-

distended. No evidence of peritonitis.


EXTREMITITES: No clubbing, cyanosis, or edema.





Objective





- Vital Signs


Vital signs: 


 Vital Signs











Temp  98.4 F   07/05/18 08:00


 


Pulse  72   07/05/18 08:15


 


Resp  16   07/05/18 08:00


 


BP  140/71   07/05/18 08:00


 


Pulse Ox  99   07/05/18 04:00








 Intake & Output











 07/04/18 07/05/18 07/05/18





 18:59 06:59 18:59


 


Intake Total 720 510 0


 


Output Total  50 


 


Balance 720 460 0


 


Weight 133.81 kg 136.5 kg 


 


Intake:   


 


  IV  30 


 


    Invasive Line 1  30 


 


  Oral 720 480 0


 


Output:   


 


  Urine  50 


 


Other:   


 


  Voiding Method  Toilet 














- Labs


CBC & Chem 7: 


 07/05/18 05:22





 07/05/18 05:22


Labs: 


 Abnormal Lab Results - Last 24 Hours (Table)











  07/04/18 07/05/18 07/05/18 Range/Units





  08:29 05:22 05:22 


 


RBC    2.67 L  (3.80-5.40)  m/uL


 


Hgb    8.1 L  (11.4-16.0)  gm/dL


 


Hct    26.9 L  (34.0-46.0)  %


 


MCV    100.8 H  (80.0-100.0)  fL


 


MCHC    30.3 L  (31.0-37.0)  g/dL


 


RDW    15.6 H  (11.5-15.5)  %


 


Lymphocytes #    0.8 L  (1.0-4.8)  k/uL


 


D-Dimer  0.75 H    (<0.60)  mg/L FEU


 


Chloride   96 L   ()  mmol/L


 


BUN   28 H   (7-17)  mg/dL


 


Creatinine   4.45 H   (0.52-1.04)  mg/dL


 


Glucose   227 H   (74-99)  mg/dL


 


POC Glucose (mg/dL)     (75-99)  mg/dL


 


Total Protein   5.8 L   (6.3-8.2)  g/dL


 


Triglycerides   196 H   (<150)  mg/dL














  07/05/18 Range/Units





  06:39 


 


RBC   (3.80-5.40)  m/uL


 


Hgb   (11.4-16.0)  gm/dL


 


Hct   (34.0-46.0)  %


 


MCV   (80.0-100.0)  fL


 


MCHC   (31.0-37.0)  g/dL


 


RDW   (11.5-15.5)  %


 


Lymphocytes #   (1.0-4.8)  k/uL


 


D-Dimer   (<0.60)  mg/L FEU


 


Chloride   ()  mmol/L


 


BUN   (7-17)  mg/dL


 


Creatinine   (0.52-1.04)  mg/dL


 


Glucose   (74-99)  mg/dL


 


POC Glucose (mg/dL)  233 H  (75-99)  mg/dL


 


Total Protein   (6.3-8.2)  g/dL


 


Triglycerides   (<150)  mg/dL














Assessment and Plan


Plan: 





Assessment:


1.  End-stage renal disease maintained on hemodialysis on a Monday Wednesday Friday schedule.


2.  Dyspnea secondary to volume overload.  Improved postdialysis.


3.  Anemia of chronic kidney disease maintained on Aranesp.  Rule out iron 

deficiency.


4.  Chronic kidney disease mineral bone disease maintained on PhosLo.  


5.  Diastolic CHF.


6.  Hypertension with chronic kidney disease.


7.  Insulin-dependent diabetes mellitus.





Plan:


Hemodialysis tomorrow.


Continue Lasix 80 mg orally twice daily.


Check iron studies.


Check phosphorus level.

## 2018-07-05 NOTE — ECHOF
Referral Reason:chf



MEASUREMENTS

--------

HEIGHT: 162.6 cm

WEIGHT: 136.1 kg

BP: 151/63

RVIDd:   4.3 cm     (< 3.3)

IVSd:   1.4 cm     (0.6 - 1.1)

LVIDd:   5.6 cm     (3.9 - 5.3)

LVPWd:   1.6 cm     (0.6 - 1.1)

IVSs:   1.9 cm

LVIDs:   3.1 cm

LVPWs:   1.4 cm

LA Diam:   3.8 cm     (2.7 - 3.8)

Ao Diam:   3.0 cm     (2.0 - 3.7)

AV Cusp:   1.9 cm     (1.5 - 2.6)

LA Diam:   4.5 cm     (2.7 - 3.8)

MV EXCURSION:   18.547 mm     (> 18.000)

MV EF SLOPE:   93 mm/s     (70 - 150)

EPSS:   0.5 cm

MV E Kyrie:   1.30 m/s

MV DecT:   169 ms

MV A Kyrie:   1.20 m/s

MV E/A Ratio:   1.08 

RAP:   5.00 mmHg

RVSP:   27.03 mmHg







FINDINGS

--------

Sinus rhythm.

Morbid Obesity

The left ventricular size is normal.   There is moderate concentric left ventricular hypertrophy.   O
verall left ventricular systolic function is normal with, an EF between 55 - 60 %.

The right ventricle is moderately enlarged.

The left atrium is mildly dilated.

The right atrial size is normal.

5.0mg OF Lumason UTLIZED: 2 OR MORE WALL SEGMENTS NOT VISUALIZED.

The aortic valve is trileaflet, and appears structurally normal. No aortic stenosis or regurgitation.


Mild mitral annular calcification present.   Mild mitral regurgitation is present.

Mild tricuspid regurgitation present.   There is no evidence of pulmonary hypertension.   The right v
entricular systolic pressure, as measured by Doppler, is 27.03mmHg.

There is no pulmonic regurgitation present.

The aortic root size is normal.

There is no pericardial effusion.



CONCLUSIONS

--------

1. Morbid Obesity

2. The left ventricular size is normal.

3. There is moderate concentric left ventricular hypertrophy.

4. The right ventricle is moderately enlarged.

5. The left atrium is mildly dilated.

6. The right atrial size is normal.

7. 5.0mg OF Lumason UTLIZED: 2 OR MORE WALL SEGMENTS NOT VISUALIZED.

8. The aortic valve is trileaflet, and appears structurally normal. No aortic stenosis or regurgitati
on.

9. Mild mitral annular calcification present.

10. Mild mitral regurgitation is present.

11. Mild tricuspid regurgitation present.

12. There is no evidence of pulmonary hypertension.

13. The right ventricular systolic pressure, as measured by Doppler, is 27.03mmHg.

14. There is no pulmonic regurgitation present.

15. The aortic root size is normal.

16. There is no pericardial effusion.





SONOGRAPHER: Lillie Garrison RDCS

## 2018-07-05 NOTE — P.PN
Subjective


Progress Note Date: 07/05/18


Bree Poon is a 57-year-old female who presented to [facility] emergency room with a chief complaint of worsening shortness of breath

, patient has a known history of end-stage renal disease on hemodialysis for 

the last 1 year, she went to dialysis Center today however due to shortness of 

breath she was referred to emergency room.  Patient denies any chest pain, 

denies any fever or chills, patient stated that she has been using her left arm 

fistula for dialysis until recently when it stopped working, currently she is 

using her right subclavian catheter.


In the emergency room patient was evaluated by Dr. Ramey, vitals were stable, 

troponin level was slightly elevated and d-dimer was slightly elevated 

creatinine was up at 5.38 chest x-ray revealed evidence of pleural effusion 

patient was admitted to telemetry floor, cardiology and pulmonary consultation 

were requested , nephrology consultation was requested and patient will have 

dialysis today . VQ scan was ordered


Patient stated that 1 year ago she had a stroke and was in a coma for a 

prolonged period of time at that time she had renal failure and resulted in end-

stage renal disease and has been on dialysis since then.


Patient has a known history of insulin-dependent diabetes mellitus, she also 

has known history of congestive heart failure, history of COPD, history of 

hypertension, and history of hyperlipidemia





07/04//2018 Patient currently resting comfortably in bed.  Patient received 

dialysis yesterday.  May require dialysis again today per Dr. Esquivel depending 

on chest x-ray.  VQ scan completed yesterday showing low probability for 

pulmonary embolism.  2-D echo has been ordered per cardiology services.  

Patient denies chest pain or shortness of breath at this time.  Patient states 

she sees Dr. NATHALY Jenkins for pulmonary services.  Dr. Jenkins has been consulted








Objective





- Vital Signs


Vital signs: 


 Vital Signs











Temp  98.4 F   07/05/18 08:00


 


Pulse  72   07/05/18 08:15


 


Resp  16   07/05/18 08:00


 


BP  140/71   07/05/18 08:00


 


Pulse Ox  99   07/05/18 04:00








 Intake & Output











 07/04/18 07/05/18 07/05/18





 18:59 06:59 18:59


 


Intake Total 720 510 0


 


Output Total  50 


 


Balance 720 460 0


 


Weight 133.81 kg 136.5 kg 


 


Intake:   


 


  IV  30 


 


    Invasive Line 1  30 


 


  Oral 720 480 0


 


Output:   


 


  Urine  50 


 


Other:   


 


  Voiding Method  Toilet 














- Exam


Head normocephalic


Neck supple


Lungs clear to auscultation bilaterally no wheezing or crackles


Heart regular rate and rhythm S1-S2, no rub or gallop


Abdomen is soft nontender nondistended positive bowel sounds no 

hepatosplenomegaly


Extremities no edema


Neuro alert and orientated to 3








- Labs


CBC & Chem 7: 


 07/05/18 05:22





 07/05/18 05:22


Labs: 


 Abnormal Lab Results - Last 24 Hours (Table)











  07/04/18 07/05/18 07/05/18 Range/Units





  08:29 05:22 05:22 


 


RBC    2.67 L  (3.80-5.40)  m/uL


 


Hgb    8.1 L  (11.4-16.0)  gm/dL


 


Hct    26.9 L  (34.0-46.0)  %


 


MCV    100.8 H  (80.0-100.0)  fL


 


MCHC    30.3 L  (31.0-37.0)  g/dL


 


RDW    15.6 H  (11.5-15.5)  %


 


Lymphocytes #    0.8 L  (1.0-4.8)  k/uL


 


D-Dimer  0.75 H    (<0.60)  mg/L FEU


 


Chloride   96 L   ()  mmol/L


 


BUN   28 H   (7-17)  mg/dL


 


Creatinine   4.45 H   (0.52-1.04)  mg/dL


 


Glucose   227 H   (74-99)  mg/dL


 


POC Glucose (mg/dL)     (75-99)  mg/dL


 


Total Protein   5.8 L   (6.3-8.2)  g/dL


 


Triglycerides   196 H   (<150)  mg/dL














  07/05/18 Range/Units





  06:39 


 


RBC   (3.80-5.40)  m/uL


 


Hgb   (11.4-16.0)  gm/dL


 


Hct   (34.0-46.0)  %


 


MCV   (80.0-100.0)  fL


 


MCHC   (31.0-37.0)  g/dL


 


RDW   (11.5-15.5)  %


 


Lymphocytes #   (1.0-4.8)  k/uL


 


D-Dimer   (<0.60)  mg/L FEU


 


Chloride   ()  mmol/L


 


BUN   (7-17)  mg/dL


 


Creatinine   (0.52-1.04)  mg/dL


 


Glucose   (74-99)  mg/dL


 


POC Glucose (mg/dL)  233 H  (75-99)  mg/dL


 


Total Protein   (6.3-8.2)  g/dL


 


Triglycerides   (<150)  mg/dL














Assessment and Plan


Assessment: 


#1 shortness of breath multifactorial most likely related to acute congestive 

heart failure exacerbation was fluid overload, with evidence of pleural effusion

, and underlying history of COPD, currently patient is scheduled for dialysis 

today, and will be evaluated by pulmonary in regard to COPD and pleural 

effusion.  Patient sees Dr. NATHALY Jenkins outpatient. Dr. Jenkins has been consulted.





#2 end-stage renal disease.   Patient receives dialysis via right  internal 

jugular Perm Cath. Patient received dialysis yesterday getting 2 L off.  Per 

Dr. Esquivel patient may require dialysis again today depending upon chest x-ray 

is.  Normal dialysis schedule Monday Wednesday Friday.  Creatinine level 4.45.





#3 slight elevation in troponin level without any history of chest pain, 

cardiology consultation was requested, doubt acute coronary syndrome.  2-D echo 

has been ordered.  Per cardiology patient will benefit from a stress test 

outpatient.  She has been started the medication Aranesp per Dr. Esquivel





#4 slight elevation in d-dimer, VQ scan completed showing low probability for 

pulmonary embolism.





#5 insulin-dependent diabetes mellitus will resume home insulin





#6 underlying history of hypertension





#7 underlying history of hyperlipidemia











Add subcu Lovenox for DVT prophylaxis, add Pepcid for GI prophylaxis





I performed an examination of the patient and discussed their management with 

the Nurse Practitioner.  I have reviewed the Nurse Practitioner's notes and 

agree with the documented findings and plan of care

## 2018-07-05 NOTE — P.CNPUL
<Jennifer Pham E - Last Filed: 18 09:50>





History of Present Illness


Consult date: 18


Requesting physician: Deanne Cuadra


Reason for consult: COPD


Chief complaint: shortness of breath


History of present illness: 





This is a 57-year-old female patient is well-known to our services being seen 

examined and evaluated today for consultation.  This patient came into the 

emergency room with worsening shortness of breath had been ongoing over the 

last few days.  She has a known history of end-stage renal disease and is on 

hemodialysis for the past year.  Upon workup in the emergency room she was 

noted to have an elevated troponin level 0.041 as well as an elevated d-dimer 

of 0.75 in the BNP of 3050.  Chest x-ray was consistent with fluid overload.  V/

Q scan showed low probability for pulmonary embolism.  Patient's BUN is 28 and 

her creatinine is 4.45 today.  Nephrology is on consult.  She normally has 

dialysis on .  She did receive dialysis yesterday.  Upon 

examination she is resting up in bed on 4 L of supplemental oxygen which is 

what she wears at home as well.  She states she's feeling much better than 

yesterday.  She also did wear her BiPAP overnight which she wears every night 

at home as well for her obstructive sleep apnea.





Review of Systems





14 point review of systems was completed and is negative unless noted above in 

the HPI





Past Medical History


Past Medical History: Asthma, Heart Failure, COPD, CVA/TIA, Diabetes Mellitus, 

Eye Disorder, Hyperlipidemia, Hypertension, Osteoarthritis (OA), Pneumonia, 

Renal Disease, Sleep Apnea/CPAP/BIPAP, Thyroid Disorder


Additional Past Medical History / Comment(s): sleep apnea, neuropathy, patient 

has one kidney, Stage III kidney failure


History of Any Multi-Drug Resistant Organisms: None Reported


Past Surgical History: Bariatric Surgery, Hernia Repair, Orthopedic Surgery, 

Tonsillectomy


Additional Past Surgical History / Comment(s): rt kidney removed, Rt knee 

replacement


Past Anesthesia/Blood Transfusion Reactions: No Reported Reaction


Additional Past Anesthesia/Blood Transfusion Reaction / Comment(s): pt states 

she [facility], "it made her itchy and they 

gave bendyl"


Smoking Status: Former smoker





- Past Family History


  ** Mother


Additional Family Medical History / Comment(s): skin CA, CHF, DM, thyroid 

disorder, HTN.





  ** Father


Family Medical History: Diabetes Mellitus, Hypertension, Thyroid Disorder


Additional Family Medical History / Comment(s): CHF.  Pt's father is .





Medications and Allergies


 Home Medications











 Medication  Instructions  Recorded  Confirmed  Type


 


Atorvastatin [Lipitor] 80 mg PO HS 09/13/15 07/04/18 History


 


Gabapentin [Neurontin] 200 mg PO TID 09/13/15 07/04/18 History


 


Levothyroxine Sodium [Synthroid] 350 mcg PO DAILY 09/13/15 07/04/18 History


 


Acetaminophen Tab [Tylenol] 650 mg PO Q4H PRN 17 History


 


Famotidine [Pepcid] 20 mg PO BID 17 History


 


Folic Acid-Vit B Complex-Vit C 1 cap PO DAILY 17 History





[Nephrocaps]    


 


HYDROcodone/APAP 7.5-325MG [Norco 1 tab PO TID PRN 17 History





7.5-325]    


 


Latanoprost [Xalatan 0.005%] 1 drop BOTH EYES HS 17 History


 


Omalizumab [Xolair] 150 mg SQ Q30D 17 History


 


Aspirin 325 mg PO DAILY  tab 17 Rx


 


Furosemide [Lasix] 80 mg PO BID #1 tablet 17 Rx


 


diphenhydrAMINE [Benadryl] 25 mg PO Q6HR PRN  cap 17 Rx


 


Clopidogrel [Plavix] 75 mg PO DAILY 18 History


 


Insulin Regular, Human [NovoLIN R] See Protocol SQ AC-TID 18 

History


 


Insuln Asp Prt/Insulin Aspart 75 unit SQ BID 18 History





[NovoLOG MIX 70-30 VIAL]    


 


Ipratropium-Albuterol Nebulize 3 ml INHALATION RT-QID PRN 18 

History





[Duoneb 0.5 mg-3 mg/3 ml Soln]    


 


Sevelamer Carbonate 1 tab PO AC-TID 18 History











 Allergies











Allergy/AdvReac Type Severity Reaction Status Date / Time


 


erythromycin base Allergy  Unknown Verified 18 10:18





[Erythromycin Base]     


 


NSAIDS (Non-Steroidal Allergy  Unknown Verified 18 10:18





Anti-Inflamma     


 


PAPER TAPE Allergy  Rash/Hives Uncoded 18 10:18














Physical Exam


Vitals: 


 Vital Signs











  Temp Pulse Pulse Resp BP BP Pulse Ox


 


 18 08:15   72     


 


 18 08:00  98.4 F  76  68  16   140/71 


 


 18 04:00  97.7 F   70  17   151/63  99


 


 18 00:00  97.5 F L   87  17   143/73  95


 


 18 20:58   68     


 


 18 20:47   64     


 


 18 20:00  97.8 F   90  16   131/64  97


 


 18 16:46   76     


 


 18 16:35   72     


 


 18 15:55    77  16   


 


 18 11:25  97.1 F L   77  16   142/68  98


 


 18 10:26  98 F  72   18  139/64   98








 Intake and Output











 18





 22:59 06:59 14:59


 


Intake Total 370 500 0


 


Output Total  50 


 


Balance 370 450 0


 


Intake:   


 


  IV 10 20 


 


    Invasive Line 1 10 20 


 


  Oral 360 480 0


 


Output:   


 


  Urine  50 


 


Other:   


 


  Voiding Method Toilet Toilet 


 


  Weight  136.5 kg 














GENERAL EXAM: Alert, morbidly obese, comfortable in no apparent distress.


HEAD: Normocephalic.


EYES: Normal reaction of pupils, equal size.


NOSE: Clear with pink turbinates.


THROAT: No erythema or exudates.


NECK: No masses, no JVD.


CHEST: No chest wall deformity.


LUNGS: Equal air entry with few scattered crackles.  Basis diminished


CVS: S1 and S2 normal with no audible mumurs, regular rhythm.


ABDOMEN: No hepatosplenomegaly, normal bowel sounds, no guarding or rigidity.


EXTREMITIES: +1 edema noted, pedal pulses palpable.


CENTRAL NERVOUS SYSTEM: No focal deficits, tone is normal in all 4 extremities.





Results





- Laboratory Findings


CBC and BMP: 


 18 05:22





 18 05:22


PT/INR, D-dimer











PT  10.1 sec (9.0-12.0)   18  08:29    


 


INR  1.0  (<1.2)   18  08:29    


 


D-Dimer  0.75 mg/L FEU (<0.60)  H  18  08:29    








Abnormal lab findings: 


 Abnormal Labs











  18





  08:29 08:29 08:29


 


RBC   2.81 L 


 


Hgb   8.7 L 


 


Hct   28.1 L 


 


MCV   


 


MCHC   30.9 L 


 


RDW   15.7 H 


 


Lymphocytes #   0.8 L 


 


D-Dimer   


 


VBG pCO2   


 


VBG HCO3   


 


Sodium    136 L


 


Chloride    93 L


 


BUN    34 H


 


Creatinine    5.34 H*


 


Glucose    168 H


 


POC Glucose (mg/dL)   


 


Troponin I  0.041 H*  


 


Total Protein    6.0 L


 


Triglycerides   














  18





  08:29 08:29 05:22


 


RBC   


 


Hgb   


 


Hct   


 


MCV   


 


MCHC   


 


RDW   


 


Lymphocytes #   


 


D-Dimer  0.75 H  


 


VBG pCO2   52 H 


 


VBG HCO3   30 H 


 


Sodium   


 


Chloride    96 L


 


BUN    28 H


 


Creatinine    4.45 H


 


Glucose    227 H


 


POC Glucose (mg/dL)   


 


Troponin I   


 


Total Protein    5.8 L


 


Triglycerides    196 H














  18





  05:22 06:39


 


RBC  2.67 L 


 


Hgb  8.1 L 


 


Hct  26.9 L 


 


MCV  100.8 H 


 


MCHC  30.3 L 


 


RDW  15.6 H 


 


Lymphocytes #  0.8 L 


 


D-Dimer  


 


VBG pCO2  


 


VBG HCO3  


 


Sodium  


 


Chloride  


 


BUN  


 


Creatinine  


 


Glucose  


 


POC Glucose (mg/dL)   233 H


 


Troponin I  


 


Total Protein  


 


Triglycerides  














- Diagnostic Findings


Chest x-ray: report reviewed, image reviewed





Assessment and Plan


Assessment: 





Assessment





Acute on chronic hypoxic respiratory failure requiring supplemental oxygen


Acute exacerbation of CHF related to fluid overload


Acute exacerbation of COPD


Morbidly obese


Obstructive sleep apnea


Chronic end-stage kidney disease on dialysis


Eosinophilia


Insulin-dependent diabetes mellitus








Plan





Medications have been reviewed and will be continued as ordered. 


Continue with pulmonary hygiene, coughing and deep breathing exercises, and 

supportive care. 


Supplemental oxygen to maintain oxygen saturations of 92% or better. 


Continue nebulizer treatments. 


BiPAP from home use every night and with naps


Cardiology and nephrology on consult


Patient will undergo hemodialysis again today


Eosinophils 5 on admission will evaluate outpatient for possible biologic agent


GI and DVT prophylaxis. 


We will continue to monitor labs/results and adjust treatment as necessary. 


Further recommendations pending.





I performed an examination of the patient and discussed their management with 

the nurse practitioner. I have reviewed the nurse practitioner's note and agree 

with the documented findings and plan of care.





<Crista Nogueira - Last Filed: 18 15:20>





Physical Exam


Osteopathic Statement: *.  No significant issues noted on an osteopathic 

structural exam other than those noted in the History and Physical/Consult.


Vitals: 


 Vital Signs











  Temp Pulse Pulse Resp BP Pulse Ox


 


 18 12:00    48 L  16  149/65  96


 


 18 11:33     16  


 


 18 08:15   72    


 


 18 08:00  98.4 F  76  68  16  140/71 


 


 18 04:00  97.7 F   70  17  151/63  99


 


 18 00:00  97.5 F L   87  17  143/73  95


 


 18 20:58   68    


 


 18 20:47   64    


 


 18 20:00  97.8 F   90  16  131/64  97


 


 18 16:46   76    


 


 18 16:35   72    


 


 18 15:55    77  16  








 Intake and Output











 18





 06:59 14:59 22:59


 


Intake Total 500 118 


 


Output Total 50 0 


 


Balance 450 118 


 


Intake:   


 


  IV 20  


 


    Invasive Line 1 20  


 


  Oral 480 118 


 


Output:   


 


  Urine 50 0 


 


Other:   


 


  Voiding Method Toilet Toilet 


 


  Weight 136.5 kg 136.5 kg 














Results





- Laboratory Findings


CBC and BMP: 


 18 05:22





 18 05:22


PT/INR, D-dimer











PT  10.1 sec (9.0-12.0)   18  08:29    


 


INR  1.0  (<1.2)   18  08:29    


 


D-Dimer  0.75 mg/L FEU (<0.60)  H  18  08:29    








Abnormal lab findings: 


 Abnormal Labs











  18





  08:29 08:29 08:29


 


RBC   2.81 L 


 


Hgb   8.7 L 


 


Hct   28.1 L 


 


MCV   


 


MCHC   30.9 L 


 


RDW   15.7 H 


 


Lymphocytes #   0.8 L 


 


D-Dimer   


 


VBG pCO2   


 


VBG HCO3   


 


Sodium    136 L


 


Chloride    93 L


 


BUN    34 H


 


Creatinine    5.34 H*


 


Glucose    168 H


 


POC Glucose (mg/dL)   


 


Phosphorus   


 


Troponin I  0.041 H*  


 


Total Protein    6.0 L


 


Triglycerides   














  18





  08:29 08:29 05:22


 


RBC   


 


Hgb   


 


Hct   


 


MCV   


 


MCHC   


 


RDW   


 


Lymphocytes #   


 


D-Dimer  0.75 H  


 


VBG pCO2   52 H 


 


VBG HCO3   30 H 


 


Sodium   


 


Chloride    96 L


 


BUN    28 H


 


Creatinine    4.45 H


 


Glucose    227 H


 


POC Glucose (mg/dL)   


 


Phosphorus   


 


Troponin I   


 


Total Protein    5.8 L


 


Triglycerides    196 H














  18





  05:22 05:22 06:39


 


RBC  2.67 L  


 


Hgb  8.1 L  


 


Hct  26.9 L  


 


MCV  100.8 H  


 


MCHC  30.3 L  


 


RDW  15.6 H  


 


Lymphocytes #  0.8 L  


 


D-Dimer   


 


VBG pCO2   


 


VBG HCO3   


 


Sodium   


 


Chloride   


 


BUN   


 


Creatinine   


 


Glucose   


 


POC Glucose (mg/dL)    233 H


 


Phosphorus   6.1 H 


 


Troponin I   


 


Total Protein   


 


Triglycerides   














  18





  11:46


 


RBC 


 


Hgb 


 


Hct 


 


MCV 


 


MCHC 


 


RDW 


 


Lymphocytes # 


 


D-Dimer 


 


VBG pCO2 


 


VBG HCO3 


 


Sodium 


 


Chloride 


 


BUN 


 


Creatinine 


 


Glucose 


 


POC Glucose (mg/dL)  210 H


 


Phosphorus 


 


Troponin I 


 


Total Protein 


 


Triglycerides 














Assessment and Plan


Assessment: 





Patient seen and examined.  Patient states she is feeling much better.  She is 

currently wearing her bipap for a nap.  Her breathing is back to baseline.  She 

has not had fevers or chills.  She is hoping to go home soon.  Follow up in 

office in 1-2 days after discharge.





~rCista Nogueira DO

## 2018-07-06 LAB
ALBUMIN SERPL-MCNC: 3.4 G/DL (ref 3.5–5)
ALP SERPL-CCNC: 61 U/L (ref 38–126)
ALT SERPL-CCNC: 23 U/L (ref 9–52)
ANION GAP SERPL CALC-SCNC: 15 MMOL/L
AST SERPL-CCNC: 14 U/L (ref 14–36)
BASOPHILS # BLD AUTO: 0.1 K/UL (ref 0–0.2)
BASOPHILS NFR BLD AUTO: 1 %
BUN SERPL-SCNC: 46 MG/DL (ref 7–17)
CALCIUM SPEC-MCNC: 9 MG/DL (ref 8.4–10.2)
CHLORIDE SERPL-SCNC: 97 MMOL/L (ref 98–107)
CO2 SERPL-SCNC: 27 MMOL/L (ref 22–30)
EOSINOPHIL # BLD AUTO: 0.2 K/UL (ref 0–0.7)
EOSINOPHIL NFR BLD AUTO: 3 %
ERYTHROCYTE [DISTWIDTH] IN BLOOD BY AUTOMATED COUNT: 2.57 M/UL (ref 3.8–5.4)
ERYTHROCYTE [DISTWIDTH] IN BLOOD: 15.9 % (ref 11.5–15.5)
GLUCOSE BLD-MCNC: 141 MG/DL (ref 75–99)
GLUCOSE BLD-MCNC: 148 MG/DL (ref 75–99)
GLUCOSE BLD-MCNC: 197 MG/DL (ref 75–99)
GLUCOSE BLD-MCNC: 393 MG/DL (ref 75–99)
GLUCOSE SERPL-MCNC: 137 MG/DL (ref 74–99)
HCT VFR BLD AUTO: 26.5 % (ref 34–46)
HGB BLD-MCNC: 8 GM/DL (ref 11.4–16)
LYMPHOCYTES # SPEC AUTO: 1 K/UL (ref 1–4.8)
LYMPHOCYTES NFR SPEC AUTO: 16 %
MCH RBC QN AUTO: 31.3 PG (ref 25–35)
MCHC RBC AUTO-ENTMCNC: 30.3 G/DL (ref 31–37)
MCV RBC AUTO: 103.3 FL (ref 80–100)
MONOCYTES # BLD AUTO: 0.5 K/UL (ref 0–1)
MONOCYTES NFR BLD AUTO: 8 %
NEUTROPHILS # BLD AUTO: 4.4 K/UL (ref 1.3–7.7)
NEUTROPHILS NFR BLD AUTO: 71 %
PLATELET # BLD AUTO: 292 K/UL (ref 150–450)
POTASSIUM SERPL-SCNC: 5 MMOL/L (ref 3.5–5.1)
PROT SERPL-MCNC: 5.6 G/DL (ref 6.3–8.2)
SODIUM SERPL-SCNC: 139 MMOL/L (ref 137–145)
WBC # BLD AUTO: 6.3 K/UL (ref 3.8–10.6)

## 2018-07-06 RX ADMIN — NYSTATIN SCH APPLIC: 100000 POWDER TOPICAL at 19:37

## 2018-07-06 RX ADMIN — SEVELAMER CARBONATE SCH MG: 800 TABLET, FILM COATED ORAL at 07:13

## 2018-07-06 RX ADMIN — SEVELAMER CARBONATE SCH MG: 800 TABLET, FILM COATED ORAL at 17:49

## 2018-07-06 RX ADMIN — SENNOSIDES SCH MG: 8.6 TABLET, FILM COATED ORAL at 15:45

## 2018-07-06 RX ADMIN — IPRATROPIUM BROMIDE AND ALBUTEROL SULFATE SCH: .5; 3 SOLUTION RESPIRATORY (INHALATION) at 12:11

## 2018-07-06 RX ADMIN — INSULIN ASPART SCH: 100 INJECTION, SOLUTION INTRAVENOUS; SUBCUTANEOUS at 11:51

## 2018-07-06 RX ADMIN — ASPIRIN 81 MG CHEWABLE TABLET SCH MG: 81 TABLET CHEWABLE at 08:20

## 2018-07-06 RX ADMIN — NYSTATIN SCH APPLIC: 100000 POWDER TOPICAL at 08:20

## 2018-07-06 RX ADMIN — METHYLPREDNISOLONE SODIUM SUCCINATE SCH MG: 125 INJECTION, POWDER, FOR SOLUTION INTRAMUSCULAR; INTRAVENOUS at 23:06

## 2018-07-06 RX ADMIN — INSULIN DETEMIR SCH UNIT: 100 INJECTION, SOLUTION SUBCUTANEOUS at 17:49

## 2018-07-06 RX ADMIN — Medication SCH MG: at 15:43

## 2018-07-06 RX ADMIN — FAMOTIDINE SCH MG: 20 TABLET, FILM COATED ORAL at 15:43

## 2018-07-06 RX ADMIN — METHYLPREDNISOLONE SODIUM SUCCINATE SCH: 125 INJECTION, POWDER, FOR SOLUTION INTRAMUSCULAR; INTRAVENOUS at 17:42

## 2018-07-06 RX ADMIN — GABAPENTIN SCH: 100 CAPSULE ORAL at 17:42

## 2018-07-06 RX ADMIN — Medication SCH EACH: at 15:45

## 2018-07-06 RX ADMIN — LEVOTHYROXINE SODIUM SCH MCG: 100 TABLET ORAL at 04:16

## 2018-07-06 RX ADMIN — LATANOPROST SCH DROPS: 50 SOLUTION OPHTHALMIC at 21:06

## 2018-07-06 RX ADMIN — IPRATROPIUM BROMIDE AND ALBUTEROL SULFATE SCH ML: .5; 3 SOLUTION RESPIRATORY (INHALATION) at 20:08

## 2018-07-06 RX ADMIN — IPRATROPIUM BROMIDE AND ALBUTEROL SULFATE SCH ML: .5; 3 SOLUTION RESPIRATORY (INHALATION) at 15:37

## 2018-07-06 RX ADMIN — ENOXAPARIN SODIUM SCH MG: 30 INJECTION SUBCUTANEOUS at 08:20

## 2018-07-06 RX ADMIN — SEVELAMER CARBONATE SCH: 800 TABLET, FILM COATED ORAL at 15:45

## 2018-07-06 RX ADMIN — ATORVASTATIN CALCIUM SCH MG: 80 TABLET, FILM COATED ORAL at 19:37

## 2018-07-06 RX ADMIN — INSULIN ASPART SCH UNIT: 100 INJECTION, SOLUTION INTRAVENOUS; SUBCUTANEOUS at 17:49

## 2018-07-06 RX ADMIN — IPRATROPIUM BROMIDE AND ALBUTEROL SULFATE SCH ML: .5; 3 SOLUTION RESPIRATORY (INHALATION) at 08:12

## 2018-07-06 RX ADMIN — FUROSEMIDE SCH MG: 80 TABLET ORAL at 15:44

## 2018-07-06 RX ADMIN — HYDROCODONE BITARTRATE AND ACETAMINOPHEN SCH EACH: 7.5; 325 TABLET ORAL at 15:49

## 2018-07-06 RX ADMIN — HYDROCODONE BITARTRATE AND ACETAMINOPHEN SCH EACH: 7.5; 325 TABLET ORAL at 04:16

## 2018-07-06 RX ADMIN — LACTOBACILLUS ACIDOPHILUS / LACTOBACILLUS BULGARICUS SCH: 100 MILLION CFU STRENGTH GRANULES at 15:45

## 2018-07-06 RX ADMIN — INSULIN ASPART SCH UNIT: 100 INJECTION, SOLUTION INTRAVENOUS; SUBCUTANEOUS at 07:13

## 2018-07-06 RX ADMIN — GABAPENTIN SCH MG: 100 CAPSULE ORAL at 21:07

## 2018-07-06 RX ADMIN — FUROSEMIDE SCH MG: 80 TABLET ORAL at 08:20

## 2018-07-06 RX ADMIN — HYDROCODONE BITARTRATE AND ACETAMINOPHEN SCH EACH: 7.5; 325 TABLET ORAL at 21:07

## 2018-07-06 RX ADMIN — METHYLPREDNISOLONE SODIUM SUCCINATE SCH MG: 125 INJECTION, POWDER, FOR SOLUTION INTRAMUSCULAR; INTRAVENOUS at 15:50

## 2018-07-06 RX ADMIN — GABAPENTIN SCH MG: 100 CAPSULE ORAL at 15:43

## 2018-07-06 NOTE — XR
EXAMINATION TYPE: XR chest 1V portable

 

DATE OF EXAM: 7/6/2018

 

COMPARISON: Prior chest 7/5/2018

 

HISTORY: Pneumonia, abnormal chest x-ray

 

TECHNIQUE: Single frontal view of the chest is obtained.

 

FINDINGS:  Right jugular central venous catheter is stable. The heart remains enlarged. No evident pn
eumothorax or pleural effusion. There are overlying cardiac leads. There may be some improvement in t
he prominence of central pulmonary vascularity.

 

IMPRESSION:  Suspect some improvement in patient's volume status.

## 2018-07-06 NOTE — P.PN
Subjective


Progress Note Date: 07/06/18


Bree Poon is a 57-year-old female who presented to [facility] emergency room with a chief complaint of worsening shortness of breath

, patient has a known history of end-stage renal disease on hemodialysis for 

the last 1 year, she went to dialysis Center today however due to shortness of 

breath she was referred to emergency room.  Patient denies any chest pain, 

denies any fever or chills, patient stated that she has been using her left arm 

fistula for dialysis until recently when it stopped working, currently she is 

using her right subclavian catheter.


In the emergency room patient was evaluated by Dr. Ramey, vitals were stable, 

troponin level was slightly elevated and d-dimer was slightly elevated 

creatinine was up at 5.38 chest x-ray revealed evidence of pleural effusion 

patient was admitted to telemetry floor, cardiology and pulmonary consultation 

were requested , nephrology consultation was requested and patient will have 

dialysis today . VQ scan was ordered


Patient stated that 1 year ago she had a stroke and was in a coma for a 

prolonged period of time at that time she had renal failure and resulted in end-

stage renal disease and has been on dialysis since then.


Patient has a known history of insulin-dependent diabetes mellitus, she also 

has known history of congestive heart failure, history of COPD, history of 

hypertension, and history of hyperlipidemia





07/05/2018 Patient currently resting comfortably in bed.  Patient received 

dialysis yesterday.  May require dialysis again today per Dr. Esquivel depending 

on chest x-ray.  VQ scan completed yesterday showing low probability for 

pulmonary embolism.  2-D echo has been ordered per cardiology services.  

Patient denies chest pain or shortness of breath at this time.  Patient states 

she sees Dr. NATHALY Jenkins for pulmonary services.  Dr. Jenkins has been consulted





On 07/06/2018 patient is resting comfortably in bed but does complain of some 

shortness of breath at this time.  Per nephrology patient is to receive 

dialysis again.  2-D echo completed showing EF 55-60% plans to follow 

outpatient per cardiology.  Patient has been cleared for discharge from 

pulmonary standpoint.  We'll plan to see how patient does after dialysis in 

regards to shortness of breath and possible discharge later today or tomorrow.








Objective





- Vital Signs


Vital signs: 


 Vital Signs











Temp  98.0 F   07/06/18 04:00


 


Pulse  64   07/06/18 12:00


 


Resp  17   07/06/18 12:00


 


BP  155/69   07/06/18 12:00


 


Pulse Ox  98   07/06/18 12:00








 Intake & Output











 07/05/18 07/06/18 07/06/18





 18:59 06:59 18:59


 


Intake Total 298 200 0


 


Output Total 0  


 


Balance 298 200 0


 


Weight 136.5 kg 137 kg 


 


Intake:   


 


  Oral 298 200 0


 


Output:   


 


  Urine 0  


 


Other:   


 


  Voiding Method Toilet Toilet 


 


  # Voids  1 














- Exam


Head normocephalic


Neck supple


Lungs expiratory wheezing bilaterally


Heart regular rate and rhythm S1-S2, no rub or gallop


Abdomen is soft nontender nondistended positive bowel sounds no 

hepatosplenomegaly


Extremities no edema


Neuro alert and orientated to 3








- Labs


CBC & Chem 7: 


 07/06/18 05:14





 07/06/18 05:14


Labs: 


 Abnormal Lab Results - Last 24 Hours (Table)











  07/05/18 07/05/18 07/05/18 Range/Units





  05:22 16:38 21:05 


 


RBC     (3.80-5.40)  m/uL


 


Hgb     (11.4-16.0)  gm/dL


 


Hct     (34.0-46.0)  %


 


MCV     (80.0-100.0)  fL


 


MCHC     (31.0-37.0)  g/dL


 


RDW     (11.5-15.5)  %


 


Chloride     ()  mmol/L


 


BUN     (7-17)  mg/dL


 


Creatinine     (0.52-1.04)  mg/dL


 


Glucose     (74-99)  mg/dL


 


POC Glucose (mg/dL)   233 H  202 H  (75-99)  mg/dL


 


Ferritin  1152.0 H    (10.0-291.0)  ng/mL


 


Total Protein     (6.3-8.2)  g/dL


 


Albumin     (3.5-5.0)  g/dL














  07/06/18 07/06/18 07/06/18 Range/Units





  05:14 05:14 05:57 


 


RBC  2.57 L    (3.80-5.40)  m/uL


 


Hgb  8.0 L    (11.4-16.0)  gm/dL


 


Hct  26.5 L    (34.0-46.0)  %


 


MCV  103.3 H    (80.0-100.0)  fL


 


MCHC  30.3 L    (31.0-37.0)  g/dL


 


RDW  15.9 H    (11.5-15.5)  %


 


Chloride   97 L   ()  mmol/L


 


BUN   46 H   (7-17)  mg/dL


 


Creatinine   6.05 H*   (0.52-1.04)  mg/dL


 


Glucose   137 H   (74-99)  mg/dL


 


POC Glucose (mg/dL)    148 H  (75-99)  mg/dL


 


Ferritin     (10.0-291.0)  ng/mL


 


Total Protein   5.6 L   (6.3-8.2)  g/dL


 


Albumin   3.4 L   (3.5-5.0)  g/dL














  07/06/18 Range/Units





  11:32 


 


RBC   (3.80-5.40)  m/uL


 


Hgb   (11.4-16.0)  gm/dL


 


Hct   (34.0-46.0)  %


 


MCV   (80.0-100.0)  fL


 


MCHC   (31.0-37.0)  g/dL


 


RDW   (11.5-15.5)  %


 


Chloride   ()  mmol/L


 


BUN   (7-17)  mg/dL


 


Creatinine   (0.52-1.04)  mg/dL


 


Glucose   (74-99)  mg/dL


 


POC Glucose (mg/dL)  141 H  (75-99)  mg/dL


 


Ferritin   (10.0-291.0)  ng/mL


 


Total Protein   (6.3-8.2)  g/dL


 


Albumin   (3.5-5.0)  g/dL














Assessment and Plan


Assessment: 


#1 shortness of breath multifactorial most likely related to acute congestive 

heart failure exacerbation was fluid overload, with evidence of pleural effusion

, and underlying history of COPD, currently patient is scheduled for dialysis 

today, and will be evaluated by pulmonary in regard to COPD and pleural 

effusion.  Patient sees Dr. NATHALY Jenkins outpatient. Dr. Jenkins has been consulted.

  Dr. Nogueira covering for Dr. NATHALY Jenkins.  She is currently back on 4 L nasal 

cannula which she wears at home.  Patient has been cleared for discharge from 

pulmonary standpoint





#2 end-stage renal disease.   Patient receives dialysis via right  internal 

jugular Perm Cath. Patient received dialysis 7/4 getting 2 L off.   She has 

been started the medication Aranesp per Dr. Esquivel.  Normal dialysis schedule 

Monday Wednesday Friday.  Patient currently receiving dialysis today and 

potential discharge per nephrology later today and resume 80 mg Lasix twice a 

day.  





#3 slight elevation in troponin level without any history of chest pain, 

cardiology consultation was requested, doubt acute coronary syndrome.  2-D echo 

completed showing EF of 55-60%. Cleared for discharge from cardiology.  Per 

cardiology patient will benefit from a stress test outpatient.  Per cardiology 

aspirin has been increased to 81 mg daily.





#4 slight elevation in d-dimer, VQ scan completed showing low probability for 

pulmonary embolism.





#5 insulin-dependent diabetes mellitus will resume home insulin





#6 underlying history of hypertension





#7 underlying history of hyperlipidemia











Add subcu Lovenox for DVT prophylaxis, add Pepcid for GI prophylaxis





I performed an examination of the patient and discussed their management with 

the Nurse Practitioner.  I have reviewed the Nurse Practitioner's notes and 

agree with the documented findings and plan of care

## 2018-07-06 NOTE — P.PN
Subjective





Patient is seen in follow-up for end-stage renal disease.  She is maintained on 

hemodialysis on a Monday Wednesday Friday schedule.  Dyspnea is improved.  She 

is eager to go home.  No active complaints at this time.  





Vital signs are stable.


General: The patient appeared well nourished and normally developed. 


HEENT: Head exam is unremarkable. Neck is without jugular venous distension.


LUNGS: Lungs are clear to auscultation and percussion. Breath sounds decreased.


HEART: Rate and Rhythm are regular. First and second heart sounds normal. No 

murmurs, rubs or gallops. 


ABDOMEN: Abdominal exam reveals normal bowel sounds. Non-tender and non-

distended. No evidence of peritonitis.


EXTREMITITES: No clubbing, cyanosis, or edema.





Objective





- Vital Signs


Vital signs: 


 Vital Signs











Temp  98.0 F   07/06/18 04:00


 


Pulse  69   07/06/18 04:00


 


Resp  19   07/06/18 04:00


 


BP  168/72   07/06/18 04:00


 


Pulse Ox  94 L  07/06/18 04:00








 Intake & Output











 07/05/18 07/06/18 07/06/18





 18:59 06:59 18:59


 


Intake Total 298 200 


 


Output Total 0  


 


Balance 298 200 


 


Weight 136.5 kg 137 kg 


 


Intake:   


 


  Oral 298 200 


 


Output:   


 


  Urine 0  


 


Other:   


 


  Voiding Method Toilet Toilet 


 


  # Voids  1 














- Labs


CBC & Chem 7: 


 07/06/18 05:14





 07/06/18 05:14


Labs: 


 Abnormal Lab Results - Last 24 Hours (Table)











  07/05/18 07/05/18 07/05/18 Range/Units





  05:22 05:22 11:46 


 


RBC     (3.80-5.40)  m/uL


 


Hgb     (11.4-16.0)  gm/dL


 


Hct     (34.0-46.0)  %


 


MCV     (80.0-100.0)  fL


 


MCHC     (31.0-37.0)  g/dL


 


RDW     (11.5-15.5)  %


 


Chloride     ()  mmol/L


 


BUN     (7-17)  mg/dL


 


Creatinine     (0.52-1.04)  mg/dL


 


Glucose     (74-99)  mg/dL


 


POC Glucose (mg/dL)    210 H  (75-99)  mg/dL


 


Phosphorus  6.1 H    (2.5-4.5)  mg/dL


 


Ferritin   1152.0 H   (10.0-291.0)  ng/mL


 


Total Protein     (6.3-8.2)  g/dL


 


Albumin     (3.5-5.0)  g/dL














  07/05/18 07/05/18 07/06/18 Range/Units





  16:38 21:05 05:14 


 


RBC    2.57 L  (3.80-5.40)  m/uL


 


Hgb    8.0 L  (11.4-16.0)  gm/dL


 


Hct    26.5 L  (34.0-46.0)  %


 


MCV    103.3 H  (80.0-100.0)  fL


 


MCHC    30.3 L  (31.0-37.0)  g/dL


 


RDW    15.9 H  (11.5-15.5)  %


 


Chloride     ()  mmol/L


 


BUN     (7-17)  mg/dL


 


Creatinine     (0.52-1.04)  mg/dL


 


Glucose     (74-99)  mg/dL


 


POC Glucose (mg/dL)  233 H  202 H   (75-99)  mg/dL


 


Phosphorus     (2.5-4.5)  mg/dL


 


Ferritin     (10.0-291.0)  ng/mL


 


Total Protein     (6.3-8.2)  g/dL


 


Albumin     (3.5-5.0)  g/dL














  07/06/18 07/06/18 Range/Units





  05:14 05:57 


 


RBC    (3.80-5.40)  m/uL


 


Hgb    (11.4-16.0)  gm/dL


 


Hct    (34.0-46.0)  %


 


MCV    (80.0-100.0)  fL


 


MCHC    (31.0-37.0)  g/dL


 


RDW    (11.5-15.5)  %


 


Chloride  97 L   ()  mmol/L


 


BUN  46 H   (7-17)  mg/dL


 


Creatinine  6.05 H*   (0.52-1.04)  mg/dL


 


Glucose  137 H   (74-99)  mg/dL


 


POC Glucose (mg/dL)   148 H  (75-99)  mg/dL


 


Phosphorus    (2.5-4.5)  mg/dL


 


Ferritin    (10.0-291.0)  ng/mL


 


Total Protein  5.6 L   (6.3-8.2)  g/dL


 


Albumin  3.4 L   (3.5-5.0)  g/dL














Assessment and Plan


Plan: 





Assessment:


1.  End-stage renal disease maintained on hemodialysis on a Monday Wednesday Friday schedule.


2.  Dyspnea secondary to volume overload.  Improved postdialysis.


3.  Anemia of chronic kidney disease maintained on Aranesp.  Rule out iron 

deficiency.


4.  Chronic kidney disease mineral bone disease maintained on PhosLo.  


5.  Diastolic CHF.


6.  Hypertension with chronic kidney disease.


7.  Insulin-dependent diabetes mellitus.





Plan:


Hemodialysis today.


Continue Lasix 80 mg orally twice daily.


Follow-up iron studies.


Potential discharge today after dialysis.


Patient is interested in peritoneal dialysis and this will be discussed with 

her as an outpatient.

## 2018-07-06 NOTE — P.PN
<Jennifer Pham E - Last Filed: 07/06/18 09:43>





Subjective


Progress Note Date: 07/06/18





History of present illness: 





This is a 57-year-old female patient is well-known to our services being seen 

examined and evaluated today for consultation.  This patient came into the 

emergency room with worsening shortness of breath had been ongoing over the 

last few days.  She has a known history of end-stage renal disease and is on 

hemodialysis for the past year.  Upon workup in the emergency room she was 

noted to have an elevated troponin level 0.041 as well as an elevated d-dimer 

of 0.75 in the BNP of 3050.  Chest x-ray was consistent with fluid overload.  V/

Q scan showed low probability for pulmonary embolism.  Patient's BUN is 28 and 

her creatinine is 4.45 today.  Nephrology is on consult.  She normally has 

dialysis on Monday Wednesday Friday.  She did receive dialysis yesterday.  Upon 

examination she is resting up in bed on 4 L of supplemental oxygen which is 

what she wears at home as well.  She states she's feeling much better than 

yesterday.  She also did wear her BiPAP overnight which she wears every night 

at home as well for her obstructive sleep apnea.





Interval History:


7/6/18- patient is being seen examined and evaluated on rounds.  She is resting 

up in bed on 4 L of supplemental oxygen.  She states she has some slight  cough 

and clear sputum production.  She did undergo dialysis yesterday and should be 

undergoing dialysis today as well.  She is a potential discharge today after 

dialysis.





Objective





- Vital Signs


Vital signs: 


 Vital Signs











Temp  98.0 F   07/06/18 04:00


 


Pulse  68   07/06/18 08:26


 


Resp  18   07/06/18 08:00


 


BP  135/56   07/06/18 08:00


 


Pulse Ox  100   07/06/18 08:00








 Intake & Output











 07/05/18 07/06/18 07/06/18





 18:59 06:59 18:59


 


Intake Total 298 200 


 


Output Total 0  


 


Balance 298 200 


 


Weight 136.5 kg 137 kg 


 


Intake:   


 


  Oral 298 200 


 


Output:   


 


  Urine 0  


 


Other:   


 


  Voiding Method Toilet Toilet 


 


  # Voids  1 














- Exam





GENERAL EXAM: Alert, morbidly obese, comfortable in no apparent distress.


HEAD: Normocephalic.


EYES: Normal reaction of pupils, equal size.


NOSE: Clear with pink turbinates.


THROAT: No erythema or exudates.


NECK: No masses, no JVD.


CHEST: No chest wall deformity.


LUNGS: Equal air entry with few scattered crackles.  Basis diminished


CVS: S1 and S2 normal with no audible mumurs, regular rhythm.


ABDOMEN: No hepatosplenomegaly, normal bowel sounds, no guarding or rigidity.


EXTREMITIES: +1 edema noted, pedal pulses palpable.


CENTRAL NERVOUS SYSTEM: No focal deficits, tone is normal in all 4 extremities.





- Labs


CBC & Chem 7: 


 07/06/18 05:14





 07/06/18 05:14


Labs: 


 Abnormal Lab Results - Last 24 Hours (Table)











  07/05/18 07/05/18 07/05/18 Range/Units





  05:22 05:22 11:46 


 


RBC     (3.80-5.40)  m/uL


 


Hgb     (11.4-16.0)  gm/dL


 


Hct     (34.0-46.0)  %


 


MCV     (80.0-100.0)  fL


 


MCHC     (31.0-37.0)  g/dL


 


RDW     (11.5-15.5)  %


 


Chloride     ()  mmol/L


 


BUN     (7-17)  mg/dL


 


Creatinine     (0.52-1.04)  mg/dL


 


Glucose     (74-99)  mg/dL


 


POC Glucose (mg/dL)    210 H  (75-99)  mg/dL


 


Phosphorus  6.1 H    (2.5-4.5)  mg/dL


 


Ferritin   1152.0 H   (10.0-291.0)  ng/mL


 


Total Protein     (6.3-8.2)  g/dL


 


Albumin     (3.5-5.0)  g/dL














  07/05/18 07/05/18 07/06/18 Range/Units





  16:38 21:05 05:14 


 


RBC    2.57 L  (3.80-5.40)  m/uL


 


Hgb    8.0 L  (11.4-16.0)  gm/dL


 


Hct    26.5 L  (34.0-46.0)  %


 


MCV    103.3 H  (80.0-100.0)  fL


 


MCHC    30.3 L  (31.0-37.0)  g/dL


 


RDW    15.9 H  (11.5-15.5)  %


 


Chloride     ()  mmol/L


 


BUN     (7-17)  mg/dL


 


Creatinine     (0.52-1.04)  mg/dL


 


Glucose     (74-99)  mg/dL


 


POC Glucose (mg/dL)  233 H  202 H   (75-99)  mg/dL


 


Phosphorus     (2.5-4.5)  mg/dL


 


Ferritin     (10.0-291.0)  ng/mL


 


Total Protein     (6.3-8.2)  g/dL


 


Albumin     (3.5-5.0)  g/dL














  07/06/18 07/06/18 Range/Units





  05:14 05:57 


 


RBC    (3.80-5.40)  m/uL


 


Hgb    (11.4-16.0)  gm/dL


 


Hct    (34.0-46.0)  %


 


MCV    (80.0-100.0)  fL


 


MCHC    (31.0-37.0)  g/dL


 


RDW    (11.5-15.5)  %


 


Chloride  97 L   ()  mmol/L


 


BUN  46 H   (7-17)  mg/dL


 


Creatinine  6.05 H*   (0.52-1.04)  mg/dL


 


Glucose  137 H   (74-99)  mg/dL


 


POC Glucose (mg/dL)   148 H  (75-99)  mg/dL


 


Phosphorus    (2.5-4.5)  mg/dL


 


Ferritin    (10.0-291.0)  ng/mL


 


Total Protein  5.6 L   (6.3-8.2)  g/dL


 


Albumin  3.4 L   (3.5-5.0)  g/dL














Assessment and Plan


Assessment: 





Assessment





Acute on chronic hypoxic respiratory failure requiring supplemental oxygen


Acute exacerbation of CHF related to fluid overload


Acute exacerbation of COPD


Morbidly obese


Obstructive sleep apnea


Chronic end-stage kidney disease on dialysis


Eosinophilia


Insulin-dependent diabetes mellitus








Plan





Patient cleared for discharge from pulmonary standpoint


Medications have been reviewed and will be continued as ordered. 


Continue with pulmonary hygiene, coughing and deep breathing exercises, and 

supportive care. 


Supplemental oxygen to maintain oxygen saturations of 92% or better. 


Continue nebulizer treatments. 


BiPAP from home use every night and with naps


Cardiology and nephrology on consult


Patient will undergo hemodialysis again today


Eosinophils 5 on admission will evaluate outpatient for possible biologic agent


GI and DVT prophylaxis. 


We will continue to monitor labs/results and adjust treatment as necessary. 


Further recommendations pending.





I performed an examination of the patient and discussed their management with 

the nurse practitioner. I have reviewed the nurse practitioner's note and agree 

with the documented findings and plan of care.





<Crista Nogueira A - Last Filed: 07/06/18 14:41>





Objective





- Vital Signs


Vital signs: 


 Vital Signs











Temp  98.0 F   07/06/18 04:00


 


Pulse  64   07/06/18 12:00


 


Resp  17   07/06/18 12:00


 


BP  155/69   07/06/18 12:00


 


Pulse Ox  98   07/06/18 12:00








 Intake & Output











 07/05/18 07/06/18 07/06/18





 18:59 06:59 18:59


 


Intake Total 298 200 230


 


Output Total 0  


 


Balance 298 200 230


 


Weight 136.5 kg 137 kg 


 


Intake:   


 


  Oral 298 200 230


 


Output:   


 


  Urine 0  


 


Other:   


 


  Voiding Method Toilet Toilet 


 


  # Voids  1 














- Labs


CBC & Chem 7: 


 07/06/18 05:14





 07/06/18 05:14


Labs: 


 Abnormal Lab Results - Last 24 Hours (Table)











  07/05/18 07/05/18 07/05/18 Range/Units





  05:22 16:38 21:05 


 


RBC     (3.80-5.40)  m/uL


 


Hgb     (11.4-16.0)  gm/dL


 


Hct     (34.0-46.0)  %


 


MCV     (80.0-100.0)  fL


 


MCHC     (31.0-37.0)  g/dL


 


RDW     (11.5-15.5)  %


 


Chloride     ()  mmol/L


 


BUN     (7-17)  mg/dL


 


Creatinine     (0.52-1.04)  mg/dL


 


Glucose     (74-99)  mg/dL


 


POC Glucose (mg/dL)   233 H  202 H  (75-99)  mg/dL


 


Ferritin  1152.0 H    (10.0-291.0)  ng/mL


 


Total Protein     (6.3-8.2)  g/dL


 


Albumin     (3.5-5.0)  g/dL














  07/06/18 07/06/18 07/06/18 Range/Units





  05:14 05:14 05:57 


 


RBC  2.57 L    (3.80-5.40)  m/uL


 


Hgb  8.0 L    (11.4-16.0)  gm/dL


 


Hct  26.5 L    (34.0-46.0)  %


 


MCV  103.3 H    (80.0-100.0)  fL


 


MCHC  30.3 L    (31.0-37.0)  g/dL


 


RDW  15.9 H    (11.5-15.5)  %


 


Chloride   97 L   ()  mmol/L


 


BUN   46 H   (7-17)  mg/dL


 


Creatinine   6.05 H*   (0.52-1.04)  mg/dL


 


Glucose   137 H   (74-99)  mg/dL


 


POC Glucose (mg/dL)    148 H  (75-99)  mg/dL


 


Ferritin     (10.0-291.0)  ng/mL


 


Total Protein   5.6 L   (6.3-8.2)  g/dL


 


Albumin   3.4 L   (3.5-5.0)  g/dL














  07/06/18 Range/Units





  11:32 


 


RBC   (3.80-5.40)  m/uL


 


Hgb   (11.4-16.0)  gm/dL


 


Hct   (34.0-46.0)  %


 


MCV   (80.0-100.0)  fL


 


MCHC   (31.0-37.0)  g/dL


 


RDW   (11.5-15.5)  %


 


Chloride   ()  mmol/L


 


BUN   (7-17)  mg/dL


 


Creatinine   (0.52-1.04)  mg/dL


 


Glucose   (74-99)  mg/dL


 


POC Glucose (mg/dL)  141 H  (75-99)  mg/dL


 


Ferritin   (10.0-291.0)  ng/mL


 


Total Protein   (6.3-8.2)  g/dL


 


Albumin   (3.5-5.0)  g/dL














Assessment and Plan


Assessment: 





CXR now.  Start IV steroids.  Patient has worsening shortness of breath, cough, 

wheezing.  Continue to monitor.  Hold discharge.  Sputum culture.  Consider ABX 

if patient develops fever, WBC count or CXR shows new pna.  Add Mucinex.





~Crista Nogueira DO

## 2018-07-06 NOTE — P.PN
Subjective


Progress Note Date: 07/06/18





This is a pleasant 57-year-old  female patient with a past medical 

history significant for end stage renal disease on hemodialysis as well as 

congestive heart failure secondary to diastolic dysfunction was referred from 

the dialysis center to the hospital for further evaluation of shortness of 

breath.The patient stated that she has been experiencing worsening shortness of 

breath for the last 4 days. At the dialysis center the shortness of breath was 

worse just before she started dialysis.  She did not have any symptoms of chest 

pain or chest discomfort, dizziness or dizziness, or syncope.  She did develop 

while lateral lower extremities edema mainly in the feet.  She stated that she 

was not compliant with her diet and her fluid and over the weekend she has been 

drinking excessively water because of the weather was so hot.  She stated that 

she was compliant with her dialysis and she does dialysis on regular basis.The 

EKG showed sinus rhythm without any significant ST or T-wave abnormalities.  

The chest x-ray showed findings consistent with CHF.  The BNP came in to be 

elevated.  The d-dimer also came in to be alleviated but the V/Q scan of the 

chest showed no PE.  The troponin also is slightly elevated but the patient did 

not have any symptoms of chest discomfort and the EKG did not show any ischemic 

changes.  Beside that the patient does not have any history of coronary artery 

disease according to her.The patient was seen in the hospital here in August 2017 where she underwent an echocardiogram at that point and that revealed 

normal LV function without any significant valvular abnormalities.  At the time 

of my examination this morning, patient states her breathing is overall stable.

  Blood pressure 140/70 with a heart rate in the 60s, 99% on 4 L of oxygen.  No 

lab data today.








07/06/2018


Patient seen and examined this morning, she states her breathing was improved, 

after she had her breathing treatment this morning she became quite wheezy.  

Overall she had a fairly decent night.  She is scheduled to undergo dialysis 

today.  Blood pressure 134/56 with a heart rate in the 60s, 100% on 4 L of 

oxygen.  White blood cell count 6.3, hemoglobin 8.0, platelet count 292.  D-

dimer 0.7, sodium 139, potassium 5.0, BUN 36, creatinine 6.0.








Objective





- Vital Signs


Vital signs: 


 Vital Signs











Temp  98.0 F   07/06/18 04:00


 


Pulse  68   07/06/18 08:26


 


Resp  18   07/06/18 08:00


 


BP  135/56   07/06/18 08:00


 


Pulse Ox  100   07/06/18 08:00








 Intake & Output











 07/05/18 07/06/18 07/06/18





 18:59 06:59 18:59


 


Intake Total 298 200 0


 


Output Total 0  


 


Balance 298 200 0


 


Weight 136.5 kg 137 kg 


 


Intake:   


 


  Oral 298 200 0


 


Output:   


 


  Urine 0  


 


Other:   


 


  Voiding Method Toilet Toilet 


 


  # Voids  1 














- Exam


PHYSICAL EXAMINATION: 





GENERAL: 57-year-old  female in no acute distress at the time of my 

examination





HEENT: Head is atraumatic, normocephalic.  Pupils equal, round.  Sclera 

anicteric. Conjunctiva are clear.  Mucous membranes of the mouth are moist.  

Neck is supple.  There is no elevated jugular venous pressure.]  bruit is heard.





HEART EXAMINATION: Heart S1, S2 normal.  No murmur or gallop heard.





CHEST EXAMINATION: Lungs reveal coarse scattered wheezing throughout .





ABDOMEN:  Soft, obese, nontender. Bowel sounds are heard. No organomegaly noted.


 


EXTREMITIES: 2+ peripheral pulses with no evidence of peripheral edema and no 

calf tenderness noted.





NEUROLOGIC patient is awake, alert and oriented ?-3.


 


.


 











- Labs


CBC & Chem 7: 


 07/06/18 05:14





 07/06/18 05:14


Labs: 


 Abnormal Lab Results - Last 24 Hours (Table)











  07/05/18 07/05/18 07/05/18 Range/Units





  05:22 16:38 21:05 


 


RBC     (3.80-5.40)  m/uL


 


Hgb     (11.4-16.0)  gm/dL


 


Hct     (34.0-46.0)  %


 


MCV     (80.0-100.0)  fL


 


MCHC     (31.0-37.0)  g/dL


 


RDW     (11.5-15.5)  %


 


Chloride     ()  mmol/L


 


BUN     (7-17)  mg/dL


 


Creatinine     (0.52-1.04)  mg/dL


 


Glucose     (74-99)  mg/dL


 


POC Glucose (mg/dL)   233 H  202 H  (75-99)  mg/dL


 


Ferritin  1152.0 H    (10.0-291.0)  ng/mL


 


Total Protein     (6.3-8.2)  g/dL


 


Albumin     (3.5-5.0)  g/dL














  07/06/18 07/06/18 07/06/18 Range/Units





  05:14 05:14 05:57 


 


RBC  2.57 L    (3.80-5.40)  m/uL


 


Hgb  8.0 L    (11.4-16.0)  gm/dL


 


Hct  26.5 L    (34.0-46.0)  %


 


MCV  103.3 H    (80.0-100.0)  fL


 


MCHC  30.3 L    (31.0-37.0)  g/dL


 


RDW  15.9 H    (11.5-15.5)  %


 


Chloride   97 L   ()  mmol/L


 


BUN   46 H   (7-17)  mg/dL


 


Creatinine   6.05 H*   (0.52-1.04)  mg/dL


 


Glucose   137 H   (74-99)  mg/dL


 


POC Glucose (mg/dL)    148 H  (75-99)  mg/dL


 


Ferritin     (10.0-291.0)  ng/mL


 


Total Protein   5.6 L   (6.3-8.2)  g/dL


 


Albumin   3.4 L   (3.5-5.0)  g/dL














  07/06/18 Range/Units





  11:32 


 


RBC   (3.80-5.40)  m/uL


 


Hgb   (11.4-16.0)  gm/dL


 


Hct   (34.0-46.0)  %


 


MCV   (80.0-100.0)  fL


 


MCHC   (31.0-37.0)  g/dL


 


RDW   (11.5-15.5)  %


 


Chloride   ()  mmol/L


 


BUN   (7-17)  mg/dL


 


Creatinine   (0.52-1.04)  mg/dL


 


Glucose   (74-99)  mg/dL


 


POC Glucose (mg/dL)  141 H  (75-99)  mg/dL


 


Ferritin   (10.0-291.0)  ng/mL


 


Total Protein   (6.3-8.2)  g/dL


 


Albumin   (3.5-5.0)  g/dL














Assessment and Plan


Plan: 


Assessment and plan


#1 congestive heart failure secondary to diastolic dysfunction.  Acute on 

chronic


#2 end-stage renal disease on hemodialysis


#3 mildly abnormal cardiac enzymes likely secondary to renal dysfunction in the 

absence of any chest pain


#4 multiple comorbid conditions including diabetes, hypertension, dyslipidemia








Plan


From cardiology standpoint, we will recommend to continue current dose of oral 

Lasix. Patient may be able to be discharged once cleared by primary.  We'll 

make her a follow-up appointment in the office post discharge.








DNP note has been reviewed, I agree with a documented findings and plan of 

care.  Patient was seen and examined.

## 2018-07-07 VITALS — SYSTOLIC BLOOD PRESSURE: 160 MMHG | DIASTOLIC BLOOD PRESSURE: 65 MMHG

## 2018-07-07 VITALS — HEART RATE: 76 BPM

## 2018-07-07 VITALS — TEMPERATURE: 97 F | RESPIRATION RATE: 20 BRPM

## 2018-07-07 LAB
ALBUMIN SERPL-MCNC: 3.9 G/DL (ref 3.5–5)
ALP SERPL-CCNC: 74 U/L (ref 38–126)
ALT SERPL-CCNC: 26 U/L (ref 9–52)
ANION GAP SERPL CALC-SCNC: 15 MMOL/L
AST SERPL-CCNC: 18 U/L (ref 14–36)
BASOPHILS # BLD AUTO: 0 K/UL (ref 0–0.2)
BASOPHILS NFR BLD AUTO: 0 %
BUN SERPL-SCNC: 45 MG/DL (ref 7–17)
CALCIUM SPEC-MCNC: 9.3 MG/DL (ref 8.4–10.2)
CHLORIDE SERPL-SCNC: 96 MMOL/L (ref 98–107)
CO2 SERPL-SCNC: 26 MMOL/L (ref 22–30)
EOSINOPHIL # BLD AUTO: 0 K/UL (ref 0–0.7)
EOSINOPHIL NFR BLD AUTO: 0 %
ERYTHROCYTE [DISTWIDTH] IN BLOOD BY AUTOMATED COUNT: 2.99 M/UL (ref 3.8–5.4)
ERYTHROCYTE [DISTWIDTH] IN BLOOD: 15.3 % (ref 11.5–15.5)
GLUCOSE BLD-MCNC: 376 MG/DL (ref 75–99)
GLUCOSE BLD-MCNC: 418 MG/DL (ref 75–99)
GLUCOSE SERPL-MCNC: 381 MG/DL (ref 74–99)
HCT VFR BLD AUTO: 31.5 % (ref 34–46)
HGB BLD-MCNC: 9.2 GM/DL (ref 11.4–16)
LYMPHOCYTES # SPEC AUTO: 0.4 K/UL (ref 1–4.8)
LYMPHOCYTES NFR SPEC AUTO: 4 %
MCH RBC QN AUTO: 30.9 PG (ref 25–35)
MCHC RBC AUTO-ENTMCNC: 29.3 G/DL (ref 31–37)
MCV RBC AUTO: 105.4 FL (ref 80–100)
MONOCYTES # BLD AUTO: 0.4 K/UL (ref 0–1)
MONOCYTES NFR BLD AUTO: 4 %
NEUTROPHILS # BLD AUTO: 9.2 K/UL (ref 1.3–7.7)
NEUTROPHILS NFR BLD AUTO: 92 %
PLATELET # BLD AUTO: 317 K/UL (ref 150–450)
POTASSIUM SERPL-SCNC: 5.9 MMOL/L (ref 3.5–5.1)
PROT SERPL-MCNC: 6.1 G/DL (ref 6.3–8.2)
SODIUM SERPL-SCNC: 137 MMOL/L (ref 137–145)
WBC # BLD AUTO: 10.1 K/UL (ref 3.8–10.6)

## 2018-07-07 RX ADMIN — LEVOTHYROXINE SODIUM SCH MCG: 100 TABLET ORAL at 05:49

## 2018-07-07 RX ADMIN — HYDROCODONE BITARTRATE AND ACETAMINOPHEN SCH EACH: 7.5; 325 TABLET ORAL at 05:49

## 2018-07-07 RX ADMIN — METHYLPREDNISOLONE SODIUM SUCCINATE SCH MG: 125 INJECTION, POWDER, FOR SOLUTION INTRAMUSCULAR; INTRAVENOUS at 12:32

## 2018-07-07 RX ADMIN — Medication SCH MG: at 12:31

## 2018-07-07 RX ADMIN — LACTOBACILLUS ACIDOPHILUS / LACTOBACILLUS BULGARICUS SCH: 100 MILLION CFU STRENGTH GRANULES at 12:31

## 2018-07-07 RX ADMIN — FAMOTIDINE SCH MG: 20 TABLET, FILM COATED ORAL at 12:31

## 2018-07-07 RX ADMIN — SEVELAMER CARBONATE SCH MG: 800 TABLET, FILM COATED ORAL at 12:31

## 2018-07-07 RX ADMIN — INSULIN ASPART SCH UNIT: 100 INJECTION, SOLUTION INTRAVENOUS; SUBCUTANEOUS at 12:32

## 2018-07-07 RX ADMIN — SEVELAMER CARBONATE SCH MG: 800 TABLET, FILM COATED ORAL at 06:51

## 2018-07-07 RX ADMIN — ASPIRIN 81 MG CHEWABLE TABLET SCH MG: 81 TABLET CHEWABLE at 08:39

## 2018-07-07 RX ADMIN — IPRATROPIUM BROMIDE AND ALBUTEROL SULFATE SCH ML: .5; 3 SOLUTION RESPIRATORY (INHALATION) at 11:35

## 2018-07-07 RX ADMIN — Medication SCH EACH: at 12:34

## 2018-07-07 RX ADMIN — IPRATROPIUM BROMIDE AND ALBUTEROL SULFATE SCH ML: .5; 3 SOLUTION RESPIRATORY (INHALATION) at 08:15

## 2018-07-07 RX ADMIN — NYSTATIN SCH APPLIC: 100000 POWDER TOPICAL at 08:40

## 2018-07-07 RX ADMIN — FUROSEMIDE SCH MG: 80 TABLET ORAL at 08:39

## 2018-07-07 RX ADMIN — HYDROCODONE BITARTRATE AND ACETAMINOPHEN SCH EACH: 7.5; 325 TABLET ORAL at 14:27

## 2018-07-07 RX ADMIN — IPRATROPIUM BROMIDE AND ALBUTEROL SULFATE SCH ML: .5; 3 SOLUTION RESPIRATORY (INHALATION) at 15:54

## 2018-07-07 RX ADMIN — METHYLPREDNISOLONE SODIUM SUCCINATE SCH MG: 125 INJECTION, POWDER, FOR SOLUTION INTRAMUSCULAR; INTRAVENOUS at 05:48

## 2018-07-07 RX ADMIN — GABAPENTIN SCH MG: 100 CAPSULE ORAL at 12:31

## 2018-07-07 RX ADMIN — INSULIN ASPART SCH UNIT: 100 INJECTION, SOLUTION INTRAVENOUS; SUBCUTANEOUS at 06:50

## 2018-07-07 RX ADMIN — ENOXAPARIN SODIUM SCH MG: 30 INJECTION SUBCUTANEOUS at 08:40

## 2018-07-07 RX ADMIN — SENNOSIDES SCH: 8.6 TABLET, FILM COATED ORAL at 12:31

## 2018-07-07 NOTE — P.DS
Providers


Date of admission: 


07/04/18 09:57





Expected date of discharge: 07/07/18


Attending physician: 


Deanne Cuadra





Consults: 





 





07/04/18 09:57


Consult Physician Stat 


   Consulting Provider: Carlos John


   Consult Reason/Comments: Shortness of breath, congestive congestive heart 

failure, elevated


                                                troponin


   Do you want consulting provider notified?: Yes


Consult Physician Stat 


   Consulting Provider: Albina Esquivel


   Consult Reason/Comments: Renal failure


   Do you want consulting provider notified?: Yes





07/05/18 09:08


Consult Physician Routine 


   Consulting Provider: Peewee Jenkins


   Consult Reason/Comments: COPD exacerbation


   Do you want consulting provider notified?: Yes











Primary care physician: 


Balbina Pugh





[facility] Course: 

















Diagnoses on discharge:








#1 shortness of breath multifactorial most likely related to acute congestive 

heart failure exacerbation was fluid overload, with evidence of pleural effusion

, and underlying history of COPD, currently patient is scheduled for dialysis 

today, and will be evaluated by pulmonary in regard to COPD and pleural 

effusion.  Patient sees Dr. NATHALY Jenkins outpatient. Dr. Jenkins has been consulted.

  Dr. Nogueira covering for Dr. NATHALY Jenkins.  She is currently back on 4 L nasal 

cannula which she wears at home.  Patient has been cleared for discharge from 

pulmonary standpoint





#2 end-stage renal disease.   Patient receives dialysis via right  internal 

jugular Perm Cath. Patient received dialysis 7/4 getting 2 L off.   She has 

been started the medication Aranesp per Dr. Esquivel.  Normal dialysis schedule 

Monday Wednesday Friday.  Patient currently receiving dialysis today and 

potential discharge per nephrology later today and resume 80 mg Lasix twice a 

day.  





#3 slight elevation in troponin level without any history of chest pain, 

cardiology consultation was requested, doubt acute coronary syndrome.  2-D echo 

completed showing EF of 55-60%. Cleared for discharge from cardiology.  Per 

cardiology patient will benefit from a stress test outpatient.  Per cardiology 

aspirin has been increased to 81 mg daily.





#4 slight elevation in d-dimer, VQ scan completed showing low probability for 

pulmonary embolism.





#5 insulin-dependent diabetes mellitus will resume home insulin





#6 underlying history of hypertension





#7 underlying history of hyperlipidemia

















Hospital course: 








Bree Poon is a 57-year-old female who presented to [facility] emergency room with a chief complaint of worsening shortness of breath

, patient has a known history of end-stage renal disease on hemodialysis for 

the last 1 year, she went to dialysis Center today however due to shortness of 

breath she was referred to emergency room.  Patient denies any chest pain, 

denies any fever or chills, patient stated that she has been using her left arm 

fistula for dialysis until recently when it stopped working, currently she is 

using her right subclavian catheter.


In the emergency room patient was evaluated by Dr. Ramey, vitals were stable, 

troponin level was slightly elevated and d-dimer was slightly elevated 

creatinine was up at 5.38 chest x-ray revealed evidence of pleural effusion 

patient was admitted to telemetry floor, cardiology and pulmonary consultation 

were requested , nephrology consultation was requested and patient will have 

dialysis today . VQ scan was ordered


Patient stated that 1 year ago she had a stroke and was in a coma for a 

prolonged period of time at that time she had renal failure and resulted in end-

stage renal disease and has been on dialysis since then.


Patient has a known history of insulin-dependent diabetes mellitus, she also 

has known history of congestive heart failure, history of COPD, history of 

hypertension, and history of hyperlipidemia





07/05/2018 Patient currently resting comfortably in bed.  Patient received 

dialysis yesterday.  May require dialysis again today per Dr. Esquivel depending 

on chest x-ray.  VQ scan completed yesterday showing low probability for 

pulmonary embolism.  2-D echo has been ordered per cardiology services.  

Patient denies chest pain or shortness of breath at this time.  Patient states 

she sees Dr. NATHALY Jenkins for pulmonary services.  Dr. Jenkins has been consulted





On 07/06/2018 patient is resting comfortably in bed but does complain of some 

shortness of breath at this time.  Per nephrology patient is to receive 

dialysis again.  2-D echo completed showing EF 55-60% plans to follow 

outpatient per cardiology.  Patient has been cleared for discharge from 

pulmonary standpoint.  We'll plan to see how patient does after dialysis in 

regards to shortness of breath and possible discharge later today or tomorrow.





On 07/07/2018 patient is feeling better she is alert and oriented 3 denies any 

chest pain or shortness of breath she is maintained on oxygen 4 L per nasal 

cannula she received hemodialysis yesterday, her next dialysis session will be 

on Monday, potassium elevated today at 5.36, she will be given a dose of 

Kayexalate 15 g by mouth, patient was cleared for discharge by all consultants, 

she will be discharged home today follow up at the dialysis center on Monday, 

follow-up with her primary care physician within one week.





Plan - Discharge Summary


Discharge Rx Participant: No


New Discharge Prescriptions: 


New


   amLODIPine [Norvasc] 10 mg PO DAILY  tab


   Cinacalcet [Sensipar] 30 mg PO TU@1100  tab


   Darbepoetin Dl [Aranesp] 40 mcg SQ Q7D  syringe


   Nystatin 100,000 Unit/gm Powd [Mycostatin Powder] 1 applic TOPICAL BID  

applic





Continue


   Gabapentin [Neurontin] 200 mg PO TID


   Atorvastatin [Lipitor] 80 mg PO HS


   Levothyroxine Sodium [Synthroid] 350 mcg PO DAILY


   HYDROcodone/APAP 7.5-325MG [Norco 7.5-325] 1 tab PO TID PRN


     PRN Reason: Pain


   Latanoprost [Xalatan 0.005%] 1 drop BOTH EYES HS


   Folic Acid-Vit B Complex-Vit C [Nephrocaps] 1 cap PO DAILY


   Acetaminophen Tab [Tylenol] 650 mg PO Q4H PRN


     PRN Reason: Fever And/ Or Pain


   Famotidine [Pepcid] 20 mg PO BID


   Omalizumab [Xolair] 150 mg SQ Q30D


   Aspirin 325 mg PO DAILY  tab


   diphenhydrAMINE [Benadryl] 25 mg PO Q6HR PRN  cap


     PRN Reason: Itching


   Furosemide [Lasix] 80 mg PO BID #1 tablet


   Ipratropium-Albuterol Nebulize [Duoneb 0.5 mg-3 mg/3 ml Soln] 3 ml 

INHALATION RT-QID PRN


     PRN Reason: Shortness Of Breath


   Insuln Asp Prt/Insulin Aspart [NovoLOG MIX 70-30 VIAL] 75 unit SQ BID


   Insulin Regular, Human [NovoLIN R] See Protocol SQ AC-TID


   Clopidogrel [Plavix] 75 mg PO DAILY


   Sevelamer Carbonate 1 tab PO AC-TID


Discharge Medication List





Atorvastatin [Lipitor] 80 mg PO HS 09/13/15 [History]


Gabapentin [Neurontin] 200 mg PO TID 09/13/15 [History]


Levothyroxine Sodium [Synthroid] 350 mcg PO DAILY 09/13/15 [History]


Acetaminophen Tab [Tylenol] 650 mg PO Q4H PRN 08/09/17 [History]


Famotidine [Pepcid] 20 mg PO BID 08/09/17 [History]


Folic Acid-Vit B Complex-Vit C [Nephrocaps] 1 cap PO DAILY 08/09/17 [History]


HYDROcodone/APAP 7.5-325MG [Norco 7.5-325] 1 tab PO TID PRN 08/09/17 [History]


Latanoprost [Xalatan 0.005%] 1 drop BOTH EYES HS 08/09/17 [History]


Omalizumab [Xolair] 150 mg SQ Q30D 08/09/17 [History]


Aspirin 325 mg PO DAILY  tab 08/22/17 [Rx]


Furosemide [Lasix] 80 mg PO BID #1 tablet 08/22/17 [Rx]


diphenhydrAMINE [Benadryl] 25 mg PO Q6HR PRN  cap 08/22/17 [Rx]


Clopidogrel [Plavix] 75 mg PO DAILY 07/04/18 [History]


Insulin Regular, Human [NovoLIN R] See Protocol SQ AC-TID 07/04/18 [History]


Insuln Asp Prt/Insulin Aspart [NovoLOG MIX 70-30 VIAL] 75 unit SQ BID 07/04/18 [

History]


Ipratropium-Albuterol Nebulize [Duoneb 0.5 mg-3 mg/3 ml Soln] 3 ml INHALATION RT

-QID PRN 07/04/18 [History]


Sevelamer Carbonate 1 tab PO AC-TID 07/05/18 [History]


Cinacalcet [Sensipar] 30 mg PO TU@1100  tab 07/07/18 [Rx]


Darbepoetin Dl [Aranesp] 40 mcg SQ Q7D  syringe 07/07/18 [Rx]


Nystatin 100,000 Unit/gm Powd [Mycostatin Powder] 1 applic TOPICAL BID  applic 

07/07/18 [Rx]


amLODIPine [Norvasc] 10 mg PO DAILY  tab 07/07/18 [Rx]








Follow up Appointment(s)/Referral(s): 


Crista Nogueira DO [Doctor of Osteopathic Medicine] - 1-2 Days


Balbina Pugh DO [Primary Care Provider] - 07/12/18 10:00 am (Thursday In 

[place].)

## 2018-07-07 NOTE — P.PN
Subjective


Progress Note Date: 07/07/18





This is a pleasant 57-year-old  female patient with a past medical 

history significant for end stage renal disease on hemodialysis as well as 

congestive heart failure secondary to diastolic dysfunction was referred from 

the dialysis center to the hospital for further evaluation of shortness of 

breath.The patient stated that she has been experiencing worsening shortness of 

breath for the last 4 days. At the dialysis center the shortness of breath was 

worse just before she started dialysis.  She did not have any symptoms of chest 

pain or chest discomfort, dizziness or dizziness, or syncope.  She did develop 

while lateral lower extremities edema mainly in the feet.  She stated that she 

was not compliant with her diet and her fluid and over the weekend she has been 

drinking excessively water because of the weather was so hot.  She stated that 

she was compliant with her dialysis and she does dialysis on regular basis.The 

EKG showed sinus rhythm without any significant ST or T-wave abnormalities.  

The chest x-ray showed findings consistent with CHF.  The BNP came in to be 

elevated.  The d-dimer also came in to be alleviated but the V/Q scan of the 

chest showed no PE.  The troponin also is slightly elevated but the patient did 

not have any symptoms of chest discomfort and the EKG did not show any ischemic 

changes.  Beside that the patient does not have any history of coronary artery 

disease according to her.The patient was seen in the hospital here in August 2017 where she underwent an echocardiogram at that point and that revealed 

normal LV function without any significant valvular abnormalities.  At the time 

of my examination this morning, patient states her breathing is overall stable.

  Blood pressure 140/70 with a heart rate in the 60s, 99% on 4 L of oxygen.  No 

lab data today.








07/06/2018


Patient seen and examined this morning, she states her breathing was improved, 

after she had her breathing treatment this morning she became quite wheezy.  

Overall she had a fairly decent night.  She is scheduled to undergo dialysis 

today.  Blood pressure 134/56 with a heart rate in the 60s, 100% on 4 L of 

oxygen.  White blood cell count 6.3, hemoglobin 8.0, platelet count 292.  D-

dimer 0.7, sodium 139, potassium 5.0, BUN 36, creatinine 6.0.








07/07/2018


Patient seen and examined this morning, feeling significantly better.  

Breathing is stable.  Blood pressure 152/64, heart rate in the 70s, 97% on 4 L 

of oxygen.  White blood cell count 10.1, hemoglobin 9.2, platelet count 317.  

Sodium 137, potassium 5.9, BUN 45, creatinine 5.3.








Objective





- Vital Signs


Vital signs: 


 Vital Signs











Temp  97.0 F L  07/07/18 08:00


 


Pulse  68   07/07/18 08:30


 


Resp  20   07/07/18 08:00


 


BP  153/65   07/07/18 08:00


 


Pulse Ox  97   07/07/18 08:00








 Intake & Output











 07/06/18 07/07/18 07/07/18





 18:59 06:59 18:59


 


Intake Total 470 610 240


 


Balance 470 610 240


 


Weight  135.2 kg 


 


Intake:   


 


  IV  10 


 


    flush  10 


 


  Oral 470 600 240


 


Other:   


 


  Voiding Method  Toilet Toilet


 


  # Voids 0 1 


 


  # Bowel Movements 0  














- Exam


PHYSICAL EXAMINATION: 





GENERAL: 57-year-old  female in no acute distress at the time of my 

examination





HEENT: Head is atraumatic, normocephalic.  Pupils equal, round.  Sclera 

anicteric. Conjunctiva are clear.  Mucous membranes of the mouth are moist.  

Neck is supple.  There is no elevated jugular venous pressure.]  bruit is heard.





HEART EXAMINATION: Heart S1, S2 normal.  No murmur or gallop heard.





CHEST EXAMINATION: Lungs reveal coarse scattered wheezing throughout .





ABDOMEN:  Soft, obese, nontender. Bowel sounds are heard. No organomegaly noted.


 


EXTREMITIES: 2+ peripheral pulses with no evidence of peripheral edema and no 

calf tenderness noted.





NEUROLOGIC patient is awake, alert and oriented ?-3.


 


.


 











- Labs


CBC & Chem 7: 


 07/07/18 06:29





 07/07/18 06:29


Labs: 


 Abnormal Lab Results - Last 24 Hours (Table)











  07/06/18 07/06/18 07/06/18 Range/Units





  11:32 16:39 20:48 


 


RBC     (3.80-5.40)  m/uL


 


Hgb     (11.4-16.0)  gm/dL


 


Hct     (34.0-46.0)  %


 


MCV     (80.0-100.0)  fL


 


MCHC     (31.0-37.0)  g/dL


 


Neutrophils #     (1.3-7.7)  k/uL


 


Lymphocytes #     (1.0-4.8)  k/uL


 


Potassium     (3.5-5.1)  mmol/L


 


Chloride     ()  mmol/L


 


BUN     (7-17)  mg/dL


 


Creatinine     (0.52-1.04)  mg/dL


 


Glucose     (74-99)  mg/dL


 


POC Glucose (mg/dL)  141 H  197 H  393 H  (75-99)  mg/dL


 


Total Protein     (6.3-8.2)  g/dL














  07/07/18 07/07/18 07/07/18 Range/Units





  06:28 06:29 06:29 


 


RBC   2.99 L   (3.80-5.40)  m/uL


 


Hgb   9.2 L   (11.4-16.0)  gm/dL


 


Hct   31.5 L   (34.0-46.0)  %


 


MCV   105.4 H   (80.0-100.0)  fL


 


MCHC   29.3 L   (31.0-37.0)  g/dL


 


Neutrophils #   9.2 H   (1.3-7.7)  k/uL


 


Lymphocytes #   0.4 L   (1.0-4.8)  k/uL


 


Potassium    5.9 H  (3.5-5.1)  mmol/L


 


Chloride    96 L  ()  mmol/L


 


BUN    45 H  (7-17)  mg/dL


 


Creatinine    5.36 H*  (0.52-1.04)  mg/dL


 


Glucose    381 H  (74-99)  mg/dL


 


POC Glucose (mg/dL)  376 H    (75-99)  mg/dL


 


Total Protein    6.1 L  (6.3-8.2)  g/dL














Assessment and Plan


Plan: 


Assessment and plan


#1 congestive heart failure secondary to diastolic dysfunction.  Acute on 

chronic


#2 end-stage renal disease on hemodialysis


#3 mildly abnormal cardiac enzymes likely secondary to renal dysfunction in the 

absence of any chest pain


#4 multiple comorbid conditions including diabetes, hypertension, dyslipidemia








Plan


From cardiology standpoint, we will recommend to continue current dose of oral 

Lasix. Patient may be able to be discharged once cleared by primary.  We will 

follow her along with you now on an as-needed basis only, please don't hesitate 

to call with any questions.








DNP note has been reviewed, I agree with a documented findings and plan of 

care.  Patient was seen and examined.

## 2018-07-07 NOTE — P.PN
Subjective


Progress Note Date: 07/07/18


Principal diagnosis: 





This is a 57-year-old female known with ESRD on dialysis Monday Wednesday Friday at the [facility] unit to came in because of shortness of breath.  She 

was dialyzed yesterday and has done well.  She states she is feeling much 

better nearly back to normal baseline shortness of breath.  She is on CPAP 

chronically at home.





Her blood sugars are somewhat high here today in the 393, 376 with a potassium 

therefore is up at 5.9.





Patient denies any significant complaints she is ambulatory.  She has no fever 

chills cough.  Shortness of breath at baseline at her best.





No dizzines.





100 hemoglobin is 9.2, up from 8.7 on admission.





Objective





- Vital Signs


Vital signs: 


 Vital Signs











Temp  97.0 F L  07/07/18 08:00


 


Pulse  68   07/07/18 08:30


 


Resp  20   07/07/18 08:00


 


BP  153/65   07/07/18 08:00


 


Pulse Ox  97   07/07/18 08:00








 Intake & Output











 07/06/18 07/07/18 07/07/18





 18:59 06:59 18:59


 


Intake Total 470 610 240


 


Balance 470 610 240


 


Weight  135.2 kg 


 


Intake:   


 


  IV  10 


 


    flush  10 


 


  Oral 470 600 240


 


Other:   


 


  Voiding Method  Toilet Toilet


 


  # Voids 0 1 


 


  # Bowel Movements 0  








Examination she is awake alert oriented.





She is on a nasal BiPAP machine.





HEENT exam no JVP neck is supple no facial asymmetry





Lungs are significant for diminished breath sounds bilaterally but no crackles 

no wheezing.





Heart sounds are unremarkable for  murmur rub gallop





Abdomen soft nontender somewhat obese





Extremity exam was no edema





Neurologically awake alert oriented.





- Labs


CBC & Chem 7: 


 07/07/18 06:29





 07/07/18 06:29


Labs: 


 Abnormal Lab Results - Last 24 Hours (Table)











  07/06/18 07/06/18 07/06/18 Range/Units





  11:32 16:39 20:48 


 


RBC     (3.80-5.40)  m/uL


 


Hgb     (11.4-16.0)  gm/dL


 


Hct     (34.0-46.0)  %


 


MCV     (80.0-100.0)  fL


 


MCHC     (31.0-37.0)  g/dL


 


Neutrophils #     (1.3-7.7)  k/uL


 


Lymphocytes #     (1.0-4.8)  k/uL


 


Potassium     (3.5-5.1)  mmol/L


 


Chloride     ()  mmol/L


 


BUN     (7-17)  mg/dL


 


Creatinine     (0.52-1.04)  mg/dL


 


Glucose     (74-99)  mg/dL


 


POC Glucose (mg/dL)  141 H  197 H  393 H  (75-99)  mg/dL


 


Total Protein     (6.3-8.2)  g/dL














  07/07/18 07/07/18 07/07/18 Range/Units





  06:28 06:29 06:29 


 


RBC   2.99 L   (3.80-5.40)  m/uL


 


Hgb   9.2 L   (11.4-16.0)  gm/dL


 


Hct   31.5 L   (34.0-46.0)  %


 


MCV   105.4 H   (80.0-100.0)  fL


 


MCHC   29.3 L   (31.0-37.0)  g/dL


 


Neutrophils #   9.2 H   (1.3-7.7)  k/uL


 


Lymphocytes #   0.4 L   (1.0-4.8)  k/uL


 


Potassium    5.9 H  (3.5-5.1)  mmol/L


 


Chloride    96 L  ()  mmol/L


 


BUN    45 H  (7-17)  mg/dL


 


Creatinine    5.36 H*  (0.52-1.04)  mg/dL


 


Glucose    381 H  (74-99)  mg/dL


 


POC Glucose (mg/dL)  376 H    (75-99)  mg/dL


 


Total Protein    6.1 L  (6.3-8.2)  g/dL














Assessment and Plan


Assessment: 





Impression





1.  ESRD on dialysis Monday Wednesday Friday.  Last dialysis yesterday Friday





2.  Admitted with shortness of breath improved and at baseline.





3.  History of being on nasal BiPAP and is comfortable at this time.





4.  Uncontrolled diabetes blood sugar 376 this morning and was 393 earlier.





5.  Hyperkalemia secondary to intracellular potassium shift because of 

hyperglycemia potassium is 5.9 this morning.





6.  Anemia of chronic kidney disease ESRD.  Hemoglobin improved





Recommendation.





Continue been discharged as she says she is comfortable adjusting her insulin 

dose at home and control her blood sugars.  The hyperkalemia is expected to 

improve with control blood sugar but suffice to say that she should maintain 

her 2 g potassium diet

## 2018-07-07 NOTE — P.PN
Subjective


Progress Note Date: 07/07/18 07/07/2018, patient seen eval reexamined during the rounds this patient was 

seen eval examined while covering for Dr. RUTHIE wells, this patient has been 

admitted to hospital with acute exacerbation of CHF with a component of COPD as 

well patient was also eval by cardiovascular services, clinically patient is 

doing much better likely will be discharged to follow with primary 

pulmonologist Dr. Jenkins





This is a 57-year-old female patient is well-known to our services being seen 

examined and evaluated today for consultation.  This patient came into the 

emergency room with worsening shortness of breath had been ongoing over the 

last few days.  She has a known history of end-stage renal disease and is on 

hemodialysis for the past year.  Upon workup in the emergency room she was 

noted to have an elevated troponin level 0.041 as well as an elevated d-dimer 

of 0.75 in the BNP of 3050.  Chest x-ray was consistent with fluid overload.  V/

Q scan showed low probability for pulmonary embolism.  Patient's BUN is 28 and 

her creatinine is 4.45 today.  Nephrology is on consult.  She normally has 

dialysis on Monday Wednesday Friday.  She did receive dialysis yesterday.  Upon 

examination she is resting up in bed on 4 L of supplemental oxygen which is 

what she wears at home as well.  She states she's feeling much better than 

yesterday.  She also did wear her BiPAP overnight which she wears every night 

at home as well for her obstructive sleep apnea.








Objective





- Vital Signs


Vital signs: 


 Vital Signs











Temp  97.0 F L  07/07/18 08:00


 


Pulse  78   07/07/18 12:00


 


Resp  20   07/07/18 12:00


 


BP  160/65   07/07/18 12:00


 


Pulse Ox  94 L  07/07/18 12:00








 Intake & Output











 07/06/18 07/07/18 07/07/18





 18:59 06:59 18:59


 


Intake Total 470 610 240


 


Balance 470 610 240


 


Weight  135.2 kg 


 


Intake:   


 


  IV  10 


 


    flush  10 


 


  Oral 470 600 240


 


Other:   


 


  Voiding Method  Toilet Toilet


 


  # Voids 0 1 


 


  # Bowel Movements 0  














- Exam





GENERAL EXAM: Alert, morbidly obese, comfortable in no apparent distress.


HEAD: Normocephalic.


EYES: Normal reaction of pupils, equal size.


NOSE: Clear with pink turbinates.


THROAT: No erythema or exudates.


NECK: No masses, no JVD.


CHEST: No chest wall deformity.


LUNGS: Equal air entry with few scattered crackles.  Basis diminished


CVS: S1 and S2 normal with no audible mumurs, regular rhythm.


ABDOMEN: No hepatosplenomegaly, normal bowel sounds, no guarding or rigidity.


EXTREMITIES: +1 edema noted, pedal pulses palpable.


CENTRAL NERVOUS SYSTEM: No focal deficits, tone is normal in all 4 extremities.








- Labs


CBC & Chem 7: 


 07/07/18 06:29





 07/07/18 06:29


Labs: 


 Abnormal Lab Results - Last 24 Hours (Table)











  07/06/18 07/06/18 07/07/18 Range/Units





  16:39 20:48 06:28 


 


RBC     (3.80-5.40)  m/uL


 


Hgb     (11.4-16.0)  gm/dL


 


Hct     (34.0-46.0)  %


 


MCV     (80.0-100.0)  fL


 


MCHC     (31.0-37.0)  g/dL


 


Neutrophils #     (1.3-7.7)  k/uL


 


Lymphocytes #     (1.0-4.8)  k/uL


 


Potassium     (3.5-5.1)  mmol/L


 


Chloride     ()  mmol/L


 


BUN     (7-17)  mg/dL


 


Creatinine     (0.52-1.04)  mg/dL


 


Glucose     (74-99)  mg/dL


 


POC Glucose (mg/dL)  197 H  393 H  376 H  (75-99)  mg/dL


 


Total Protein     (6.3-8.2)  g/dL














  07/07/18 07/07/18 07/07/18 Range/Units





  06:29 06:29 11:35 


 


RBC  2.99 L    (3.80-5.40)  m/uL


 


Hgb  9.2 L    (11.4-16.0)  gm/dL


 


Hct  31.5 L    (34.0-46.0)  %


 


MCV  105.4 H    (80.0-100.0)  fL


 


MCHC  29.3 L    (31.0-37.0)  g/dL


 


Neutrophils #  9.2 H    (1.3-7.7)  k/uL


 


Lymphocytes #  0.4 L    (1.0-4.8)  k/uL


 


Potassium   5.9 H   (3.5-5.1)  mmol/L


 


Chloride   96 L   ()  mmol/L


 


BUN   45 H   (7-17)  mg/dL


 


Creatinine   5.36 H*   (0.52-1.04)  mg/dL


 


Glucose   381 H   (74-99)  mg/dL


 


POC Glucose (mg/dL)    418 H  (75-99)  mg/dL


 


Total Protein   6.1 L   (6.3-8.2)  g/dL














Assessment and Plan


Assessment: 


Acute on chronic hypoxic respiratory failure requiring supplemental oxygen





Acute exacerbation of CHF related to fluid overload





Acute exacerbation of COPD





Morbidly obese





Obstructive sleep apnea





Chronic end-stage kidney disease on dialysis





Eosinophilia





Insulin-dependent diabetes mellitus





Plan: 


Medications have been reviewed and will be continued as ordered. 





Continue with pulmonary hygiene, coughing and deep breathing exercises, and 

supportive care. 





Supplemental oxygen to maintain oxygen saturations of 92% or better. 





Continue nebulizer treatments. 





BiPAP from home use every night and with naps





GI and DVT prophylaxis.


 


We will continue to monitor labs/results and adjust treatment as necessary. 





Further recommendations pending.  Overall patient can be discharged home follow 

with the primary pulmonary service in outpatient setting





Time with Patient: Greater than 30

## 2018-11-19 ENCOUNTER — HOSPITAL ENCOUNTER (EMERGENCY)
Dept: HOSPITAL 47 - EC | Age: 58
Discharge: OTHER INSTITUTION | End: 2018-11-19
Payer: MEDICARE

## 2018-11-19 VITALS
SYSTOLIC BLOOD PRESSURE: 155 MMHG | DIASTOLIC BLOOD PRESSURE: 53 MMHG | HEART RATE: 90 BPM | TEMPERATURE: 97 F | RESPIRATION RATE: 22 BRPM

## 2018-11-19 DIAGNOSIS — Z88.1: ICD-10-CM

## 2018-11-19 DIAGNOSIS — Z88.6: ICD-10-CM

## 2018-11-19 DIAGNOSIS — Z99.2: ICD-10-CM

## 2018-11-19 DIAGNOSIS — J44.9: ICD-10-CM

## 2018-11-19 DIAGNOSIS — G47.30: ICD-10-CM

## 2018-11-19 DIAGNOSIS — I50.9: ICD-10-CM

## 2018-11-19 DIAGNOSIS — Z79.899: ICD-10-CM

## 2018-11-19 DIAGNOSIS — F32.9: ICD-10-CM

## 2018-11-19 DIAGNOSIS — Z90.5: ICD-10-CM

## 2018-11-19 DIAGNOSIS — Z87.01: ICD-10-CM

## 2018-11-19 DIAGNOSIS — E78.5: ICD-10-CM

## 2018-11-19 DIAGNOSIS — Z79.02: ICD-10-CM

## 2018-11-19 DIAGNOSIS — I11.0: ICD-10-CM

## 2018-11-19 DIAGNOSIS — Z98.890: ICD-10-CM

## 2018-11-19 DIAGNOSIS — Z96.651: ICD-10-CM

## 2018-11-19 DIAGNOSIS — R05: ICD-10-CM

## 2018-11-19 DIAGNOSIS — E11.40: ICD-10-CM

## 2018-11-19 DIAGNOSIS — M19.90: ICD-10-CM

## 2018-11-19 DIAGNOSIS — Z79.891: ICD-10-CM

## 2018-11-19 DIAGNOSIS — R06.00: Primary | ICD-10-CM

## 2018-11-19 DIAGNOSIS — Z91.048: ICD-10-CM

## 2018-11-19 DIAGNOSIS — Z87.891: ICD-10-CM

## 2018-11-19 DIAGNOSIS — Z99.89: ICD-10-CM

## 2018-11-19 DIAGNOSIS — E87.70: ICD-10-CM

## 2018-11-19 DIAGNOSIS — Z79.4: ICD-10-CM

## 2018-11-19 DIAGNOSIS — Z98.84: ICD-10-CM

## 2018-11-19 LAB
ALBUMIN SERPL-MCNC: 3.5 G/DL (ref 3.5–5)
ALP SERPL-CCNC: 169 U/L (ref 38–126)
ALT SERPL-CCNC: 25 U/L (ref 9–52)
ANION GAP SERPL CALC-SCNC: 20 MMOL/L
APTT BLD: 16.8 SEC (ref 22–30)
AST SERPL-CCNC: 21 U/L (ref 14–36)
BASOPHILS # BLD AUTO: 0.1 K/UL (ref 0–0.2)
BASOPHILS NFR BLD AUTO: 1 %
BUN SERPL-SCNC: 74 MG/DL (ref 7–17)
CALCIUM SPEC-MCNC: 8.5 MG/DL (ref 8.4–10.2)
CHLORIDE SERPL-SCNC: 90 MMOL/L (ref 98–107)
CK SERPL-CCNC: 52 U/L (ref 30–135)
CO2 SERPL-SCNC: 19 MMOL/L (ref 22–30)
EOSINOPHIL # BLD AUTO: 0.1 K/UL (ref 0–0.7)
EOSINOPHIL NFR BLD AUTO: 1 %
ERYTHROCYTE [DISTWIDTH] IN BLOOD BY AUTOMATED COUNT: 2.7 M/UL (ref 3.8–5.4)
ERYTHROCYTE [DISTWIDTH] IN BLOOD: 16 % (ref 11.5–15.5)
GLUCOSE SERPL-MCNC: 561 MG/DL (ref 74–99)
HCT VFR BLD AUTO: 28.5 % (ref 34–46)
HGB BLD-MCNC: 9.1 GM/DL (ref 11.4–16)
INR PPP: 1 (ref ?–1.2)
LYMPHOCYTES # SPEC AUTO: 0.4 K/UL (ref 1–4.8)
LYMPHOCYTES NFR SPEC AUTO: 3 %
MAGNESIUM SPEC-SCNC: 1.8 MG/DL (ref 1.6–2.3)
MCH RBC QN AUTO: 33.8 PG (ref 25–35)
MCHC RBC AUTO-ENTMCNC: 32 G/DL (ref 31–37)
MCV RBC AUTO: 105.6 FL (ref 80–100)
MONOCYTES # BLD AUTO: 0.5 K/UL (ref 0–1)
MONOCYTES NFR BLD AUTO: 4 %
NEUTROPHILS # BLD AUTO: 11.6 K/UL (ref 1.3–7.7)
NEUTROPHILS NFR BLD AUTO: 91 %
PLATELET # BLD AUTO: 274 K/UL (ref 150–450)
POTASSIUM SERPL-SCNC: 5.4 MMOL/L (ref 3.5–5.1)
PROT SERPL-MCNC: 5.7 G/DL (ref 6.3–8.2)
PT BLD: 9.5 SEC (ref 9–12)
SODIUM SERPL-SCNC: 129 MMOL/L (ref 137–145)
TROPONIN I SERPL-MCNC: 0.06 NG/ML (ref 0–0.03)
WBC # BLD AUTO: 12.7 K/UL (ref 3.8–10.6)

## 2018-11-19 PROCEDURE — 82553 CREATINE MB FRACTION: CPT

## 2018-11-19 PROCEDURE — 85730 THROMBOPLASTIN TIME PARTIAL: CPT

## 2018-11-19 PROCEDURE — 93005 ELECTROCARDIOGRAM TRACING: CPT

## 2018-11-19 PROCEDURE — 83735 ASSAY OF MAGNESIUM: CPT

## 2018-11-19 PROCEDURE — 99285 EMERGENCY DEPT VISIT HI MDM: CPT

## 2018-11-19 PROCEDURE — 85025 COMPLETE CBC W/AUTO DIFF WBC: CPT

## 2018-11-19 PROCEDURE — 80053 COMPREHEN METABOLIC PANEL: CPT

## 2018-11-19 PROCEDURE — 85610 PROTHROMBIN TIME: CPT

## 2018-11-19 PROCEDURE — 36415 COLL VENOUS BLD VENIPUNCTURE: CPT

## 2018-11-19 PROCEDURE — 82550 ASSAY OF CK (CPK): CPT

## 2018-11-19 PROCEDURE — 87502 INFLUENZA DNA AMP PROBE: CPT

## 2018-11-19 PROCEDURE — 71046 X-RAY EXAM CHEST 2 VIEWS: CPT

## 2018-11-19 PROCEDURE — 84484 ASSAY OF TROPONIN QUANT: CPT

## 2018-11-19 NOTE — ED
General Adult HPI





- General


Chief complaint: Shortness of Breath


Stated complaint: fluid overload


Source: patient, family


Mode of arrival: wheelchair


Limitations: no limitations





- History of Present Illness


Initial comments: 





Dictation was produced using dragon dictation software. please excuse any 

grammatical, word or spelling errors. 





Chief Complaint: 57-year-old  female with multiple comorbidities.  

Patient presents with chief complaint of dyspnea.





History of Present Illness: 57-year-old  female multiple comorbidities 

presents presents with shortness of breath.  Patient has a history of COPD, CHF 

and asthma.  Patient states her dyspnea is worse with exertion.  States that she

's been drinking more fluid than usual.  Patient is a usual prior to dialysis 

patient.  Her neurologist is Dr. Esquivel.  Patient denies any constitutional 

symptoms.  He has been complaining of increased cough and viral URI-type 

symptoms.








The ROS documented in this emergency department record has been reviewed and 

confirmed by me.  Those systems with pertinent positive or negative responses 

have been documented in the HPI.  All other systems are other negative and/or 

noncontributory.





- Related Data


 Home Medications











 Medication  Instructions  Recorded  Confirmed


 


Atorvastatin [Lipitor] 80 mg PO HS 09/13/15 11/19/18


 


Levothyroxine Sodium [Synthroid] 175 mcg PO DAILY 09/13/15 11/19/18


 


HYDROcodone/APAP 7.5-325MG [Norco 1 tab PO QID 17





7.5-325]   


 


Omalizumab [Xolair] 150 mg SQ Q30D 17


 


Clopidogrel [Plavix] 75 mg PO DAILY 18


 


Insulin Regular, Human [NovoLIN R] 100 unit SQ AC-BID 18


 


Ipratropium-Albuterol Nebulize 3 ml INHALATION RT-QID PRN 18





[Duoneb 0.5 mg-3 mg/3 ml Soln]   


 


Acetaminophen Tab [Tylenol Tab] 1,000 mg PO Q6HR PRN 18


 


Albuterol Sulfate [Proair Hfa] 2 puff INHALATION RT-QID PRN 18


 


B Complex W-C No.20/Folic Acid 1 mg PO DAILY 18





[Renal Caps Softgel]   


 


Bimatoprost [Lumigan .01% Ophth 1 drop BOTH EYES HS 18





Soln]   


 


Cholecalciferol [Vitamin D3] 5,000 unit PO DAILY 18


 


FLUoxetine HCL [PROzac] 20 mg PO DAILY 18


 


Famotidine [Pepcid] 40 mg PO HS 18


 


Fenofibrate,Micronized 200 mg PO DAILY 18





[Fenofibrate]   


 


Folic Acid 0.8 mg PO DAILY 18


 


Insulin Aspart [Novolog Flexpen] See Protocol SQ AC-BID 18


 


Ketoconazole 2% Cream [Nizoral 2%] 1 applic TOPICAL BID 18


 


Sevelamer [Renvela] 2,400 mg PO AC-TID 18


 


Tiotropium 18 Mcg/Puff [Spiriva] 1 cap INHALATION RT-DAILY 18


 


Triamcinolone 0.025% Cream 1 applic TOPICAL BID 18





[Kenalog 0.025% Cream]   








 Previous Rx's











 Medication  Instructions  Recorded


 


Aspirin 325 mg PO DAILY  tab 17


 


diphenhydrAMINE [Benadryl] 25 mg PO Q6HR PRN  cap 17


 


Cinacalcet [Sensipar] 30 mg PO TU@1100  tab 18











 Allergies











Allergy/AdvReac Type Severity Reaction Status Date / Time


 


erythromycin base Allergy  Unknown Verified 18 19:08





[Erythromycin Base]     


 


NSAIDS (Non-Steroidal Allergy  Unknown Verified 18 19:08





Anti-Inflamma     


 


PAPER TAPE Allergy  Rash/Hives Uncoded 18 17:33














Review of Systems


ROS Statement: 


Those systems with pertinent positive or pertinent negative responses have been 

documented in the HPI.





ROS Other: All systems not noted in ROS Statement are negative.





Past Medical History


Past Medical History: Asthma, Heart Failure, COPD, CVA/TIA, Diabetes Mellitus, 

Eye Disorder, Hyperlipidemia, Hypertension, Osteoarthritis (OA), Pneumonia, 

Renal Disease, Sleep Apnea/CPAP/BIPAP, Thyroid Disorder


Additional Past Medical History / Comment(s): sleep apnea, neuropathy, patient 

has one kidney, Stage III kidney failure, peritional dialysis- right side 

abdominal port.


History of Any Multi-Drug Resistant Organisms: None Reported


Past Surgical History: Bariatric Surgery, Hernia Repair, Orthopedic Surgery, 

Tonsillectomy


Additional Past Surgical History / Comment(s): rt kidney removed, Rt knee 

replacement


Past Anesthesia/Blood Transfusion Reactions: No Reported Reaction


Additional Past Anesthesia/Blood Transfusion Reaction / Comment(s): pt states 

[facility], "it made her itchy and they 

gave bendyl"


Past Psychological History: Anxiety, Depression


Smoking Status: Former smoker


Past Alcohol Use History: None Reported


Past Drug Use History: None Reported





- Past Family History


  ** Mother


Additional Family Medical History / Comment(s): skin CA, CHF, DM, thyroid 

disorder, HTN.





  ** Father


Family Medical History: Diabetes Mellitus, Hypertension, Thyroid Disorder


Additional Family Medical History / Comment(s): CHF.  Pt's father is .





General Exam





- General Exam Comments


Initial Comments: 





PHYSICAL EXAM:


General Impression: Alert and oriented x3, not in acute distress


HEENT: Normocephalic atraumatic, extra-ocular movements intact, pupils equal 

and reactive to light bilaterally, mucous membranes moist.


Cardiovascular: Heart regular rate and rhythm, S1&S2 audible, no murmurs, rubs 

or gallops


Chest: Bilateral lung crackles


Abdomen: Bowel sounds present, abdomen soft, non-tender, non-distended, no 

organomegaly


Musculoskeletal: Pulses present and equal in all extremities, no peripheral 

edema


Motor: Power 5/5 bilaterally, no focal deficits noted


Neurological: CN II-XII grossly intact, no focal motor or sensory deficits noted


Skin: Intact with no visualized rashes


Psych: Normal affect and mood


Limitations: no limitations





Course





 Vital Signs











  18





  17:28


 


Temperature 98.5 F


 


Pulse Rate 83


 


Respiratory 18





Rate 


 


Blood Pressure 170/63


 


O2 Sat by Pulse 100





Oximetry 














Medical Decision Making





- Medical Decision Making








ED course: 57-year-old female with past medical history of CVA, diabetes, COPD, 

heart failure presents with shortness of breath.  HPI is consistent with 

congestive heart failure versus fluid overload due to incomplete dialysis or 

increase fluid intake.  Upon arrival are within acceptable limits.  Discussed 

patient case with nephrologist who requests 2.5 solution 2 L every 6 hours.  

Return evaluation obtained findings are within acceptable limits.  Patient's 

urine creatinine are slightly above baseline.  Patient's glucose is 561.  

Influenza test is negative.  Patient be admitted to the floor for further care.





- Lab Data


Result diagrams: 


 18 18:40





 18 18:40





 Lab Results











  18 Range/Units





  18:40 18:40 18:40 


 


WBC   12.7 H   (3.8-10.6)  k/uL


 


RBC   2.70 L   (3.80-5.40)  m/uL


 


Hgb   9.1 L   (11.4-16.0)  gm/dL


 


Hct   28.5 L   (34.0-46.0)  %


 


MCV   105.6 H   (80.0-100.0)  fL


 


MCH   33.8   (25.0-35.0)  pg


 


MCHC   32.0   (31.0-37.0)  g/dL


 


RDW   16.0 H   (11.5-15.5)  %


 


Plt Count   274   (150-450)  k/uL


 


Neutrophils %   91   %


 


Lymphocytes %   3   %


 


Monocytes %   4   %


 


Eosinophils %   1   %


 


Basophils %   1   %


 


Neutrophils #   11.6 H   (1.3-7.7)  k/uL


 


Lymphocytes #   0.4 L   (1.0-4.8)  k/uL


 


Monocytes #   0.5   (0-1.0)  k/uL


 


Eosinophils #   0.1   (0-0.7)  k/uL


 


Basophils #   0.1   (0-0.2)  k/uL


 


Hypochromasia   Marked   


 


Macrocytosis   Moderate   


 


PT     (9.0-12.0)  sec


 


INR     (<1.2)  


 


APTT     (22.0-30.0)  sec


 


Sodium    129 L  (137-145)  mmol/L


 


Potassium    5.4 H  (3.5-5.1)  mmol/L


 


Chloride    90 L  ()  mmol/L


 


Carbon Dioxide    19 L  (22-30)  mmol/L


 


Anion Gap    20  mmol/L


 


BUN    74 H  (7-17)  mg/dL


 


Creatinine    9.52 H*  (0.52-1.04)  mg/dL


 


Est GFR (CKD-EPI)AfAm    5  (>60 ml/min/1.73 sqM)  


 


Est GFR (CKD-EPI)NonAf    4  (>60 ml/min/1.73 sqM)  


 


Glucose    561 H*  (74-99)  mg/dL


 


Calcium    8.5  (8.4-10.2)  mg/dL


 


Magnesium    1.8  (1.6-2.3)  mg/dL


 


Total Bilirubin    0.7  (0.2-1.3)  mg/dL


 


AST    21  (14-36)  U/L


 


ALT    25  (9-52)  U/L


 


Alkaline Phosphatase    169 H  ()  U/L


 


Total Creatine Kinase  52    ()  U/L


 


CK-MB (CK-2)  1.5    (0.0-2.4)  ng/mL


 


CK-MB (CK-2) Rel Index  2.9    


 


Troponin I  0.056 H*    (0.000-0.034)  ng/mL


 


Total Protein    5.7 L  (6.3-8.2)  g/dL


 


Albumin    3.5  (3.5-5.0)  g/dL


 


Influenza Type A RNA     (Not Detectd)  


 


Influenza Type B (PCR)     (Not Detectd)  














  18 Range/Units





  18:40 19:45 


 


WBC    (3.8-10.6)  k/uL


 


RBC    (3.80-5.40)  m/uL


 


Hgb    (11.4-16.0)  gm/dL


 


Hct    (34.0-46.0)  %


 


MCV    (80.0-100.0)  fL


 


MCH    (25.0-35.0)  pg


 


MCHC    (31.0-37.0)  g/dL


 


RDW    (11.5-15.5)  %


 


Plt Count    (150-450)  k/uL


 


Neutrophils %    %


 


Lymphocytes %    %


 


Monocytes %    %


 


Eosinophils %    %


 


Basophils %    %


 


Neutrophils #    (1.3-7.7)  k/uL


 


Lymphocytes #    (1.0-4.8)  k/uL


 


Monocytes #    (0-1.0)  k/uL


 


Eosinophils #    (0-0.7)  k/uL


 


Basophils #    (0-0.2)  k/uL


 


Hypochromasia    


 


Macrocytosis    


 


PT  9.5   (9.0-12.0)  sec


 


INR  1.0   (<1.2)  


 


APTT  16.8 L   (22.0-30.0)  sec


 


Sodium    (137-145)  mmol/L


 


Potassium    (3.5-5.1)  mmol/L


 


Chloride    ()  mmol/L


 


Carbon Dioxide    (22-30)  mmol/L


 


Anion Gap    mmol/L


 


BUN    (7-17)  mg/dL


 


Creatinine    (0.52-1.04)  mg/dL


 


Est GFR (CKD-EPI)AfAm    (>60 ml/min/1.73 sqM)  


 


Est GFR (CKD-EPI)NonAf    (>60 ml/min/1.73 sqM)  


 


Glucose    (74-99)  mg/dL


 


Calcium    (8.4-10.2)  mg/dL


 


Magnesium    (1.6-2.3)  mg/dL


 


Total Bilirubin    (0.2-1.3)  mg/dL


 


AST    (14-36)  U/L


 


ALT    (9-52)  U/L


 


Alkaline Phosphatase    ()  U/L


 


Total Creatine Kinase    ()  U/L


 


CK-MB (CK-2)    (0.0-2.4)  ng/mL


 


CK-MB (CK-2) Rel Index    


 


Troponin I    (0.000-0.034)  ng/mL


 


Total Protein    (6.3-8.2)  g/dL


 


Albumin    (3.5-5.0)  g/dL


 


Influenza Type A RNA   Not Detected  (Not Detectd)  


 


Influenza Type B (PCR)   Not Detected  (Not Detectd)  














Disposition


Clinical Impression: 


 Dyspnea





Disposition: ADMITTED AS IP TO THIS HOSP


Condition: Fair


Referrals: 


Balbina Pugh DO [Primary Care Provider] - 1-2 days


Decision Time: 21:15

## 2018-11-19 NOTE — ED
Medical Decision Making





- Medical Decision Making


At approximately 10:06 PM patient expressed a desire to be discharge.  

Discussed the patient and recommend that she be admitted to the hospital 

however she does have good follow-up.  She has an appointment with her 

nephrologist tomorrow.  Patient is ambulatory at baseline without any 

difficulties.  Her shortness of breath is been stable.  Lung sounds are clear.  

I believe this is a reasonable disposition given that patient is well-appearing 

has stable labs and has appointment tomorrow with nephrologist.  Patient is 

instructed to return to the emergency Department with any changes.  She is 

understandable and agreeable to plan.








- Lab Data


Result diagrams: 


 11/19/18 18:40





 11/19/18 18:40





 Lab Results











  11/19/18 11/19/18 11/19/18 Range/Units





  18:40 18:40 18:40 


 


WBC   12.7 H   (3.8-10.6)  k/uL


 


RBC   2.70 L   (3.80-5.40)  m/uL


 


Hgb   9.1 L   (11.4-16.0)  gm/dL


 


Hct   28.5 L   (34.0-46.0)  %


 


MCV   105.6 H   (80.0-100.0)  fL


 


MCH   33.8   (25.0-35.0)  pg


 


MCHC   32.0   (31.0-37.0)  g/dL


 


RDW   16.0 H   (11.5-15.5)  %


 


Plt Count   274   (150-450)  k/uL


 


Neutrophils %   91   %


 


Lymphocytes %   3   %


 


Monocytes %   4   %


 


Eosinophils %   1   %


 


Basophils %   1   %


 


Neutrophils #   11.6 H   (1.3-7.7)  k/uL


 


Lymphocytes #   0.4 L   (1.0-4.8)  k/uL


 


Monocytes #   0.5   (0-1.0)  k/uL


 


Eosinophils #   0.1   (0-0.7)  k/uL


 


Basophils #   0.1   (0-0.2)  k/uL


 


Hypochromasia   Marked   


 


Macrocytosis   Moderate   


 


PT     (9.0-12.0)  sec


 


INR     (<1.2)  


 


APTT     (22.0-30.0)  sec


 


Sodium    129 L  (137-145)  mmol/L


 


Potassium    5.4 H  (3.5-5.1)  mmol/L


 


Chloride    90 L  ()  mmol/L


 


Carbon Dioxide    19 L  (22-30)  mmol/L


 


Anion Gap    20  mmol/L


 


BUN    74 H  (7-17)  mg/dL


 


Creatinine    9.52 H*  (0.52-1.04)  mg/dL


 


Est GFR (CKD-EPI)AfAm    5  (>60 ml/min/1.73 sqM)  


 


Est GFR (CKD-EPI)NonAf    4  (>60 ml/min/1.73 sqM)  


 


Glucose    561 H*  (74-99)  mg/dL


 


Calcium    8.5  (8.4-10.2)  mg/dL


 


Magnesium    1.8  (1.6-2.3)  mg/dL


 


Total Bilirubin    0.7  (0.2-1.3)  mg/dL


 


AST    21  (14-36)  U/L


 


ALT    25  (9-52)  U/L


 


Alkaline Phosphatase    169 H  ()  U/L


 


Total Creatine Kinase  52    ()  U/L


 


CK-MB (CK-2)  1.5    (0.0-2.4)  ng/mL


 


CK-MB (CK-2) Rel Index  2.9    


 


Troponin I  0.056 H*    (0.000-0.034)  ng/mL


 


Total Protein    5.7 L  (6.3-8.2)  g/dL


 


Albumin    3.5  (3.5-5.0)  g/dL


 


Influenza Type A RNA     (Not Detectd)  


 


Influenza Type B (PCR)     (Not Detectd)  














  11/19/18 11/19/18 Range/Units





  18:40 19:45 


 


WBC    (3.8-10.6)  k/uL


 


RBC    (3.80-5.40)  m/uL


 


Hgb    (11.4-16.0)  gm/dL


 


Hct    (34.0-46.0)  %


 


MCV    (80.0-100.0)  fL


 


MCH    (25.0-35.0)  pg


 


MCHC    (31.0-37.0)  g/dL


 


RDW    (11.5-15.5)  %


 


Plt Count    (150-450)  k/uL


 


Neutrophils %    %


 


Lymphocytes %    %


 


Monocytes %    %


 


Eosinophils %    %


 


Basophils %    %


 


Neutrophils #    (1.3-7.7)  k/uL


 


Lymphocytes #    (1.0-4.8)  k/uL


 


Monocytes #    (0-1.0)  k/uL


 


Eosinophils #    (0-0.7)  k/uL


 


Basophils #    (0-0.2)  k/uL


 


Hypochromasia    


 


Macrocytosis    


 


PT  9.5   (9.0-12.0)  sec


 


INR  1.0   (<1.2)  


 


APTT  16.8 L   (22.0-30.0)  sec


 


Sodium    (137-145)  mmol/L


 


Potassium    (3.5-5.1)  mmol/L


 


Chloride    ()  mmol/L


 


Carbon Dioxide    (22-30)  mmol/L


 


Anion Gap    mmol/L


 


BUN    (7-17)  mg/dL


 


Creatinine    (0.52-1.04)  mg/dL


 


Est GFR (CKD-EPI)AfAm    (>60 ml/min/1.73 sqM)  


 


Est GFR (CKD-EPI)NonAf    (>60 ml/min/1.73 sqM)  


 


Glucose    (74-99)  mg/dL


 


Calcium    (8.4-10.2)  mg/dL


 


Magnesium    (1.6-2.3)  mg/dL


 


Total Bilirubin    (0.2-1.3)  mg/dL


 


AST    (14-36)  U/L


 


ALT    (9-52)  U/L


 


Alkaline Phosphatase    ()  U/L


 


Total Creatine Kinase    ()  U/L


 


CK-MB (CK-2)    (0.0-2.4)  ng/mL


 


CK-MB (CK-2) Rel Index    


 


Troponin I    (0.000-0.034)  ng/mL


 


Total Protein    (6.3-8.2)  g/dL


 


Albumin    (3.5-5.0)  g/dL


 


Influenza Type A RNA   Not Detected  (Not Detectd)  


 


Influenza Type B (PCR)   Not Detected  (Not Detectd)  














Disposition


Clinical Impression: 


 Dyspnea





Disposition: ADMITTED AS IP TO THIS HOSP


Condition: Fair


Is patient prescribed a controlled substance at d/c from ED?: No


Referrals: 


Balbina Pugh DO [Primary Care Provider] - 1-2 days


Time of Disposition: 22:07

## 2018-11-19 NOTE — XR
EXAMINATION TYPE: XR chest 2V

 

DATE OF EXAM: 11/19/2018

 

COMPARISON: 7/6/2018

 

HISTORY: Cough and short of breath

 

TECHNIQUE:  Frontal and lateral views of the chest are obtained.

 

FINDINGS:  Heart is borderline enlarged. There is no heart failure. There is some fluid in the left m
ajor fissure. There are chest leads. There is no pulmonary consolidation.

 

IMPRESSION:  Small amount of fluid in the left major fissure is a change compared to old exam. No mela
ss heart failure. No pulmonary consolidation.

## 2018-12-06 ENCOUNTER — HOSPITAL ENCOUNTER (INPATIENT)
Dept: HOSPITAL 47 - EC | Age: 58
LOS: 20 days | Discharge: HOSPICE | DRG: 622 | End: 2018-12-26
Payer: MEDICARE

## 2018-12-06 VITALS — BODY MASS INDEX: 52.5 KG/M2

## 2018-12-06 DIAGNOSIS — E87.1: ICD-10-CM

## 2018-12-06 DIAGNOSIS — Z90.5: ICD-10-CM

## 2018-12-06 DIAGNOSIS — I25.10: ICD-10-CM

## 2018-12-06 DIAGNOSIS — Z79.890: ICD-10-CM

## 2018-12-06 DIAGNOSIS — Z86.73: ICD-10-CM

## 2018-12-06 DIAGNOSIS — Z99.2: ICD-10-CM

## 2018-12-06 DIAGNOSIS — I96: ICD-10-CM

## 2018-12-06 DIAGNOSIS — T38.3X5A: ICD-10-CM

## 2018-12-06 DIAGNOSIS — Z98.84: ICD-10-CM

## 2018-12-06 DIAGNOSIS — Z88.6: ICD-10-CM

## 2018-12-06 DIAGNOSIS — L89.210: ICD-10-CM

## 2018-12-06 DIAGNOSIS — Z82.5: ICD-10-CM

## 2018-12-06 DIAGNOSIS — Z74.01: ICD-10-CM

## 2018-12-06 DIAGNOSIS — S80.10XA: ICD-10-CM

## 2018-12-06 DIAGNOSIS — Z99.81: ICD-10-CM

## 2018-12-06 DIAGNOSIS — Z79.02: ICD-10-CM

## 2018-12-06 DIAGNOSIS — Z87.891: ICD-10-CM

## 2018-12-06 DIAGNOSIS — N18.6: ICD-10-CM

## 2018-12-06 DIAGNOSIS — K92.2: ICD-10-CM

## 2018-12-06 DIAGNOSIS — M19.90: ICD-10-CM

## 2018-12-06 DIAGNOSIS — S30.0XXA: ICD-10-CM

## 2018-12-06 DIAGNOSIS — Z88.1: ICD-10-CM

## 2018-12-06 DIAGNOSIS — K59.03: ICD-10-CM

## 2018-12-06 DIAGNOSIS — Z91.048: ICD-10-CM

## 2018-12-06 DIAGNOSIS — E11.22: ICD-10-CM

## 2018-12-06 DIAGNOSIS — R62.7: ICD-10-CM

## 2018-12-06 DIAGNOSIS — N17.9: ICD-10-CM

## 2018-12-06 DIAGNOSIS — G47.33: ICD-10-CM

## 2018-12-06 DIAGNOSIS — E83.89: ICD-10-CM

## 2018-12-06 DIAGNOSIS — L89.150: ICD-10-CM

## 2018-12-06 DIAGNOSIS — E11.52: ICD-10-CM

## 2018-12-06 DIAGNOSIS — J44.1: ICD-10-CM

## 2018-12-06 DIAGNOSIS — Z79.4: ICD-10-CM

## 2018-12-06 DIAGNOSIS — L03.312: ICD-10-CM

## 2018-12-06 DIAGNOSIS — I08.1: ICD-10-CM

## 2018-12-06 DIAGNOSIS — Z82.49: ICD-10-CM

## 2018-12-06 DIAGNOSIS — K56.7: ICD-10-CM

## 2018-12-06 DIAGNOSIS — Z87.01: ICD-10-CM

## 2018-12-06 DIAGNOSIS — I50.32: ICD-10-CM

## 2018-12-06 DIAGNOSIS — Z80.9: ICD-10-CM

## 2018-12-06 DIAGNOSIS — J96.11: ICD-10-CM

## 2018-12-06 DIAGNOSIS — S70.12XA: ICD-10-CM

## 2018-12-06 DIAGNOSIS — T40.605A: ICD-10-CM

## 2018-12-06 DIAGNOSIS — E66.01: ICD-10-CM

## 2018-12-06 DIAGNOSIS — D63.1: ICD-10-CM

## 2018-12-06 DIAGNOSIS — K21.9: ICD-10-CM

## 2018-12-06 DIAGNOSIS — T38.3X6A: ICD-10-CM

## 2018-12-06 DIAGNOSIS — I13.2: ICD-10-CM

## 2018-12-06 DIAGNOSIS — Z83.3: ICD-10-CM

## 2018-12-06 DIAGNOSIS — E83.42: ICD-10-CM

## 2018-12-06 DIAGNOSIS — E87.2: ICD-10-CM

## 2018-12-06 DIAGNOSIS — I95.81: ICD-10-CM

## 2018-12-06 DIAGNOSIS — R32: ICD-10-CM

## 2018-12-06 DIAGNOSIS — R77.9: ICD-10-CM

## 2018-12-06 DIAGNOSIS — D62: ICD-10-CM

## 2018-12-06 DIAGNOSIS — E11.65: Primary | ICD-10-CM

## 2018-12-06 DIAGNOSIS — E78.5: ICD-10-CM

## 2018-12-06 DIAGNOSIS — Z83.49: ICD-10-CM

## 2018-12-06 DIAGNOSIS — E03.9: ICD-10-CM

## 2018-12-06 DIAGNOSIS — E16.0: ICD-10-CM

## 2018-12-06 DIAGNOSIS — E87.6: ICD-10-CM

## 2018-12-06 DIAGNOSIS — E11.40: ICD-10-CM

## 2018-12-06 DIAGNOSIS — G89.29: ICD-10-CM

## 2018-12-06 DIAGNOSIS — K59.01: ICD-10-CM

## 2018-12-06 DIAGNOSIS — Z79.82: ICD-10-CM

## 2018-12-06 DIAGNOSIS — J44.9: ICD-10-CM

## 2018-12-06 DIAGNOSIS — I95.9: ICD-10-CM

## 2018-12-06 DIAGNOSIS — F41.9: ICD-10-CM

## 2018-12-06 DIAGNOSIS — F33.2: ICD-10-CM

## 2018-12-06 DIAGNOSIS — S30.1XXA: ICD-10-CM

## 2018-12-06 DIAGNOSIS — Z91.19: ICD-10-CM

## 2018-12-06 DIAGNOSIS — Z96.651: ICD-10-CM

## 2018-12-06 DIAGNOSIS — Z79.899: ICD-10-CM

## 2018-12-06 LAB
ALBUMIN SERPL-MCNC: 3.2 G/DL (ref 3.5–5)
ALP SERPL-CCNC: 104 U/L (ref 38–126)
ALT SERPL-CCNC: 25 U/L (ref 9–52)
ANION GAP SERPL CALC-SCNC: 16 MMOL/L
AST SERPL-CCNC: 17 U/L (ref 14–36)
BASOPHILS # BLD AUTO: 0 K/UL (ref 0–0.2)
BASOPHILS NFR BLD AUTO: 0 %
BUN SERPL-SCNC: 37 MG/DL (ref 7–17)
CALCIUM SPEC-MCNC: 8.5 MG/DL (ref 8.4–10.2)
CHLORIDE SERPL-SCNC: 90 MMOL/L (ref 98–107)
CK SERPL-CCNC: 53 U/L (ref 30–135)
CO2 SERPL-SCNC: 24 MMOL/L (ref 22–30)
EOSINOPHIL # BLD AUTO: 0.1 K/UL (ref 0–0.7)
EOSINOPHIL NFR BLD AUTO: 2 %
ERYTHROCYTE [DISTWIDTH] IN BLOOD BY AUTOMATED COUNT: 3.39 M/UL (ref 3.8–5.4)
ERYTHROCYTE [DISTWIDTH] IN BLOOD: 16 % (ref 11.5–15.5)
GLUCOSE BLD-MCNC: 331 MG/DL (ref 75–99)
GLUCOSE BLD-MCNC: 385 MG/DL (ref 75–99)
GLUCOSE BLD-MCNC: 469 MG/DL (ref 75–99)
GLUCOSE SERPL-MCNC: 483 MG/DL (ref 74–99)
HCT VFR BLD AUTO: 35.7 % (ref 34–46)
HGB BLD-MCNC: 10.2 GM/DL (ref 11.4–16)
LIPASE SERPL-CCNC: 410 U/L (ref 23–300)
LYMPHOCYTES # SPEC AUTO: 0.6 K/UL (ref 1–4.8)
LYMPHOCYTES NFR SPEC AUTO: 9 %
MAGNESIUM SPEC-SCNC: 1.3 MG/DL (ref 1.6–2.3)
MCH RBC QN AUTO: 30.2 PG (ref 25–35)
MCHC RBC AUTO-ENTMCNC: 28.7 G/DL (ref 31–37)
MCV RBC AUTO: 105.4 FL (ref 80–100)
MONOCYTES # BLD AUTO: 0.6 K/UL (ref 0–1)
MONOCYTES NFR BLD AUTO: 8 %
NEUTROPHILS # BLD AUTO: 5.7 K/UL (ref 1.3–7.7)
NEUTROPHILS NFR BLD AUTO: 80 %
PLATELET # BLD AUTO: 378 K/UL (ref 150–450)
POTASSIUM SERPL-SCNC: 3.6 MMOL/L (ref 3.5–5.1)
PROT SERPL-MCNC: 6.2 G/DL (ref 6.3–8.2)
SODIUM SERPL-SCNC: 130 MMOL/L (ref 137–145)
TROPONIN I SERPL-MCNC: 0.07 NG/ML (ref 0–0.03)
WBC # BLD AUTO: 7.1 K/UL (ref 3.8–10.6)

## 2018-12-06 PROCEDURE — 84484 ASSAY OF TROPONIN QUANT: CPT

## 2018-12-06 PROCEDURE — 94760 N-INVAS EAR/PLS OXIMETRY 1: CPT

## 2018-12-06 PROCEDURE — 86900 BLOOD TYPING SEROLOGIC ABO: CPT

## 2018-12-06 PROCEDURE — 94640 AIRWAY INHALATION TREATMENT: CPT

## 2018-12-06 PROCEDURE — 87205 SMEAR GRAM STAIN: CPT

## 2018-12-06 PROCEDURE — 71045 X-RAY EXAM CHEST 1 VIEW: CPT

## 2018-12-06 PROCEDURE — 82009 KETONE BODYS QUAL: CPT

## 2018-12-06 PROCEDURE — 82553 CREATINE MB FRACTION: CPT

## 2018-12-06 PROCEDURE — 80048 BASIC METABOLIC PNL TOTAL CA: CPT

## 2018-12-06 PROCEDURE — 83550 IRON BINDING TEST: CPT

## 2018-12-06 PROCEDURE — 80053 COMPREHEN METABOLIC PANEL: CPT

## 2018-12-06 PROCEDURE — 85025 COMPLETE CBC W/AUTO DIFF WBC: CPT

## 2018-12-06 PROCEDURE — 82746 ASSAY OF FOLIC ACID SERUM: CPT

## 2018-12-06 PROCEDURE — 82550 ASSAY OF CK (CPK): CPT

## 2018-12-06 PROCEDURE — 93005 ELECTROCARDIOGRAM TRACING: CPT

## 2018-12-06 PROCEDURE — 83690 ASSAY OF LIPASE: CPT

## 2018-12-06 PROCEDURE — 93970 EXTREMITY STUDY: CPT

## 2018-12-06 PROCEDURE — 83540 ASSAY OF IRON: CPT

## 2018-12-06 PROCEDURE — 88304 TISSUE EXAM BY PATHOLOGIST: CPT

## 2018-12-06 PROCEDURE — 99285 EMERGENCY DEPT VISIT HI MDM: CPT

## 2018-12-06 PROCEDURE — 83036 HEMOGLOBIN GLYCOSYLATED A1C: CPT

## 2018-12-06 PROCEDURE — 83735 ASSAY OF MAGNESIUM: CPT

## 2018-12-06 PROCEDURE — 86901 BLOOD TYPING SEROLOGIC RH(D): CPT

## 2018-12-06 PROCEDURE — 87070 CULTURE OTHR SPECIMN AEROBIC: CPT

## 2018-12-06 PROCEDURE — 96374 THER/PROPH/DIAG INJ IV PUSH: CPT

## 2018-12-06 PROCEDURE — 82728 ASSAY OF FERRITIN: CPT

## 2018-12-06 PROCEDURE — 86870 RBC ANTIBODY IDENTIFICATION: CPT

## 2018-12-06 PROCEDURE — 84550 ASSAY OF BLOOD/URIC ACID: CPT

## 2018-12-06 PROCEDURE — 87040 BLOOD CULTURE FOR BACTERIA: CPT

## 2018-12-06 PROCEDURE — 86920 COMPATIBILITY TEST SPIN: CPT

## 2018-12-06 PROCEDURE — 84132 ASSAY OF SERUM POTASSIUM: CPT

## 2018-12-06 PROCEDURE — 86850 RBC ANTIBODY SCREEN: CPT

## 2018-12-06 PROCEDURE — 89050 BODY FLUID CELL COUNT: CPT

## 2018-12-06 PROCEDURE — 36415 COLL VENOUS BLD VENIPUNCTURE: CPT

## 2018-12-06 PROCEDURE — 82947 ASSAY GLUCOSE BLOOD QUANT: CPT

## 2018-12-06 PROCEDURE — 85379 FIBRIN DEGRADATION QUANT: CPT

## 2018-12-06 PROCEDURE — 84100 ASSAY OF PHOSPHORUS: CPT

## 2018-12-06 PROCEDURE — 74018 RADEX ABDOMEN 1 VIEW: CPT

## 2018-12-06 PROCEDURE — 82607 VITAMIN B-12: CPT

## 2018-12-06 PROCEDURE — 86880 COOMBS TEST DIRECT: CPT

## 2018-12-06 PROCEDURE — 85027 COMPLETE CBC AUTOMATED: CPT

## 2018-12-06 PROCEDURE — 83880 ASSAY OF NATRIURETIC PEPTIDE: CPT

## 2018-12-06 PROCEDURE — 80202 ASSAY OF VANCOMYCIN: CPT

## 2018-12-06 RX ADMIN — TRIAMCINOLONE ACETONIDE SCH: 1 CREAM TOPICAL at 19:44

## 2018-12-06 RX ADMIN — CLOTRIMAZOLE SCH: 0.01 CREAM TOPICAL at 19:46

## 2018-12-06 RX ADMIN — INSULIN ASPART SCH UNIT: 100 INJECTION, SOLUTION INTRAVENOUS; SUBCUTANEOUS at 21:27

## 2018-12-06 RX ADMIN — HYDROCODONE BITARTRATE AND ACETAMINOPHEN SCH EACH: 7.5; 325 TABLET ORAL at 21:28

## 2018-12-06 RX ADMIN — LATANOPROST SCH DROPS: 50 SOLUTION OPHTHALMIC at 19:44

## 2018-12-06 RX ADMIN — ATORVASTATIN CALCIUM SCH MG: 80 TABLET, FILM COATED ORAL at 19:46

## 2018-12-06 RX ADMIN — SEVELAMER CARBONATE SCH MG: 800 TABLET, FILM COATED ORAL at 19:33

## 2018-12-06 RX ADMIN — INSULIN ASPART SCH UNIT: 100 INJECTION, SOLUTION INTRAVENOUS; SUBCUTANEOUS at 19:32

## 2018-12-06 RX ADMIN — FAMOTIDINE SCH MG: 20 TABLET, FILM COATED ORAL at 19:45

## 2018-12-06 RX ADMIN — CEFAZOLIN SCH MLS/HR: 330 INJECTION, POWDER, FOR SOLUTION INTRAMUSCULAR; INTRAVENOUS at 19:31

## 2018-12-06 NOTE — ED
General Adult HPI





- General


Stated complaint: HYPOTENSION, HYPERGLYCEMIC


Time Seen by Provider: 18 13:39


Source: patient, EMS, RN notes reviewed


Mode of arrival: EMS


Limitations: altered mental status, physical limitation





- History of Present Illness


Initial comments: 





This is a 58-year-old female with a history of chronic renal failure gets.


Dialysis history of CHF COPD who is brought in for multiple reasons she's not 

gotten out of bed for the past 2 weeks not taken her medication she's had 

hyperglycemia and hypotension.  She's been bedbound for at least a week she 

states family members told paramedics and is been 2 weeks.  She also states she'

s feeling very depressed.  She denies any fevers chills nausea vomiting sweats 

she does states she has severe chronic right sided back pain that hurts with 

any kind of movement or touch.





- Related Data


 Home Medications











 Medication  Instructions  Recorded  Confirmed


 


Atorvastatin [Lipitor] 80 mg PO HS 09/13/15 12/06/18


 


Levothyroxine Sodium [Synthroid] 175 mcg PO DAILY 09/13/15 12/06/18


 


Clopidogrel [Plavix] 75 mg PO DAILY 18


 


Insulin Regular, Human [NovoLIN R] 100 unit SQ AC-BID 18


 


Ipratropium-Albuterol Nebulize 3 ml INHALATION RT-QID PRN 18





[Duoneb 0.5 mg-3 mg/3 ml Soln]   


 


Acetaminophen Tab [Tylenol Tab] 1,000 mg PO Q6HR PRN 18


 


Albuterol Sulfate [Proair Hfa] 2 puff INHALATION RT-QID PRN 18


 


Bimatoprost [Lumigan .01% Ophth 1 drop BOTH EYES HS 18





Soln]   


 


Cholecalciferol [Vitamin D3] 5,000 unit PO DAILY 18


 


Famotidine [Pepcid] 40 mg PO HS 18


 


Fenofibrate,Micronized 200 mg PO DAILY 18





[Fenofibrate]   


 


Folic Acid 0.8 mg PO DAILY 18


 


Insulin Aspart [Novolog Flexpen] See Protocol SQ AC-BID 18


 


Ketoconazole 2% Cream [Nizoral 2%] 1 applic TOPICAL BID 18


 


Sevelamer [Renvela] 2,400 mg PO AC-TID 18


 


Tiotropium 18 Mcg/Puff [Spiriva] 1 cap INHALATION RT-DAILY 18


 


Triamcinolone 0.025% Cream 1 applic TOPICAL BID 18





[Kenalog 0.025% Cream]   


 


B Complex W-C No.20/Folic Acid 1 cap PO DAILY 18





[Renal Caps Softgel]   


 


HYDROcodone/APAP 7.5-325MG [Norco 1 tab PO TID 18





7.5-325]   


 


Sertraline [Zoloft] 150 mg PO DAILY 18


 


amLODIPine [Norvasc] 10 mg PO DAILY 18








 Previous Rx's











 Medication  Instructions  Recorded


 


Aspirin 325 mg PO DAILY  tab 17











 Allergies











Allergy/AdvReac Type Severity Reaction Status Date / Time


 


erythromycin base Allergy  Unknown Verified 18 14:06





[Erythromycin Base]     


 


NSAIDS (Non-Steroidal Allergy  Unknown Verified 18 14:06





Anti-Inflamma     


 


PAPER TAPE Allergy  Rash/Hives Uncoded 18 13:51














Review of Systems


ROS Statement: 


Those systems with pertinent positive or pertinent negative responses have been 

documented in the HPI.





ROS Other: All systems not noted in ROS Statement are negative.





Past Medical History


Past Medical History: Asthma, Heart Failure, COPD, CVA/TIA, Diabetes Mellitus, 

Eye Disorder, Hyperlipidemia, Hypertension, Osteoarthritis (OA), Pneumonia, 

Renal Disease, Sleep Apnea/CPAP/BIPAP, Thyroid Disorder


Additional Past Medical History / Comment(s): sleep apnea, neuropathy, patient 

has one kidney, Stage III kidney failure, peritional dialysis- right side 

abdominal port.


History of Any Multi-Drug Resistant Organisms: None Reported


Past Surgical History: Bariatric Surgery, Hernia Repair, Orthopedic Surgery, 

Tonsillectomy


Additional Past Surgical History / Comment(s): rt kidney removed, Rt knee 

replacement


Past Anesthesia/Blood Transfusion Reactions: No Reported Reaction


Additional Past Anesthesia/Blood Transfusion Reaction / Comment(s): pt states 

[facility], "it made her itchy and they 

gave bendyl"


Past Psychological History: Anxiety, Depression


Smoking Status: Former smoker


Past Alcohol Use History: None Reported


Past Drug Use History: None Reported





- Past Family History


  ** Mother


Additional Family Medical History / Comment(s): skin CA, CHF, DM, thyroid 

disorder, HTN.





  ** Father


Family Medical History: Diabetes Mellitus, Hypertension, Thyroid Disorder


Additional Family Medical History / Comment(s): CHF.  Pt's father is .





General Exam





- General Exam Comments


Initial Comments: 





This is a well-developed morbidly obese female who is awake alert though 

somewhat lethargic


Limitations: altered mental status, physical limitation


General appearance: alert, anxious, in distress


Head exam: Present: atraumatic, normocephalic, normal inspection


Eye exam: Present: normal appearance, PERRL, EOMI.  Absent: scleral icterus, 

conjunctival injection, periorbital swelling


ENT exam: Present: mucous membranes dry


Neck exam: Present: normal inspection.  Absent: tenderness, meningismus, 

lymphadenopathy


Respiratory exam: Present: normal lung sounds bilaterally.  Absent: respiratory 

distress, wheezes, rales, rhonchi, stridor


Cardiovascular Exam: Present: regular rate, normal rhythm, normal heart sounds.

  Absent: systolic murmur, diastolic murmur, rubs, gallop, clicks


GI/Abdominal exam: Present: soft, normal bowel sounds, other (Peritoneal 

dialysis site appears be clear with no evidence of any drainage or erythema).  

Absent: distended, tenderness, guarding, rebound, rigid


Rectal exam: Present: deferred


Extremities exam: Present: full ROM, normal capillary refill, other (Dusky 

peripheral examination appears consistent with peripheral vascular disease).  

Absent: tenderness, pedal edema, joint swelling, calf tenderness


Back exam: Present: normal inspection, tenderness (Tennis palpation over the 

right low back and flank area.  No evidence of any rash or is however some 

lividity noted.)


Neurological exam: Present: alert, oriented X3, CN II-XII intact


Psychiatric exam: Present: depressed, flat affect


Skin exam: Present: warm, dry, intact.  Absent: normal color, rash





Course


 Vital Signs











  18





  13:44 13:51 14:44


 


Temperature  98.2 F 


 


Pulse Rate 76  73


 


Respiratory 16  16





Rate   


 


Blood Pressure 104/59  104/59


 


O2 Sat by Pulse 100  





Oximetry   














  18





  14:46 15:00 15:30


 


Temperature   


 


Pulse Rate 70 71 69


 


Respiratory 18 16 18





Rate   


 


Blood Pressure 117/67 118/67 134/63


 


O2 Sat by Pulse 100 100 99





Oximetry   














EKG Findings





- EKG Results:


EKG: interpreted by ERMD, sinus rhythm (Sinus rhythm rate is 74.  Interval 194 

QRS duration 102 QT since QTC of 446/495 nonspecific inferior and anterior 

changes.)





Medical Decision Making





- Medical Decision Making





The patient will be admitted for inpatient treatment of uncontrolled diabetes 

renal failure as well as depression.





- Lab Data


Result diagrams: 


 18 14:07





 Lab Results











  18 Range/Units





  13:44 14:07 14:07 


 


WBC    7.1  (3.8-10.6)  k/uL


 


RBC    3.39 L  (3.80-5.40)  m/uL


 


Hgb    10.2 L  (11.4-16.0)  gm/dL


 


Hct    35.7  (34.0-46.0)  %


 


MCV    105.4 H  (80.0-100.0)  fL


 


MCH    30.2  (25.0-35.0)  pg


 


MCHC    28.7 L  (31.0-37.0)  g/dL


 


RDW    16.0 H  (11.5-15.5)  %


 


Plt Count    378  (150-450)  k/uL


 


Neutrophils %    80  %


 


Lymphocytes %    9  %


 


Monocytes %    8  %


 


Eosinophils %    2  %


 


Basophils %    0  %


 


Neutrophils #    5.7  (1.3-7.7)  k/uL


 


Lymphocytes #    0.6 L  (1.0-4.8)  k/uL


 


Monocytes #    0.6  (0-1.0)  k/uL


 


Eosinophils #    0.1  (0-0.7)  k/uL


 


Basophils #    0.0  (0-0.2)  k/uL


 


Hypochromasia    Marked  


 


Poikilocytosis    Slight  


 


Macrocytosis    Moderate  


 


POC Glucose (mg/dL)  469 H    (75-99)  mg/dL


 


POC Glu Operater ID  Vincent Salinas    


 


NT-Pro-B Natriuret Pep   3490   pg/mL














- Radiology Data


Radiology results: report reviewed (Imaging showed no definite increase 

markings.), image reviewed





Disposition


Clinical Impression: 


 Chronic renal failure syndrome, CHF (congestive heart failure), Failure to 

thrive, Depression, Morbid obesity





Disposition: ADMITTED AS IP TO THIS [facility]


Condition: Stable


Referrals: 


Balbina Pugh DO [Primary Care Provider] - 1-2 days

## 2018-12-06 NOTE — XR
EXAMINATION TYPE: XR chest 1V portable

 

DATE OF EXAM: 12/6/2018

 

Comparison: 11/19/2018

 

Clinical History: 58-year-old female with pain

 

Findings:

Large patient body habitus causes underpenetration. Heart mildly enlarged. Hazy peripheral and lower 
lung densities related to overlying soft tissue. No consolidation or pleural effusion.

 

 

Impression:

 Underpenetrated due to large body habitus. Mild cardiomegaly. No definite acute process.

## 2018-12-06 NOTE — P.HPIM
History of Present Illness


H&P Date: 18


Chief Complaint: Not getting out of bed 2 weeks





This is a 58-year-old female, patient of [facility].  Patient has 

a known past medical history of end-stage renal disease on hemodialysis, 

congestive heart failure, COPD, diabetes mellitus, hypertension, hyperlipidemia

, obstructive sleep apnea, chronic hypoxic respiratory failure on 4-5 L of 

oxygen at home, depression and CVA.  History obtained from both patient and 

patient's sister.  Apparently their mother had passed away on  

couple weeks ago.  Since then, patient's has not gotten out of bed.  She has 

not moved out of her bed for about 2 weeks per the sister.  She stopped taking 

all of her medications.  And she has not been eating or drinking much.  Blood 

sugar 469 on admission.  Per the sister the patient was started on Prozac 10 mg 

daily about a month ago.  She took it for about a week and then stopped taking 

it.  Before that patient had been on Zoloft.  Patient reports that she hurts 

everywhere.  She has extensive bruising in the upper thigh is growing area and 

back.  There are smaller bruises on the lower leg area.  She hurts anywhere she 

is touched.  She complains of pain in the chest as well as the abdomen and legs 

and back.  She denies any falling.  Reports some nausea and chest chest pain.  

Denies any fever, chills, sweats, cough, bowel movement changes.  She does not 

make urine and is on peritoneal dialysis. 





Review of Systems





Please refer to HPI otherwise unremarkable





Past Medical History


Past Medical History: Asthma, Heart Failure, COPD, CVA/TIA, Diabetes Mellitus, 

Eye Disorder, Hyperlipidemia, Hypertension, Osteoarthritis (OA), Pneumonia, 

Renal Disease, Sleep Apnea/CPAP/BIPAP, Thyroid Disorder


Additional Past Medical History / Comment(s): sleep apnea, neuropathy, patient 

has one kidney, Stage III kidney failure, peritional dialysis- right side 

abdominal port.


History of Any Multi-Drug Resistant Organisms: None Reported


Past Surgical History: Bariatric Surgery, Hernia Repair, Orthopedic Surgery, 

Tonsillectomy


Additional Past Surgical History / Comment(s): rt kidney removed, Rt knee 

replacement


Past Anesthesia/Blood Transfusion Reactions: No Reported Reaction


Additional Past Anesthesia/Blood Transfusion Reaction / Comment(s): pt [facility] 

[facility], "[facility] her it[facility] and they 

gave bendyl"


Past Psychological History: Anxiety, Depression


Smoking Status: Former smoker


Past Alcohol Use History: None Reported


Past Drug Use History: None Reported





- Past Family History


  ** Mother


Additional Family Medical History / Comment(s): skin CA, CHF, DM, thyroid 

disorder, HTN.





  ** Father


Family Medical History: Diabetes Mellitus, Hypertension, Thyroid Disorder


Additional Family Medical History / Comment(s): CHF.  Pt's father is .





Medications and Allergies


 Home Medications











 Medication  Instructions  Recorded  Confirmed  Type


 


Atorvastatin [Lipitor] 80 mg PO HS 09/13/15 12/06/18 History


 


Levothyroxine Sodium [Synthroid] 175 mcg PO DAILY 09/13/15 12/06/18 History


 


Aspirin 325 mg PO DAILY  tab 17 Rx


 


Clopidogrel [Plavix] 75 mg PO DAILY 18 History


 


Insulin Regular, Human [NovoLIN R] 100 unit SQ AC-BID 18 History


 


Ipratropium-Albuterol Nebulize 3 ml INHALATION RT-QID PRN 18 

History





[Duoneb 0.5 mg-3 mg/3 ml Soln]    


 


Acetaminophen Tab [Tylenol Tab] 1,000 mg PO Q6HR PRN 18 History


 


Albuterol Sulfate [Proair Hfa] 2 puff INHALATION RT-QID PRN 18 

History


 


Bimatoprost [Lumigan .01% Ophth 1 drop BOTH EYES HS 18 History





Soln]    


 


Cholecalciferol [Vitamin D3] 5,000 unit PO DAILY 18 History


 


Famotidine [Pepcid] 40 mg PO HS 18 History


 


Fenofibrate,Micronized 200 mg PO DAILY 18 History





[Fenofibrate]    


 


Folic Acid 0.8 mg PO DAILY 18 History


 


Insulin Aspart [Novolog Flexpen] See Protocol SQ AC-BID 18 

History


 


Ketoconazole 2% Cream [Nizoral 2%] 1 applic TOPICAL BID 18 

History


 


Sevelamer [Renvela] 2,400 mg PO AC-TID 18 History


 


Tiotropium 18 Mcg/Puff [Spiriva] 1 cap INHALATION RT-DAILY 18 

History


 


Triamcinolone 0.025% Cream 1 applic TOPICAL BID 18 History





[Kenalog 0.025% Cream]    


 


B Complex W-C No.20/Folic Acid 1 cap PO DAILY 18 History





[Renal Caps Softgel]    


 


HYDROcodone/APAP 7.5-325MG [Norco 1 tab PO TID 18 History





7.5-325]    


 


Sertraline [Zoloft] 150 mg PO DAILY 18 History


 


amLODIPine [Norvasc] 10 mg PO DAILY 18 History











 Allergies











Allergy/AdvReac Type Severity Reaction Status Date / Time


 


erythromycin base Allergy  Unknown Verified 18 14:06





[Erythromycin Base]     


 


NSAIDS (Non-Steroidal Allergy  Unknown Verified 18 14:06





Anti-Inflamma     


 


PAPER TAPE Allergy  Rash/Hives Uncoded 18 13:51














Physical Exam


Vitals: 


 Vital Signs











  Temp Pulse Resp BP Pulse Ox


 


 18 14:46   70  18  117/67  100


 


 18 13:51  98.2 F    


 


 18 13:44   76  16  104/59  100








 Intake and Output











 18





 06:59 14:59 22:59


 


Other:   


 


  Weight  139.253 kg 














Head normocephalic


Neck supple


Lungs clear to auscultation bilaterally no wheezing or crackles


Heart regular rate and rhythm S1-S2, no rub or gallop


Abdomen is soft diffuse tenderness with palpation nondistended positive bowel 

sounds no hepatosplenomegaly obese


Extremities no edema


Neuro alert and orientated to 3


Skin: Patient has extensive bruising along the upper thighs lower back also 

smaller bruises along the lower legs..  Patient has tenderness all over body 

with palpation





Results


CBC & Chem 7: 


 18 14:07





Labs: 


 Abnormal Lab Results - Last 24 Hours (Table)











  18 Range/Units





  13:44 14:07 


 


RBC   3.39 L  (3.80-5.40)  m/uL


 


Hgb   10.2 L  (11.4-16.0)  gm/dL


 


MCV   105.4 H  (80.0-100.0)  fL


 


MCHC   28.7 L  (31.0-37.0)  g/dL


 


RDW   16.0 H  (11.5-15.5)  %


 


Lymphocytes #   0.6 L  (1.0-4.8)  k/uL


 


POC Glucose (mg/dL)  469 H   (75-99)  mg/dL














Assessment and Plan


Assessment: 





1.  Prolonged period of time in bed With pain throughout her body and extensive 

bruising.  Concerns for possible rhabdomyolysis.  Check total creatinine 

kinase.  Check cardiac enzymes.  Hold aspirin and Plavix.  Hemoglobin 10.2





2.  Insulin-dependent diabetes mellitus: Hyperglycemia with blood sugar 469 on 

admission.  Patient has not been taking insulin at home.  Resume home insulin.  

Check acetone level





3.  History of end-stage renal disease on peritoneal dialysis.  Consult Dr. Esquivel





4.  Morbid obesity





5.  Medication noncompliance





6.  Severe Depression.  Patient denies any suicidal or homicidal ideation.  

Recently started on Prozac 10 mg daily outpatient.  Will restart Prozac at 20 

mg daily and consult psychiatry





7.  History of COPD stable





8.  History of CVA





9.  Essential hypertension: Blood pressure on the lower side





10.  Hyperlipidemia





11.  Obstructive sleep apnea





12.  Chronic hypoxic respiratory failure home O2 dependent on 4-5 L





Patient seen and examined in the emergency room.  Awaiting further blood work 

results before initiating orders.


Time with Patient: Greater than 30 (Greater than 50% of the total time spent in 

counseling and coordination of care.I performed an examination of the patient 

and discussed their management with the physician Assistant.  I have reviewed 

the Physician Assistant's notes and agree with the documented findings and plan 

of care)

## 2018-12-07 LAB
ANION GAP SERPL CALC-SCNC: 16 MMOL/L
BUN SERPL-SCNC: 39 MG/DL (ref 7–17)
CALCIUM SPEC-MCNC: 8.4 MG/DL (ref 8.4–10.2)
CHLORIDE SERPL-SCNC: 91 MMOL/L (ref 98–107)
CO2 SERPL-SCNC: 24 MMOL/L (ref 22–30)
ERYTHROCYTE [DISTWIDTH] IN BLOOD BY AUTOMATED COUNT: 3.46 M/UL (ref 3.8–5.4)
ERYTHROCYTE [DISTWIDTH] IN BLOOD: 15.7 % (ref 11.5–15.5)
GLUCOSE BLD-MCNC: 213 MG/DL (ref 75–99)
GLUCOSE BLD-MCNC: 319 MG/DL (ref 75–99)
GLUCOSE BLD-MCNC: 396 MG/DL (ref 75–99)
GLUCOSE BLD-MCNC: 412 MG/DL (ref 75–99)
GLUCOSE SERPL-MCNC: 270 MG/DL (ref 74–99)
HCT VFR BLD AUTO: 35.9 % (ref 34–46)
HGB BLD-MCNC: 10.3 GM/DL (ref 11.4–16)
MAGNESIUM SPEC-SCNC: 1.5 MG/DL (ref 1.6–2.3)
MCH RBC QN AUTO: 29.8 PG (ref 25–35)
MCHC RBC AUTO-ENTMCNC: 28.7 G/DL (ref 31–37)
MCV RBC AUTO: 103.8 FL (ref 80–100)
PLATELET # BLD AUTO: 316 K/UL (ref 150–450)
POTASSIUM SERPL-SCNC: 3.3 MMOL/L (ref 3.5–5.1)
SODIUM SERPL-SCNC: 131 MMOL/L (ref 137–145)
URATE SERPL-MCNC: 8.4 MG/DL (ref 3.7–7.4)
WBC # BLD AUTO: 5.7 K/UL (ref 3.8–10.6)

## 2018-12-07 RX ADMIN — FOLIC ACID SCH MG: 1 TABLET ORAL at 11:39

## 2018-12-07 RX ADMIN — DEXTROSE MONOHYDRATE, SODIUM CHLORIDE, SODIUM LACTATE, CALCIUM CHLORIDE, MAGNESIUM CHLORIDE SCH MLS/HR: 2.5; 538; 448; 18.4; 5.08 SOLUTION INTRAPERITONEAL at 21:04

## 2018-12-07 RX ADMIN — HYDROMORPHONE HYDROCHLORIDE PRN MG: 1 INJECTION, SOLUTION INTRAMUSCULAR; INTRAVENOUS; SUBCUTANEOUS at 15:03

## 2018-12-07 RX ADMIN — CLOTRIMAZOLE SCH: 0.01 CREAM TOPICAL at 07:31

## 2018-12-07 RX ADMIN — MAGNESIUM SULFATE IN DEXTROSE SCH MLS/HR: 10 INJECTION, SOLUTION INTRAVENOUS at 15:03

## 2018-12-07 RX ADMIN — TRIAMCINOLONE ACETONIDE SCH APPLIC: 1 CREAM TOPICAL at 21:16

## 2018-12-07 RX ADMIN — POTASSIUM CHLORIDE SCH MEQ: 20 TABLET, EXTENDED RELEASE ORAL at 15:04

## 2018-12-07 RX ADMIN — IPRATROPIUM BROMIDE SCH: 0.5 SOLUTION RESPIRATORY (INHALATION) at 17:05

## 2018-12-07 RX ADMIN — FENOFIBRATE SCH MG: 160 TABLET ORAL at 07:30

## 2018-12-07 RX ADMIN — SEVELAMER CARBONATE SCH MG: 800 TABLET, FILM COATED ORAL at 07:31

## 2018-12-07 RX ADMIN — INSULIN ASPART SCH UNIT: 100 INJECTION, SOLUTION INTRAVENOUS; SUBCUTANEOUS at 17:50

## 2018-12-07 RX ADMIN — HYDROMORPHONE HYDROCHLORIDE PRN MG: 1 INJECTION, SOLUTION INTRAMUSCULAR; INTRAVENOUS; SUBCUTANEOUS at 22:53

## 2018-12-07 RX ADMIN — FAMOTIDINE SCH MG: 20 TABLET, FILM COATED ORAL at 21:13

## 2018-12-07 RX ADMIN — MAGNESIUM SULFATE IN DEXTROSE SCH MLS/HR: 10 INJECTION, SOLUTION INTRAVENOUS at 14:02

## 2018-12-07 RX ADMIN — CLOTRIMAZOLE SCH APPLIC: 0.01 CREAM TOPICAL at 21:26

## 2018-12-07 RX ADMIN — IPRATROPIUM BROMIDE SCH MG: 0.5 SOLUTION RESPIRATORY (INHALATION) at 19:04

## 2018-12-07 RX ADMIN — POTASSIUM CHLORIDE SCH MEQ: 20 TABLET, EXTENDED RELEASE ORAL at 14:02

## 2018-12-07 RX ADMIN — LEVOTHYROXINE SODIUM SCH MCG: 88 TABLET ORAL at 05:39

## 2018-12-07 RX ADMIN — IPRATROPIUM BROMIDE SCH: 0.5 SOLUTION RESPIRATORY (INHALATION) at 07:22

## 2018-12-07 RX ADMIN — INSULIN ASPART SCH UNIT: 100 INJECTION, SOLUTION INTRAVENOUS; SUBCUTANEOUS at 13:41

## 2018-12-07 RX ADMIN — IPRATROPIUM BROMIDE SCH: 0.5 SOLUTION RESPIRATORY (INHALATION) at 13:20

## 2018-12-07 RX ADMIN — HYDROCODONE BITARTRATE AND ACETAMINOPHEN SCH EACH: 7.5; 325 TABLET ORAL at 07:30

## 2018-12-07 RX ADMIN — HYDROCODONE BITARTRATE AND ACETAMINOPHEN SCH EACH: 7.5; 325 TABLET ORAL at 21:26

## 2018-12-07 RX ADMIN — DEXTROSE MONOHYDRATE, SODIUM CHLORIDE, SODIUM LACTATE, CALCIUM CHLORIDE, MAGNESIUM CHLORIDE SCH MLS/HR: 2.5; 538; 448; 18.4; 5.08 SOLUTION INTRAPERITONEAL at 09:16

## 2018-12-07 RX ADMIN — ATORVASTATIN CALCIUM SCH MG: 80 TABLET, FILM COATED ORAL at 21:12

## 2018-12-07 RX ADMIN — CEFAZOLIN SCH: 330 INJECTION, POWDER, FOR SOLUTION INTRAMUSCULAR; INTRAVENOUS at 16:12

## 2018-12-07 RX ADMIN — INSULIN ASPART SCH UNIT: 100 INJECTION, SOLUTION INTRAVENOUS; SUBCUTANEOUS at 21:13

## 2018-12-07 RX ADMIN — DEXTROSE MONOHYDRATE, SODIUM CHLORIDE, SODIUM LACTATE, CALCIUM CHLORIDE, MAGNESIUM CHLORIDE SCH MLS/HR: 2.5; 538; 448; 18.4; 5.08 SOLUTION INTRAPERITONEAL at 15:11

## 2018-12-07 RX ADMIN — INSULIN ASPART SCH UNIT: 100 INJECTION, SOLUTION INTRAVENOUS; SUBCUTANEOUS at 08:04

## 2018-12-07 RX ADMIN — Medication SCH EACH: at 11:39

## 2018-12-07 RX ADMIN — HYDROCODONE BITARTRATE AND ACETAMINOPHEN SCH: 7.5; 325 TABLET ORAL at 15:04

## 2018-12-07 RX ADMIN — TRIAMCINOLONE ACETONIDE SCH: 1 CREAM TOPICAL at 07:31

## 2018-12-07 RX ADMIN — Medication SCH UNIT: at 11:39

## 2018-12-07 RX ADMIN — HYDROMORPHONE HYDROCHLORIDE PRN MG: 1 INJECTION, SOLUTION INTRAMUSCULAR; INTRAVENOUS; SUBCUTANEOUS at 19:16

## 2018-12-07 RX ADMIN — LATANOPROST SCH DROPS: 50 SOLUTION OPHTHALMIC at 21:26

## 2018-12-07 RX ADMIN — HYDROMORPHONE HYDROCHLORIDE PRN MG: 1 INJECTION, SOLUTION INTRAMUSCULAR; INTRAVENOUS; SUBCUTANEOUS at 10:22

## 2018-12-07 NOTE — CONS
CONSULTATION



REASON FOR CONSULTATION:

End-stage renal disease.



HISTORY OF PRESENT ILLNESS:

The patient is a 58-year-old female with a history of end-stage renal disease, on

peritoneal dialysis.  Patient was admitted to the hospital with complaints of weakness,

shortness of breath, not feeling well.  She states that she was not doing her dialysis

as prescribed over the past 2 weeks.  Patient's mother recently passed away and she had

been significantly depressed and had also not taken any of her medications.  She was

found to be hyperglycemic with a blood sugar at 483 at the time of admission.  Serum

creatinine was elevated at 11 mg/dL.  Patient did have some evidence of volume

overload.



PAST MEDICAL HISTORY:

1. End-stage renal disease.

2. Anemia of chronic disease.

3. CKD mineral bone disorder.

4. Type 2 diabetes.

5. COPD.

6. History of pneumonia.

7. Obstructive sleep apnea.

8. Hypothyroidism.

9. Neuropathy.



PAST SURGICAL HISTORY:

1. Bariatric surgery.

2. Hernia repair.

3. Tonsillectomy.

4. PD catheter insertion.

5. Right nephrectomy.

6. Right knee arthroplasty.



SOCIAL HISTORY:

Patient is a former smoker.  No history of drug abuse or alcohol abuse.



MEDICATIONS:

Prior to admission, patient was not taking any of her medications for the last few

weeks.  She is supposed to be on:

1. Lipitor.

2. Synthroid.

3. Aspirin.

4. Plavix.

5. Insulin.

6. Vitamin D3.

7. Pepcid.

8. Folic acid.

9. Renvela.

10.Norco.

11.Zoloft. Norvasc.



ALLERGIES:

ALLERGIES include:

1. ERYTHROMYCIN.

2. TAPE.

3. NONSTEROIDAL ANTI-INFLAMMATORY AGENTS.



REVIEW OF SYSTEMS:

As per HPI.  Other systems negative for fever, chills, chest pains, nausea or vomiting.



PHYSICAL EXAMINATION:

Patient was comfortable this morning.  Blood pressure was 107/48, heart rate 63 per

minute. She is afebrile.

EXAMINATION OF THE HEART: S1, S2.

EXAMINATION OF LUNGS: Decreased breath sounds at the bases.

ABDOMEN:  Soft, obese, non-tender.

Examination of lower extremities shows chronic skin changes bilaterally. Trace edema is

noted.

CNS exam is grossly intact. Patient moving all 4 extremities.



Labs show sodium 131, potassium 3.3, BUN 39, serum creatinine 11.3, magnesium 1.5,

hemoglobin 10.3 g/dL.



ASSESSMENT:

1. End-stage renal disease, on peritoneal dialysis.  Will resume peritoneal dialysis

    at 2.5% solution q.6 hours, 2.5 L.  Patient had about 1.5 L of ultrafiltration with

    her last exchange.  I will continue with the current regimen.

2. Volume overload.  Continue with the 2.5% exchanges, as patient has had good UF.  If

    her UF decreases over the next day or so, we can change to 2.5% alternating with

    4.25% solutions.

3. Hypokalemia.  Will replace.

4. Hypomagnesemia.  We will replace.

5. Anemia of chronic disease.

6. Depression.

7. Pain which is generalized, mainly in the lower abdomen and the lower extremities.

8. Bruising and prolonged period of bedrest for about 2 weeks. CK level was not

    elevated.  It was at 53.  Continue pain control and start mobilization.



PLAN:

Continue current exchanges.  Encourage increased oral intake.  Encourage mobility.

Replace potassium and magnesium and repeat labs in a.m.



Thank you for this consultation.  We will continue to follow the patient with you

during her hospitalization.





MMODL / IJN: [id number] / Job#: [id number]

## 2018-12-07 NOTE — P.PN
Subjective


Progress Note Date: 12/07/18


This is a 58-year-old female, patient of [facility].  Patient has 

a known past medical history of end-stage renal disease on hemodialysis, 

congestive heart failure, COPD, diabetes mellitus, hypertension, hyperlipidemia

, obstructive sleep apnea, chronic hypoxic respiratory failure on 4-5 L of 

oxygen at home, depression and CVA.  History obtained from both patient and 

patient's sister.  Apparently their mother had passed away on November 27 

couple weeks ago.  Since then, patient's has not gotten out of bed.  She has 

not moved out of her bed for about 2 weeks per the sister.  She stopped taking 

all of her medications.  And she has not been eating or drinking much.  Blood 

sugar 469 on admission.  Per the sister the patient was started on Prozac 10 mg 

daily about a month ago.  She took it for about a week and then stopped taking 

it.  Before that patient had been on Zoloft.  Patient reports that she hurts 

everywhere.  She has extensive bruising in the upper thigh is growing area and 

back.  There are smaller bruises on the lower leg area.  She hurts anywhere she 

is touched.  She complains of pain in the chest as well as the abdomen and legs 

and back.  She denies any falling.  Reports some nausea and chest chest pain.  

Denies any fever, chills, sweats, cough, bowel movement changes.  She does not 

make urine and is on peritoneal dialysis. 








On 12/07/2018 patient is currently lying in bed currently getting CAPD.  

Patient is complaining of generalized pain throughout chest abdomen and legs.  

Patient also complaining of more severe pain in her right lower extremity.  

Patient would not let me examine right leg.  Explained to patient that due to 

her prolonged bed rest she is at increased risk for blood clot and then I'm 

concerned that she may have a blood clot in her right leg and that she would 

require a venous Doppler to assess.  Patient states she would not be able to 

tolerate a venous Doppler at this time due to the pain.  Will order Dilaudid 

for pain at this time and encouraged patient the importance of obtaining this 

test.  We'll also consult cardiology services at this time.





Objective





- Vital Signs


Vital signs: 


 Vital Signs











Temp  97.3 F L  12/07/18 06:08


 


Pulse  63   12/07/18 06:08


 


Resp  20   12/07/18 06:08


 


BP  107/48   12/07/18 06:08


 


Pulse Ox  99   12/07/18 06:08








 Intake & Output











 12/06/18 12/07/18 12/07/18





 18:59 06:59 18:59


 


Intake Total  960 100


 


Balance  960 100


 


Weight 139.253 kg 139.253 kg 


 


Intake:   


 


  IV  260 


 


    Magnesium Sulfate-D5w Pmx  100 





    1 gm In Dextrose/Water 1   





    100ml.bag @ 100 mls/hr   





    IVPB ONCE ONE Rx#:   





    [id number]   


 


    Sodium Chloride 0.9% 1,  160 





    000 ml @ 20 mls/hr IV .   





    Q24H [facility] Rx#:[id number]   


 


  Oral  700 100


 


Other:   


 


  Voiding Method   CAPD


 


  # Voids  0 0














- Exam


Head normocephalic


Neck supple


Lungs clear to auscultation bilaterally no wheezing or crackles


Heart regular rate and rhythm S1-S2, no rub or gallop


Abdomen is soft diffuse tenderness with palpation nondistended positive bowel 

sounds no hepatosplenomegaly obese


Extremities no edema


Neuro alert and orientated to 3


Skin: Patient has extensive bruising along the upper thighs lower back also 

smaller bruises along the lower legs..  Patient has tenderness all over body 

with palpation








- Labs


CBC & Chem 7: 


 12/07/18 10:47





 12/07/18 10:47


Labs: 


 Abnormal Lab Results - Last 24 Hours (Table)











  12/06/18 12/06/18 12/06/18 Range/Units





  13:44 14:07 15:50 


 


RBC   3.39 L   (3.80-5.40)  m/uL


 


Hgb   10.2 L   (11.4-16.0)  gm/dL


 


MCV   105.4 H   (80.0-100.0)  fL


 


MCHC   28.7 L   (31.0-37.0)  g/dL


 


RDW   16.0 H   (11.5-15.5)  %


 


Lymphocytes #   0.6 L   (1.0-4.8)  k/uL


 


Sodium     (137-145)  mmol/L


 


Chloride     ()  mmol/L


 


BUN     (7-17)  mg/dL


 


Creatinine     (0.52-1.04)  mg/dL


 


Glucose     (74-99)  mg/dL


 


POC Glucose (mg/dL)  469 H    (75-99)  mg/dL


 


Magnesium     (1.6-2.3)  mg/dL


 


Troponin I    0.072 H*  (0.000-0.034)  ng/mL


 


Total Protein     (6.3-8.2)  g/dL


 


Albumin     (3.5-5.0)  g/dL


 


Lipase     ()  U/L














  12/06/18 12/06/18 12/06/18 Range/Units





  15:50 18:50 21:16 


 


RBC     (3.80-5.40)  m/uL


 


Hgb     (11.4-16.0)  gm/dL


 


MCV     (80.0-100.0)  fL


 


MCHC     (31.0-37.0)  g/dL


 


RDW     (11.5-15.5)  %


 


Lymphocytes #     (1.0-4.8)  k/uL


 


Sodium  130 L    (137-145)  mmol/L


 


Chloride  90 L    ()  mmol/L


 


BUN  37 H    (7-17)  mg/dL


 


Creatinine  11.15 H*    (0.52-1.04)  mg/dL


 


Glucose  483 H    (74-99)  mg/dL


 


POC Glucose (mg/dL)   385 H  331 H  (75-99)  mg/dL


 


Magnesium  1.3 L    (1.6-2.3)  mg/dL


 


Troponin I     (0.000-0.034)  ng/mL


 


Total Protein  6.2 L    (6.3-8.2)  g/dL


 


Albumin  3.2 L    (3.5-5.0)  g/dL


 


Lipase  410 H    ()  U/L














  12/07/18 Range/Units





  07:27 


 


RBC   (3.80-5.40)  m/uL


 


Hgb   (11.4-16.0)  gm/dL


 


MCV   (80.0-100.0)  fL


 


MCHC   (31.0-37.0)  g/dL


 


RDW   (11.5-15.5)  %


 


Lymphocytes #   (1.0-4.8)  k/uL


 


Sodium   (137-145)  mmol/L


 


Chloride   ()  mmol/L


 


BUN   (7-17)  mg/dL


 


Creatinine   (0.52-1.04)  mg/dL


 


Glucose   (74-99)  mg/dL


 


POC Glucose (mg/dL)  213 H  (75-99)  mg/dL


 


Magnesium   (1.6-2.3)  mg/dL


 


Troponin I   (0.000-0.034)  ng/mL


 


Total Protein   (6.3-8.2)  g/dL


 


Albumin   (3.5-5.0)  g/dL


 


Lipase   ()  U/L














Assessment and Plan


Assessment: 


1.  Prolonged period of time in bed With pain throughout her body and extensive 

bruising.  Hold aspirin and Plavix.  Hemoglobin 10.2.  Platelets 378





2.  Insulin-dependent diabetes mellitus: Hyperglycemia with blood sugar 469 on 

admission.  Patient has not been taking insulin at home.  Resume home insulin.  

Acetone level negative





3.  History of end-stage renal disease on peritoneal dialysis.  Consult Dr. Esquivel.  Creatinine currently 11.15 and bun 37.  Patient currently getting CAPD 

at this time





4.  Morbid obesity





5.  Medication noncompliance





6.  Severe Depression.  Patient denies any suicidal or homicidal ideation.  

Recently started on Prozac 10 mg daily outpatient.  Will restart Prozac at 20 

mg daily and consult psychiatry





7.  History of COPD stable





8.  History of CVA





9.  Essential hypertension: Blood pressure on the lower side





10.  Hyperlipidemia





11.  Obstructive sleep apnea





12.  Chronic hypoxic respiratory failure home O2 dependent on 4-5 L





13.  Right lower extremity pain.  Concerns for possible DVT due to prolonged 

time in bed.  Patient at this time stating she would not be able to tolerate 

venous Doppler to rule out DVT due to the pain.  Will order Dilaudid for pain 

control and educated patient on the importance of obtaining this test to 

further assess. Patient will be started on lovenox 1mg/kg for DVT treatment. 

Discussed with pharmacist Katerine adjusted for renal dosing. Will re order 

doppler tomorrow and will order 1mg of diuladid to  be given prior to ultrasound





14.  Elevated cardiac enzymes.  Troponin 0.072.  EKG completed showing normal 

sinus rhythm.  Inferior infarct, age undetermined anterior infarct age 

undetermined cardiology services have been consulted





15.  Elevated lipase level of 410.  Repeat lipase level ordered for a.m.








Venous Doppler ordered to rule out DVT in right lower extremity.


Uric acid level ordered.  A.m. labs ordered.


Cardiology consult placed








I performed an examination of the patient and discussed their management with 

the Nurse Practitioner.  I have reviewed the Nurse Practitioner's notes and 

agree with the documented findings and plan of care

## 2018-12-07 NOTE — CONS
CONSULTATION



Mrs. Sargent is a 58-year-old female with known history of end-stage renal disease

with history of obstructive sleep apnea, prior history of congestive heart failure,

hyperlipidemia, hypertension and history of CVA, who presented with symptoms of

progressive dyspnea and weakness.  Patient's mother passed away 2 weeks ago and since

that time, she has not got out of bed.  She has been feeling progressively fatigued and

weak and dyspnea. Because of that, she was brought into the emergency room and

subsequently admitted.  Patient has a prior history of dyspnea on exertion.  No

documented history of obstructive coronary artery disease.  She has been seen by Dr. Gomez in the past.  She was told that she has congestive heart failure on the basis of

diastolic dysfunction.  During this admission, she had elevation of her troponin that

was noted in the past.  She had an echocardiogram in July of this year that showed a

preserved systolic function with mild mitral and tricuspid regurgitation and no

significant pulmonary hypertension.  Patient is not active physically.  She has no

clear PND orthopnea.  She has occasional peripheral edema.  No dizziness or

palpitation.  No documented malignant arrhythmia.  Her coronary risk factors are

remarkable for the history of diabetes, hyperlipidemia and hypertension.



MEDICATION:

Include amlodipine 10 mg daily, Spiriva, Renvela, Zoloft, Synthroid, DuoNeb, insulin

Norco, fenofibrate 200 mg daily, Plavix 75 mg daily, vitamin D, Lipitor 80 mg daily,

aspirin, ProAir, and Tylenol.



REVIEW OF SYSTEMS:

RESPIRATORY SYSTEM:  She had dyspnea on exertion.  She is not active physically.  She

has no documented recent cough or fever.

GI SYSTEM:  No recent GI bleeding.  No peptic ulcer disease.

 SYSTEM:  She is on peritoneal dialysis and has no urine output.

NERVOUS SYSTEM:  She has prior history of stroke and she has history of depression.



PHYSICAL EXAMINATION:

She is a 58-year-old female, alert, oriented, in no apparent distress.  Morbidly obese.

Blood pressure is 107/48 with a heart rate in the 60s.

HEAD:  Normocephalic.

EYES:  Sclerae nonicteric.

NECK:  Good upstroke, no bruit.

LUNGS:  Clear to auscultation anteriorly.

HEART:  Regular rate and rhythm, S1, S2.  No S3 with a systolic murmur, no diastolic

murmur.

ABDOMEN:  Soft, obese, mild tenderness.

EXTREMITIES:  Trace edema with ecchymosis noted.



LAB DATA:

EKG sinus mechanism with a normal axis, nonspecific ST-T wave changes, poor R-wave

progression, cannot exclude an inferior wall myocardial infarction.



Chest x-ray showed no clear infiltrate.



Lab data revealed a creatinine of 11.3, BUN of 39, potassium 3.3, hemoglobin of 10.3,

troponin 0.072.  NT-proBNP of 3490.



IMPRESSION:

1. Progressive dyspnea and fatigue with no clear evidence to suggest congestive heart

    failure at this time.

2. Mild troponin elevation, not related to any cardiac event, most likely related to

    the renal failure.

3. End-stage renal disease, on hemodialysis.

4. Prior history of congestive heart failure with diastolic dysfunction with no

    evidence of acute heart failure at this time.

5. History of diabetes.

6. Hypertension.

7. Hyperlipidemia.

8. History of depression.

9. Obstructive sleep apnea.



RECOMMENDATION:

From the cardiac standpoint, I see no evidence for active cardiac event.  Will continue

clinical observation.  No further workup is needed from the cardiac standpoint.  Will

see him on an as-needed basis.  Please feel free to call us for any question.





MMODL / IJN: [id number] / Job#: [id number]

## 2018-12-08 LAB
ALBUMIN SERPL-MCNC: 2.9 G/DL (ref 3.5–5)
ALP SERPL-CCNC: 99 U/L (ref 38–126)
ALT SERPL-CCNC: 27 U/L (ref 9–52)
ANION GAP SERPL CALC-SCNC: 15 MMOL/L
AST SERPL-CCNC: 14 U/L (ref 14–36)
BASOPHILS # BLD AUTO: 0 K/UL (ref 0–0.2)
BASOPHILS NFR BLD AUTO: 1 %
BUN SERPL-SCNC: 36 MG/DL (ref 7–17)
CALCIUM SPEC-MCNC: 8.6 MG/DL (ref 8.4–10.2)
CHLORIDE SERPL-SCNC: 91 MMOL/L (ref 98–107)
CO2 SERPL-SCNC: 22 MMOL/L (ref 22–30)
EOSINOPHIL # BLD AUTO: 0.1 K/UL (ref 0–0.7)
EOSINOPHIL NFR BLD AUTO: 2 %
ERYTHROCYTE [DISTWIDTH] IN BLOOD BY AUTOMATED COUNT: 3.06 M/UL (ref 3.8–5.4)
ERYTHROCYTE [DISTWIDTH] IN BLOOD: 15.8 % (ref 11.5–15.5)
GLUCOSE BLD-MCNC: 338 MG/DL (ref 75–99)
GLUCOSE BLD-MCNC: 391 MG/DL (ref 75–99)
GLUCOSE BLD-MCNC: 404 MG/DL (ref 75–99)
GLUCOSE BLD-MCNC: 408 MG/DL (ref 75–99)
GLUCOSE SERPL-MCNC: 377 MG/DL (ref 74–99)
HCT VFR BLD AUTO: 32.5 % (ref 34–46)
HGB BLD-MCNC: 9.1 GM/DL (ref 11.4–16)
LIPASE SERPL-CCNC: 381 U/L (ref 23–300)
LYMPHOCYTES # SPEC AUTO: 0.5 K/UL (ref 1–4.8)
LYMPHOCYTES NFR SPEC AUTO: 10 %
MCH RBC QN AUTO: 29.9 PG (ref 25–35)
MCHC RBC AUTO-ENTMCNC: 28.1 G/DL (ref 31–37)
MCV RBC AUTO: 106.4 FL (ref 80–100)
MONOCYTES # BLD AUTO: 0.5 K/UL (ref 0–1)
MONOCYTES NFR BLD AUTO: 9 %
NEUTROPHILS # BLD AUTO: 4.2 K/UL (ref 1.3–7.7)
NEUTROPHILS NFR BLD AUTO: 78 %
PLATELET # BLD AUTO: 352 K/UL (ref 150–450)
POTASSIUM SERPL-SCNC: 3.7 MMOL/L (ref 3.5–5.1)
PROT SERPL-MCNC: 5.6 G/DL (ref 6.3–8.2)
SODIUM SERPL-SCNC: 128 MMOL/L (ref 137–145)
WBC # BLD AUTO: 5.4 K/UL (ref 3.8–10.6)

## 2018-12-08 RX ADMIN — Medication SCH EACH: at 11:23

## 2018-12-08 RX ADMIN — IPRATROPIUM BROMIDE SCH: 0.5 SOLUTION RESPIRATORY (INHALATION) at 19:57

## 2018-12-08 RX ADMIN — HYDROCODONE BITARTRATE AND ACETAMINOPHEN SCH EACH: 7.5; 325 TABLET ORAL at 08:20

## 2018-12-08 RX ADMIN — DEXTROSE MONOHYDRATE, SODIUM CHLORIDE, SODIUM LACTATE, CALCIUM CHLORIDE, MAGNESIUM CHLORIDE SCH MLS/HR: 2.5; 538; 448; 18.4; 5.08 SOLUTION INTRAPERITONEAL at 21:07

## 2018-12-08 RX ADMIN — HYDROMORPHONE HYDROCHLORIDE PRN MG: 1 INJECTION, SOLUTION INTRAMUSCULAR; INTRAVENOUS; SUBCUTANEOUS at 16:50

## 2018-12-08 RX ADMIN — IPRATROPIUM BROMIDE SCH: 0.5 SOLUTION RESPIRATORY (INHALATION) at 16:33

## 2018-12-08 RX ADMIN — DEXTROSE MONOHYDRATE, SODIUM CHLORIDE, SODIUM LACTATE, CALCIUM CHLORIDE, MAGNESIUM CHLORIDE SCH MLS/HR: 2.5; 538; 448; 18.4; 5.08 SOLUTION INTRAPERITONEAL at 03:03

## 2018-12-08 RX ADMIN — FOLIC ACID SCH MG: 1 TABLET ORAL at 11:23

## 2018-12-08 RX ADMIN — INSULIN ASPART SCH UNIT: 100 INJECTION, SUSPENSION SUBCUTANEOUS at 21:33

## 2018-12-08 RX ADMIN — FAMOTIDINE SCH MG: 20 TABLET, FILM COATED ORAL at 21:09

## 2018-12-08 RX ADMIN — HYDROCODONE BITARTRATE AND ACETAMINOPHEN SCH: 7.5; 325 TABLET ORAL at 23:01

## 2018-12-08 RX ADMIN — BUPROPION HYDROCHLORIDE SCH MG: 150 TABLET, FILM COATED, EXTENDED RELEASE ORAL at 13:43

## 2018-12-08 RX ADMIN — HYDROCODONE BITARTRATE AND ACETAMINOPHEN SCH EACH: 7.5; 325 TABLET ORAL at 16:01

## 2018-12-08 RX ADMIN — CEFAZOLIN SCH MLS/HR: 330 INJECTION, POWDER, FOR SOLUTION INTRAMUSCULAR; INTRAVENOUS at 16:04

## 2018-12-08 RX ADMIN — HYDROMORPHONE HYDROCHLORIDE PRN MG: 1 INJECTION, SOLUTION INTRAMUSCULAR; INTRAVENOUS; SUBCUTANEOUS at 07:21

## 2018-12-08 RX ADMIN — HYDROMORPHONE HYDROCHLORIDE PRN MG: 1 INJECTION, SOLUTION INTRAMUSCULAR; INTRAVENOUS; SUBCUTANEOUS at 11:23

## 2018-12-08 RX ADMIN — INSULIN ASPART SCH UNIT: 100 INJECTION, SOLUTION INTRAVENOUS; SUBCUTANEOUS at 21:33

## 2018-12-08 RX ADMIN — HYDROMORPHONE HYDROCHLORIDE PRN MG: 1 INJECTION, SOLUTION INTRAMUSCULAR; INTRAVENOUS; SUBCUTANEOUS at 21:33

## 2018-12-08 RX ADMIN — ATORVASTATIN CALCIUM SCH MG: 80 TABLET, FILM COATED ORAL at 21:08

## 2018-12-08 RX ADMIN — INSULIN ASPART SCH UNIT: 100 INJECTION, SOLUTION INTRAVENOUS; SUBCUTANEOUS at 17:27

## 2018-12-08 RX ADMIN — IPRATROPIUM BROMIDE SCH: 0.5 SOLUTION RESPIRATORY (INHALATION) at 11:11

## 2018-12-08 RX ADMIN — ENOXAPARIN SODIUM SCH MG: 150 INJECTION SUBCUTANEOUS at 08:20

## 2018-12-08 RX ADMIN — FENOFIBRATE SCH MG: 160 TABLET ORAL at 08:20

## 2018-12-08 RX ADMIN — IPRATROPIUM BROMIDE SCH: 0.5 SOLUTION RESPIRATORY (INHALATION) at 07:43

## 2018-12-08 RX ADMIN — LATANOPROST SCH DROPS: 50 SOLUTION OPHTHALMIC at 21:11

## 2018-12-08 RX ADMIN — DEXTROSE MONOHYDRATE, SODIUM CHLORIDE, SODIUM LACTATE, CALCIUM CHLORIDE, MAGNESIUM CHLORIDE SCH MLS/HR: 2.5; 538; 448; 18.4; 5.08 SOLUTION INTRAPERITONEAL at 14:52

## 2018-12-08 RX ADMIN — LEVOTHYROXINE SODIUM SCH MCG: 88 TABLET ORAL at 06:13

## 2018-12-08 RX ADMIN — CLOTRIMAZOLE SCH: 0.01 CREAM TOPICAL at 08:17

## 2018-12-08 RX ADMIN — CLOTRIMAZOLE SCH APPLIC: 0.01 CREAM TOPICAL at 21:12

## 2018-12-08 RX ADMIN — TRIAMCINOLONE ACETONIDE SCH: 1 CREAM TOPICAL at 08:18

## 2018-12-08 RX ADMIN — INSULIN ASPART SCH UNIT: 100 INJECTION, SOLUTION INTRAVENOUS; SUBCUTANEOUS at 08:19

## 2018-12-08 RX ADMIN — TRIAMCINOLONE ACETONIDE SCH APPLIC: 1 CREAM TOPICAL at 21:11

## 2018-12-08 RX ADMIN — DEXTROSE MONOHYDRATE, SODIUM CHLORIDE, SODIUM LACTATE, CALCIUM CHLORIDE, MAGNESIUM CHLORIDE SCH MLS/HR: 2.5; 538; 448; 18.4; 5.08 SOLUTION INTRAPERITONEAL at 09:21

## 2018-12-08 RX ADMIN — HYDROMORPHONE HYDROCHLORIDE PRN MG: 1 INJECTION, SOLUTION INTRAMUSCULAR; INTRAVENOUS; SUBCUTANEOUS at 03:11

## 2018-12-08 RX ADMIN — INSULIN ASPART SCH UNIT: 100 INJECTION, SOLUTION INTRAVENOUS; SUBCUTANEOUS at 12:44

## 2018-12-08 RX ADMIN — Medication SCH UNIT: at 11:23

## 2018-12-08 NOTE — US
EXAMINATION TYPE: US venous doppler duplex LE 

 

DATE OF EXAM: 12/8/2018 9:13 AM

 

COMPARISON: NONE

 

CLINICAL HISTORY: r/o DVT. Obese patient did not get out of bed for 1 week and legs are extremely siobhan
nful, diabetic, no h/o dvt

 

SIDE PERFORMED: bilateral  

 

TECHNIQUE:  The lower extremity deep venous system is examined utilizing real time linear array sonog
veda with graded compression, doppler sonography and color-flow sonography.

 

VESSELS IMAGED:

External Iliac Vein (EIV)

Common Femoral Vein

Deep Femoral Vein

Greater Saphenous Vein *

Femoral Vein

Popliteal Vein

Small Saphenous Vein *

Proximal Calf Veins

(* superficial vessels)

 

**VERY limited study due to patients pain and inability to tolerate even the lightest pressure. I con
nected with nurse regarding more pain meds and she informed me patient had just received them. Patien
t asked for test to stop and tech informed importance of trying to continue. 

 

Right Leg:  Was only able to image with some color and doppler assessment from CFV through dist POP. 
Unable to fully r/o DVT but venous flow was detected

 

Left Leg:  Was only able to image with some color and doppler assessment from CFV through dist POP. U
nable to fully r/o DVT but venous flow was detected

 

*RN made aware of very limited exam

 

IMPRESSION:

 

MARKEDLY COMPROMISED EXAMINATION UNABLE TO FULLY EXCLUDE DVT IN EITHER LEG.

## 2018-12-08 NOTE — P.PN
Subjective





Patient is seen in follow-up for end-stage renal disease.  She is maintained on 

peritoneal dialysis.  She is tolerating PD exchanges well with net UF of about 3

-500 mL per exchange.  Feels weak.  Oral intake is fair.  Denies chest pain or 

shortness of breath.





Vital signs are stable.


General: The patient appeared well nourished and normally developed. 


HEENT: Head exam is unremarkable. Neck is without jugular venous distension.


LUNGS: Lungs are clear to auscultation and percussion. Breath sounds decreased.


HEART: Rate and Rhythm are regular. First and second heart sounds normal. No 

murmurs, rubs or gallops. 


ABDOMEN: Abdominal exam reveals normal bowel sounds. Non-tender and non-

distended. No evidence of peritonitis.


EXTREMITITES: No clubbing, cyanosis, or edema.





Objective





- Vital Signs


Vital signs: 


 Vital Signs











Temp  97.0 F L  12/08/18 06:23


 


Pulse  66   12/08/18 06:23


 


Resp  18   12/08/18 06:23


 


BP  117/67   12/08/18 06:23


 


Pulse Ox  97   12/08/18 06:23








 Intake & Output











 12/07/18 12/08/18 12/08/18





 18:59 06:59 18:59


 


Intake Total 200 320 


 


Balance 200 320 


 


Weight  136.25 kg 


 


Intake:   


 


  IV  160 


 


    Sodium Chloride 0.9% 1,  160 





    000 ml @ 20 mls/hr IV .   





    Q24H [facility] Rx#:[id number]   


 


  Intake, IV Titration  160 





  Amount   


 


    Sodium Chloride 0.9% 1,  160 





    000 ml @ 20 mls/hr IV .   





    Q24H [facility] Rx#:[id number]   


 


  Oral 200  


 


Other:   


 


  Voiding Method Incontinent Incontinent 





 CAPD CAPD 


 


  # Voids 0 0 














- Labs


CBC & Chem 7: 


 12/07/18 10:47





 12/07/18 10:47


Labs: 


 Abnormal Lab Results - Last 24 Hours (Table)











  12/07/18 12/07/18 12/07/18 Range/Units





  10:47 10:47 11:56 


 


RBC  3.46 L    (3.80-5.40)  m/uL


 


Hgb  10.3 L    (11.4-16.0)  gm/dL


 


MCV  103.8 H    (80.0-100.0)  fL


 


MCHC  28.7 L    (31.0-37.0)  g/dL


 


RDW  15.7 H    (11.5-15.5)  %


 


Sodium   131 L   (137-145)  mmol/L


 


Potassium   3.3 L   (3.5-5.1)  mmol/L


 


Chloride   91 L   ()  mmol/L


 


BUN   39 H   (7-17)  mg/dL


 


Creatinine   11.31 H*   (0.52-1.04)  mg/dL


 


Glucose   270 H   (74-99)  mg/dL


 


POC Glucose (mg/dL)    319 H  (75-99)  mg/dL


 


Uric Acid   8.4 H   (3.7-7.4)  mg/dL


 


Magnesium   1.5 L   (1.6-2.3)  mg/dL














  12/07/18 12/07/18 12/08/18 Range/Units





  17:44 20:23 07:15 


 


RBC     (3.80-5.40)  m/uL


 


Hgb     (11.4-16.0)  gm/dL


 


MCV     (80.0-100.0)  fL


 


MCHC     (31.0-37.0)  g/dL


 


RDW     (11.5-15.5)  %


 


Sodium     (137-145)  mmol/L


 


Potassium     (3.5-5.1)  mmol/L


 


Chloride     ()  mmol/L


 


BUN     (7-17)  mg/dL


 


Creatinine     (0.52-1.04)  mg/dL


 


Glucose     (74-99)  mg/dL


 


POC Glucose (mg/dL)  412 H  396 H  391 H  (75-99)  mg/dL


 


Uric Acid     (3.7-7.4)  mg/dL


 


Magnesium     (1.6-2.3)  mg/dL














Assessment and Plan


Plan: 





Assessment:


1.  End-stage renal disease maintained on peritoneal dialysis.


2.  Hypervolemic hyponatremia.


3.  Hypokalemia secondary to poor oral intake and PD losses.  Status post 

placement.


4.  Hypomagnesemia status post placement.


5.  Anemia of chronic kidney disease.


6.  Lower extremity tenderness.  Currently undergoing ultrasound.





Plan:


Maintain current PD exchanges.


Labs pending from today.


Check phosphorus level.

## 2018-12-08 NOTE — P.PN
Subjective


Progress Note Date: 12/08/18


This is a 58-year-old female, patient of [facility].  Patient has 

a known past medical history of end-stage renal disease on hemodialysis, 

congestive heart failure, COPD, diabetes mellitus, hypertension, hyperlipidemia

, obstructive sleep apnea, chronic hypoxic respiratory failure on 4-5 L of 

oxygen at home, depression and CVA.  History obtained from both patient and 

patient's sister.  Apparently their mother had passed away on November 27 

couple weeks ago.  Since then, patient's has not gotten out of bed.  She has 

not moved out of her bed for about 2 weeks per the sister.  She stopped taking 

all of her medications.  And she has not been eating or drinking much.  Blood 

sugar 469 on admission.  Per the sister the patient was started on Prozac 10 mg 

daily about a month ago.  She took it for about a week and then stopped taking 

it.  Before that patient had been on Zoloft.  Patient reports that she hurts 

everywhere.  She has extensive bruising in the upper thigh is growing area and 

back.  There are smaller bruises on the lower leg area.  She hurts anywhere she 

is touched.  She complains of pain in the chest as well as the abdomen and legs 

and back.  She denies any falling.  Reports some nausea and chest chest pain.  

Denies any fever, chills, sweats, cough, bowel movement changes.  She does not 

make urine and is on peritoneal dialysis. 








On 12/07/2018 patient is currently lying in bed currently getting CAPD.  

Patient is complaining of generalized pain throughout chest abdomen and legs.  

Patient also complaining of more severe pain in her right lower extremity.  

Patient would not let me examine right leg.  Explained to patient that due to 

her prolonged bed rest she is at increased risk for blood clot and then I'm 

concerned that she may have a blood clot in her right leg and that she would 

require a venous Doppler to assess.  Patient states she would not be able to 

tolerate a venous Doppler at this time due to the pain.  Will order Dilaudid 

for pain at this time and encouraged patient the importance of obtaining this 

test.  We'll also consult cardiology services at this time.








On 12/08/2018 patient currently getting CAPD.  Patient did have Doppler study 

completed but was un conclusive of DVT due to increased pain during test.  

Patient remains on Lovenox 1 mg/kg per renal dosing for DVT treatment.  This 

time patient states that she feels slightly improved but still has generalized 

pain all over.  Patient denies chest pain or shortness breath.  Patient denies 

any nausea vomiting or diarrhea.  Patient denies any urinary burning or 

frequency.





Objective





- Vital Signs


Vital signs: 


 Vital Signs











Temp  97.0 F L  12/08/18 09:23


 


Pulse  66   12/08/18 09:23


 


Resp  18   12/08/18 09:23


 


BP  117/67   12/08/18 09:23


 


Pulse Ox  97   12/08/18 09:23








 Intake & Output











 12/07/18 12/08/18 12/08/18





 18:59 06:59 18:59


 


Intake Total 200 320 


 


Balance 200 320 


 


Weight  136.25 kg 114 kg


 


Intake:   


 


  IV  160 


 


    Sodium Chloride 0.9% 1,  160 





    000 ml @ 20 mls/hr IV .   





    Q24[facility] Rx#:[id number]   


 


  Intake, IV Titration  160 





  Amount   


 


    Sodium Chloride 0.9% 1,  160 





    000 ml @ 20 mls/hr IV .   





    [facility] Rx#:[id number]   


 


  Oral 200  


 


Other:   


 


  Voiding Method Incontinent Incontinent 





 CAPD CAPD 


 


  # Voids 0 0 














- Exam


Head normocephalic


Neck supple


Lungs clear to auscultation bilaterally no wheezing or crackles


Heart regular rate and rhythm S1-S2, no rub or gallop


Abdomen is soft diffuse tenderness with palpation nondistended positive bowel 

sounds no hepatosplenomegaly obese


Extremities no edema


Neuro alert and orientated to 3


Skin: Patient has extensive bruising along the upper thighs lower back also 

smaller bruises along the lower legs..  Patient has tenderness all over body 

with palpation








- Labs


CBC & Chem 7: 


 12/08/18 08:31





 12/08/18 08:31


Labs: 


 Abnormal Lab Results - Last 24 Hours (Table)











  12/07/18 12/07/18 12/08/18 Range/Units





  17:44 20:23 07:15 


 


RBC     (3.80-5.40)  m/uL


 


Hgb     (11.4-16.0)  gm/dL


 


Hct     (34.0-46.0)  %


 


MCV     (80.0-100.0)  fL


 


MCHC     (31.0-37.0)  g/dL


 


RDW     (11.5-15.5)  %


 


Lymphocytes #     (1.0-4.8)  k/uL


 


Sodium     (137-145)  mmol/L


 


Chloride     ()  mmol/L


 


BUN     (7-17)  mg/dL


 


Creatinine     (0.52-1.04)  mg/dL


 


Glucose     (74-99)  mg/dL


 


POC Glucose (mg/dL)  412 H  396 H  391 H  (75-99)  mg/dL


 


Phosphorus     (2.5-4.5)  mg/dL


 


Total Protein     (6.3-8.2)  g/dL


 


Albumin     (3.5-5.0)  g/dL


 


Lipase     ()  U/L














  12/08/18 12/08/18 12/08/18 Range/Units





  08:31 08:31 11:39 


 


RBC   3.06 L   (3.80-5.40)  m/uL


 


Hgb   9.1 L   (11.4-16.0)  gm/dL


 


Hct   32.5 L   (34.0-46.0)  %


 


MCV   106.4 H   (80.0-100.0)  fL


 


MCHC   28.1 L   (31.0-37.0)  g/dL


 


RDW   15.8 H   (11.5-15.5)  %


 


Lymphocytes #   0.5 L   (1.0-4.8)  k/uL


 


Sodium  128 L    (137-145)  mmol/L


 


Chloride  91 L    ()  mmol/L


 


BUN  36 H    (7-17)  mg/dL


 


Creatinine  10.50 H*    (0.52-1.04)  mg/dL


 


Glucose  377 H    (74-99)  mg/dL


 


POC Glucose (mg/dL)    404 H  (75-99)  mg/dL


 


Phosphorus  6.8 H    (2.5-4.5)  mg/dL


 


Total Protein  5.6 L    (6.3-8.2)  g/dL


 


Albumin  2.9 L    (3.5-5.0)  g/dL


 


Lipase  381 H    ()  U/L














Assessment and Plan


Assessment: 


1.  Prolonged period of time in bed With pain throughout her body and extensive 

bruising.  Hold aspirin and Plavix.  Hemoglobin 10.2.  Platelets 378





2.  Insulin-dependent diabetes mellitus: Hyperglycemia with blood sugar 469 on 

admission.  Patient has not been taking insulin at home.  Resume home insulin.  

Acetone level negative.  Home medications resumed plus sliding scale coverage





3.  History of end-stage renal disease on peritoneal dialysis.  Consult Dr. Esquivel.  Creatinine currently 11.15 and bun 37.  Patient currently getting CAPD 

at this time.  Per nephrology continue PD exchanges.





4.  Morbid obesity





5.  Medication noncompliance





6.  Severe Depression.  Patient denies any suicidal or homicidal ideation.  

Recently started on Prozac 10 mg daily outpatient.  Will restart Prozac at 20 

mg daily and consult psychiatry





7.  History of COPD stable





8.  History of CVA





9.  Essential hypertension: Blood pressure on the lower side





10.  Hyperlipidemia





11.  Obstructive sleep apnea





12.  Chronic hypoxic respiratory failure home O2 dependent on 4-5 L





13.  Right lower extremity pain.  Concerns for possible DVT due to prolonged 

time in bed.  Patient at this time stating she would not be able to tolerate 

venous Doppler to rule out DVT due to the pain.  Will order Dilaudid for pain 

control and educated patient on the importance of obtaining this test to 

further assess. Patient will be started on lovenox 1mg/kg for DVT treatment. 

Discussed with pharmacist Katerine adjusted for renal dosing. Will re order 

doppler tomorrow and will order 1mg of diuladid to  be given prior to 

ultrasound.  Venous Doppler ultrasound attempted showing compromised exam 

unable to fully exclude DVT in either leg.  Patient remains on Lovenox 1 mg/kg 

for treatment of DVT renal dosing.





14.  Elevated cardiac enzymes.  Troponin 0.072.  EKG completed showing normal 

sinus rhythm.  Inferior infarct, age undetermined anterior infarct age 

undetermined.  Per cardiology services no further workup needed from cardiology 

standpoint.  No evidence for an acute cardiac event per cardiology





15.  Elevated lipase level of 410.  Repeat lipase level ordered for a.m. lipase 

level in improving to 381.  Will order repeat in a.m.





16.  Hyponatremia.  Sodium 01/20/2018 patient currently receiving CAPD.  

Nephrology services are following





17. Elevated uric acid.  Uric acid 8.4  Patient will be started on allopurinol





18.  Anemia.  Hemoglobin 9.1.  Will order iron studies





DVT prophylaxis Lovenox..  GI prophylaxis Pepcid











I performed an examination of the patient and discussed their management with 

the Nurse Practitioner.  I have reviewed the Nurse Practitioner's notes and 

agree with the documented findings and plan of care

## 2018-12-08 NOTE — CONS
CONSULTATION



DATE OF SERVICE/DICTATION:

12/08/2018



IDENTIFYING DATA:

This patient is a 58-year-old   female.  I am asked to consult

regarding symptoms of depression.



HISTORY OF PRESENT ILLNESS:

The patient states that she has been struggling with major depression for years, and

her symptoms became acutely exacerbated after the death of her stepfather and then

mother last month.  She states after those losses she just went to her bed and stayed

there.  She has not been caring for herself, eating appropriately or participating in

any activity.  She has diabetes.  Her blood sugars have become quite elevated.  She has

a history of end-stage renal disease requiring peritoneal dialysis.  She states she has

been more tearful.  She reports having no hopeless thinking, no suicidal ideation,

intent or plan.  She endorses no significant anxiety.  No history of hypomanic or manic

episodes.  No symptoms of psychosis.  She is aware of her level of dysfunction since

these losses.



PAST PSYCHIATRIC HISTORY:

No prior inpatient psychiatric admissions.  No history of suicide attempts.  She is not

working with an outpatient psychiatrist or therapist at this time. Numerous years ago

she worked with a counselor for grief counseling.  She previously was on Zoloft up to

100 mg daily.  She states that the medication was ineffective.  Prior to that she was

on Effexor XR with no benefit.  She has been placed on Prozac 10 mg by her primary care

physician, and this was just recently increased to 20 mg daily.



PAST MEDICAL HISTORY:

1. End-stage renal disease requiring peritoneal dialysis.

2. COPD.

3. Diabetes.

4. Hypertension.

5. CHF.

6. Hyperlipidemia.

7. Obstructive sleep apnea.

8. Anemia.



ALLERGIES:

1. NSAIDS.

2. ERYTHROMYCIN.



CHEMICAL DEPENDENCY HISTORY:

She reports using no alcohol or illicit drugs.  She has never been placed in

residential treatment for chemical dependency reasons.



FAMILY PSYCHIATRIC HISTORY:

She reports her mother was diagnosed with depression, but is unaware if she was on any

medication.  No suicides in the family.



FAMILY CHEMICAL DEPENDENCY HISTORY:

Unknown.



SOCIAL HISTORY:

The patient is 58 years old.  She has been  for numerous years.  She has a

fiance and they have been together for 30 years.  She characterizes the relationship as

being good.  She also resides with her daughter and 2 granddaughters.  She states for

the most part, everyone gets along. The patient graduated from high school and

participated in 3 years of college.  She is unemployed at this time.  No history of

 service.  She has one brother and one sister.



MENTAL STATUS EXAMINATION:

The patient is a morbidly obese  female appearing her stated age.  She is

lying in the bed.  She is dressed in hospital gowns.  Eye contact is appropriate.  She

is pleasant and cooperative.  She endorses a depressed mood and experiencing

significant grief.  She reports no acute suicidal ideation, intent or plan.  She

reports no homicidal ideation, intent or plan.  She does not appear anxious during our

interaction.  She demonstrates a linear thought process with no tangential thinking,

loose associations or flight of ideas.  She does not appear hypomanic or manic.  She

endorses no auditory or visual hallucinations or specific delusions.  There is no

observed evidence of psychosis.  She demonstrates no verbal or physical aggressiveness.

She is not tearful throughout the session.  She maintains a constricted affect.



IMPRESSIONS:

1. Major depressive disorder, recurrent, severe, without psychosis, bereavement.

2. Medical comorbidities include end-stage renal disease, anemia, congestive heart

    failure, chronic obstructive pulmonary disease, diabetes, hypertension,

    hyperlipidemia, obstructive sleep apnea, history of bariatric surgery in the form

    of lap band.



PLAN:

The patient is experiencing symptoms of bereavement in the context of ongoing major

depressive disorder.  We will continue the Prozac that has been started and recently

titrated to 20 mg daily.  I suggest we use an augmentation strategy, and we discussed

either using Wellbutrin or Abilify.  We will proceed with adding Wellbutrin  mg

in the morning, as this may further improve symptoms of depression and help with energy

and motivation.  She states that she has no history of seizures.  We will continue to

follow her.  She states she does not wish to be admitted to a psychiatric unit.  She is

encouraged to put forth more of an effort in participating in her care.  I discussed

the case with the patient's nurse.





MMODL / IJN: [id number] / Job#: [id number]

## 2018-12-09 LAB
ALBUMIN SERPL-MCNC: 2.8 G/DL (ref 3.5–5)
ALP SERPL-CCNC: 112 U/L (ref 38–126)
ALT SERPL-CCNC: 25 U/L (ref 9–52)
ANION GAP SERPL CALC-SCNC: 14 MMOL/L
AST SERPL-CCNC: 17 U/L (ref 14–36)
BASOPHILS # BLD AUTO: 0 K/UL (ref 0–0.2)
BASOPHILS NFR BLD AUTO: 1 %
BUN SERPL-SCNC: 36 MG/DL (ref 7–17)
CALCIUM SPEC-MCNC: 8.7 MG/DL (ref 8.4–10.2)
CHLORIDE SERPL-SCNC: 92 MMOL/L (ref 98–107)
CO2 SERPL-SCNC: 24 MMOL/L (ref 22–30)
EOSINOPHIL # BLD AUTO: 0.2 K/UL (ref 0–0.7)
EOSINOPHIL NFR BLD AUTO: 3 %
ERYTHROCYTE [DISTWIDTH] IN BLOOD BY AUTOMATED COUNT: 3.16 M/UL (ref 3.8–5.4)
ERYTHROCYTE [DISTWIDTH] IN BLOOD: 15.6 % (ref 11.5–15.5)
GLUCOSE BLD-MCNC: 284 MG/DL (ref 75–99)
GLUCOSE SERPL-MCNC: 273 MG/DL (ref 74–99)
HCT VFR BLD AUTO: 33.1 % (ref 34–46)
HGB BLD-MCNC: 9.3 GM/DL (ref 11.4–16)
LIPASE SERPL-CCNC: 312 U/L (ref 23–300)
LYMPHOCYTES # SPEC AUTO: 0.7 K/UL (ref 1–4.8)
LYMPHOCYTES NFR SPEC AUTO: 10 %
MCH RBC QN AUTO: 29.6 PG (ref 25–35)
MCHC RBC AUTO-ENTMCNC: 28.2 G/DL (ref 31–37)
MCV RBC AUTO: 104.9 FL (ref 80–100)
MONOCYTES # BLD AUTO: 0.8 K/UL (ref 0–1)
MONOCYTES NFR BLD AUTO: 11 %
NEUTROPHILS # BLD AUTO: 5 K/UL (ref 1.3–7.7)
NEUTROPHILS NFR BLD AUTO: 73 %
PLATELET # BLD AUTO: 320 K/UL (ref 150–450)
POTASSIUM SERPL-SCNC: 3.6 MMOL/L (ref 3.5–5.1)
PROT SERPL-MCNC: 5.7 G/DL (ref 6.3–8.2)
SODIUM SERPL-SCNC: 130 MMOL/L (ref 137–145)
WBC # BLD AUTO: 6.9 K/UL (ref 3.8–10.6)

## 2018-12-09 RX ADMIN — IPRATROPIUM BROMIDE SCH: 0.5 SOLUTION RESPIRATORY (INHALATION) at 11:16

## 2018-12-09 RX ADMIN — INSULIN ASPART SCH UNIT: 100 INJECTION, SUSPENSION SUBCUTANEOUS at 22:12

## 2018-12-09 RX ADMIN — FENOFIBRATE SCH MG: 160 TABLET ORAL at 07:53

## 2018-12-09 RX ADMIN — CLOTRIMAZOLE SCH: 0.01 CREAM TOPICAL at 23:02

## 2018-12-09 RX ADMIN — HYDROMORPHONE HYDROCHLORIDE PRN MG: 1 INJECTION, SOLUTION INTRAMUSCULAR; INTRAVENOUS; SUBCUTANEOUS at 07:55

## 2018-12-09 RX ADMIN — INSULIN ASPART SCH UNIT: 100 INJECTION, SOLUTION INTRAVENOUS; SUBCUTANEOUS at 17:42

## 2018-12-09 RX ADMIN — SEVELAMER CARBONATE SCH MG: 800 TABLET, FILM COATED ORAL at 17:42

## 2018-12-09 RX ADMIN — CEFAZOLIN SCH MLS/HR: 330 INJECTION, POWDER, FOR SOLUTION INTRAMUSCULAR; INTRAVENOUS at 16:21

## 2018-12-09 RX ADMIN — TRIAMCINOLONE ACETONIDE SCH: 1 CREAM TOPICAL at 07:57

## 2018-12-09 RX ADMIN — ATORVASTATIN CALCIUM SCH MG: 80 TABLET, FILM COATED ORAL at 21:57

## 2018-12-09 RX ADMIN — HYDROMORPHONE HYDROCHLORIDE PRN MG: 1 INJECTION, SOLUTION INTRAMUSCULAR; INTRAVENOUS; SUBCUTANEOUS at 23:52

## 2018-12-09 RX ADMIN — ENOXAPARIN SODIUM SCH MG: 150 INJECTION SUBCUTANEOUS at 07:55

## 2018-12-09 RX ADMIN — INSULIN ASPART SCH UNIT: 100 INJECTION, SOLUTION INTRAVENOUS; SUBCUTANEOUS at 22:12

## 2018-12-09 RX ADMIN — HYDROCODONE BITARTRATE AND ACETAMINOPHEN SCH EACH: 7.5; 325 TABLET ORAL at 16:20

## 2018-12-09 RX ADMIN — HYDROCODONE BITARTRATE AND ACETAMINOPHEN SCH EACH: 7.5; 325 TABLET ORAL at 08:53

## 2018-12-09 RX ADMIN — INSULIN ASPART SCH UNIT: 100 INJECTION, SUSPENSION SUBCUTANEOUS at 07:53

## 2018-12-09 RX ADMIN — IPRATROPIUM BROMIDE SCH: 0.5 SOLUTION RESPIRATORY (INHALATION) at 19:36

## 2018-12-09 RX ADMIN — DEXTROSE MONOHYDRATE, SODIUM CHLORIDE, SODIUM LACTATE, CALCIUM CHLORIDE, MAGNESIUM CHLORIDE SCH MLS/HR: 2.5; 538; 448; 18.4; 5.08 SOLUTION INTRAPERITONEAL at 15:05

## 2018-12-09 RX ADMIN — IPRATROPIUM BROMIDE SCH: 0.5 SOLUTION RESPIRATORY (INHALATION) at 15:29

## 2018-12-09 RX ADMIN — FAMOTIDINE SCH MG: 20 TABLET, FILM COATED ORAL at 21:57

## 2018-12-09 RX ADMIN — LEVOTHYROXINE SODIUM SCH MCG: 88 TABLET ORAL at 05:59

## 2018-12-09 RX ADMIN — HYDROCODONE BITARTRATE AND ACETAMINOPHEN SCH EACH: 7.5; 325 TABLET ORAL at 22:11

## 2018-12-09 RX ADMIN — FOLIC ACID SCH MG: 1 TABLET ORAL at 11:39

## 2018-12-09 RX ADMIN — HYDROMORPHONE HYDROCHLORIDE PRN MG: 1 INJECTION, SOLUTION INTRAMUSCULAR; INTRAVENOUS; SUBCUTANEOUS at 19:39

## 2018-12-09 RX ADMIN — CLOTRIMAZOLE SCH: 0.01 CREAM TOPICAL at 07:57

## 2018-12-09 RX ADMIN — BUPROPION HYDROCHLORIDE SCH MG: 150 TABLET, FILM COATED, EXTENDED RELEASE ORAL at 07:53

## 2018-12-09 RX ADMIN — TRIAMCINOLONE ACETONIDE SCH APPLIC: 1 CREAM TOPICAL at 21:58

## 2018-12-09 RX ADMIN — IPRATROPIUM BROMIDE SCH: 0.5 SOLUTION RESPIRATORY (INHALATION) at 07:20

## 2018-12-09 RX ADMIN — LATANOPROST SCH DROPS: 50 SOLUTION OPHTHALMIC at 22:04

## 2018-12-09 RX ADMIN — HYDROMORPHONE HYDROCHLORIDE PRN MG: 1 INJECTION, SOLUTION INTRAMUSCULAR; INTRAVENOUS; SUBCUTANEOUS at 03:10

## 2018-12-09 RX ADMIN — DEXTROSE MONOHYDRATE, SODIUM CHLORIDE, SODIUM LACTATE, CALCIUM CHLORIDE, MAGNESIUM CHLORIDE SCH MLS/HR: 2.5; 538; 448; 18.4; 5.08 SOLUTION INTRAPERITONEAL at 03:09

## 2018-12-09 RX ADMIN — INSULIN ASPART SCH UNIT: 100 INJECTION, SOLUTION INTRAVENOUS; SUBCUTANEOUS at 12:32

## 2018-12-09 RX ADMIN — DEXTROSE MONOHYDRATE, SODIUM CHLORIDE, SODIUM LACTATE, CALCIUM CHLORIDE, MAGNESIUM CHLORIDE SCH MLS/HR: 2.5; 538; 448; 18.4; 5.08 SOLUTION INTRAPERITONEAL at 08:54

## 2018-12-09 RX ADMIN — Medication SCH UNIT: at 11:40

## 2018-12-09 RX ADMIN — INSULIN ASPART SCH UNIT: 100 INJECTION, SOLUTION INTRAVENOUS; SUBCUTANEOUS at 07:55

## 2018-12-09 RX ADMIN — ALLOPURINOL SCH MG: 100 TABLET ORAL at 07:53

## 2018-12-09 RX ADMIN — SEVELAMER CARBONATE SCH MG: 800 TABLET, FILM COATED ORAL at 12:36

## 2018-12-09 RX ADMIN — DEXTROSE MONOHYDRATE, SODIUM CHLORIDE, SODIUM LACTATE, CALCIUM CHLORIDE, MAGNESIUM CHLORIDE SCH MLS/HR: 2.5; 538; 448; 18.4; 5.08 SOLUTION INTRAPERITONEAL at 21:54

## 2018-12-09 RX ADMIN — Medication SCH EACH: at 11:39

## 2018-12-09 NOTE — P.PN
Subjective





Patient is seen in follow-up for end-stage renal disease.  She is maintained on 

peritoneal dialysis.  She is tolerating PD exchanges well.  Feels weak.  Oral 

intake is fair.  Denies chest pain or shortness of breath.  She is resting 

comfortably on nasal cannula.





Vital signs are stable.


General: The patient appeared well nourished and normally developed. 


HEENT: Head exam is unremarkable. Neck is without jugular venous distension.


LUNGS: Lungs are clear to auscultation and percussion. Breath sounds decreased.


HEART: Rate and Rhythm are regular. First and second heart sounds normal. No 

murmurs, rubs or gallops. 


ABDOMEN: Abdominal exam reveals normal bowel sounds. Non-tender and non-

distended. No evidence of peritonitis.


EXTREMITITES: No clubbing, cyanosis, or edema.





Objective





- Vital Signs


Vital signs: 


 Vital Signs











Temp  98.3 F   12/09/18 07:08


 


Pulse  69   12/09/18 07:08


 


Resp  18   12/09/18 07:08


 


BP  114/69   12/09/18 07:08


 


Pulse Ox  100   12/09/18 07:08








 Intake & Output











 12/08/18 12/09/18 12/09/18





 18:59 06:59 18:59


 


Intake Total 160 320 


 


Balance 160 320 


 


Weight 114 kg 114 kg 


 


Intake:   


 


   320 


 


    Sodium Chloride 0.9% 1, 160 320 





    000 ml @ 20 mls/hr IV .   





    Q24[facility] Rx#:[id number]   


 


Other:   


 


  Voiding Method  Incontinent 





  CAPD 














- Labs


CBC & Chem 7: 


 12/08/18 08:31





 12/08/18 08:31


Labs: 


 Abnormal Lab Results - Last 24 Hours (Table)











  12/08/18 12/08/18 12/08/18 Range/Units





  08:31 08:31 11:39 


 


RBC   3.06 L   (3.80-5.40)  m/uL


 


Hgb   9.1 L   (11.4-16.0)  gm/dL


 


Hct   32.5 L   (34.0-46.0)  %


 


MCV   106.4 H   (80.0-100.0)  fL


 


MCHC   28.1 L   (31.0-37.0)  g/dL


 


RDW   15.8 H   (11.5-15.5)  %


 


Lymphocytes #   0.5 L   (1.0-4.8)  k/uL


 


Sodium  128 L    (137-145)  mmol/L


 


Chloride  91 L    ()  mmol/L


 


BUN  36 H    (7-17)  mg/dL


 


Creatinine  10.50 H*    (0.52-1.04)  mg/dL


 


Glucose  377 H    (74-99)  mg/dL


 


POC Glucose (mg/dL)    404 H  (75-99)  mg/dL


 


Phosphorus  6.8 H    (2.5-4.5)  mg/dL


 


Total Protein  5.6 L    (6.3-8.2)  g/dL


 


Albumin  2.9 L    (3.5-5.0)  g/dL


 


Lipase  381 H    ()  U/L














  12/08/18 12/08/18 12/09/18 Range/Units





  16:55 20:42 07:19 


 


RBC     (3.80-5.40)  m/uL


 


Hgb     (11.4-16.0)  gm/dL


 


Hct     (34.0-46.0)  %


 


MCV     (80.0-100.0)  fL


 


MCHC     (31.0-37.0)  g/dL


 


RDW     (11.5-15.5)  %


 


Lymphocytes #     (1.0-4.8)  k/uL


 


Sodium     (137-145)  mmol/L


 


Chloride     ()  mmol/L


 


BUN     (7-17)  mg/dL


 


Creatinine     (0.52-1.04)  mg/dL


 


Glucose     (74-99)  mg/dL


 


POC Glucose (mg/dL)  408 H  338 H  284 H  (75-99)  mg/dL


 


Phosphorus     (2.5-4.5)  mg/dL


 


Total Protein     (6.3-8.2)  g/dL


 


Albumin     (3.5-5.0)  g/dL


 


Lipase     ()  U/L














Assessment and Plan


Plan: 





Assessment:


1.  End-stage renal disease maintained on peritoneal dialysis.


2.  Hypervolemic hyponatremia.  Also component of hyperglycemia.


3.  Hypokalemia secondary to poor oral intake and PD losses.  Status post 

placement.


4.  Hypomagnesemia status post placement.


5.  Anemia of chronic kidney disease.


6.  Lower extremity tenderness.  DVT could not be excluded.


7.  Chronic kidney disease mineral bone disease.  Phosphorus level 6.8.





Plan:


Maintain current PD exchanges.


Add Renvela with meals.


Labs pending from this morning.

## 2018-12-09 NOTE — US
EXAMINATION TYPE: US venous doppler duplex LE RT

 

DATE OF EXAM: 12/9/2018 1:11 PM

 

COMPARISON: NONE

 

CLINICAL HISTORY: r/o DVT.

 

SIDE PERFORMED: Attempted bilateral, only right imaged  

 

TECHNIQUE:  The lower extremity deep venous system is examined utilizing real time linear array sonog
veda with graded compression, doppler sonography and color-flow sonography.

 

VESSELS IMAGED:

External Iliac Vein (EIV)

Common Femoral Vein

Deep Femoral Vein

Greater Saphenous Vein *

Femoral Vein

Popliteal Vein

Small Saphenous Vein *

Proximal Calf Veins

(* superficial vessels)

 

This was a second attempt at a BLEV study. Previous done previous day. This was reordered to be done 
after patient was medicated. Patient medicated pre-exam. Patient morbidly obese and unable to tolerat
e exam.

 

Right Leg:  Patient able to tolerate only color doppler from EIV through mid FV. Patient unable to to
lerate compression views, patient unable to tolerate any pressure behind knee. In very limited views 
of upper thigh and groin this appears negative, no pictures able to be taken at or below knee

 

Left Leg:  Attempted, patient stopped exam.

RN aware. 

 

IMPRESSION: Limited evaluation of the right leg shows no evidence of deep venous thrombosis in the ri
ght femoral vein.

## 2018-12-09 NOTE — P.PN
Subjective


Progress Note Date: 12/09/18


This is a 58-year-old female, patient of [facility].  Patient has 

a known past medical history of end-stage renal disease on hemodialysis, 

congestive heart failure, COPD, diabetes mellitus, hypertension, hyperlipidemia

, obstructive sleep apnea, chronic hypoxic respiratory failure on 4-5 L of 

oxygen at home, depression and CVA.  History obtained from both patient and 

patient's sister.  Apparently their mother had passed away on November 27 

couple weeks ago.  Since then, patient's has not gotten out of bed.  She has 

not moved out of her bed for about 2 weeks per the sister.  She stopped taking 

all of her medications.  And she has not been eating or drinking much.  Blood 

sugar 469 on admission.  Per the sister the patient was started on Prozac 10 mg 

daily about a month ago.  She took it for about a week and then stopped taking 

it.  Before that patient had been on Zoloft.  Patient reports that she hurts 

everywhere.  She has extensive bruising in the upper thigh is growing area and 

back.  There are smaller bruises on the lower leg area.  She hurts anywhere she 

is touched.  She complains of pain in the chest as well as the abdomen and legs 

and back.  She denies any falling.  Reports some nausea and chest chest pain.  

Denies any fever, chills, sweats, cough, bowel movement changes.  She does not 

make urine and is on peritoneal dialysis. 








On 12/07/2018 patient is currently lying in bed currently getting CAPD.  

Patient is complaining of generalized pain throughout chest abdomen and legs.  

Patient also complaining of more severe pain in her right lower extremity.  

Patient would not let me examine right leg.  Explained to patient that due to 

her prolonged bed rest she is at increased risk for blood clot and then I'm 

concerned that she may have a blood clot in her right leg and that she would 

require a venous Doppler to assess.  Patient states she would not be able to 

tolerate a venous Doppler at this time due to the pain.  Will order Dilaudid 

for pain at this time and encouraged patient the importance of obtaining this 

test.  We'll also consult cardiology services at this time.








On 12/08/2018 patient currently getting CAPD.  Patient did have Doppler study 

completed but was un conclusive of DVT due to increased pain during test.  

Patient remains on Lovenox 1 mg/kg per renal dosing for DVT treatment.  This 

time patient states that she feels slightly improved but still has generalized 

pain all over.  Patient denies chest pain or shortness breath.  Patient denies 

any nausea vomiting or diarrhea.  Patient denies any urinary burning or 

frequency.





On 12/09/2018 Patient currently resting in bed. Patient is having significant 

to lower extremity pain. Will order venous doppler again due to unconclusive 

reading from initial test.  Will order ativan and diuladid to be given prior to 

exam in order to get adequate reading. Patient denies chest pain and shortness 

of breath. Denies nausea, vomitting or diarrhea. Denies any urinary burning or 

frequency  





Objective





- Vital Signs


Vital signs: 


 Vital Signs











Temp  98.3 F   12/09/18 08:54


 


Pulse  69   12/09/18 08:54


 


Resp  18   12/09/18 08:54


 


BP  114/69   12/09/18 08:54


 


Pulse Ox  100   12/09/18 08:54








 Intake & Output











 12/08/18 12/09/18 12/09/18





 18:59 06:59 18:59


 


Intake Total 160 320 


 


Balance 160 320 


 


Weight 114 kg 114 kg 127.4 kg


 


Intake:   


 


   320 


 


    Sodium Chloride 0.9% 1, 160 320 





    000 ml @ 20 mls/hr IV .   





    Q24H [facility] Rx#:[id number]   


 


Other:   


 


  Voiding Method  Incontinent 





  CAPD 














- Exam


Head normocephalic


Neck supple


Lungs clear to auscultation bilaterally no wheezing or crackles


Heart regular rate and rhythm S1-S2, no rub or gallop


Abdomen is soft diffuse tenderness with palpation nondistended positive bowel 

sounds no hepatosplenomegaly obese


Extremities no edema


Neuro alert and orientated to 3


Skin: Patient has extensive bruising along the upper thighs lower back also 

smaller bruises along the lower legs..  Patient has tenderness all over body 

with palpation








- Labs


CBC & Chem 7: 


 12/09/18 07:34





 12/09/18 07:58


Labs: 


 Abnormal Lab Results - Last 24 Hours (Table)











  12/08/18 12/08/18 12/08/18 Range/Units





  11:39 16:55 20:42 


 


RBC     (3.80-5.40)  m/uL


 


Hgb     (11.4-16.0)  gm/dL


 


Hct     (34.0-46.0)  %


 


MCV     (80.0-100.0)  fL


 


MCHC     (31.0-37.0)  g/dL


 


RDW     (11.5-15.5)  %


 


Lymphocytes #     (1.0-4.8)  k/uL


 


Sodium     (137-145)  mmol/L


 


Chloride     ()  mmol/L


 


BUN     (7-17)  mg/dL


 


Creatinine     (0.52-1.04)  mg/dL


 


Glucose     (74-99)  mg/dL


 


POC Glucose (mg/dL)  404 H  408 H  338 H  (75-99)  mg/dL


 


Total Protein     (6.3-8.2)  g/dL


 


Albumin     (3.5-5.0)  g/dL


 


Lipase     ()  U/L














  12/09/18 12/09/18 12/09/18 Range/Units





  07:19 07:34 07:58 


 


RBC   3.16 L   (3.80-5.40)  m/uL


 


Hgb   9.3 L   (11.4-16.0)  gm/dL


 


Hct   33.1 L   (34.0-46.0)  %


 


MCV   104.9 H   (80.0-100.0)  fL


 


MCHC   28.2 L   (31.0-37.0)  g/dL


 


RDW   15.6 H   (11.5-15.5)  %


 


Lymphocytes #   0.7 L   (1.0-4.8)  k/uL


 


Sodium    130 L  (137-145)  mmol/L


 


Chloride    92 L  ()  mmol/L


 


BUN    36 H  (7-17)  mg/dL


 


Creatinine    10.44 H*  (0.52-1.04)  mg/dL


 


Glucose    273 H  (74-99)  mg/dL


 


POC Glucose (mg/dL)  284 H    (75-99)  mg/dL


 


Total Protein    5.7 L  (6.3-8.2)  g/dL


 


Albumin    2.8 L  (3.5-5.0)  g/dL


 


Lipase    312 H  ()  U/L














Assessment and Plan


Assessment: 


1.  Prolonged period of time in bed With pain throughout her body and extensive 

bruising.  Hold aspirin and Plavix.  Hemoglobin 10.2.  Platelets 378





2.  Insulin-dependent diabetes mellitus: Hyperglycemia with blood sugar 469 on 

admission.  Patient has not been taking insulin at home.  Resume home insulin.  

Acetone level negative.  Home medications resumed plus sliding scale coverage





3.  History of end-stage renal disease on peritoneal dialysis.  Consult Dr. Esquivel.  Creatinine currently 11.15 and bun 37.  Patient currently getting CAPD 

at this time.  Per nephrology continue PD exchanges.





4.  Morbid obesity





5.  Medication noncompliance





6.  Severe Depression.  Patient denies any suicidal or homicidal ideation.  

Recently started on Prozac 10 mg daily outpatient.  Will restart Prozac at 20 

mg daily . Wellbutrin has been adder per psych.





7.  History of COPD stable





8.  History of CVA





9.  Essential hypertension: Blood pressure on the lower side





10.  Hyperlipidemia





11.  Obstructive sleep apnea





12.  Chronic hypoxic respiratory failure home O2 dependent on 4-5 L





13.  Right lower extremity pain.  Concerns for possible DVT due to prolonged 

time in bed.  Patient at this time stating she would not be able to tolerate 

venous Doppler to rule out DVT due to the pain.  Will order Dilaudid for pain 

control and educated patient on the importance of obtaining this test to 

further assess. Patient will be started on lovenox 1mg/kg for DVT treatment. 

Discussed with pharmacist Katerine adjusted for renal dosing. Will re order 

doppler tomorrow and will order 1mg of diuladid to  be given prior to 

ultrasound.  Venous Doppler ultrasound attempted showing compromised exam 

unable to fully exclude DVT in either leg.  Patient remains on Lovenox 1 mg/kg 

for treatment of DVT renal dosing.atient is having significant to lower 

extremity pain. Will order venous doppler again due to unconclusive reading 

from initial test.  Will order ativan and diuladid to be given prior to exam in 

order to get adequate reading





14.  Elevated cardiac enzymes.  Troponin 0.072.  EKG completed showing normal 

sinus rhythm.  Inferior infarct, age undetermined anterior infarct age 

undetermined.  Per cardiology services no further workup needed from cardiology 

standpoint.  No evidence for an acute cardiac event per cardiology





15.  Elevated lipase level of 410.  Repeat lipase level ordered for a.m. lipase 

level in improving to 381.  Will order repeat in a.m.





16.  Hyponatremia.  Sodium 128 patient currently receiving CAPD.  Nephrology 

services are following





17. Elevated uric acid.  Uric acid 8.4  Patient will be started on allopurinol





18.  Anemia.  Hemoglobin 9.1.  Will order iron studies





DVT prophylaxis Lovenox..  GI prophylaxis Pepcid











I performed an examination of the patient and discussed their management with 

the Nurse Practitioner.  I have reviewed the Nurse Practitioner's notes and 

agree with the documented findings and plan of care

## 2018-12-10 LAB
ALBUMIN SERPL-MCNC: 2.7 G/DL (ref 3.5–5)
ALP SERPL-CCNC: 116 U/L (ref 38–126)
ALT SERPL-CCNC: 30 U/L (ref 9–52)
ANION GAP SERPL CALC-SCNC: 15 MMOL/L
AST SERPL-CCNC: 17 U/L (ref 14–36)
BASOPHILS # BLD AUTO: 0 K/UL (ref 0–0.2)
BASOPHILS NFR BLD AUTO: 0 %
BUN SERPL-SCNC: 34 MG/DL (ref 7–17)
CALCIUM SPEC-MCNC: 8.6 MG/DL (ref 8.4–10.2)
CHLORIDE SERPL-SCNC: 92 MMOL/L (ref 98–107)
CO2 SERPL-SCNC: 24 MMOL/L (ref 22–30)
EOSINOPHIL # BLD AUTO: 0.3 K/UL (ref 0–0.7)
EOSINOPHIL NFR BLD AUTO: 4 %
ERYTHROCYTE [DISTWIDTH] IN BLOOD BY AUTOMATED COUNT: 2.82 M/UL (ref 3.8–5.4)
ERYTHROCYTE [DISTWIDTH] IN BLOOD: 15.7 % (ref 11.5–15.5)
GLUCOSE SERPL-MCNC: 215 MG/DL (ref 74–99)
HBA1C MFR BLD: 7.7 % (ref 4–6)
HCT VFR BLD AUTO: 28.9 % (ref 34–46)
HGB BLD-MCNC: 8.7 GM/DL (ref 11.4–16)
LYMPHOCYTES # SPEC AUTO: 0.7 K/UL (ref 1–4.8)
LYMPHOCYTES NFR SPEC AUTO: 10 %
MCH RBC QN AUTO: 30.7 PG (ref 25–35)
MCHC RBC AUTO-ENTMCNC: 29.9 G/DL (ref 31–37)
MCV RBC AUTO: 102.5 FL (ref 80–100)
MONOCYTES # BLD AUTO: 0.8 K/UL (ref 0–1)
MONOCYTES NFR BLD AUTO: 12 %
NEUTROPHILS # BLD AUTO: 4.9 K/UL (ref 1.3–7.7)
NEUTROPHILS NFR BLD AUTO: 71 %
PLATELET # BLD AUTO: 386 K/UL (ref 150–450)
POTASSIUM SERPL-SCNC: 3.1 MMOL/L (ref 3.5–5.1)
PROT SERPL-MCNC: 5.6 G/DL (ref 6.3–8.2)
SODIUM SERPL-SCNC: 131 MMOL/L (ref 137–145)
WBC # BLD AUTO: 6.8 K/UL (ref 3.8–10.6)

## 2018-12-10 RX ADMIN — HYDROMORPHONE HYDROCHLORIDE PRN MG: 1 INJECTION, SOLUTION INTRAMUSCULAR; INTRAVENOUS; SUBCUTANEOUS at 22:16

## 2018-12-10 RX ADMIN — Medication SCH MG: at 21:34

## 2018-12-10 RX ADMIN — SEVELAMER CARBONATE SCH MG: 800 TABLET, FILM COATED ORAL at 17:57

## 2018-12-10 RX ADMIN — ALLOPURINOL SCH MG: 100 TABLET ORAL at 07:48

## 2018-12-10 RX ADMIN — IPRATROPIUM BROMIDE SCH MG: 0.5 SOLUTION RESPIRATORY (INHALATION) at 08:42

## 2018-12-10 RX ADMIN — INSULIN ASPART SCH UNIT: 100 INJECTION, SOLUTION INTRAVENOUS; SUBCUTANEOUS at 07:53

## 2018-12-10 RX ADMIN — INSULIN ASPART SCH UNIT: 100 INJECTION, SOLUTION INTRAVENOUS; SUBCUTANEOUS at 13:02

## 2018-12-10 RX ADMIN — LEVOTHYROXINE SODIUM SCH MCG: 88 TABLET ORAL at 06:03

## 2018-12-10 RX ADMIN — CLOTRIMAZOLE SCH: 0.01 CREAM TOPICAL at 07:48

## 2018-12-10 RX ADMIN — HYDROMORPHONE HYDROCHLORIDE PRN MG: 1 INJECTION, SOLUTION INTRAMUSCULAR; INTRAVENOUS; SUBCUTANEOUS at 03:46

## 2018-12-10 RX ADMIN — INSULIN ASPART SCH UNIT: 100 INJECTION, SOLUTION INTRAVENOUS; SUBCUTANEOUS at 17:56

## 2018-12-10 RX ADMIN — INSULIN ASPART SCH UNIT: 100 INJECTION, SUSPENSION SUBCUTANEOUS at 09:10

## 2018-12-10 RX ADMIN — HYDROCODONE BITARTRATE AND ACETAMINOPHEN SCH EACH: 7.5; 325 TABLET ORAL at 15:12

## 2018-12-10 RX ADMIN — LATANOPROST SCH DROPS: 50 SOLUTION OPHTHALMIC at 21:22

## 2018-12-10 RX ADMIN — HYDROMORPHONE HYDROCHLORIDE PRN MG: 1 INJECTION, SOLUTION INTRAMUSCULAR; INTRAVENOUS; SUBCUTANEOUS at 12:30

## 2018-12-10 RX ADMIN — BUPROPION HYDROCHLORIDE SCH MG: 150 TABLET, FILM COATED, EXTENDED RELEASE ORAL at 07:47

## 2018-12-10 RX ADMIN — DEXTROSE MONOHYDRATE, SODIUM CHLORIDE, SODIUM LACTATE, CALCIUM CHLORIDE, MAGNESIUM CHLORIDE SCH MLS/HR: 2.5; 538; 448; 18.4; 5.08 SOLUTION INTRAPERITONEAL at 15:12

## 2018-12-10 RX ADMIN — FENOFIBRATE SCH MG: 160 TABLET ORAL at 07:48

## 2018-12-10 RX ADMIN — SEVELAMER CARBONATE SCH MG: 800 TABLET, FILM COATED ORAL at 07:48

## 2018-12-10 RX ADMIN — FOLIC ACID SCH MG: 1 TABLET ORAL at 11:41

## 2018-12-10 RX ADMIN — INSULIN ASPART SCH UNIT: 100 INJECTION, SUSPENSION SUBCUTANEOUS at 21:34

## 2018-12-10 RX ADMIN — FAMOTIDINE SCH MG: 20 TABLET, FILM COATED ORAL at 21:23

## 2018-12-10 RX ADMIN — INSULIN ASPART SCH UNIT: 100 INJECTION, SOLUTION INTRAVENOUS; SUBCUTANEOUS at 21:34

## 2018-12-10 RX ADMIN — CLOTRIMAZOLE SCH: 0.01 CREAM TOPICAL at 19:10

## 2018-12-10 RX ADMIN — CEFAZOLIN SCH: 330 INJECTION, POWDER, FOR SOLUTION INTRAMUSCULAR; INTRAVENOUS at 17:01

## 2018-12-10 RX ADMIN — HYDROCODONE BITARTRATE AND ACETAMINOPHEN SCH EACH: 7.5; 325 TABLET ORAL at 09:10

## 2018-12-10 RX ADMIN — DARBEPOETIN ALFA SCH MCG: 60 INJECTION, SOLUTION INTRAVENOUS; SUBCUTANEOUS at 23:09

## 2018-12-10 RX ADMIN — DEXTROSE MONOHYDRATE, SODIUM CHLORIDE, SODIUM LACTATE, CALCIUM CHLORIDE, MAGNESIUM CHLORIDE SCH MLS/HR: 2.5; 538; 448; 18.4; 5.08 SOLUTION INTRAPERITONEAL at 09:09

## 2018-12-10 RX ADMIN — IPRATROPIUM BROMIDE SCH: 0.5 SOLUTION RESPIRATORY (INHALATION) at 16:08

## 2018-12-10 RX ADMIN — Medication SCH EACH: at 11:41

## 2018-12-10 RX ADMIN — DOCUSATE SODIUM SCH MG: 100 CAPSULE, LIQUID FILLED ORAL at 13:02

## 2018-12-10 RX ADMIN — IPRATROPIUM BROMIDE SCH: 0.5 SOLUTION RESPIRATORY (INHALATION) at 12:08

## 2018-12-10 RX ADMIN — HYDROCODONE BITARTRATE AND ACETAMINOPHEN SCH EACH: 7.5; 325 TABLET ORAL at 21:27

## 2018-12-10 RX ADMIN — DOCUSATE SODIUM SCH MG: 100 CAPSULE, LIQUID FILLED ORAL at 21:34

## 2018-12-10 RX ADMIN — ENOXAPARIN SODIUM SCH MG: 150 INJECTION SUBCUTANEOUS at 07:47

## 2018-12-10 RX ADMIN — IPRATROPIUM BROMIDE SCH: 0.5 SOLUTION RESPIRATORY (INHALATION) at 20:44

## 2018-12-10 RX ADMIN — Medication SCH UNIT: at 11:40

## 2018-12-10 RX ADMIN — ATORVASTATIN CALCIUM SCH MG: 80 TABLET, FILM COATED ORAL at 21:23

## 2018-12-10 RX ADMIN — TRIAMCINOLONE ACETONIDE SCH: 1 CREAM TOPICAL at 08:05

## 2018-12-10 RX ADMIN — HYDROMORPHONE HYDROCHLORIDE PRN MG: 1 INJECTION, SOLUTION INTRAMUSCULAR; INTRAVENOUS; SUBCUTANEOUS at 07:53

## 2018-12-10 RX ADMIN — HYDROMORPHONE HYDROCHLORIDE PRN MG: 1 INJECTION, SOLUTION INTRAMUSCULAR; INTRAVENOUS; SUBCUTANEOUS at 17:01

## 2018-12-10 RX ADMIN — DEXTROSE MONOHYDRATE, SODIUM CHLORIDE, SODIUM LACTATE, CALCIUM CHLORIDE, MAGNESIUM CHLORIDE SCH MLS/HR: 2.5; 538; 448; 18.4; 5.08 SOLUTION INTRAPERITONEAL at 21:40

## 2018-12-10 RX ADMIN — SEVELAMER CARBONATE SCH MG: 800 TABLET, FILM COATED ORAL at 12:07

## 2018-12-10 RX ADMIN — DEXTROSE MONOHYDRATE, SODIUM CHLORIDE, SODIUM LACTATE, CALCIUM CHLORIDE, MAGNESIUM CHLORIDE SCH MLS/HR: 2.5; 538; 448; 18.4; 5.08 SOLUTION INTRAPERITONEAL at 03:46

## 2018-12-10 RX ADMIN — TRIAMCINOLONE ACETONIDE SCH: 1 CREAM TOPICAL at 21:26

## 2018-12-10 NOTE — PN
PROGRESS NOTE



The patient is seen for followup for end-stage renal disease.  She was sitting up in

bed this morning.  Patient is trying to eat.  However, she does not participate much in

physical therapy.  She wants to go home and has been refusing rehab.



PHYSICAL EXAMINATION:

This morning, blood pressure was 112/46, heart rate of 70 per minute.  Patient is

afebrile.  Examination of the heart S1, S2.  Examination lungs bilateral breath sounds

are heard.  Abdomen is soft, nontender.  Examination of lower extremities shows chronic

skin changes.  Chronic edema is noted.  CNS exam is grossly intact.



LABS SHOW:

Sodium 131, potassium 3.1.  Serum creatinine remains elevated at 10.2 mg/dL.



ASSESSMENT:

1. End-stage renal disease, on peritoneal dialysis.  I will increase the exchanges to

    q.4 hours as serum creatinine remains elevated, although it is better than on

    admission.

2. Hypokalemia.  Will replace.

3. Anemia of chronic disease, will start patient on Aranesp.

4. Fluid overload, now improved.

5. CKD mineral bone disorder, maintained on Renvela.

6. Hypotension.  Agree with decreasing the dose of Norvasc.  I will discontinue it

    completely as blood pressure remains low.



PLAN:

Increase exchanges to q.4 hours and DC Norvasc.





MMODL / IJN: [id number] / Job#: [id number]

## 2018-12-10 NOTE — P.PN
Subjective


Progress Note Date: 12/10/18





This is a 58-year-old female, patient of [facility].  Patient has 

a known past medical history of end-stage renal disease on hemodialysis, 

congestive heart failure, COPD, diabetes mellitus, hypertension, hyperlipidemia

, obstructive sleep apnea, chronic hypoxic respiratory failure on 4-5 L of 

oxygen at home, depression and CVA.  History obtained from both patient and 

patient's sister.  Apparently their mother had passed away on November 27 

couple weeks ago.  Since then, patient's has not gotten out of bed.  She has 

not moved out of her bed for about 2 weeks per the sister.  She stopped taking 

all of her medications.  And she has not been eating or drinking much.  Blood 

sugar 469 on admission.  Per the sister the patient was started on Prozac 10 mg 

daily about a month ago.  She took it for about a week and then stopped taking 

it.  Before that patient had been on Zoloft.  Patient reports that she hurts 

everywhere.  She has extensive bruising in the upper thigh is growing area and 

back.  There are smaller bruises on the lower leg area.  She hurts anywhere she 

is touched.  She complains of pain in the chest as well as the abdomen and legs 

and back.  She denies any falling.  Reports some nausea and chest chest pain.  

Denies any fever, chills, sweats, cough, bowel movement changes.  She does not 

make urine and is on peritoneal dialysis. 








On 12/07/2018 patient is currently lying in bed currently getting CAPD.  

Patient is complaining of generalized pain throughout chest abdomen and legs.  

Patient also complaining of more severe pain in her right lower extremity.  

Patient would not let me examine right leg.  Explained to patient that due to 

her prolonged bed rest she is at increased risk for blood clot and then I'm 

concerned that she may have a blood clot in her right leg and that she would 

require a venous Doppler to assess.  Patient states she would not be able to 

tolerate a venous Doppler at this time due to the pain.  Will order Dilaudid 

for pain at this time and encouraged patient the importance of obtaining this 

test.  We'll also consult cardiology services at this time.








On 12/08/2018 patient currently getting CAPD.  Patient did have Doppler study 

completed but was un conclusive of DVT due to increased pain during test.  

Patient remains on Lovenox 1 mg/kg per renal dosing for DVT treatment.  This 

time patient states that she feels slightly improved but still has generalized 

pain all over.  Patient denies chest pain or shortness breath.  Patient denies 

any nausea vomiting or diarrhea.  Patient denies any urinary burning or 

frequency.





On 12/09/2018 Patient currently resting in bed. Patient is having significant 

to lower extremity pain. Will order venous doppler again due to unconclusive 

reading from initial test.  Will order ativan and diuladid to be given prior to 

exam in order to get adequate reading. Patient denies chest pain and shortness 

of breath. Denies nausea, vomitting or diarrhea. Denies any urinary burning or 

frequency  





12/10/2018 patient still complaining of pain all over the body.  She does 

report is slightly better than admission.  Complaining of constipation and his 

been about 3 days since her last bowel movement.  Hemoglobin has dropped from 

9.3-8.7.  Potassium is 3.1 and she received supplement.  Venous Doppler of the 

lower extremities showing a limited exam of the right leg and what could be 

seen was negative for DVT.  Patient refused the left leg to be completed due to 

pain.  Patient has been noncompliant with physical therapy.  Poor appetite.  

Pain service will be placed on consult.  Patient has been educated that she 

needs to participate with physical therapy.





Objective





- Vital Signs


Vital signs: 


 Vital Signs











Temp  97.0 F L  12/10/18 05:44


 


Pulse  70   12/10/18 08:54


 


Resp  16   12/10/18 08:16


 


BP  112/46   12/10/18 05:44


 


Pulse Ox  100   12/10/18 05:44








 Intake & Output











 12/09/18 12/10/18 12/10/18





 18:59 06:59 18:59


 


Intake Total 160  100


 


Balance 160  100


 


Weight 127.4 kg  127.4 kg


 


Intake:   


 


    


 


    Sodium Chloride 0.9% 1, 160  





    000 ml @ 20 mls/hr IV .   





    Q24H [facility] Rx#:[id number]   


 


  Oral   100


 


Other:   


 


  Voiding Method  Incontinent Incontinent





  CAPD CAPD


 


  # Voids  0 0


 


  # Bowel Movements  0 0














- Exam





Head normocephalic


Neck supple


Lungs clear to auscultation bilaterally no wheezing or crackles


Heart regular rate and rhythm S1-S2, no rub or gallop


Abdomen is soft nontender nondistended positive bowel sounds no 

hepatosplenomegaly


Extremities no edema


Neuro alert and orientated to 3


Skin extensive bruising along the upper thighs and lower back and small bruises 

on the legs.  Patient has tenderness all over the body with palpation.





- Labs


CBC & Chem 7: 


 12/10/18 08:28





 12/10/18 08:28


Labs: 


 Abnormal Lab Results - Last 24 Hours (Table)











  12/08/18 12/09/18 12/10/18 Range/Units





  08:31 07:34 08:28 


 


RBC    2.82 L  (3.80-5.40)  m/uL


 


Hgb    8.7 L  (11.4-16.0)  gm/dL


 


Hct    28.9 L  (34.0-46.0)  %


 


MCV    102.5 H  (80.0-100.0)  fL


 


MCHC    29.9 L  (31.0-37.0)  g/dL


 


RDW    15.7 H  (11.5-15.5)  %


 


Lymphocytes #    0.7 L  (1.0-4.8)  k/uL


 


Sodium     (137-145)  mmol/L


 


Potassium     (3.5-5.1)  mmol/L


 


Chloride     ()  mmol/L


 


BUN     (7-17)  mg/dL


 


Creatinine     (0.52-1.04)  mg/dL


 


Glucose     (74-99)  mg/dL


 


Hemoglobin A1c   7.7 H   (4.0-6.0)  %


 


Iron  27 L    ()  ug/dL


 


TIBC  144 L    (228-460)  ug/dL


 


Ferritin  1515.9 H    (10.0-291.0)  ng/mL


 


Total Protein     (6.3-8.2)  g/dL


 


Albumin     (3.5-5.0)  g/dL














  12/10/18 Range/Units





  08:28 


 


RBC   (3.80-5.40)  m/uL


 


Hgb   (11.4-16.0)  gm/dL


 


Hct   (34.0-46.0)  %


 


MCV   (80.0-100.0)  fL


 


MCHC   (31.0-37.0)  g/dL


 


RDW   (11.5-15.5)  %


 


Lymphocytes #   (1.0-4.8)  k/uL


 


Sodium  131 L  (137-145)  mmol/L


 


Potassium  3.1 L  (3.5-5.1)  mmol/L


 


Chloride  92 L  ()  mmol/L


 


BUN  34 H  (7-17)  mg/dL


 


Creatinine  10.21 H*  (0.52-1.04)  mg/dL


 


Glucose  215 H  (74-99)  mg/dL


 


Hemoglobin A1c   (4.0-6.0)  %


 


Iron   ()  ug/dL


 


TIBC   (228-460)  ug/dL


 


Ferritin   (10.0-291.0)  ng/mL


 


Total Protein  5.6 L  (6.3-8.2)  g/dL


 


Albumin  2.7 L  (3.5-5.0)  g/dL














Assessment and Plan


Assessment: 





1.  Prolonged period of time in bed With pain throughout her body and extensive 

bruising.  Hold aspirin and Plavix.  Hemoglobin trending down to 8.7.  Consult 

pain service for pain management





2.  Insulin-dependent diabetes mellitus: Hyperglycemia with blood sugar 469 on 

admission.  Patient has not been taking insulin at home.  Acetone level 

negative.  A1c 7.7.  Patient's blood sugars remain in the 200s.  We'll increase 

her NovoLog 7030-82 units twice a day.  Continue sliding scale coverage.





3.  History of end-stage renal disease on peritoneal dialysis.  Nephrology 

following.  Continue peritoneal dialysis.





4.  Morbid obesity





5.  Medication noncompliance





6.  Severe Depression.  Patient denies any suicidal or homicidal ideation.  

Recently started on Prozac 10 mg daily outpatient.  Will restart Prozac at 20 

mg daily . Wellbutrin has been adder per psych.





7.  History of COPD stable





8.  History of CVA





9.  History of Essential hypertension





10.  Hyperlipidemia





11.  Obstructive sleep apnea uses CPAP





12.  Chronic hypoxic respiratory failure home O2 dependent on 4-5 L





13.  Right lower extremity pain.  Concerns for possible DVT due to prolonged 

time in bed.  Continue with Lovenox 140 mg subcu daily.  Patient unable to 

tolerate venous Doppler.  On 12/ 9 venous Doppler was a limited exam what was 

evaluated was negative for DVT in the right leg.  Patient refused the left leg 

to be completed.





14.  Elevated cardiac enzymes.  Troponin 0.072.  EKG completed showing normal 

sinus rhythm.  Inferior infarct, age undetermined anterior infarct age 

undetermined.  Per cardiology services no further workup needed from cardiology 

standpoint.  No evidence for an acute cardiac event per cardiology





15.  Elevated lipase level of 410.  Has trended down.  No evidence of 

pancreatitis





16.  Hypovolemic Hyponatremia.  Sodium 128 now improving.  Sodium is up to 131.

  Possibly related to her hyperglycemia as well.  Nephrology following





17. Elevated uric acid.  Uric acid 8.4  Patient will be started on allopurinol





18.  Anemia of chronic kidney disease.  And evidence of iron deficiency anemia.

  Hemoglobin has down to 8.7.  Will start ferrous sulfate 325 mg twice a day





19.  Constipation add stool softener





20.  Hypotension possibly related to the IV Dilaudid.  We'll monitor.  Also 

will decrease patient's blood pressure medications from Norvasc 10 mg daily to 

5 mg daily and hold for systolic pressure less than 120











DVT prophylaxis Lovenox..  GI prophylaxis Pepcid





I performed an examination of the patient and discussed their management with 

the physician Assistant.  I have reviewed the Physician Assistant's notes and 

agree with the documented findings and plan of care

## 2018-12-10 NOTE — P.CN
Psychiatric Consult





- .


Consult date: 12/10/18


Consult:: 





12/10/18 13:47


Severe depression





Assessment and Plan


(1) Depression


Narrative/Plan: 








HPI: I stopped my psychiatric medications at home and they have restarted him 

here and I'm feeling better.


This is a 58-year-old female, patient of [facility].  Patient has 

a known past medical history of end-stage renal disease on hemodialysis, 

congestive heart failure, COPD, diabetes mellitus, hypertension, hyperlipidemia

, obstructive sleep apnea, chronic hypoxic respiratory failure on 4-5 L of 

oxygen at home, depression and CVA.  History obtained from both patient and 

patient's sister.  Apparently their mother had passed away on  

couple weeks ago.  Since then, patient's has not gotten out of bed.  She has 

not moved out of her bed for about 2 weeks per the sister.  She stopped taking 

all of her medications.  And she has not been eating or drinking much.  Blood 

sugar 469 on admission.  Per the sister the patient was started on Prozac 10 mg 

daily about a month ago.  She took it for about a week and then stopped taking 

it.  Before that patient had been on Zoloft.  Patient reports that she hurts 

everywhere.  She has extensive bruising in the upper thigh is growing area and 

back.  There are smaller bruises on the lower leg area.  She hurts anywhere she 

is touched.  She complains of pain in the chest as well as the abdomen and legs 

and back.  She denies any falling.  Reports some nausea and chest chest pain.  

Denies any fever, chills, sweats, cough, bowel movement changes.  She does not 

make urine and is on peritoneal dialysis. 








Past Medical History


Past Medical History: Asthma, Heart Failure, COPD, CVA/TIA, Diabetes Mellitus, 

Eye Disorder, Hyperlipidemia, Hypertension, Osteoarthritis (OA), Pneumonia, 

Renal Disease, Sleep Apnea/CPAP/BIPAP, Thyroid Disorder


Additional Past Medical History / Comment(s): sleep apnea, neuropathy, patient 

has one kidney, Stage III kidney failure, peritional dialysis- right side 

abdominal port.


History of Any Multi-Drug Resistant Organisms: None Reported


Past Surgical History: Bariatric Surgery, Hernia Repair, Orthopedic Surgery, 

Tonsillectomy


Additional Past Surgical History / Comment(s): rt kidney removed, Rt knee 

replacement


Past Anesthesia/Blood Transfusion Reactions: No Reported Reaction


Additional Past Anesthesia/Blood Transfusion Reaction / Comment(s): pt states 

she had a blood transfusion at [facility] 2017, "it made her itchy and they 

gave bendyl"


Past Psychological History: Anxiety, Depression


Smoking Status: Former smoker


Past Alcohol Use History: None Reported


Past Drug Use History: None Reported





- Past Family History


  ** Mother


Additional Family Medical History / Comment(s): skin CA, CHF, DM, thyroid 

disorder, HTN.





  ** Father


Family Medical History: Diabetes Mellitus, Hypertension, Thyroid Disorder


Additional Family Medical History / Comment(s): CHF.  Pt's father is .





Medications and Allergies


 Home Medications











 Medication  Instructions  Recorded  Confirmed  Type


 


Atorvastatin [Lipitor] 80 mg PO HS 09/13/15 12/06/18 History


 


Levothyroxine Sodium [Synthroid] 175 mcg PO DAILY 09/13/15 12/06/18 History


 


Aspirin 325 mg PO DAILY  tab 17 Rx


 


Clopidogrel [Plavix] 75 mg PO DAILY 18 History


 


Insulin Regular, Human [NovoLIN R] 100 unit SQ AC-BID 18 History


 


Ipratropium-Albuterol Nebulize 3 ml INHALATION RT-QID PRN 18 

History





[Duoneb 0.5 mg-3 mg/3 ml Soln]    


 


Acetaminophen Tab [Tylenol Tab] 1,000 mg PO Q6HR PRN 18 History


 


Albuterol Sulfate [Proair Hfa] 2 puff INHALATION RT-QID PRN 18 

History


 


Bimatoprost [Lumigan .01% Ophth 1 drop BOTH EYES HS 18 History





Soln]    


 


Cholecalciferol [Vitamin D3] 5,000 unit PO DAILY 18 History


 


Famotidine [Pepcid] 40 mg PO HS 18 History


 


Fenofibrate,Micronized 200 mg PO DAILY 18 History





[Fenofibrate]    


 


Folic Acid 0.8 mg PO DAILY 18 History


 


Insulin Aspart [Novolog Flexpen] See Protocol SQ AC-BID 18 

History


 


Ketoconazole 2% Cream [Nizoral 2%] 1 applic TOPICAL BID 18 

History


 


Sevelamer [Renvela] 2,400 mg PO AC-TID 18 History


 


Tiotropium 18 Mcg/Puff [Spiriva] 1 cap INHALATION RT-DAILY 18 

History


 


Triamcinolone 0.025% Cream 1 applic TOPICAL BID 18 History





[Kenalog 0.025% Cream]    


 


B Complex W-C No.20/Folic Acid 1 cap PO DAILY 18 History





[Renal Caps Softgel]    


 


HYDROcodone/APAP 7.5-325MG [Norco 1 tab PO TID 18 History





7.5-325]    


 


Sertraline [Zoloft] 150 mg PO DAILY 18 History


 


amLODIPine [Norvasc] 10 mg PO DAILY 18 History











 Allergies











Allergy/AdvReac Type Severity Reaction Status Date / Time


 


erythromycin base Allergy  Unknown Verified 18 14:06





[Erythromycin Base]     


 


NSAIDS (Non-Steroidal Allergy  Unknown Verified 18 14:06





Anti-Inflamma     


 


PAPER TAPE Allergy  Rash/Hives Uncoded 18 13:51




















Mental Status Examination - 


General Appearance: [casual, bizarre,  appears older than stated age


Speech/Language: [spontaneous, slow


Attitude/Behavior: [cooperative


Mood: [ depressed,  anxious


Affect: [ flat,  blunted constricted


Orientation: [time, person, place situation]


Thought Content: [wnl


Risk Factors: [She is not suicidal (ideations, plan), nor Homicidal (ideations, 

plan), other]


Perception: [wnl, denies hallucinations (auditory, visual, tactile), other]


Thought Processes: [goal-oriented


Concentration/Attention Span: [wnl] [Per observation and interview with the 

patient]


Recent Memory: [wnl


Remote Memory: [wnl] [past events, as related history]


Intelligence: [below average] [based on history, based on vocabulary, syntax, 

grammar, and content]


Judgement: [good] [per patient's behavior/history of present illness]


Insight: [good] [understanding severity of illness/history of present illness]





Psychiatric impression: Continue fluoxetine and Wellbutrin no further 

psychiatric recommendations at this time she has major depressive disorder 

recurrent





Psychiatric recommendations no new medications at this time





Thank you for the consult





Zak Aguirre D.O. PhD


Current Visit: Yes   Status: Acute   Code(s): F32.9 - MAJOR DEPRESSIVE DISORDER

, SINGLE EPISODE, UNSPECIFIED   SNOMED Code(s): 15675034


   





(2) Morbid obesity


Current Visit: Yes   Status: Acute   Code(s): E66.01 - MORBID (SEVERE) OBESITY 

DUE TO EXCESS CALORIES   SNOMED Code(s): 176480417


   


Time with Patient: Less than 30

## 2018-12-11 LAB
ALBUMIN SERPL-MCNC: 3.1 G/DL (ref 3.5–5)
ALP SERPL-CCNC: 121 U/L (ref 38–126)
ALT SERPL-CCNC: 27 U/L (ref 9–52)
ANION GAP SERPL CALC-SCNC: 12 MMOL/L
AST SERPL-CCNC: 24 U/L (ref 14–36)
BASOPHILS # BLD AUTO: 0 K/UL (ref 0–0.2)
BASOPHILS NFR BLD AUTO: 0 %
BUN SERPL-SCNC: 31 MG/DL (ref 7–17)
CALCIUM SPEC-MCNC: 9.2 MG/DL (ref 8.4–10.2)
CHLORIDE SERPL-SCNC: 93 MMOL/L (ref 98–107)
CO2 SERPL-SCNC: 26 MMOL/L (ref 22–30)
EOSINOPHIL # BLD AUTO: 0.3 K/UL (ref 0–0.7)
EOSINOPHIL NFR BLD AUTO: 3 %
ERYTHROCYTE [DISTWIDTH] IN BLOOD BY AUTOMATED COUNT: 3.46 M/UL (ref 3.8–5.4)
ERYTHROCYTE [DISTWIDTH] IN BLOOD: 15.4 % (ref 11.5–15.5)
GLUCOSE BLD-MCNC: 146 MG/DL (ref 75–99)
GLUCOSE BLD-MCNC: 158 MG/DL (ref 75–99)
GLUCOSE BLD-MCNC: 176 MG/DL (ref 75–99)
GLUCOSE SERPL-MCNC: 186 MG/DL (ref 74–99)
HCT VFR BLD AUTO: 35 % (ref 34–46)
HGB BLD-MCNC: 10.7 GM/DL (ref 11.4–16)
LYMPHOCYTES # SPEC AUTO: 0.9 K/UL (ref 1–4.8)
LYMPHOCYTES NFR SPEC AUTO: 9 %
MCH RBC QN AUTO: 31 PG (ref 25–35)
MCHC RBC AUTO-ENTMCNC: 30.7 G/DL (ref 31–37)
MCV RBC AUTO: 101.1 FL (ref 80–100)
MONOCYTES # BLD AUTO: 1.3 K/UL (ref 0–1)
MONOCYTES NFR BLD AUTO: 12 %
NEUTROPHILS # BLD AUTO: 7.4 K/UL (ref 1.3–7.7)
NEUTROPHILS NFR BLD AUTO: 73 %
PLATELET # BLD AUTO: 351 K/UL (ref 150–450)
POTASSIUM SERPL-SCNC: 3 MMOL/L (ref 3.5–5.1)
PROT SERPL-MCNC: 6.5 G/DL (ref 6.3–8.2)
SODIUM SERPL-SCNC: 131 MMOL/L (ref 137–145)
WBC # BLD AUTO: 10.1 K/UL (ref 3.8–10.6)

## 2018-12-11 RX ADMIN — HYDROMORPHONE HYDROCHLORIDE PRN MG: 1 INJECTION, SOLUTION INTRAMUSCULAR; INTRAVENOUS; SUBCUTANEOUS at 06:54

## 2018-12-11 RX ADMIN — SEVELAMER CARBONATE SCH MG: 800 TABLET, FILM COATED ORAL at 08:49

## 2018-12-11 RX ADMIN — POTASSIUM CHLORIDE SCH MEQ: 20 TABLET, EXTENDED RELEASE ORAL at 13:41

## 2018-12-11 RX ADMIN — LATANOPROST SCH DROPS: 50 SOLUTION OPHTHALMIC at 21:57

## 2018-12-11 RX ADMIN — INSULIN ASPART SCH: 100 INJECTION, SUSPENSION SUBCUTANEOUS at 21:58

## 2018-12-11 RX ADMIN — CEFAZOLIN SCH MLS/HR: 330 INJECTION, POWDER, FOR SOLUTION INTRAMUSCULAR; INTRAVENOUS at 15:45

## 2018-12-11 RX ADMIN — IPRATROPIUM BROMIDE SCH: 0.5 SOLUTION RESPIRATORY (INHALATION) at 18:54

## 2018-12-11 RX ADMIN — INSULIN ASPART SCH: 100 INJECTION, SOLUTION INTRAVENOUS; SUBCUTANEOUS at 21:50

## 2018-12-11 RX ADMIN — HYDROMORPHONE HYDROCHLORIDE PRN MG: 1 INJECTION, SOLUTION INTRAMUSCULAR; INTRAVENOUS; SUBCUTANEOUS at 02:50

## 2018-12-11 RX ADMIN — HYDROCODONE BITARTRATE AND ACETAMINOPHEN SCH: 7.5; 325 TABLET ORAL at 16:12

## 2018-12-11 RX ADMIN — INSULIN ASPART SCH UNIT: 100 INJECTION, SUSPENSION SUBCUTANEOUS at 08:48

## 2018-12-11 RX ADMIN — Medication SCH UNIT: at 08:49

## 2018-12-11 RX ADMIN — SEVELAMER CARBONATE SCH MG: 800 TABLET, FILM COATED ORAL at 17:53

## 2018-12-11 RX ADMIN — IPRATROPIUM BROMIDE SCH: 0.5 SOLUTION RESPIRATORY (INHALATION) at 15:49

## 2018-12-11 RX ADMIN — DEXTROSE MONOHYDRATE, SODIUM CHLORIDE, SODIUM LACTATE, CALCIUM CHLORIDE, MAGNESIUM CHLORIDE SCH MLS/HR: 2.5; 538; 448; 18.4; 5.08 SOLUTION INTRAPERITONEAL at 09:06

## 2018-12-11 RX ADMIN — DEXTROSE MONOHYDRATE, SODIUM CHLORIDE, SODIUM LACTATE, CALCIUM CHLORIDE, MAGNESIUM CHLORIDE SCH MLS/HR: 2.5; 538; 448; 18.4; 5.08 SOLUTION INTRAPERITONEAL at 05:10

## 2018-12-11 RX ADMIN — DEXTROSE MONOHYDRATE, SODIUM CHLORIDE, SODIUM LACTATE, CALCIUM CHLORIDE, MAGNESIUM CHLORIDE SCH MLS/HR: 2.5; 538; 448; 18.4; 5.08 SOLUTION INTRAPERITONEAL at 17:42

## 2018-12-11 RX ADMIN — FOLIC ACID SCH MG: 1 TABLET ORAL at 08:50

## 2018-12-11 RX ADMIN — DEXTROSE MONOHYDRATE, SODIUM CHLORIDE, SODIUM LACTATE, CALCIUM CHLORIDE, MAGNESIUM CHLORIDE SCH MLS/HR: 2.5; 538; 448; 18.4; 5.08 SOLUTION INTRAPERITONEAL at 21:58

## 2018-12-11 RX ADMIN — INSULIN ASPART SCH UNIT: 100 INJECTION, SOLUTION INTRAVENOUS; SUBCUTANEOUS at 08:48

## 2018-12-11 RX ADMIN — Medication SCH MG: at 08:51

## 2018-12-11 RX ADMIN — ATORVASTATIN CALCIUM SCH MG: 80 TABLET, FILM COATED ORAL at 21:58

## 2018-12-11 RX ADMIN — DOCUSATE SODIUM SCH MG: 100 CAPSULE, LIQUID FILLED ORAL at 21:58

## 2018-12-11 RX ADMIN — INSULIN ASPART SCH: 100 INJECTION, SOLUTION INTRAVENOUS; SUBCUTANEOUS at 17:51

## 2018-12-11 RX ADMIN — SEVELAMER CARBONATE SCH MG: 800 TABLET, FILM COATED ORAL at 13:37

## 2018-12-11 RX ADMIN — HYDROCODONE BITARTRATE AND ACETAMINOPHEN SCH EACH: 7.5; 325 TABLET ORAL at 21:57

## 2018-12-11 RX ADMIN — IPRATROPIUM BROMIDE SCH: 0.5 SOLUTION RESPIRATORY (INHALATION) at 11:27

## 2018-12-11 RX ADMIN — POTASSIUM CHLORIDE SCH MEQ: 20 TABLET, EXTENDED RELEASE ORAL at 16:14

## 2018-12-11 RX ADMIN — Medication SCH EACH: at 08:50

## 2018-12-11 RX ADMIN — FAMOTIDINE SCH MG: 20 TABLET, FILM COATED ORAL at 21:58

## 2018-12-11 RX ADMIN — BUPROPION HYDROCHLORIDE SCH MG: 150 TABLET, FILM COATED, EXTENDED RELEASE ORAL at 08:51

## 2018-12-11 RX ADMIN — HYDROCODONE BITARTRATE AND ACETAMINOPHEN SCH EACH: 7.5; 325 TABLET ORAL at 08:47

## 2018-12-11 RX ADMIN — TRIAMCINOLONE ACETONIDE SCH APPLIC: 1 CREAM TOPICAL at 08:51

## 2018-12-11 RX ADMIN — HYDROMORPHONE HYDROCHLORIDE PRN MG: 1 INJECTION, SOLUTION INTRAMUSCULAR; INTRAVENOUS; SUBCUTANEOUS at 11:33

## 2018-12-11 RX ADMIN — CLOTRIMAZOLE SCH: 0.01 CREAM TOPICAL at 08:52

## 2018-12-11 RX ADMIN — Medication SCH MG: at 21:57

## 2018-12-11 RX ADMIN — IPRATROPIUM BROMIDE SCH: 0.5 SOLUTION RESPIRATORY (INHALATION) at 08:00

## 2018-12-11 RX ADMIN — ALLOPURINOL SCH MG: 100 TABLET ORAL at 08:49

## 2018-12-11 RX ADMIN — FENOFIBRATE SCH MG: 160 TABLET ORAL at 08:50

## 2018-12-11 RX ADMIN — DOCUSATE SODIUM SCH MG: 100 CAPSULE, LIQUID FILLED ORAL at 08:50

## 2018-12-11 RX ADMIN — INSULIN ASPART SCH UNIT: 100 INJECTION, SOLUTION INTRAVENOUS; SUBCUTANEOUS at 13:36

## 2018-12-11 RX ADMIN — DEXTROSE MONOHYDRATE, SODIUM CHLORIDE, SODIUM LACTATE, CALCIUM CHLORIDE, MAGNESIUM CHLORIDE SCH MLS/HR: 2.5; 538; 448; 18.4; 5.08 SOLUTION INTRAPERITONEAL at 01:07

## 2018-12-11 RX ADMIN — HYDROMORPHONE HYDROCHLORIDE PRN MG: 1 INJECTION, SOLUTION INTRAMUSCULAR; INTRAVENOUS; SUBCUTANEOUS at 22:47

## 2018-12-11 RX ADMIN — DEXTROSE MONOHYDRATE, SODIUM CHLORIDE, SODIUM LACTATE, CALCIUM CHLORIDE, MAGNESIUM CHLORIDE SCH MLS/HR: 2.5; 538; 448; 18.4; 5.08 SOLUTION INTRAPERITONEAL at 13:15

## 2018-12-11 RX ADMIN — ENOXAPARIN SODIUM SCH MG: 150 INJECTION SUBCUTANEOUS at 08:49

## 2018-12-11 RX ADMIN — LEVOTHYROXINE SODIUM SCH MCG: 88 TABLET ORAL at 05:59

## 2018-12-11 NOTE — P.PN
Subjective


Progress Note Date: 12/11/18





This is a 58-year-old female, patient of [facility].  Patient has 

a known past medical history of end-stage renal disease on hemodialysis, 

congestive heart failure, COPD, diabetes mellitus, hypertension, hyperlipidemia

, obstructive sleep apnea, chronic hypoxic respiratory failure on 4-5 L of 

oxygen at home, depression and CVA.  History obtained from both patient and 

patient's sister.  Apparently their mother had passed away on November 27 

couple weeks ago.  Since then, patient's has not gotten out of bed.  She has 

not moved out of her bed for about 2 weeks per the sister.  She stopped taking 

all of her medications.  And she has not been eating or drinking much.  Blood 

sugar 469 on admission.  Per the sister the patient was started on Prozac 10 mg 

daily about a month ago.  She took it for about a week and then stopped taking 

it.  Before that patient had been on Zoloft.  Patient reports that she hurts 

everywhere.  She has extensive bruising in the upper thigh is growing area and 

back.  There are smaller bruises on the lower leg area.  She hurts anywhere she 

is touched.  She complains of pain in the chest as well as the abdomen and legs 

and back.  She denies any falling.  Reports some nausea and chest chest pain.  

Denies any fever, chills, sweats, cough, bowel movement changes.  She does not 

make urine and is on peritoneal dialysis. 








On 12/07/2018 patient is currently lying in bed currently getting CAPD.  

Patient is complaining of generalized pain throughout chest abdomen and legs.  

Patient also complaining of more severe pain in her right lower extremity.  

Patient would not let me examine right leg.  Explained to patient that due to 

her prolonged bed rest she is at increased risk for blood clot and then I'm 

concerned that she may have a blood clot in her right leg and that she would 

require a venous Doppler to assess.  Patient states she would not be able to 

tolerate a venous Doppler at this time due to the pain.  Will order Dilaudid 

for pain at this time and encouraged patient the importance of obtaining this 

test.  We'll also consult cardiology services at this time.








On 12/08/2018 patient currently getting CAPD.  Patient did have Doppler study 

completed but was un conclusive of DVT due to increased pain during test.  

Patient remains on Lovenox 1 mg/kg per renal dosing for DVT treatment.  This 

time patient states that she feels slightly improved but still has generalized 

pain all over.  Patient denies chest pain or shortness breath.  Patient denies 

any nausea vomiting or diarrhea.  Patient denies any urinary burning or 

frequency.





On 12/09/2018 Patient currently resting in bed. Patient is having significant 

to lower extremity pain. Will order venous doppler again due to unconclusive 

reading from initial test.  Will order ativan and diuladid to be given prior to 

exam in order to get adequate reading. Patient denies chest pain and shortness 

of breath. Denies nausea, vomitting or diarrhea. Denies any urinary burning or 

frequency  





12/10/2018 patient still complaining of pain all over the body.  She does 

report is slightly better than admission.  Complaining of constipation and his 

been about 3 days since her last bowel movement.  Hemoglobin has dropped from 

9.3-8.7.  Potassium is 3.1 and she received supplement.  Venous Doppler of the 

lower extremities showing a limited exam of the right leg and what could be 

seen was negative for DVT.  Patient refused the left leg to be completed due to 

pain.  Patient has been noncompliant with physical therapy.  Poor appetite.  

Pain service will be placed on consult.  Patient has been educated that she 

needs to participate with physical therapy.





12/11/2018 patient does report slight improvement in her pain.  Still 

complaining of pain throughout her body.  Patient was able to work with 

physical therapy yesterday.  She was a max assist.  She denies any chest pain 

or shortness of breath.  Denies any nausea or vomiting.  Still has not had a 

bowel movement for about 5 days.  Norvasc discontinued yesterday due to 

hypotension.  Blood pressures are 20 better today.  Nephrology is also adjusted 

the frequency of peritoneal dialysis to every 4 hours. 





Objective





- Vital Signs


Vital signs: 


 Vital Signs











Temp  97.4 F L  12/11/18 09:07


 


Pulse  75   12/11/18 09:07


 


Resp  18   12/11/18 09:07


 


BP  113/57   12/11/18 09:07


 


Pulse Ox  100   12/11/18 09:07








 Intake & Output











 12/10/18 12/11/18 12/11/18





 18:59 06:59 18:59


 


Intake Total 550 550 


 


Output Total  0 


 


Balance 550 550 


 


Weight 127.4 kg 116.7 kg 


 


Intake:   


 


  Oral 550 550 


 


Output:   


 


  Urine  0 


 


Other:   


 


  Voiding Method Incontinent Incontinent Incontinent





 CAPD CAPD CAPD


 


  # Voids 0 0 


 


  # Bowel Movements 0 0 














- Exam





Head normocephalic


Neck supple


Lungs clear to auscultation bilaterally no wheezing or crackles


Heart regular rate and rhythm S1-S2, no rub or gallop


Abdomen is soft nontender nondistended positive bowel sounds no 

hepatosplenomegaly


Extremities no edema


Neuro alert and orientated to 3


Skin extensive bruising along the upper thighs and lower back and small bruises 

on the legs.  Patient has tenderness all over the body with palpation.





- Labs


CBC & Chem 7: 


 12/10/18 08:28





 12/10/18 08:28


Labs: 


 Abnormal Lab Results - Last 24 Hours (Table)











  12/08/18 12/11/18 Range/Units





  08:31 07:52 


 


POC Glucose (mg/dL)   158 H  (75-99)  mg/dL


 


Iron  27 L   ()  ug/dL


 


TIBC  144 L   (228-460)  ug/dL


 


Ferritin  1515.9 H   (10.0-291.0)  ng/mL














Assessment and Plan


Assessment: 





1.  Prolonged period of time in bed With pain throughout her body and extensive 

bruising.  Hold aspirin and Plavix.  Hemoglobin trending down to 8.7.  Consult 

pain service for pain management





2.  Insulin-dependent diabetes mellitus: Hyperglycemia with blood sugar 469 on 

admission.  Patient has not been taking insulin at home.  Acetone level 

negative.  A1c 7.7.  Blood sugar 158 this morning showing improvement.  We'll 

monitor.  continue NovoLog 70/30 to 82 units twice a day.  Continue sliding 

scale coverage.





3.  History of end-stage renal disease on peritoneal dialysis.  Nephrology 

following.  Continue peritoneal dialysis.





4.  Morbid obesity





5.  Medication noncompliance





6.  Severe Depression.  Patient denies any suicidal or homicidal ideation.  

Recently started on Prozac 10 mg daily outpatient.  Will restart Prozac at 20 

mg daily . Wellbutrin has been adder per psych.





7.  History of COPD stable





8.  History of CVA





9.  History of Essential hypertension





10.  Hyperlipidemia





11.  Obstructive sleep apnea uses CPAP





12.  Chronic hypoxic respiratory failure home O2 dependent on 4-5 L





13.  Right lower extremity pain.  Concerns for possible DVT due to prolonged 

time in bed.  Continue with Lovenox 140 mg subcu daily.  Patient unable to 

tolerate venous Doppler.  On 12/ 9 venous Doppler was a limited exam what was 

evaluated was negative for DVT in the right leg.  Patient refused the left leg 

to be completed.





14.  Elevated cardiac enzymes.  Troponin 0.072.  EKG completed showing normal 

sinus rhythm.  Inferior infarct, age undetermined anterior infarct age 

undetermined.  Per cardiology services no further workup needed from cardiology 

standpoint.  No evidence for an acute cardiac event per cardiology





15.  Elevated lipase level of 410.  Has trended down.  No evidence of 

pancreatitis





16.  Hypovolemic Hyponatremia.  Sodium 128 now improving.  Sodium is up to 131.

  Possibly related to her hyperglycemia as well.  Nephrology following





17. Elevated uric acid.  Uric acid 8.4  Patient will be started on allopurinol





18.  Anemia of chronic kidney disease.  And evidence of iron deficiency anemia.

  Hemoglobin has down to 8.7.  Will start ferrous sulfate 325 mg twice a day





19.  Constipation add stool softener and lactulose





20.  Hypotension possibly related to the IV Dilaudid.  Nephrology discontinued 

the Norvasc.  Blood pressures showing improvement.





Plan


Encourage patient to increase activity 


continue to work with physical therapy.  


Await pain service evaluation





DVT prophylaxis Lovenox. GI prophylaxis Pepcid





I performed an examination of the patient and discussed their management with 

the physician Assistant.  I have reviewed the Physician Assistant's notes and 

agree with the documented findings and plan of care

## 2018-12-11 NOTE — PN
PROGRESS NOTE



Patient is seen for followup for end-stage renal disease.  She is currently maintained

on peritoneal dialysis.  According to nursing staff, patient has not been doing much.

She refuses therapy.  She has just been lying; however, she has been eating.  Her

exchanges were increased to q.4 hours and patient is doing well with her dialysis.



On examination, blood pressure remains on the lower side with systolic at about 113 to

96 mmHg. Heart rate 76 per minute.  Patient is afebrile.

EXAMINATION OF THE HEART: S1, S2.

EXAMINATION OF LUNGS: Bilateral breath sounds are heard.

ABDOMEN:  Soft, morbidly obese.

Examination of lower extremities shows no significant edema. Chronic skin changes are

noted.

CNS exam is grossly intact.



Labs show sodium 131, potassium 3.0, BUN 31, serum creatinine 10.56, hemoglobin 10.7

g/dL.



ASSESSMENT:

1. End-stage renal disease, on peritoneal dialysis.  Continue q.4 hour exchanges.

2. Hypokalemia associated with decreased intake and loss through dialysis.  Will

    maintain patient on supplementation.

3. Anemia of chronic disease, currently maintained on Aranesp.

4. Chronic kidney disease mineral bone disorder, maintained on Renvela.

5. Hypotension.  Currently off of Norvasc.  I will switch her to 1.5% solution

    alternating with 2.5% solution exchanges tomorrow.

6. Generalized debility.

7. Depression with recent loss of her mother prior to admission.



PLAN:

Continue current exchanges for now. Change to 2.5% solution alternating with 1.5%

solution tomorrow.





MMODL / IJN: [id number] / Job#: [id number]

## 2018-12-11 NOTE — P.CONS
History of Present Illness





- Chief Complaint


Medical debility and lethargy





- History of Present Illness





I had the opportunity to see patient for inpatient rehab consultation with 

regard to medical debility.  She was admitted to [facility]  with 

history of 2 weeks not getting out of bed.  Nurse reports the patient's mother 

passed away recently and she has since been depressed.  Seen by Dr. Aguirre who 

notes severe depression.  Chest x-ray demonstrates mild cardiomegaly.  Patient 

unable to tolerate lower extremity Doppler test 2.  PT and OT have difficulty 

assessing patient's due to low pain threshold's.





Previous functional history as elicited from patient: 58-year-old right-handed 

white female single.  PMD Dr. Pugh.





Review of Systems





Review of systems: Patient poor and very slow historian.  No specific 

complaints.


ENT: Denies sneezes or discharge.


Eyes: Denies discharge or photophobia.


Cardiac: Denies chest pain or palpitation.


Pulmonary: Denies cough or shortness of breath.


Breast: Denies discharge or lumps.


Gastrointestinal: Denies nausea, emesis, constipation, diarrhea.


Genitourinary: Denies discharge or frequency.


Musculoskeletal: Denies muscle or bone aches.


Neurologic: Denies motor or sensory change.


Endocrine: Denies shakes or sweats.


Oncology: Denies cancers.


Dermatologic: Denies rash, itching, pruritus.


ALLERGY/immunology: Denies sneezes, rashes.








Past Medical History


Past Medical History: Asthma, Heart Failure, COPD, CVA/TIA, Diabetes Mellitus, 

Eye Disorder, Hyperlipidemia, Hypertension, Osteoarthritis (OA), Pneumonia, 

Renal Disease, Sleep Apnea/CPAP/BIPAP, Thyroid Disorder


Additional Past Medical History / Comment(s): sleep apnea, neuropathy, patient 

has one kidney, Stage III kidney failure, peritional dialysis- right side 

abdominal port.


History of Any Multi-Drug Resistant Organisms: None Reported


Past Surgical History: Bariatric Surgery, Hernia Repair, Orthopedic Surgery, 

Tonsillectomy


Additional Past Surgical History / Comment(s): rt kidney removed, Rt knee 

replacement


Past Anesthesia/Blood Transfusion Reactions: No Reported Reaction


Additional Past Anesthesia/Blood Transfusion Reaction / Comm: pt states she had 

a blood transfusion [facility], "it made her itchy and they gave 

bendyl"


Past Psychological History: Anxiety, Depression


Smoking Status: Former smoker


Past Alcohol Use History: None Reported


Past Drug Use History: None Reported





- Past Family History


  ** Mother


Additional Family Medical History / Comment(s): skin CA, CHF, DM, thyroid 

disorder, HTN.





  ** Father


Family Medical History: Diabetes Mellitus, Hypertension, Thyroid Disorder


Additional Family Medical History / Comment(s): CHF.  Pt's father is .





Medications and Allergies


 Home Medications











 Medication  Instructions  Recorded  Confirmed  Type


 


Atorvastatin [Lipitor] 80 mg PO HS 09/13/15 12/06/18 History


 


Levothyroxine Sodium [Synthroid] 175 mcg PO DAILY 09/13/15 12/06/18 History


 


Aspirin 325 mg PO DAILY  tab 17 Rx


 


Clopidogrel [Plavix] 75 mg PO DAILY 18 History


 


Insulin Regular, Human [NovoLIN R] See Protocol SQ ACHS 18 

History


 


Ipratropium-Albuterol Nebulize 3 ml INHALATION RT-QID PRN 18 

History





[Duoneb 0.5 mg-3 mg/3 ml Soln]    


 


Acetaminophen Tab [Tylenol Tab] 1,000 mg PO Q6HR PRN 18 History


 


Albuterol Sulfate [Proair Hfa] 2 puff INHALATION RT-QID PRN 18 

History


 


Bimatoprost [Lumigan .01% Ophth 1 drop BOTH EYES HS 18 History





Soln]    


 


Cholecalciferol [Vitamin D3] 5,000 unit PO DAILY 18 History


 


Famotidine [Pepcid] 40 mg PO HS 18 History


 


Fenofibrate,Micronized 200 mg PO DAILY 18 History





[Fenofibrate]    


 


Folic Acid 0.8 mg PO DAILY 18 History


 


Ketoconazole 2% Cream [Nizoral 2%] 1 applic TOPICAL BID 18 

History


 


Sevelamer [Renvela] 2,400 mg PO AC-TID 18 History


 


Tiotropium 18 Mcg/Puff [Spiriva] 1 cap INHALATION RT-DAILY 18 

History


 


Triamcinolone 0.025% Cream 1 applic TOPICAL BID 18 History





[Kenalog 0.025% Cream]    


 


B Complex W-C No.20/Folic Acid 1 cap PO DAILY 18 History





[Renal Caps Softgel]    


 


HYDROcodone/APAP 7.5-325MG [Norco 1 tab PO TID 18 History





7.5-325]    


 


Insulin Aspart Protam & Aspart 80 unit SQ BID 18 History





[Novolog Mix 70-30 Flexpen Syrn]    


 


amLODIPine [Norvasc] 10 mg PO DAILY 18 History


 


FLUoxetine HCL [PROzac] 20 mg PO DAILY 18 History











 Allergies











Allergy/AdvReac Type Severity Reaction Status Date / Time


 


erythromycin base Allergy  Unknown Verified 18 14:06





[Erythromycin Base]     


 


NSAIDS (Non-Steroidal Allergy  Unknown Verified 18 14:06





Anti-Inflamma     


 


PAPER TAPE Allergy  Rash/Hives Uncoded 18 13:51














Physical Exam


Vitals: 


 Vital Signs











  Temp Pulse Pulse Resp BP BP BP


 


 18 15:48       


 


 18 15:00  98 F   77  20   164/58 


 


 18 13:15  98.2 F  72   14  103/47  


 


 18 09:07  97.4 F L  75   18  113/57  


 


 18 05:10  98.2 F  74   18  106/53  


 


 18 01:07  97.0 F L  74   18  117/56  


 


 18 01:00  97.0 F L   74  18    117/56


 


 12/10/18 21:00    68     113/64


 


 12/10/18 16:58    70     103/62














  Pulse Ox


 


 18 15:48  99


 


 18 15:00  99


 


 18 13:15  99


 


 18 09:07  100


 


 18 05:10  99


 


 18 01:07  99


 


 18 01:00  99


 


 12/10/18 21:00  95


 


 12/10/18 16:58 








 Intake and Output











 18





 06:59 14:59 22:59


 


Intake Total 450  


 


Balance 450  


 


Intake:   


 


  Oral 450  


 


Other:   


 


  Voiding Method Incontinent Incontinent 





 CAPD CAPD 


 


  # Voids 0 0 


 


  Weight 116.7 kg  














Skin: Good color, texture, turgor.


General: Morbidly obese build and comfortable appearance.


Head: Normocephalic, atraumatic.


Eyes: Symmetric.  Pupils equal round.


Ears: Symmetric.  Hearing within normal limits.


Mouth: Clear.


Neck: Supple.  Carotid without bruit.


Cardiac: Regular rate and rhythm.


Lungs: Clear anteriorly and posteriorly.


Abdomen: Soft active nontender.


Extremities: Normal tone.


Neurological: Mental status: Depressed affect.  Answers questions very slowly.


Cranial nerves: Symmetric facial tone and trapezius.


Motor: Poor movement arms and legs.  Only able to get hands off the bed, but 

not elbows.  Moves leg sideways.


Sensation: Intact throughout.


DTRs: Symmetric and equal throughout.


Mobility: Dependent for bed mobility.





Results


CBC & Chem 7: 


 18 11:18





 18 11:18


Labs: 


 Abnormal Lab Results - Last 24 Hours (Table)











  18 Range/Units





  07:52 11:18 11:18 


 


RBC   3.46 L   (3.80-5.40)  m/uL


 


Hgb   10.7 L   (11.4-16.0)  gm/dL


 


MCV   101.1 H   (80.0-100.0)  fL


 


MCHC   30.7 L   (31.0-37.0)  g/dL


 


Lymphocytes #   0.9 L   (1.0-4.8)  k/uL


 


Monocytes #   1.3 H   (0-1.0)  k/uL


 


Sodium    131 L  (137-145)  mmol/L


 


Potassium    3.0 L  (3.5-5.1)  mmol/L


 


Chloride    93 L  ()  mmol/L


 


BUN    31 H  (7-17)  mg/dL


 


Creatinine    10.56 H*  (0.52-1.04)  mg/dL


 


Glucose    186 H  (74-99)  mg/dL


 


POC Glucose (mg/dL)  158 H    (75-99)  mg/dL


 


Albumin    3.1 L  (3.5-5.0)  g/dL














  18 Range/Units





  12:42 


 


RBC   (3.80-5.40)  m/uL


 


Hgb   (11.4-16.0)  gm/dL


 


MCV   (80.0-100.0)  fL


 


MCHC   (31.0-37.0)  g/dL


 


Lymphocytes #   (1.0-4.8)  k/uL


 


Monocytes #   (0-1.0)  k/uL


 


Sodium   (137-145)  mmol/L


 


Potassium   (3.5-5.1)  mmol/L


 


Chloride   ()  mmol/L


 


BUN   (7-17)  mg/dL


 


Creatinine   (0.52-1.04)  mg/dL


 


Glucose   (74-99)  mg/dL


 


POC Glucose (mg/dL)  176 H  (75-99)  mg/dL


 


Albumin   (3.5-5.0)  g/dL














Assessment and Plan


(1) Failure to thrive


Current Visit: Yes   Status: Acute   Code(s): ITG7153 -    SNOMED Code(s): 

61727334


   


Plan: 





Impression:


1.  Medical debility.


2.  Failure to thrive.


3.  Morbid obesity.


4.  Profound depression.





Comments and plan: At this time rehab prognosis guarded due to depression.  

Patient currently requires 24/7 multiple person.

## 2018-12-12 LAB
ALBUMIN SERPL-MCNC: 2.7 G/DL (ref 3.5–5)
ALP SERPL-CCNC: 116 U/L (ref 38–126)
ALT SERPL-CCNC: 27 U/L (ref 9–52)
ANION GAP SERPL CALC-SCNC: 14 MMOL/L
AST SERPL-CCNC: 24 U/L (ref 14–36)
BASOPHILS # BLD AUTO: 0.1 K/UL (ref 0–0.2)
BASOPHILS NFR BLD AUTO: 1 %
BUN SERPL-SCNC: 30 MG/DL (ref 7–17)
CALCIUM SPEC-MCNC: 8.9 MG/DL (ref 8.4–10.2)
CHLORIDE SERPL-SCNC: 94 MMOL/L (ref 98–107)
CO2 SERPL-SCNC: 23 MMOL/L (ref 22–30)
EOSINOPHIL # BLD AUTO: 0.3 K/UL (ref 0–0.7)
EOSINOPHIL NFR BLD AUTO: 3 %
ERYTHROCYTE [DISTWIDTH] IN BLOOD BY AUTOMATED COUNT: 3.04 M/UL (ref 3.8–5.4)
ERYTHROCYTE [DISTWIDTH] IN BLOOD: 15.6 % (ref 11.5–15.5)
GLUCOSE SERPL-MCNC: 241 MG/DL (ref 74–99)
HCT VFR BLD AUTO: 31.4 % (ref 34–46)
HGB BLD-MCNC: 9.2 GM/DL (ref 11.4–16)
LYMPHOCYTES # SPEC AUTO: 0.8 K/UL (ref 1–4.8)
LYMPHOCYTES NFR SPEC AUTO: 9 %
MAGNESIUM SPEC-SCNC: 1.7 MG/DL (ref 1.6–2.3)
MCH RBC QN AUTO: 30.3 PG (ref 25–35)
MCHC RBC AUTO-ENTMCNC: 29.4 G/DL (ref 31–37)
MCV RBC AUTO: 103.1 FL (ref 80–100)
MONOCYTES # BLD AUTO: 1.4 K/UL (ref 0–1)
MONOCYTES NFR BLD AUTO: 15 %
NEUTROPHILS # BLD AUTO: 6.8 K/UL (ref 1.3–7.7)
NEUTROPHILS NFR BLD AUTO: 72 %
PLATELET # BLD AUTO: 382 K/UL (ref 150–450)
POTASSIUM SERPL-SCNC: 3.8 MMOL/L (ref 3.5–5.1)
PROT SERPL-MCNC: 5.6 G/DL (ref 6.3–8.2)
SODIUM SERPL-SCNC: 131 MMOL/L (ref 137–145)
WBC # BLD AUTO: 9.5 K/UL (ref 3.8–10.6)

## 2018-12-12 RX ADMIN — INSULIN ASPART SCH UNIT: 100 INJECTION, SOLUTION INTRAVENOUS; SUBCUTANEOUS at 17:48

## 2018-12-12 RX ADMIN — INSULIN ASPART SCH UNIT: 100 INJECTION, SUSPENSION SUBCUTANEOUS at 08:41

## 2018-12-12 RX ADMIN — INSULIN ASPART SCH UNIT: 100 INJECTION, SOLUTION INTRAVENOUS; SUBCUTANEOUS at 12:49

## 2018-12-12 RX ADMIN — HYDROCODONE BITARTRATE AND ACETAMINOPHEN SCH EACH: 7.5; 325 TABLET ORAL at 16:00

## 2018-12-12 RX ADMIN — DEXTROSE MONOHYDRATE, SODIUM CHLORIDE, SODIUM LACTATE, CALCIUM CHLORIDE, MAGNESIUM CHLORIDE SCH MLS/HR: 2.5; 538; 448; 18.4; 5.08 SOLUTION INTRAPERITONEAL at 12:48

## 2018-12-12 RX ADMIN — HYDROMORPHONE HYDROCHLORIDE PRN MG: 1 INJECTION, SOLUTION INTRAMUSCULAR; INTRAVENOUS; SUBCUTANEOUS at 18:33

## 2018-12-12 RX ADMIN — TRIAMCINOLONE ACETONIDE SCH APPLIC: 1 CREAM TOPICAL at 23:49

## 2018-12-12 RX ADMIN — IPRATROPIUM BROMIDE SCH: 0.5 SOLUTION RESPIRATORY (INHALATION) at 21:10

## 2018-12-12 RX ADMIN — DEXTROSE MONOHYDRATE, SODIUM CHLORIDE, SODIUM LACTATE, CALCIUM CHLORIDE, MAGNESIUM CHLORIDE SCH MLS/HR: 2.5; 538; 448; 18.4; 5.08 SOLUTION INTRAPERITONEAL at 09:17

## 2018-12-12 RX ADMIN — CLOTRIMAZOLE SCH: 0.01 CREAM TOPICAL at 01:01

## 2018-12-12 RX ADMIN — Medication SCH UNIT: at 11:02

## 2018-12-12 RX ADMIN — FOLIC ACID SCH MG: 1 TABLET ORAL at 11:02

## 2018-12-12 RX ADMIN — HYDROMORPHONE HYDROCHLORIDE PRN MG: 1 INJECTION, SOLUTION INTRAMUSCULAR; INTRAVENOUS; SUBCUTANEOUS at 12:48

## 2018-12-12 RX ADMIN — IPRATROPIUM BROMIDE SCH: 0.5 SOLUTION RESPIRATORY (INHALATION) at 07:48

## 2018-12-12 RX ADMIN — HYDROMORPHONE HYDROCHLORIDE PRN MG: 1 INJECTION, SOLUTION INTRAMUSCULAR; INTRAVENOUS; SUBCUTANEOUS at 02:23

## 2018-12-12 RX ADMIN — Medication SCH MG: at 22:17

## 2018-12-12 RX ADMIN — HYDROCODONE BITARTRATE AND ACETAMINOPHEN SCH EACH: 7.5; 325 TABLET ORAL at 08:41

## 2018-12-12 RX ADMIN — SEVELAMER CARBONATE SCH MG: 800 TABLET, FILM COATED ORAL at 08:41

## 2018-12-12 RX ADMIN — IPRATROPIUM BROMIDE SCH: 0.5 SOLUTION RESPIRATORY (INHALATION) at 16:13

## 2018-12-12 RX ADMIN — DOCUSATE SODIUM SCH MG: 100 CAPSULE, LIQUID FILLED ORAL at 08:42

## 2018-12-12 RX ADMIN — Medication SCH EACH: at 11:02

## 2018-12-12 RX ADMIN — SEVELAMER CARBONATE SCH MG: 800 TABLET, FILM COATED ORAL at 11:02

## 2018-12-12 RX ADMIN — DEXTROSE MONOHYDRATE, SODIUM CHLORIDE, SODIUM LACTATE, CALCIUM CHLORIDE, MAGNESIUM CHLORIDE SCH MLS/HR: 2.5; 538; 448; 18.4; 5.08 SOLUTION INTRAPERITONEAL at 01:10

## 2018-12-12 RX ADMIN — HYDROCODONE BITARTRATE AND ACETAMINOPHEN SCH EACH: 7.5; 325 TABLET ORAL at 23:49

## 2018-12-12 RX ADMIN — DOCUSATE SODIUM SCH MG: 100 CAPSULE, LIQUID FILLED ORAL at 22:17

## 2018-12-12 RX ADMIN — SEVELAMER CARBONATE SCH MG: 800 TABLET, FILM COATED ORAL at 17:04

## 2018-12-12 RX ADMIN — INSULIN ASPART SCH UNIT: 100 INJECTION, SOLUTION INTRAVENOUS; SUBCUTANEOUS at 22:17

## 2018-12-12 RX ADMIN — ATORVASTATIN CALCIUM SCH MG: 80 TABLET, FILM COATED ORAL at 22:17

## 2018-12-12 RX ADMIN — ENOXAPARIN SODIUM SCH MG: 30 INJECTION SUBCUTANEOUS at 08:42

## 2018-12-12 RX ADMIN — CLOTRIMAZOLE SCH: 0.01 CREAM TOPICAL at 08:42

## 2018-12-12 RX ADMIN — TRIAMCINOLONE ACETONIDE SCH: 1 CREAM TOPICAL at 01:01

## 2018-12-12 RX ADMIN — IPRATROPIUM BROMIDE SCH: 0.5 SOLUTION RESPIRATORY (INHALATION) at 11:20

## 2018-12-12 RX ADMIN — Medication SCH MG: at 08:42

## 2018-12-12 RX ADMIN — HYDROMORPHONE HYDROCHLORIDE PRN MG: 1 INJECTION, SOLUTION INTRAMUSCULAR; INTRAVENOUS; SUBCUTANEOUS at 22:18

## 2018-12-12 RX ADMIN — BUPROPION HYDROCHLORIDE SCH MG: 150 TABLET, FILM COATED, EXTENDED RELEASE ORAL at 10:55

## 2018-12-12 RX ADMIN — LEVOTHYROXINE SODIUM SCH MCG: 88 TABLET ORAL at 06:27

## 2018-12-12 RX ADMIN — TRIAMCINOLONE ACETONIDE SCH: 1 CREAM TOPICAL at 08:43

## 2018-12-12 RX ADMIN — HYDROMORPHONE HYDROCHLORIDE PRN MG: 1 INJECTION, SOLUTION INTRAMUSCULAR; INTRAVENOUS; SUBCUTANEOUS at 06:27

## 2018-12-12 RX ADMIN — ALLOPURINOL SCH MG: 100 TABLET ORAL at 08:41

## 2018-12-12 RX ADMIN — FAMOTIDINE SCH MG: 20 TABLET, FILM COATED ORAL at 22:17

## 2018-12-12 RX ADMIN — DEXTROSE MONOHYDRATE, SODIUM CHLORIDE, SODIUM LACTATE, CALCIUM CHLORIDE, MAGNESIUM CHLORIDE SCH MLS/HR: 2.5; 538; 448; 18.4; 5.08 SOLUTION INTRAPERITONEAL at 05:14

## 2018-12-12 RX ADMIN — DEXTROSE MONOHYDRATE, SODIUM CHLORIDE, SODIUM LACTATE, CALCIUM CHLORIDE, MAGNESIUM CHLORIDE SCH MLS/HR: 2.5; 538; 448; 18.4; 5.08 SOLUTION INTRAPERITONEAL at 22:29

## 2018-12-12 RX ADMIN — CEFAZOLIN SCH MLS/HR: 330 INJECTION, POWDER, FOR SOLUTION INTRAMUSCULAR; INTRAVENOUS at 16:00

## 2018-12-12 RX ADMIN — DEXTROSE MONOHYDRATE, SODIUM CHLORIDE, SODIUM LACTATE, CALCIUM CHLORIDE, MAGNESIUM CHLORIDE SCH MLS/HR: 2.5; 538; 448; 18.4; 5.08 SOLUTION INTRAPERITONEAL at 16:20

## 2018-12-12 RX ADMIN — INSULIN ASPART SCH UNIT: 100 INJECTION, SOLUTION INTRAVENOUS; SUBCUTANEOUS at 08:40

## 2018-12-12 RX ADMIN — INSULIN ASPART SCH UNIT: 100 INJECTION, SUSPENSION SUBCUTANEOUS at 22:18

## 2018-12-12 RX ADMIN — FENOFIBRATE SCH MG: 160 TABLET ORAL at 08:42

## 2018-12-12 NOTE — P.PN
Subjective


Progress Note Date: 12/12/18


This is a 58-year-old female, patient of [facility].  Patient has 

a known past medical history of end-stage renal disease on hemodialysis, 

congestive heart failure, COPD, diabetes mellitus, hypertension, hyperlipidemia

, obstructive sleep apnea, chronic hypoxic respiratory failure on 4-5 L of 

oxygen at home, depression and CVA.  History obtained from both patient and 

patient's sister.  Apparently their mother had passed away on November 27 

couple weeks ago.  Since then, patient's has not gotten out of bed.  She has 

not moved out of her bed for about 2 weeks per the sister.  She stopped taking 

all of her medications.  And she has not been eating or drinking much.  Blood 

sugar 469 on admission.  Per the sister the patient was started on Prozac 10 mg 

daily about a month ago.  She took it for about a week and then stopped taking 

it.  Before that patient had been on Zoloft.  Patient reports that she hurts 

everywhere.  She has extensive bruising in the upper thigh is growing area and 

back.  There are smaller bruises on the lower leg area.  She hurts anywhere she 

is touched.  She complains of pain in the chest as well as the abdomen and legs 

and back.  She denies any falling.  Reports some nausea and chest chest pain.  

Denies any fever, chills, sweats, cough, bowel movement changes.  She does not 

make urine and is on peritoneal dialysis. 








On 12/07/2018 patient is currently lying in bed currently getting CAPD.  

Patient is complaining of generalized pain throughout chest abdomen and legs.  

Patient also complaining of more severe pain in her right lower extremity.  

Patient would not let me examine right leg.  Explained to patient that due to 

her prolonged bed rest she is at increased risk for blood clot and then I'm 

concerned that she may have a blood clot in her right leg and that she would 

require a venous Doppler to assess.  Patient states she would not be able to 

tolerate a venous Doppler at this time due to the pain.  Will order Dilaudid 

for pain at this time and encouraged patient the importance of obtaining this 

test.  We'll also consult cardiology services at this time.








On 12/08/2018 patient currently getting CAPD.  Patient did have Doppler study 

completed but was un conclusive of DVT due to increased pain during test.  

Patient remains on Lovenox 1 mg/kg per renal dosing for DVT treatment.  This 

time patient states that she feels slightly improved but still has generalized 

pain all over.  Patient denies chest pain or shortness breath.  Patient denies 

any nausea vomiting or diarrhea.  Patient denies any urinary burning or 

frequency.





On 12/09/2018 Patient currently resting in bed. Patient is having significant 

to lower extremity pain. Will order venous doppler again due to unconclusive 

reading from initial test.  Will order ativan and diuladid to be given prior to 

exam in order to get adequate reading. Patient denies chest pain and shortness 

of breath. Denies nausea, vomitting or diarrhea. Denies any urinary burning or 

frequency  





12/10/2018 patient still complaining of pain all over the body.  She does 

report is slightly better than admission.  Complaining of constipation and his 

been about 3 days since her last bowel movement.  Hemoglobin has dropped from 

9.3-8.7.  Potassium is 3.1 and she received supplement.  Venous Doppler of the 

lower extremities showing a limited exam of the right leg and what could be 

seen was negative for DVT.  Patient refused the left leg to be completed due to 

pain.  Patient has been noncompliant with physical therapy.  Poor appetite.  

Pain service will be placed on consult.  Patient has been educated that she 

needs to participate with physical therapy.





12/11/2018 patient does report slight improvement in her pain.  Still 

complaining of pain throughout her body.  Patient was able to work with 

physical therapy yesterday.  She was a max assist.  She denies any chest pain 

or shortness of breath.  Denies any nausea or vomiting.  Still has not had a 

bowel movement for about 5 days.  Norvasc discontinued yesterday due to 

hypotension.  Blood pressures are 20 better today.  Nephrology is also adjusted 

the frequency of peritoneal dialysis to every 4 hours. 





On 12/12/2018 patient does report slight improvement with her pain.  Patient is 

still complaining of overall pain throughout her entire body.  Patient refused 

venous Doppler again due to pain for the third time.  D-dimer was negative.  

Lovenox was DC'd and Lovenox prophylaxis has been added.  Patient denies chest 

pain or shortness of breath.  Patient denies nausea vomiting or diarrhea.  

Patient denies any urinary burning or frequency








Objective





- Vital Signs


Vital signs: 


 Vital Signs











Temp  97.1 F L  12/12/18 09:18


 


Pulse  74   12/12/18 09:18


 


Resp  14   12/12/18 09:18


 


BP  104/57   12/12/18 09:18


 


Pulse Ox  98   12/12/18 09:18








 Intake & Output











 12/11/18 12/12/18 12/12/18





 18:59 06:59 18:59


 


Weight  116.8 kg 


 


Other:   


 


  Voiding Method Incontinent Incontinent 





 CAPD CAPD 


 


  # Voids 0 0 














- Exam


Head normocephalic


Neck supple


Lungs clear to auscultation bilaterally no wheezing or crackles


Heart regular rate and rhythm S1-S2, no rub or gallop


Abdomen is soft diffuse tenderness with palpation nondistended positive bowel 

sounds no hepatosplenomegaly obese


Extremities no edema


Neuro alert and orientated to 3


Skin: Patient has extensive bruising along the upper thighs lower back also 

smaller bruises along the lower legs..  Patient has tenderness all over body 

with palpation








- Labs


CBC & Chem 7: 


 12/11/18 11:18





 12/12/18 08:42


Labs: 


 Abnormal Lab Results - Last 24 Hours (Table)











  12/11/18 12/11/18 12/11/18 Range/Units





  11:18 11:18 12:42 


 


RBC  3.46 L    (3.80-5.40)  m/uL


 


Hgb  10.7 L    (11.4-16.0)  gm/dL


 


MCV  101.1 H    (80.0-100.0)  fL


 


MCHC  30.7 L    (31.0-37.0)  g/dL


 


Lymphocytes #  0.9 L    (1.0-4.8)  k/uL


 


Monocytes #  1.3 H    (0-1.0)  k/uL


 


Sodium   131 L   (137-145)  mmol/L


 


Potassium   3.0 L   (3.5-5.1)  mmol/L


 


Chloride   93 L   ()  mmol/L


 


BUN   31 H   (7-17)  mg/dL


 


Creatinine   10.56 H*   (0.52-1.04)  mg/dL


 


Glucose   186 H   (74-99)  mg/dL


 


POC Glucose (mg/dL)    176 H  (75-99)  mg/dL


 


Total Protein     (6.3-8.2)  g/dL


 


Albumin   3.1 L   (3.5-5.0)  g/dL














  12/11/18 12/12/18 Range/Units





  17:01 08:42 


 


RBC    (3.80-5.40)  m/uL


 


Hgb    (11.4-16.0)  gm/dL


 


MCV    (80.0-100.0)  fL


 


MCHC    (31.0-37.0)  g/dL


 


Lymphocytes #    (1.0-4.8)  k/uL


 


Monocytes #    (0-1.0)  k/uL


 


Sodium   131 L  (137-145)  mmol/L


 


Potassium    (3.5-5.1)  mmol/L


 


Chloride   94 L  ()  mmol/L


 


BUN   30 H  (7-17)  mg/dL


 


Creatinine   9.80 H*  (0.52-1.04)  mg/dL


 


Glucose   241 H  (74-99)  mg/dL


 


POC Glucose (mg/dL)  146 H   (75-99)  mg/dL


 


Total Protein   5.6 L  (6.3-8.2)  g/dL


 


Albumin   2.7 L  (3.5-5.0)  g/dL














Assessment and Plan


Assessment: 


1.  Prolonged period of time in bed With pain throughout her body and extensive 

bruising.  Hold aspirin and Plavix.  Hemoglobin trending down to 8.7.  Consult 

pain service for pain management





2.  Insulin-dependent diabetes mellitus: Hyperglycemia with blood sugar 469 on 

admission.  Patient has not been taking insulin at home.  Acetone level 

negative.  A1c 7.7.  Blood sugar 158 this morning showing improvement.  We'll 

monitor.  continue NovoLog 70/30 to 82 units twice a day.  Continue sliding 

scale coverage.





3.  History of end-stage renal disease on peritoneal dialysis.  Nephrology 

following.  Continue peritoneal dialysis.





4.  Morbid obesity





5.  Medication noncompliance





6.  Severe Depression.  Patient denies any suicidal or homicidal ideation.  

Recently started on Prozac 10 mg daily outpatient.  Will restart Prozac at 20 

mg daily . Wellbutrin has been adder per psych.





7.  History of COPD stable





8.  History of CVA





9.  History of Essential hypertension





10.  Hyperlipidemia





11.  Obstructive sleep apnea uses CPAP





12.  Chronic hypoxic respiratory failure home O2 dependent on 4-5 L





13.  Right lower extremity pain.  Concerns for possible DVT due to prolonged 

time in bed.  Continue with Lovenox 140 mg subcu daily.  Patient unable to 

tolerate venous Doppler.  On 12/ 9 venous Doppler was a limited exam what was 

evaluated was negative for DVT in the right leg.  Patient refused the left leg 

to be completed.  Patient refused for the third time venous Doppler again due 

to pain.  D-dimer less than 0.17.  Lovenox 140 mg subcu daily discontinued.  

Lovenox DVT prophylaxis has been added





14.  Elevated cardiac enzymes.  Troponin 0.072.  EKG completed showing normal 

sinus rhythm.  Inferior infarct, age undetermined anterior infarct age 

undetermined.  Per cardiology services no further workup needed from cardiology 

standpoint.  No evidence for an acute cardiac event per cardiology





15.  Elevated lipase level of 410.  Has trended down.  No evidence of 

pancreatitis





16.  Hypovolemic Hyponatremia.  Sodium 128 now improving.  Sodium is up to 131.

  Possibly related to her hyperglycemia as well.  Nephrology following





17. Elevated uric acid.  Uric acid 8.4  Patient will be started on allopurinol





18.  Anemia of chronic kidney disease.  And evidence of iron deficiency anemia.

  Hemoglobin has down to 8.7.  Will start ferrous sulfate 325 mg twice a day. 

Aranesp has been added per nephrology





19.  Constipation add stool softener and lactulose





20.  Hypotension possibly related to the IV Dilaudid.  Nephrology discontinued 

the Norvasc.  Blood pressures showing improvement.








DVT prophylaxis Lovenox.  GI prophylaxis Pepcid


Per Dr. Gutierrez with inpatient rehab at this time rehab prognosis guarded due to 

depression.  Patient currently requires 24/7 multiple person care





I performed an examination of the patient and discussed their management with 

the Nurse Practitioner.  I have reviewed the Nurse Practitioner's notes and 

agree with the documented findings and plan of care

## 2018-12-12 NOTE — PN
PROGRESS NOTE



HISTORY:

Patient is seen for followup for end-stage renal disease.  She remains unmotivated and

lying in bed. The patient states that she hurts a lot if she tries to eat.  She is

encouraged to increase her oral intake.  Her dialysis is going well.  Currently the

exchanges are every 4 hours.  The serum creatinine has decreased somewhat.



PHYSICAL EXAMINATION:

This afternoon blood pressure was 100/42, heart rate 76 per minute.  Patient is

afebrile. She is currently on CPAP.  Examination of the heart S1, S2.  Examination

lungs bilateral breath sounds are heard.  Abdomen is soft, morbidly obese. Examination

of lower extremity shows no significant edema.



LABS:

Sodium 131, potassium 3.8, chloride 94, BUN 30, serum creatinine 9.8, hemoglobin 9.2

g/dL.



ASSESSMENT:

1. End-stage renal disease, currently on peritoneal dialysis.  Continue current

    peritoneal dialysis exchanges.

2. Depression.  The patient is encouraged to increase oral intake and increased

    activity.  I do not believe she will be able to manage at home by herself.

3. Anemia of chronic disease.

4. Morbid obesity.

5. Obstructive sleep apnea, maintained on CPAP.



PLAN:

Continue current PD exchanges.  Repeat labs in a.m.





MMODL / IJN: [id number] / Job#: [id number]

## 2018-12-13 LAB
ALBUMIN SERPL-MCNC: 2.9 G/DL (ref 3.5–5)
ALP SERPL-CCNC: 124 U/L (ref 38–126)
ALT SERPL-CCNC: 33 U/L (ref 9–52)
ANION GAP SERPL CALC-SCNC: 15 MMOL/L
AST SERPL-CCNC: 27 U/L (ref 14–36)
BUN SERPL-SCNC: 29 MG/DL (ref 7–17)
CALCIUM SPEC-MCNC: 9.1 MG/DL (ref 8.4–10.2)
CELLS COUNTED: 100
CHLORIDE SERPL-SCNC: 93 MMOL/L (ref 98–107)
CO2 SERPL-SCNC: 25 MMOL/L (ref 22–30)
EOSINOPHIL # BLD MANUAL: 0.16 K/UL (ref 0–0.7)
ERYTHROCYTE [DISTWIDTH] IN BLOOD BY AUTOMATED COUNT: 3.19 M/UL (ref 3.8–5.4)
ERYTHROCYTE [DISTWIDTH] IN BLOOD: 15.6 % (ref 11.5–15.5)
GLUCOSE BLD-MCNC: 179 MG/DL (ref 75–99)
GLUCOSE BLD-MCNC: 186 MG/DL (ref 75–99)
GLUCOSE BLD-MCNC: 212 MG/DL (ref 75–99)
GLUCOSE BLD-MCNC: 216 MG/DL (ref 75–99)
GLUCOSE SERPL-MCNC: 176 MG/DL (ref 74–99)
HCT VFR BLD AUTO: 32.8 % (ref 34–46)
HGB BLD-MCNC: 9.2 GM/DL (ref 11.4–16)
LYMPHOCYTES # BLD MANUAL: 1.26 K/UL (ref 1–4.8)
MAGNESIUM SPEC-SCNC: 1.6 MG/DL (ref 1.6–2.3)
MCH RBC QN AUTO: 28.8 PG (ref 25–35)
MCHC RBC AUTO-ENTMCNC: 28 G/DL (ref 31–37)
MCV RBC AUTO: 103 FL (ref 80–100)
MONOCYTES # BLD MANUAL: 1.19 K/UL (ref 0–1)
NEUTROPHILS NFR BLD MANUAL: 65 %
NEUTS SEG # BLD MANUAL: 5.2 K/UL (ref 1.3–7.7)
PLATELET # BLD AUTO: 438 K/UL (ref 150–450)
POTASSIUM SERPL-SCNC: 3.4 MMOL/L (ref 3.5–5.1)
PROT SERPL-MCNC: 5.9 G/DL (ref 6.3–8.2)
SODIUM SERPL-SCNC: 133 MMOL/L (ref 137–145)
WBC # BLD AUTO: 7.9 K/UL (ref 3.8–10.6)

## 2018-12-13 RX ADMIN — LATANOPROST SCH DROPS: 50 SOLUTION OPHTHALMIC at 22:27

## 2018-12-13 RX ADMIN — DOCUSATE SODIUM SCH MG: 100 CAPSULE, LIQUID FILLED ORAL at 08:18

## 2018-12-13 RX ADMIN — Medication SCH EACH: at 08:17

## 2018-12-13 RX ADMIN — CLOTRIMAZOLE SCH: 0.01 CREAM TOPICAL at 22:25

## 2018-12-13 RX ADMIN — Medication SCH MG: at 22:23

## 2018-12-13 RX ADMIN — DEXTROSE MONOHYDRATE, SODIUM CHLORIDE, SODIUM LACTATE, CALCIUM CHLORIDE, MAGNESIUM CHLORIDE SCH MLS/HR: 2.5; 538; 448; 18.4; 5.08 SOLUTION INTRAPERITONEAL at 17:36

## 2018-12-13 RX ADMIN — DOCUSATE SODIUM SCH MG: 100 CAPSULE, LIQUID FILLED ORAL at 22:23

## 2018-12-13 RX ADMIN — INSULIN ASPART SCH UNIT: 100 INJECTION, SUSPENSION SUBCUTANEOUS at 22:31

## 2018-12-13 RX ADMIN — HYDROMORPHONE HYDROCHLORIDE PRN MG: 1 INJECTION, SOLUTION INTRAMUSCULAR; INTRAVENOUS; SUBCUTANEOUS at 16:10

## 2018-12-13 RX ADMIN — IPRATROPIUM BROMIDE SCH: 0.5 SOLUTION RESPIRATORY (INHALATION) at 12:57

## 2018-12-13 RX ADMIN — DEXTROSE MONOHYDRATE, SODIUM CHLORIDE, SODIUM LACTATE, CALCIUM CHLORIDE, MAGNESIUM CHLORIDE SCH MLS/HR: 2.5; 538; 448; 18.4; 5.08 SOLUTION INTRAPERITONEAL at 22:26

## 2018-12-13 RX ADMIN — FOLIC ACID SCH MG: 1 TABLET ORAL at 08:18

## 2018-12-13 RX ADMIN — IPRATROPIUM BROMIDE SCH: 0.5 SOLUTION RESPIRATORY (INHALATION) at 09:48

## 2018-12-13 RX ADMIN — HYDROMORPHONE HYDROCHLORIDE PRN MG: 1 INJECTION, SOLUTION INTRAMUSCULAR; INTRAVENOUS; SUBCUTANEOUS at 09:54

## 2018-12-13 RX ADMIN — POTASSIUM CHLORIDE SCH MEQ: 750 TABLET, EXTENDED RELEASE ORAL at 13:15

## 2018-12-13 RX ADMIN — DEXTROSE MONOHYDRATE, SODIUM CHLORIDE, SODIUM LACTATE, CALCIUM CHLORIDE, MAGNESIUM CHLORIDE SCH MLS/HR: 2.5; 538; 448; 18.4; 5.08 SOLUTION INTRAPERITONEAL at 13:19

## 2018-12-13 RX ADMIN — HYDROMORPHONE HYDROCHLORIDE PRN MG: 1 INJECTION, SOLUTION INTRAMUSCULAR; INTRAVENOUS; SUBCUTANEOUS at 05:52

## 2018-12-13 RX ADMIN — INSULIN ASPART SCH: 100 INJECTION, SUSPENSION SUBCUTANEOUS at 08:16

## 2018-12-13 RX ADMIN — IPRATROPIUM BROMIDE SCH: 0.5 SOLUTION RESPIRATORY (INHALATION) at 15:47

## 2018-12-13 RX ADMIN — INSULIN ASPART SCH UNIT: 100 INJECTION, SOLUTION INTRAVENOUS; SUBCUTANEOUS at 08:17

## 2018-12-13 RX ADMIN — LATANOPROST SCH: 50 SOLUTION OPHTHALMIC at 00:42

## 2018-12-13 RX ADMIN — POTASSIUM CHLORIDE SCH MEQ: 750 TABLET, EXTENDED RELEASE ORAL at 16:10

## 2018-12-13 RX ADMIN — CLOTRIMAZOLE SCH: 0.01 CREAM TOPICAL at 08:15

## 2018-12-13 RX ADMIN — IPRATROPIUM BROMIDE SCH: 0.5 SOLUTION RESPIRATORY (INHALATION) at 19:27

## 2018-12-13 RX ADMIN — TRIAMCINOLONE ACETONIDE SCH: 1 CREAM TOPICAL at 22:27

## 2018-12-13 RX ADMIN — DEXTROSE MONOHYDRATE, SODIUM CHLORIDE, SODIUM LACTATE, CALCIUM CHLORIDE, MAGNESIUM CHLORIDE SCH MLS/HR: 2.5; 538; 448; 18.4; 5.08 SOLUTION INTRAPERITONEAL at 09:52

## 2018-12-13 RX ADMIN — HYDROMORPHONE HYDROCHLORIDE PRN MG: 1 INJECTION, SOLUTION INTRAMUSCULAR; INTRAVENOUS; SUBCUTANEOUS at 20:02

## 2018-12-13 RX ADMIN — BUPROPION HYDROCHLORIDE SCH MG: 150 TABLET, FILM COATED, EXTENDED RELEASE ORAL at 08:18

## 2018-12-13 RX ADMIN — HYDROMORPHONE HYDROCHLORIDE PRN MG: 1 INJECTION, SOLUTION INTRAMUSCULAR; INTRAVENOUS; SUBCUTANEOUS at 01:56

## 2018-12-13 RX ADMIN — LEVOTHYROXINE SODIUM SCH MCG: 88 TABLET ORAL at 06:48

## 2018-12-13 RX ADMIN — SEVELAMER CARBONATE SCH MG: 800 TABLET, FILM COATED ORAL at 17:20

## 2018-12-13 RX ADMIN — ENOXAPARIN SODIUM SCH MG: 30 INJECTION SUBCUTANEOUS at 08:17

## 2018-12-13 RX ADMIN — SEVELAMER CARBONATE SCH MG: 800 TABLET, FILM COATED ORAL at 13:23

## 2018-12-13 RX ADMIN — HYDROCODONE BITARTRATE AND ACETAMINOPHEN SCH EACH: 7.5; 325 TABLET ORAL at 08:18

## 2018-12-13 RX ADMIN — ATORVASTATIN CALCIUM SCH MG: 80 TABLET, FILM COATED ORAL at 22:25

## 2018-12-13 RX ADMIN — SEVELAMER CARBONATE SCH MG: 800 TABLET, FILM COATED ORAL at 08:18

## 2018-12-13 RX ADMIN — Medication SCH UNIT: at 08:17

## 2018-12-13 RX ADMIN — MEGESTROL ACETATE SCH MG: 40 TABLET ORAL at 16:10

## 2018-12-13 RX ADMIN — FAMOTIDINE SCH MG: 20 TABLET, FILM COATED ORAL at 22:25

## 2018-12-13 RX ADMIN — CEFAZOLIN SCH: 330 INJECTION, POWDER, FOR SOLUTION INTRAMUSCULAR; INTRAVENOUS at 17:21

## 2018-12-13 RX ADMIN — MEGESTROL ACETATE SCH MG: 40 TABLET ORAL at 22:30

## 2018-12-13 RX ADMIN — HYDROCODONE BITARTRATE AND ACETAMINOPHEN SCH EACH: 7.5; 325 TABLET ORAL at 17:21

## 2018-12-13 RX ADMIN — INSULIN ASPART SCH UNIT: 100 INJECTION, SOLUTION INTRAVENOUS; SUBCUTANEOUS at 17:37

## 2018-12-13 RX ADMIN — CLOTRIMAZOLE SCH: 0.01 CREAM TOPICAL at 00:03

## 2018-12-13 RX ADMIN — POTASSIUM CHLORIDE SCH MEQ: 750 TABLET, EXTENDED RELEASE ORAL at 10:55

## 2018-12-13 RX ADMIN — FENOFIBRATE SCH MG: 160 TABLET ORAL at 08:18

## 2018-12-13 RX ADMIN — INSULIN ASPART SCH UNIT: 100 INJECTION, SOLUTION INTRAVENOUS; SUBCUTANEOUS at 22:25

## 2018-12-13 RX ADMIN — INSULIN ASPART SCH UNIT: 100 INJECTION, SOLUTION INTRAVENOUS; SUBCUTANEOUS at 13:18

## 2018-12-13 RX ADMIN — TRIAMCINOLONE ACETONIDE SCH: 1 CREAM TOPICAL at 08:16

## 2018-12-13 RX ADMIN — HYDROCODONE BITARTRATE AND ACETAMINOPHEN SCH EACH: 7.5; 325 TABLET ORAL at 22:23

## 2018-12-13 RX ADMIN — ALLOPURINOL SCH MG: 100 TABLET ORAL at 08:18

## 2018-12-13 RX ADMIN — DEXTROSE MONOHYDRATE, SODIUM CHLORIDE, SODIUM LACTATE, CALCIUM CHLORIDE, MAGNESIUM CHLORIDE SCH MLS/HR: 2.5; 538; 448; 18.4; 5.08 SOLUTION INTRAPERITONEAL at 01:50

## 2018-12-13 RX ADMIN — Medication SCH MG: at 08:18

## 2018-12-13 RX ADMIN — DEXTROSE MONOHYDRATE, SODIUM CHLORIDE, SODIUM LACTATE, CALCIUM CHLORIDE, MAGNESIUM CHLORIDE SCH MLS/HR: 2.5; 538; 448; 18.4; 5.08 SOLUTION INTRAPERITONEAL at 06:10

## 2018-12-13 NOTE — P.CN
Psychiatric Consult





- .


Consult date: 18


Consult:: 





12/10/18 13:47


Severe depression





Assessment and Plan


Assessment: 





HPI: I stopped my psychiatric medications at home and they have restarted him 

here and I'm feeling better.


This is a 58-year-old female, patient of [facility].  Patient has 

a known past medical history of end-stage renal disease on hemodialysis, 

congestive heart failure, COPD, diabetes mellitus, hypertension, hyperlipidemia

, obstructive sleep apnea, chronic hypoxic respiratory failure on 4-5 L of 

oxygen at home, depression and CVA.  History obtained from both patient and 

patient's sister.  Apparently their mother had passed away on  

couple weeks ago.  Since then, patient's has not gotten out of bed.  She has 

not moved out of her bed for about 2 weeks per the sister.  She stopped taking 

all of her medications.  And she has not been eating or drinking much.  Blood 

sugar 469 on admission.  Per the sister the patient was started on Prozac 10 mg 

daily about a month ago.  She took it for about a week and then stopped taking 

it.  Before that patient had been on Zoloft.  Patient reports that she hurts 

everywhere.  She has extensive bruising in the upper thigh is growing area and 

back.  There are smaller bruises on the lower leg area.  She hurts anywhere she 

is touched.  She complains of pain in the chest as well as the abdomen and legs 

and back.  She denies any falling.  Reports some nausea and chest chest pain.  

Denies any fever, chills, sweats, cough, bowel movement changes.  She does not 

make urine and is on peritoneal dialysis. 








Past Medical History


Past Medical History: Asthma, Heart Failure, COPD, CVA/TIA, Diabetes Mellitus, 

Eye Disorder, Hyperlipidemia, Hypertension, Osteoarthritis (OA), Pneumonia, 

Renal Disease, Sleep Apnea/CPAP/BIPAP, Thyroid Disorder


Additional Past Medical History / Comment(s): sleep apnea, neuropathy, patient 

has one kidney, Stage III kidney failure, peritional dialysis- right side 

abdominal port.


History of Any Multi-Drug Resistant Organisms: None Reported


Past Surgical History: Bariatric Surgery, Hernia Repair, Orthopedic Surgery, 

Tonsillectomy


Additional Past Surgical History / Comment(s): rt kidney removed, Rt knee 

replacement


Past Anesthesia/Blood Transfusion Reactions: No Reported Reaction


Additional Past Anesthesia/Blood Transfusion Reaction / Comment(s): pt states 

she had a blood transfusion at [facility] 2017, "it made her itchy and they 

gave bendyl"


Past Psychological History: Anxiety, Depression


Smoking Status: Former smoker


Past Alcohol Use History: None Reported


Past Drug Use History: None Reported





- Past Family History


  ** Mother


Additional Family Medical History / Comment(s): skin CA, CHF, DM, thyroid 

disorder, HTN.





  ** Father


Family Medical History: Diabetes Mellitus, Hypertension, Thyroid Disorder


Additional Family Medical History / Comment(s): CHF.  Pt's father is .





Medications and Allergies


 Home Medications











 Medication  Instructions  Recorded  Confirmed  Type


 


Atorvastatin [Lipitor] 80 mg PO HS 09/13/15 12/06/18 History


 


Levothyroxine Sodium [Synthroid] 175 mcg PO DAILY 09/13/15 12/06/18 History


 


Aspirin 325 mg PO DAILY  tab 17 Rx


 


Clopidogrel [Plavix] 75 mg PO DAILY 18 History


 


Insulin Regular, Human [NovoLIN R] 100 unit SQ AC-BID 18 History


 


Ipratropium-Albuterol Nebulize 3 ml INHALATION RT-QID PRN 18 

History





[Duoneb 0.5 mg-3 mg/3 ml Soln]    


 


Acetaminophen Tab [Tylenol Tab] 1,000 mg PO Q6HR PRN 18 History


 


Albuterol Sulfate [Proair Hfa] 2 puff INHALATION RT-QID PRN 18 

History


 


Bimatoprost [Lumigan .01% Ophth 1 drop BOTH EYES HS 18 History





Soln]    


 


Cholecalciferol [Vitamin D3] 5,000 unit PO DAILY 18 History


 


Famotidine [Pepcid] 40 mg PO HS 18 History


 


Fenofibrate,Micronized 200 mg PO DAILY 18 History





[Fenofibrate]    


 


Folic Acid 0.8 mg PO DAILY 18 History


 


Insulin Aspart [Novolog Flexpen] See Protocol SQ AC-BID 18 

History


 


Ketoconazole 2% Cream [Nizoral 2%] 1 applic TOPICAL BID 18 

History


 


Sevelamer [Renvela] 2,400 mg PO AC-TID 18 History


 


Tiotropium 18 Mcg/Puff [Spiriva] 1 cap INHALATION RT-DAILY 18 

History


 


Triamcinolone 0.025% Cream 1 applic TOPICAL BID 18 History





[Kenalog 0.025% Cream]    


 


B Complex W-C No.20/Folic Acid 1 cap PO DAILY 18 History





[Renal Caps Softgel]    


 


HYDROcodone/APAP 7.5-325MG [Norco 1 tab PO TID 18 History





7.5-325]    


 


Sertraline [Zoloft] 150 mg PO DAILY 18 History


 


amLODIPine [Norvasc] 10 mg PO DAILY 18 History











 Allergies











Allergy/AdvReac Type Severity Reaction Status Date / Time


 


erythromycin base Allergy  Unknown Verified 18 14:06





[Erythromycin Base]     


 


NSAIDS (Non-Steroidal Allergy  Unknown Verified 18 14:06





Anti-Inflamma     


 


PAPER TAPE Allergy  Rash/Hives Uncoded 18 13:51




















Mental Status Examination - 


General Appearance: [casual, bizarre,  appears older than stated age


Speech/Language: [spontaneous, slow


Attitude/Behavior: [cooperative


Mood: [ depressed 8 out of 10,  anxious excited 10


Affect: [ flat,  blunted constricted


Orientation: [time, person, place situation]


Thought Content: [wnl


Risk Factors: [She is not suicidal (ideations, plan), nor Homicidal (ideations, 

plan), other]


Perception: [wnl, denies hallucinations (auditory, visual, tactile), other]


Thought Processes: [goal-oriented


Concentration/Attention Span: [wnl] [Per observation and interview with the 

patient]


Recent Memory: [wnl


Remote Memory: [wnl] [past events, as related history]


Intelligence: [below average] [based on history, based on vocabulary, syntax, 

grammar, and content]


Judgement: [good] [per patient's behavior/history of present illness]


Insight: [good] [understanding severity of illness/history of present illness]





Psychiatric impression: Major depressive disorder with anxiety


Psychiatric recommendations: Will increase Wellbutrin to 300 mg in the morning 

XL and add Megace for appetite since nursing staff and family notice that she's 

had a decrease drive to eat and to be involved.





Thank you for the consult





Zak Aguirre D.O. PhD


Current Visit: Yes   Status: Acute   Code(s): F32.9 - MAJOR DEPRESSIVE DISORDER

, SINGLE EPISODE, UNSPECIFIED   SNOMED Code(s): 19292903


   


(1) Depression


Current Visit: Yes   Status: Acute   Code(s): F32.9 - MAJOR DEPRESSIVE DISORDER

, SINGLE EPISODE, UNSPECIFIED   SNOMED Code(s): 93757886


   





(2) Morbid obesity


Current Visit: Yes   Status: Acute   Code(s): E66.01 - MORBID (SEVERE) OBESITY 

DUE TO EXCESS CALORIES   SNOMED Code(s): 162549038


   


Plan: 





Discussed plan with nursing staff which agreed and interviewed patient review 

of chart.  Megace 40 mg 4 times a day increase Wellbutrin to 300 mg XL in the 

morning


Time with Patient: Less than 30

## 2018-12-13 NOTE — P.PN
Subjective


Progress Note Date: 12/13/18


This is a 58-year-old female, patient of [facility].  Patient has 

a known past medical history of end-stage renal disease on hemodialysis, 

congestive heart failure, COPD, diabetes mellitus, hypertension, hyperlipidemia

, obstructive sleep apnea, chronic hypoxic respiratory failure on 4-5 L of 

oxygen at home, depression and CVA.  History obtained from both patient and 

patient's sister.  Apparently their mother had passed away on November 27 

couple weeks ago.  Since then, patient's has not gotten out of bed.  She has 

not moved out of her bed for about 2 weeks per the sister.  She stopped taking 

all of her medications.  And she has not been eating or drinking much.  Blood 

sugar 469 on admission.  Per the sister the patient was started on Prozac 10 mg 

daily about a month ago.  She took it for about a week and then stopped taking 

it.  Before that patient had been on Zoloft.  Patient reports that she hurts 

everywhere.  She has extensive bruising in the upper thigh is growing area and 

back.  There are smaller bruises on the lower leg area.  She hurts anywhere she 

is touched.  She complains of pain in the chest as well as the abdomen and legs 

and back.  She denies any falling.  Reports some nausea and chest chest pain.  

Denies any fever, chills, sweats, cough, bowel movement changes.  She does not 

make urine and is on peritoneal dialysis. 








On 12/07/2018 patient is currently lying in bed currently getting CAPD.  

Patient is complaining of generalized pain throughout chest abdomen and legs.  

Patient also complaining of more severe pain in her right lower extremity.  

Patient would not let me examine right leg.  Explained to patient that due to 

her prolonged bed rest she is at increased risk for blood clot and then I'm 

concerned that she may have a blood clot in her right leg and that she would 

require a venous Doppler to assess.  Patient states she would not be able to 

tolerate a venous Doppler at this time due to the pain.  Will order Dilaudid 

for pain at this time and encouraged patient the importance of obtaining this 

test.  We'll also consult cardiology services at this time.








On 12/08/2018 patient currently getting CAPD.  Patient did have Doppler study 

completed but was un conclusive of DVT due to increased pain during test.  

Patient remains on Lovenox 1 mg/kg per renal dosing for DVT treatment.  This 

time patient states that she feels slightly improved but still has generalized 

pain all over.  Patient denies chest pain or shortness breath.  Patient denies 

any nausea vomiting or diarrhea.  Patient denies any urinary burning or 

frequency.





On 12/09/2018 Patient currently resting in bed. Patient is having significant 

to lower extremity pain. Will order venous doppler again due to unconclusive 

reading from initial test.  Will order ativan and diuladid to be given prior to 

exam in order to get adequate reading. Patient denies chest pain and shortness 

of breath. Denies nausea, vomitting or diarrhea. Denies any urinary burning or 

frequency  





12/10/2018 patient still complaining of pain all over the body.  She does 

report is slightly better than admission.  Complaining of constipation and his 

been about 3 days since her last bowel movement.  Hemoglobin has dropped from 

9.3-8.7.  Potassium is 3.1 and she received supplement.  Venous Doppler of the 

lower extremities showing a limited exam of the right leg and what could be 

seen was negative for DVT.  Patient refused the left leg to be completed due to 

pain.  Patient has been noncompliant with physical therapy.  Poor appetite.  

Pain service will be placed on consult.  Patient has been educated that she 

needs to participate with physical therapy.





12/11/2018 patient does report slight improvement in her pain.  Still 

complaining of pain throughout her body.  Patient was able to work with 

physical therapy yesterday.  She was a max assist.  She denies any chest pain 

or shortness of breath.  Denies any nausea or vomiting.  Still has not had a 

bowel movement for about 5 days.  Norvasc discontinued yesterday due to 

hypotension.  Blood pressures are 20 better today.  Nephrology is also adjusted 

the frequency of peritoneal dialysis to every 4 hours. 





On 12/12/2018 patient does report slight improvement with her pain.  Patient is 

still complaining of overall pain throughout her entire body.  Patient refused 

venous Doppler again due to pain for the third time.  D-dimer was negative.  

Lovenox was DC'd and Lovenox prophylaxis has been added.  Patient denies chest 

pain or shortness of breath.  Patient denies nausea vomiting or diarrhea.  

Patient denies any urinary burning or frequency





On 12/13/2018 patient does report slight improvement with pain today.  Patient'

s daughter currently at bedside.  Patient started still expressed concerns over 

lack of motivation from her mother to get out of bed.  Requesting that site 

revisit patient.  Still waiting on pain services to come and evaluate patient.  

At this time patient denies chest pain or shortness of breath.  Patient denies 

nausea vomiting or diarrhea.  Patient denies any urinary burning or frequency.  

Patient highly encouraged to continue working with physical therapy in order to 

prevent negative outcomes.  At this time planning discharge to [facility]








Objective





- Vital Signs


Vital signs: 


 Vital Signs











Temp  98.5 F   12/13/18 10:27


 


Pulse  78   12/13/18 10:27


 


Resp  20   12/13/18 10:27


 


BP  116/60   12/13/18 10:27


 


Pulse Ox  100   12/13/18 10:27








 Intake & Output











 12/12/18 12/13/18 12/13/18





 18:59 06:59 18:59


 


Intake Total 160  


 


Balance 160  


 


Weight 116.8 kg 106.5 kg 99.7 kg


 


Intake:   


 


    


 


    Sodium Chloride 0.9% 1, 160  





    000 ml @ 20 mls/hr IV .   





    Q24H [facility] Rx#:[id number]   


 


Other:   


 


  Voiding Method  Incontinent 





  CAPD 


 


  # Voids  0 














- Exam


Head normocephalic


Neck supple


Lungs clear to auscultation bilaterally no wheezing or crackles


Heart regular rate and rhythm S1-S2, no rub or gallop


Abdomen is soft diffuse tenderness with palpation nondistended positive bowel 

sounds no hepatosplenomegaly obese


Extremities no edema


Neuro alert and orientated to 3


Skin: Patient has extensive bruising along the upper thighs lower back also 

smaller bruises along the lower legs..  Patient has tenderness all over body 

with palpation








- Labs


CBC & Chem 7: 


 12/13/18 08:10





 12/13/18 08:10


Labs: 


 Abnormal Lab Results - Last 24 Hours (Table)











  12/13/18 12/13/18 12/13/18 Range/Units





  07:38 08:10 08:10 


 


RBC   3.19 L   (3.80-5.40)  m/uL


 


Hgb   9.2 L   (11.4-16.0)  gm/dL


 


Hct   32.8 L   (34.0-46.0)  %


 


MCV   103.0 H   (80.0-100.0)  fL


 


MCHC   28.0 L   (31.0-37.0)  g/dL


 


RDW   15.6 H   (11.5-15.5)  %


 


Monocytes # (Manual)   1.19 H   (0-1.0)  k/uL


 


Sodium    133 L  (137-145)  mmol/L


 


Potassium    3.4 L  (3.5-5.1)  mmol/L


 


Chloride    93 L  ()  mmol/L


 


BUN    29 H  (7-17)  mg/dL


 


Creatinine    9.27 H*  (0.52-1.04)  mg/dL


 


Glucose    176 H  (74-99)  mg/dL


 


POC Glucose (mg/dL)  179 H    (75-99)  mg/dL


 


Total Protein    5.9 L  (6.3-8.2)  g/dL


 


Albumin    2.9 L  (3.5-5.0)  g/dL














  12/13/18 Range/Units





  11:54 


 


RBC   (3.80-5.40)  m/uL


 


Hgb   (11.4-16.0)  gm/dL


 


Hct   (34.0-46.0)  %


 


MCV   (80.0-100.0)  fL


 


MCHC   (31.0-37.0)  g/dL


 


RDW   (11.5-15.5)  %


 


Monocytes # (Manual)   (0-1.0)  k/uL


 


Sodium   (137-145)  mmol/L


 


Potassium   (3.5-5.1)  mmol/L


 


Chloride   ()  mmol/L


 


BUN   (7-17)  mg/dL


 


Creatinine   (0.52-1.04)  mg/dL


 


Glucose   (74-99)  mg/dL


 


POC Glucose (mg/dL)  186 H  (75-99)  mg/dL


 


Total Protein   (6.3-8.2)  g/dL


 


Albumin   (3.5-5.0)  g/dL














Assessment and Plan


Assessment: 


1.  Prolonged period of time in bed With pain throughout her body and extensive 

bruising.  Hold aspirin and Plavix.  Hemoglobin trending down to 8.7.  Consult 

pain service for pain management





2.  Insulin-dependent diabetes mellitus: Hyperglycemia with blood sugar 469 on 

admission.  Patient has not been taking insulin at home.  Acetone level 

negative.  A1c 7.7.  Blood sugar 158 this morning showing improvement.  We'll 

monitor.  continue NovoLog 70/30 to 82 units twice a day.  Continue sliding 

scale coverage.





3.  History of end-stage renal disease on peritoneal dialysis.  Nephrology 

following.  Continue peritoneal dialysis.





4.  Morbid obesity





5.  Medication noncompliance





6.  Severe Depression.  Patient denies any suicidal or homicidal ideation.  

Recently started on Prozac 10 mg daily outpatient.  Will restart Prozac at 20 

mg daily . Wellbutrin has been adder per psych.  At this time daughter 

requesting that psych revisit patient for reevaluation of patient's depression.





7.  History of COPD stable





8.  History of CVA





9.  History of Essential hypertension





10.  Hyperlipidemia





11.  Obstructive sleep apnea uses CPAP





12.  Chronic hypoxic respiratory failure home O2 dependent on 4-5 L





13.  Right lower extremity pain.  Concerns for possible DVT due to prolonged 

time in bed.  Continue with Lovenox 140 mg subcu daily.  Patient unable to 

tolerate venous Doppler.  On 12/ 9 venous Doppler was a limited exam what was 

evaluated was negative for DVT in the right leg.  Patient refused the left leg 

to be completed.  Patient refused for the third time venous Doppler again due 

to pain.  D-dimer less than 0.17.  Lovenox 140 mg subcu daily discontinued.  

Lovenox DVT prophylaxis has been added





14.  Elevated cardiac enzymes.  Troponin 0.072.  EKG completed showing normal 

sinus rhythm.  Inferior infarct, age undetermined anterior infarct age 

undetermined.  Per cardiology services no further workup needed from cardiology 

standpoint.  No evidence for an acute cardiac event per cardiology





15.  Elevated lipase level of 410.  Has trended down.  No evidence of 

pancreatitis





16.  Hypovolemic Hyponatremia.  Sodium 128 now improving.  Sodium is up to 131.

  Possibly related to her hyperglycemia as well.  Nephrology following





17. Elevated uric acid.  Uric acid 8.4  Patient will be started on allopurinol





18.  Anemia of chronic kidney disease.  And evidence of iron deficiency anemia.

  Hemoglobin has down to 8.7.  Will start ferrous sulfate 325 mg twice a day. 

Aranesp has been added per nephrology





19.  Constipation add stool softener and lactulose





20.  Hypotension possibly related to the IV Dilaudid.  Nephrology discontinued 

the Norvasc.  Blood pressures showing improvement.








DVT prophylaxis Lovenox.  GI prophylaxis Pepcid


Discharge planning to [facility].


Requesting psych for reevaluation along with pain services evaluation





I performed an examination of the patient and discussed their management with 

the Nurse Practitioner.  I have reviewed the Nurse Practitioner's notes and 

agree with the documented findings and plan of care

## 2018-12-13 NOTE — PN
PROGRESS NOTE



Patient was seen this afternoon.  She was lying in bed, comfortable.  Patient states

she has decided to go to rehab.  No issues with dialysis currently. Patient is also

trying to increase her oral intake.



On examination this afternoon, blood pressure was 104/64, heart rate 69 per minute.

She is afebrile.

EXAMINATION OF THE HEART: S1, S2.

EXAMINATION OF LUNGS: Bilateral breath sounds are heard.

ABDOMEN:  Soft, non-tender.

Examination of lower extremities shows chronic skin changes. No significant edema is

noted.



Labs show sodium 133, potassium 3.4, BUN 29, serum creatinine 9.27, hemoglobin 9.2

g/dL.



ASSESSMENT:

1. End-stage renal disease, currently on peritoneal dialysis.  Will continue current

    PD exchanges.

2. Hypokalemia.  We will replace.

3. Anemia of chronic disease.

4. Chronic pain associated with prolonged immobility in bed.

5. Volume overload, currently improved.

6. Depression, somewhat better today.



PLAN:

Continue current PD exchanges.  Replace potassium.  Continue to monitor labs.





MMODL / IJN: [id number] / Job#: [id number]

## 2018-12-14 LAB
ALBUMIN SERPL-MCNC: 2.7 G/DL (ref 3.5–5)
ALP SERPL-CCNC: 127 U/L (ref 38–126)
ALT SERPL-CCNC: 29 U/L (ref 9–52)
ANION GAP SERPL CALC-SCNC: 14 MMOL/L
AST SERPL-CCNC: 29 U/L (ref 14–36)
BASOPHILS # BLD AUTO: 0 K/UL (ref 0–0.2)
BASOPHILS NFR BLD AUTO: 1 %
BUN SERPL-SCNC: 30 MG/DL (ref 7–17)
CALCIUM SPEC-MCNC: 9 MG/DL (ref 8.4–10.2)
CHLORIDE SERPL-SCNC: 95 MMOL/L (ref 98–107)
CO2 SERPL-SCNC: 21 MMOL/L (ref 22–30)
EOSINOPHIL # BLD AUTO: 0.2 K/UL (ref 0–0.7)
EOSINOPHIL NFR BLD AUTO: 2 %
ERYTHROCYTE [DISTWIDTH] IN BLOOD BY AUTOMATED COUNT: 3.09 M/UL (ref 3.8–5.4)
ERYTHROCYTE [DISTWIDTH] IN BLOOD: 15.8 % (ref 11.5–15.5)
GLUCOSE BLD-MCNC: 173 MG/DL (ref 75–99)
GLUCOSE BLD-MCNC: 177 MG/DL (ref 75–99)
GLUCOSE SERPL-MCNC: 207 MG/DL (ref 74–99)
HCT VFR BLD AUTO: 31.9 % (ref 34–46)
HGB BLD-MCNC: 9.2 GM/DL (ref 11.4–16)
LYMPHOCYTES # SPEC AUTO: 0.7 K/UL (ref 1–4.8)
LYMPHOCYTES NFR SPEC AUTO: 8 %
MCH RBC QN AUTO: 29.7 PG (ref 25–35)
MCHC RBC AUTO-ENTMCNC: 28.8 G/DL (ref 31–37)
MCV RBC AUTO: 103 FL (ref 80–100)
MONOCYTES # BLD AUTO: 0.8 K/UL (ref 0–1)
MONOCYTES NFR BLD AUTO: 9 %
NEUTROPHILS # BLD AUTO: 7.3 K/UL (ref 1.3–7.7)
NEUTROPHILS NFR BLD AUTO: 79 %
PLATELET # BLD AUTO: 385 K/UL (ref 150–450)
POTASSIUM SERPL-SCNC: 3.5 MMOL/L (ref 3.5–5.1)
PROT SERPL-MCNC: 5.7 G/DL (ref 6.3–8.2)
SODIUM SERPL-SCNC: 130 MMOL/L (ref 137–145)
WBC # BLD AUTO: 9.2 K/UL (ref 3.8–10.6)

## 2018-12-14 RX ADMIN — IPRATROPIUM BROMIDE SCH: 0.5 SOLUTION RESPIRATORY (INHALATION) at 07:37

## 2018-12-14 RX ADMIN — FOLIC ACID SCH MG: 1 TABLET ORAL at 13:06

## 2018-12-14 RX ADMIN — IPRATROPIUM BROMIDE SCH: 0.5 SOLUTION RESPIRATORY (INHALATION) at 19:20

## 2018-12-14 RX ADMIN — HYDROMORPHONE HYDROCHLORIDE PRN MG: 1 INJECTION, SOLUTION INTRAMUSCULAR; INTRAVENOUS; SUBCUTANEOUS at 01:13

## 2018-12-14 RX ADMIN — HYDROMORPHONE HYDROCHLORIDE PRN MG: 1 INJECTION, SOLUTION INTRAMUSCULAR; INTRAVENOUS; SUBCUTANEOUS at 17:12

## 2018-12-14 RX ADMIN — DEXTROSE MONOHYDRATE, SODIUM CHLORIDE, SODIUM LACTATE, CALCIUM CHLORIDE, MAGNESIUM CHLORIDE SCH MLS/HR: 2.5; 538; 448; 18.4; 5.08 SOLUTION INTRAPERITONEAL at 01:47

## 2018-12-14 RX ADMIN — HYDROCODONE BITARTRATE AND ACETAMINOPHEN SCH EACH: 7.5; 325 TABLET ORAL at 15:43

## 2018-12-14 RX ADMIN — HYDROMORPHONE HYDROCHLORIDE PRN MG: 1 INJECTION, SOLUTION INTRAMUSCULAR; INTRAVENOUS; SUBCUTANEOUS at 21:02

## 2018-12-14 RX ADMIN — MEGESTROL ACETATE SCH MG: 40 TABLET ORAL at 13:10

## 2018-12-14 RX ADMIN — DEXTROSE MONOHYDRATE, SODIUM CHLORIDE, SODIUM LACTATE, CALCIUM CHLORIDE, MAGNESIUM CHLORIDE SCH MLS/HR: 2.5; 538; 448; 18.4; 5.08 SOLUTION INTRAPERITONEAL at 13:25

## 2018-12-14 RX ADMIN — BUPROPION HYDROCHLORIDE SCH MG: 300 TABLET, FILM COATED, EXTENDED RELEASE ORAL at 07:57

## 2018-12-14 RX ADMIN — Medication SCH EACH: at 13:10

## 2018-12-14 RX ADMIN — ATORVASTATIN CALCIUM SCH MG: 80 TABLET, FILM COATED ORAL at 22:15

## 2018-12-14 RX ADMIN — IPRATROPIUM BROMIDE SCH: 0.5 SOLUTION RESPIRATORY (INHALATION) at 15:39

## 2018-12-14 RX ADMIN — CLOTRIMAZOLE SCH: 0.01 CREAM TOPICAL at 09:20

## 2018-12-14 RX ADMIN — MEGESTROL ACETATE SCH MG: 40 TABLET ORAL at 22:15

## 2018-12-14 RX ADMIN — Medication SCH MG: at 07:57

## 2018-12-14 RX ADMIN — LEVOTHYROXINE SODIUM SCH MCG: 88 TABLET ORAL at 07:56

## 2018-12-14 RX ADMIN — INSULIN ASPART SCH UNIT: 100 INJECTION, SOLUTION INTRAVENOUS; SUBCUTANEOUS at 13:07

## 2018-12-14 RX ADMIN — HYDROMORPHONE HYDROCHLORIDE PRN MG: 1 INJECTION, SOLUTION INTRAMUSCULAR; INTRAVENOUS; SUBCUTANEOUS at 05:16

## 2018-12-14 RX ADMIN — Medication SCH MG: at 22:15

## 2018-12-14 RX ADMIN — CLOTRIMAZOLE SCH: 0.01 CREAM TOPICAL at 22:16

## 2018-12-14 RX ADMIN — ALLOPURINOL SCH MG: 100 TABLET ORAL at 07:56

## 2018-12-14 RX ADMIN — HYDROMORPHONE HYDROCHLORIDE PRN MG: 1 INJECTION, SOLUTION INTRAMUSCULAR; INTRAVENOUS; SUBCUTANEOUS at 09:42

## 2018-12-14 RX ADMIN — ENOXAPARIN SODIUM SCH MG: 30 INJECTION SUBCUTANEOUS at 07:56

## 2018-12-14 RX ADMIN — DOCUSATE SODIUM SCH MG: 100 CAPSULE, LIQUID FILLED ORAL at 07:56

## 2018-12-14 RX ADMIN — INSULIN ASPART SCH UNIT: 100 INJECTION, SUSPENSION SUBCUTANEOUS at 08:01

## 2018-12-14 RX ADMIN — SEVELAMER CARBONATE SCH MG: 800 TABLET, FILM COATED ORAL at 17:47

## 2018-12-14 RX ADMIN — DEXTROSE MONOHYDRATE, SODIUM CHLORIDE, SODIUM LACTATE, CALCIUM CHLORIDE, MAGNESIUM CHLORIDE SCH MLS/HR: 2.5; 538; 448; 18.4; 5.08 SOLUTION INTRAPERITONEAL at 22:48

## 2018-12-14 RX ADMIN — CEFAZOLIN SCH MLS/HR: 330 INJECTION, POWDER, FOR SOLUTION INTRAMUSCULAR; INTRAVENOUS at 17:22

## 2018-12-14 RX ADMIN — HYDROCODONE BITARTRATE AND ACETAMINOPHEN SCH EACH: 7.5; 325 TABLET ORAL at 22:14

## 2018-12-14 RX ADMIN — MEGESTROL ACETATE SCH MG: 40 TABLET ORAL at 07:57

## 2018-12-14 RX ADMIN — SEVELAMER CARBONATE SCH MG: 800 TABLET, FILM COATED ORAL at 07:56

## 2018-12-14 RX ADMIN — IPRATROPIUM BROMIDE SCH: 0.5 SOLUTION RESPIRATORY (INHALATION) at 11:03

## 2018-12-14 RX ADMIN — DOCUSATE SODIUM SCH MG: 100 CAPSULE, LIQUID FILLED ORAL at 22:15

## 2018-12-14 RX ADMIN — Medication SCH UNIT: at 13:06

## 2018-12-14 RX ADMIN — DEXTROSE MONOHYDRATE, SODIUM CHLORIDE, SODIUM LACTATE, CALCIUM CHLORIDE, MAGNESIUM CHLORIDE SCH MLS/HR: 2.5; 538; 448; 18.4; 5.08 SOLUTION INTRAPERITONEAL at 05:24

## 2018-12-14 RX ADMIN — LATANOPROST SCH DROPS: 50 SOLUTION OPHTHALMIC at 22:14

## 2018-12-14 RX ADMIN — FENOFIBRATE SCH MG: 160 TABLET ORAL at 07:56

## 2018-12-14 RX ADMIN — INSULIN ASPART SCH UNIT: 100 INJECTION, SOLUTION INTRAVENOUS; SUBCUTANEOUS at 07:56

## 2018-12-14 RX ADMIN — DEXTROSE MONOHYDRATE, SODIUM CHLORIDE, SODIUM LACTATE, CALCIUM CHLORIDE, MAGNESIUM CHLORIDE SCH MLS/HR: 2.5; 538; 448; 18.4; 5.08 SOLUTION INTRAPERITONEAL at 17:21

## 2018-12-14 RX ADMIN — INSULIN ASPART SCH UNIT: 100 INJECTION, SUSPENSION SUBCUTANEOUS at 22:25

## 2018-12-14 RX ADMIN — INSULIN ASPART SCH UNIT: 100 INJECTION, SOLUTION INTRAVENOUS; SUBCUTANEOUS at 17:46

## 2018-12-14 RX ADMIN — TRIAMCINOLONE ACETONIDE SCH: 1 CREAM TOPICAL at 22:16

## 2018-12-14 RX ADMIN — TRIAMCINOLONE ACETONIDE SCH: 1 CREAM TOPICAL at 08:02

## 2018-12-14 RX ADMIN — MEGESTROL ACETATE SCH MG: 40 TABLET ORAL at 17:53

## 2018-12-14 RX ADMIN — FAMOTIDINE SCH MG: 20 TABLET, FILM COATED ORAL at 22:15

## 2018-12-14 RX ADMIN — SEVELAMER CARBONATE SCH MG: 800 TABLET, FILM COATED ORAL at 13:06

## 2018-12-14 RX ADMIN — DEXTROSE MONOHYDRATE, SODIUM CHLORIDE, SODIUM LACTATE, CALCIUM CHLORIDE, MAGNESIUM CHLORIDE SCH MLS/HR: 2.5; 538; 448; 18.4; 5.08 SOLUTION INTRAPERITONEAL at 09:19

## 2018-12-14 RX ADMIN — INSULIN ASPART SCH UNIT: 100 INJECTION, SOLUTION INTRAVENOUS; SUBCUTANEOUS at 22:25

## 2018-12-14 RX ADMIN — HYDROCODONE BITARTRATE AND ACETAMINOPHEN SCH EACH: 7.5; 325 TABLET ORAL at 08:01

## 2018-12-14 NOTE — PN
PROGRESS NOTE



The patient is seen for followup for end-stage renal disease.  This morning, patient is

awake, comfortable.  She is not in any acute distress.  She has been eating.  She is

awaiting placement to rehab.



PHYSICAL EXAMINATION:

Blood pressure is 160/57, heart rate 80 per minute.  She is afebrile.  Examination of

the heart S1, S2.  Examination of the lungs bilateral breath sounds are heard.  Abdomen

is soft, nontender.  Examination of lower extremities shows no significant edema.  CNS

exam is grossly intact.



LABS SHOW:

Sodium 130, potassium 3.5, BUN 30, serum creatinine 8.43, hemoglobin 9.2 g/dL.



ASSESSMENT:

1. End-stage renal disease currently on peritoneal dialysis, maintained on PD

    exchanges every 4 hours secondary to significantly elevated creatinine on initial

    admission, the serum creatinine continues to improve.  I will continue with q.4

    hour exchanges for now.

2. Volume overload, now improved.

3. Depression and lack of initiative with patient being in bed for more than 10 days

    prior to admission.  Her mood seems to be improved and patient states that she is

    agreeable to go to rehab.

4. Hypokalemia, currently being replaced.

5. Morbid obesity.

6. Anemia of chronic disease.



PLAN:

Continue current PD exchanges.  Continue to replace potassium.  Monitor electrolytes

periodically.





MMODL / IJN: [id number] / Job#: [id number]

## 2018-12-14 NOTE — P.PN
Subjective


Progress Note Date: 12/14/18


This is a 58-year-old female, patient of [facility].  Patient has 

a known past medical history of end-stage renal disease on hemodialysis, 

congestive heart failure, COPD, diabetes mellitus, hypertension, hyperlipidemia

, obstructive sleep apnea, chronic hypoxic respiratory failure on 4-5 L of 

oxygen at home, depression and CVA.  History obtained from both patient and 

patient's sister.  Apparently their mother had passed away on November 27 

couple weeks ago.  Since then, patient's has not gotten out of bed.  She has 

not moved out of her bed for about 2 weeks per the sister.  She stopped taking 

all of her medications.  And she has not been eating or drinking much.  Blood 

sugar 469 on admission.  Per the sister the patient was started on Prozac 10 mg 

daily about a month ago.  She took it for about a week and then stopped taking 

it.  Before that patient had been on Zoloft.  Patient reports that she hurts 

everywhere.  She has extensive bruising in the upper thigh is growing area and 

back.  There are smaller bruises on the lower leg area.  She hurts anywhere she 

is touched.  She complains of pain in the chest as well as the abdomen and legs 

and back.  She denies any falling.  Reports some nausea and chest chest pain.  

Denies any fever, chills, sweats, cough, bowel movement changes.  She does not 

make urine and is on peritoneal dialysis. 








On 12/07/2018 patient is currently lying in bed currently getting CAPD.  

Patient is complaining of generalized pain throughout chest abdomen and legs.  

Patient also complaining of more severe pain in her right lower extremity.  

Patient would not let me examine right leg.  Explained to patient that due to 

her prolonged bed rest she is at increased risk for blood clot and then I'm 

concerned that she may have a blood clot in her right leg and that she would 

require a venous Doppler to assess.  Patient states she would not be able to 

tolerate a venous Doppler at this time due to the pain.  Will order Dilaudid 

for pain at this time and encouraged patient the importance of obtaining this 

test.  We'll also consult cardiology services at this time.








On 12/08/2018 patient currently getting CAPD.  Patient did have Doppler study 

completed but was un conclusive of DVT due to increased pain during test.  

Patient remains on Lovenox 1 mg/kg per renal dosing for DVT treatment.  This 

time patient states that she feels slightly improved but still has generalized 

pain all over.  Patient denies chest pain or shortness breath.  Patient denies 

any nausea vomiting or diarrhea.  Patient denies any urinary burning or 

frequency.





On 12/09/2018 Patient currently resting in bed. Patient is having significant 

to lower extremity pain. Will order venous doppler again due to unconclusive 

reading from initial test.  Will order ativan and diuladid to be given prior to 

exam in order to get adequate reading. Patient denies chest pain and shortness 

of breath. Denies nausea, vomitting or diarrhea. Denies any urinary burning or 

frequency  





12/10/2018 patient still complaining of pain all over the body.  She does 

report is slightly better than admission.  Complaining of constipation and his 

been about 3 days since her last bowel movement.  Hemoglobin has dropped from 

9.3-8.7.  Potassium is 3.1 and she received supplement.  Venous Doppler of the 

lower extremities showing a limited exam of the right leg and what could be 

seen was negative for DVT.  Patient refused the left leg to be completed due to 

pain.  Patient has been noncompliant with physical therapy.  Poor appetite.  

Pain service will be placed on consult.  Patient has been educated that she 

needs to participate with physical therapy.





12/11/2018 patient does report slight improvement in her pain.  Still 

complaining of pain throughout her body.  Patient was able to work with 

physical therapy yesterday.  She was a max assist.  She denies any chest pain 

or shortness of breath.  Denies any nausea or vomiting.  Still has not had a 

bowel movement for about 5 days.  Norvasc discontinued yesterday due to 

hypotension.  Blood pressures are 20 better today.  Nephrology is also adjusted 

the frequency of peritoneal dialysis to every 4 hours. 





On 12/12/2018 patient does report slight improvement with her pain.  Patient is 

still complaining of overall pain throughout her entire body.  Patient refused 

venous Doppler again due to pain for the third time.  D-dimer was negative.  

Lovenox was DC'd and Lovenox prophylaxis has been added.  Patient denies chest 

pain or shortness of breath.  Patient denies nausea vomiting or diarrhea.  

Patient denies any urinary burning or frequency





On 12/13/2018 patient does report slight improvement with pain today.  Patient'

s daughter currently at bedside.  Patient started still expressed concerns over 

lack of motivation from her mother to get out of bed.  Requesting that site 

revisit patient.  Still waiting on pain services to come and evaluate patient.  

At this time patient denies chest pain or shortness of breath.  Patient denies 

nausea vomiting or diarrhea.  Patient denies any urinary burning or frequency.  

Patient highly encouraged to continue working with physical therapy in order to 

prevent negative outcomes.  At this time planning discharge to [facility]





On 12/14/2018 patient states she feels slightly more improved.  Patient was 

seen by psych Megace has been added Wellbutrin has been increased.  Discussed 

case with social work and a lot of [facility] does not have any beds available.  

Still trying to be achieved placement due to peritoneal dialysis.  Patient also 

complaining of generalized pain.  Patient denies chest pain.  Patient denies 

nausea vomiting or diarrhea.  Patient denies any urinary burning








Objective





- Vital Signs


Vital signs: 


 Vital Signs











Temp  98.2 F   12/14/18 06:22


 


Pulse  85   12/14/18 09:42


 


Resp  15   12/14/18 06:22


 


BP  113/79   12/14/18 09:42


 


Pulse Ox  95   12/14/18 06:22








 Intake & Output











 12/13/18 12/14/18 12/14/18





 18:59 06:59 18:59


 


Intake Total 160 340 50


 


Output Total   0


 


Balance 160 340 50


 


Weight 99.7 kg 86.5 kg 86.5 kg


 


Intake:   


 


    


 


    Sodium Chloride 0.9% 1, 160  





    000 ml @ 20 mls/hr IV .   





    Q24H [facility] Rx#:[id number]   


 


  Oral  340 50


 


Output:   


 


  Urine   0


 


Other:   


 


  Voiding Method  Incontinent Incontinent





  CAPD CAPD


 


  # Voids   0


 


  # Bowel Movements   0














- Exam


Head normocephalic


Neck supple


Lungs clear to auscultation bilaterally no wheezing or crackles


Heart regular rate and rhythm S1-S2, no rub or gallop


Abdomen is soft diffuse tenderness with palpation nondistended positive bowel 

sounds no hepatosplenomegaly obese


Extremities no edema


Neuro alert and orientated to 3


Skin: Patient has extensive bruising along the upper thighs lower back also 

smaller bruises along the lower legs..  Patient has tenderness all over body 

with palpation








- Labs


CBC & Chem 7: 


 12/14/18 10:39





 12/14/18 10:39


Labs: 


 Abnormal Lab Results - Last 24 Hours (Table)











  12/09/18 12/09/18 12/09/18 Range/Units





  12:01 17:10 20:36 


 


RBC     (3.80-5.40)  m/uL


 


Hgb     (11.4-16.0)  gm/dL


 


Hct     (34.0-46.0)  %


 


MCV     (80.0-100.0)  fL


 


MCHC     (31.0-37.0)  g/dL


 


RDW     (11.5-15.5)  %


 


Lymphocytes #     (1.0-4.8)  k/uL


 


Sodium     (137-145)  mmol/L


 


Chloride     ()  mmol/L


 


Carbon Dioxide     (22-30)  mmol/L


 


BUN     (7-17)  mg/dL


 


Creatinine     (0.52-1.04)  mg/dL


 


Glucose     (74-99)  mg/dL


 


POC Glucose (mg/dL)  279 H  236 H  277 H  (75-99)  mg/dL


 


Alkaline Phosphatase     ()  U/L


 


Total Protein     (6.3-8.2)  g/dL


 


Albumin     (3.5-5.0)  g/dL














  12/10/18 12/10/18 12/10/18 Range/Units





  07:10 12:10 17:36 


 


RBC     (3.80-5.40)  m/uL


 


Hgb     (11.4-16.0)  gm/dL


 


Hct     (34.0-46.0)  %


 


MCV     (80.0-100.0)  fL


 


MCHC     (31.0-37.0)  g/dL


 


RDW     (11.5-15.5)  %


 


Lymphocytes #     (1.0-4.8)  k/uL


 


Sodium     (137-145)  mmol/L


 


Chloride     ()  mmol/L


 


Carbon Dioxide     (22-30)  mmol/L


 


BUN     (7-17)  mg/dL


 


Creatinine     (0.52-1.04)  mg/dL


 


Glucose     (74-99)  mg/dL


 


POC Glucose (mg/dL)  226 H  233 H  174 H  (75-99)  mg/dL


 


Alkaline Phosphatase     ()  U/L


 


Total Protein     (6.3-8.2)  g/dL


 


Albumin     (3.5-5.0)  g/dL














  12/11/18 12/12/18 12/12/18 Range/Units





  20:46 07:01 11:44 


 


RBC     (3.80-5.40)  m/uL


 


Hgb     (11.4-16.0)  gm/dL


 


Hct     (34.0-46.0)  %


 


MCV     (80.0-100.0)  fL


 


MCHC     (31.0-37.0)  g/dL


 


RDW     (11.5-15.5)  %


 


Lymphocytes #     (1.0-4.8)  k/uL


 


Sodium     (137-145)  mmol/L


 


Chloride     ()  mmol/L


 


Carbon Dioxide     (22-30)  mmol/L


 


BUN     (7-17)  mg/dL


 


Creatinine     (0.52-1.04)  mg/dL


 


Glucose     (74-99)  mg/dL


 


POC Glucose (mg/dL)  133 H  235 H  241 H  (75-99)  mg/dL


 


Alkaline Phosphatase     ()  U/L


 


Total Protein     (6.3-8.2)  g/dL


 


Albumin     (3.5-5.0)  g/dL














  12/12/18 12/12/18 12/13/18 Range/Units





  17:01 20:50 17:21 


 


RBC     (3.80-5.40)  m/uL


 


Hgb     (11.4-16.0)  gm/dL


 


Hct     (34.0-46.0)  %


 


MCV     (80.0-100.0)  fL


 


MCHC     (31.0-37.0)  g/dL


 


RDW     (11.5-15.5)  %


 


Lymphocytes #     (1.0-4.8)  k/uL


 


Sodium     (137-145)  mmol/L


 


Chloride     ()  mmol/L


 


Carbon Dioxide     (22-30)  mmol/L


 


BUN     (7-17)  mg/dL


 


Creatinine     (0.52-1.04)  mg/dL


 


Glucose     (74-99)  mg/dL


 


POC Glucose (mg/dL)  181 H  216 H  212 H  (75-99)  mg/dL


 


Alkaline Phosphatase     ()  U/L


 


Total Protein     (6.3-8.2)  g/dL


 


Albumin     (3.5-5.0)  g/dL














  12/14/18 12/14/18 12/14/18 Range/Units





  10:39 10:39 12:28 


 


RBC  3.09 L    (3.80-5.40)  m/uL


 


Hgb  9.2 L    (11.4-16.0)  gm/dL


 


Hct  31.9 L    (34.0-46.0)  %


 


MCV  103.0 H    (80.0-100.0)  fL


 


MCHC  28.8 L    (31.0-37.0)  g/dL


 


RDW  15.8 H    (11.5-15.5)  %


 


Lymphocytes #  0.7 L    (1.0-4.8)  k/uL


 


Sodium   130 L   (137-145)  mmol/L


 


Chloride   95 L   ()  mmol/L


 


Carbon Dioxide   21 L   (22-30)  mmol/L


 


BUN   30 H   (7-17)  mg/dL


 


Creatinine   8.43 H*   (0.52-1.04)  mg/dL


 


Glucose   207 H   (74-99)  mg/dL


 


POC Glucose (mg/dL)    177 H  (75-99)  mg/dL


 


Alkaline Phosphatase   127 H   ()  U/L


 


Total Protein   5.7 L   (6.3-8.2)  g/dL


 


Albumin   2.7 L   (3.5-5.0)  g/dL














Assessment and Plan


Assessment: 


1.  Prolonged period of time in bed With pain throughout her body and extensive 

bruising.  Hold aspirin and Plavix.  Hemoglobin trending down to 8.7.  Consult 

pain service for pain management





2.  Insulin-dependent diabetes mellitus: Hyperglycemia with blood sugar 469 on 

admission.  Patient has not been taking insulin at home.  Acetone level 

negative.  A1c 7.7.  Blood sugar 158 this morning showing improvement.  We'll 

monitor.  continue NovoLog 70/30 to 82 units twice a day.  Continue sliding 

scale coverage.





3.  History of end-stage renal disease on peritoneal dialysis.  Nephrology 

following.  Continue peritoneal dialysis.





4.  Morbid obesity





5.  Medication noncompliance





6.  Severe Depression.  Patient denies any suicidal or homicidal ideation.  

Recently started on Prozac 10 mg daily outpatient.  Will restart Prozac at 20 

mg daily . Wellbutrin has been adder per psych.  At this time daughter 

requesting that psych revisit patient for reevaluation of patient's depression.

  Psychiatry has come to reevaluate patient.  Megace has been added and 

Wellbutrin has been increased.





7.  History of COPD stable





8.  History of CVA





9.  History of Essential hypertension





10.  Hyperlipidemia





11.  Obstructive sleep apnea uses CPAP





12.  Chronic hypoxic respiratory failure home O2 dependent on 4-5 L





13.  Right lower extremity pain.  Concerns for possible DVT due to prolonged 

time in bed.  Continue with Lovenox 140 mg subcu daily.  Patient unable to 

tolerate venous Doppler.  On 12/ 9 venous Doppler was a limited exam what was 

evaluated was negative for DVT in the right leg.  Patient refused the left leg 

to be completed.  Patient refused for the third time venous Doppler again due 

to pain.  D-dimer less than 0.17.  Lovenox 140 mg subcu daily discontinued.  

Lovenox DVT prophylaxis has been added





14.  Elevated cardiac enzymes.  Troponin 0.072.  EKG completed showing normal 

sinus rhythm.  Inferior infarct, age undetermined anterior infarct age 

undetermined.  Per cardiology services no further workup needed from cardiology 

standpoint.  No evidence for an acute cardiac event per cardiology





15.  Elevated lipase level of 410.  Has trended down.  No evidence of 

pancreatitis





16.  Hypovolemic Hyponatremia.  Sodium 128 now improving.  Sodium is up to 131.

  Possibly related to her hyperglycemia as well.  Nephrology following





17. Elevated uric acid.  Uric acid 8.4  Patient will be started on allopurinol





18.  Anemia of chronic kidney disease.  And evidence of iron deficiency anemia.

  Hemoglobin has down to 8.7.  Will start ferrous sulfate 325 mg twice a day. 

Aranesp has been added per nephrology





19.  Constipation add stool softener and lactulose





20.  Hypotension possibly related to the IV Dilaudid.  Nephrology discontinued 

the Norvasc.  Blood pressures showing improvement.








DVT prophylaxis Lovenox.  GI prophylaxis Pepcid


Discussed case with  and case management.  [facility] 

currently does not have a bed available.  Discussed case with case management 

and social work trying to find placement for patient with peritoneal dialysis 

needs.





I performed an examination of the patient and discussed their management with 

the Nurse Practitioner.  I have reviewed the Nurse Practitioner's notes and 

agree with the documented findings and plan of care

## 2018-12-14 NOTE — P.PAINCN
History of Present Illness





- Reason for Consult


Consult date: 18


Chronic pain





- Chief Complaint


Left big toe pain





- History of Present Illness





58-year-old female with a past medical history significant for insulin-

dependent diabetic, depression, anxiety, and fibromyalgia.  She was admitted 

with extremity elevated blood sugars and infection/gangrene in her left big 

toe.  Patient states that pain is a 10 on a 10 severity in her big toe she 

describes it as a sharp, throbbing, stabbing pain.  She does have diabetic 

neuropathy as well.  She can't identify when she noted the infection, but her 

blood sugars have been significantly out of control over the past couple weeks.

  She normally states that her blood sugars are well maintained.  Patient has a 

significant psychiatric history, and history of medication cessation.  She's 

currently on her medication since her admission.





With regards to her pain, patient will likely require vascular surgery 

consultation for possible amputation/debridement.  Concern for osteomyelitis 

which could be causing her significant pain in her left toe.  Normally, patient 

takes Norco 10 mg/325 mg twice a day for her fibromyalgia.  Currently as an 

inpatient, she is on Norco 7.5 mg/325 mg 3 times a day.





Review of Systems


Ears, nose, mouth and throat: Reports as per HPI


Cardiovascular: Reports as per HPI


Respiratory: Reports as per HPI


Gastrointestinal: Reports as per HPI


Genitourinary: Reports as per HPI


Musculoskeletal: Reports as per HPI


Neurological: Reports as per HPI


Psychiatric: Reports as per HPI


Endocrine: Reports as per HPI


Hematologic/Lymphatic: Reports as per HPI





Past Medical History


Past Medical History: Asthma, Heart Failure, COPD, CVA/TIA, Diabetes Mellitus, 

Eye Disorder, Hyperlipidemia, Hypertension, Osteoarthritis (OA), Pneumonia, 

Renal Disease, Sleep Apnea/CPAP/BIPAP, Thyroid Disorder


Additional Past Medical History / Comment(s): sleep apnea, neuropathy, patient 

has one kidney, Stage III kidney failure, peritional dialysis- right side 

abdominal port.


History of Any Multi-Drug Resistant Organisms: None Reported


Past Surgical History: Bariatric Surgery, Hernia Repair, Orthopedic Surgery, 

Tonsillectomy


Additional Past Surgical History / Comment(s): rt kidney removed, Rt knee 

replacement


Past Anesthesia/Blood Transfusion Reactions: No Reported Reaction


Additional Past Anesthesia/Blood Transfusion Reaction / Comm: pt states [facility] 

[facility], "it made her itchy and they gave 

bendyl"


Past Psychological History: Anxiety, Depression


Smoking Status: Former smoker


Past Alcohol Use History: None Reported


Past Drug Use History: None Reported





- Past Family History


  ** Mother


Additional Family Medical History / Comment(s): skin CA, CHF, DM, thyroid 

disorder, HTN.





  ** Father


Family Medical History: Diabetes Mellitus, Hypertension, Thyroid Disorder


Additional Family Medical History / Comment(s): CHF.  Pt's father is .





Medications and Allergies


 Home Medications











 Medication  Instructions  Recorded  Confirmed  Type


 


Atorvastatin [Lipitor] 80 mg PO HS 09/13/15 12/06/18 History


 


Levothyroxine Sodium [Synthroid] 175 mcg PO DAILY 09/13/15 12/06/18 History


 


Aspirin 325 mg PO DAILY  tab 17 Rx


 


Clopidogrel [Plavix] 75 mg PO DAILY 18 History


 


Insulin Regular, Human [NovoLIN R] See Protocol SQ ACHS 18 

History


 


Ipratropium-Albuterol Nebulize 3 ml INHALATION RT-QID PRN 18 

History





[Duoneb 0.5 mg-3 mg/3 ml Soln]    


 


Acetaminophen Tab [Tylenol Tab] 1,000 mg PO Q6HR PRN 18 History


 


Albuterol Sulfate [Proair Hfa] 2 puff INHALATION RT-QID PRN 18 

History


 


Bimatoprost [Lumigan .01% Ophth 1 drop BOTH EYES HS 18 History





Soln]    


 


Cholecalciferol [Vitamin D3] 5,000 unit PO DAILY 18 History


 


Famotidine [Pepcid] 40 mg PO HS 18 History


 


Fenofibrate,Micronized 200 mg PO DAILY 18 History





[Fenofibrate]    


 


Folic Acid 0.8 mg PO DAILY 18 History


 


Ketoconazole 2% Cream [Nizoral 2%] 1 applic TOPICAL BID 18 

History


 


Sevelamer [Renvela] 2,400 mg PO AC-TID 18 History


 


Tiotropium 18 Mcg/Puff [Spiriva] 1 cap INHALATION RT-DAILY 18 

History


 


Triamcinolone 0.025% Cream 1 applic TOPICAL BID 18 History





[Kenalog 0.025% Cream]    


 


B Complex W-C No.20/Folic Acid 1 cap PO DAILY 18 History





[Renal Caps Softgel]    


 


HYDROcodone/APAP 7.5-325MG [Norco 1 tab PO TID 18 History





7.5-325]    


 


Insulin Aspart Protam & Aspart 80 unit SQ BID 18 History





[Novolog Mix 70-30 Flexpen Syrn]    


 


amLODIPine [Norvasc] 10 mg PO DAILY 18 History


 


FLUoxetine HCL [PROzac] 20 mg PO DAILY 18 History











 Allergies











Allergy/AdvReac Type Severity Reaction Status Date / Time


 


erythromycin base Allergy  Unknown Verified 18 14:06





[Erythromycin Base]     


 


NSAIDS (Non-Steroidal Allergy  Unknown Verified 18 14:06





Anti-Inflamma     


 


PAPER TAPE Allergy  Rash/Hives Uncoded 18 13:51














Physical Exam


Vitals: 


 Vital Signs











  Temp Pulse Pulse Resp BP BP BP


 


 18 06:22  98.2 F   81  15    89/49


 


 18 05:20  97.8 F    20  109/74  


 


 18 01:30        115/68


 


 18 01:15    80    


 


 18 01:00  98.6 F  76   20  115/72  


 


 18 00:39    80     120/75


 


 18 23:00  98.2 F   83  16   


 


 18 21:00  98.0 F  82   20  110/60  


 


 18 17:43  97.0 F L  80   20  112/59  


 


 18 15:00  97.0 F L   80  20   112/59 


 


 18 13:19  97.4 F L  69   14  104/64  


 


 18 10:27  98.5 F  78   20  116/60  














  Pulse Ox


 


 18 06:22  95


 


 18 05:20  100


 


 18 01:30 


 


 18 01:15 


 


 18 01:00  98


 


 18 00:39 


 


 18 23:00  100


 


 18 21:00  100


 


 18 17:43  99


 


 18 15:00  99


 


 18 13:19  100


 


 18 10:27  100








 Intake and Output











 18





 22:59 06:59 14:59


 


Intake Total 240 100 


 


Balance 240 100 


 


Intake:   


 


  Oral 240 100 


 


Other:   


 


  Voiding Method Incontinent  





 CAPD  


 


  Weight  86.5 kg 














- Constitutional


General appearance: obese





- EENT


Eyes: PERRLA, scleral icterus, normal appearance


ENT: hearing grossly normal, normal oropharynx





- Neck


Neck: normal ROM





- Cardiovascular


Rhythm: regular





- Gastrointestinal


General gastrointestinal: soft (Left big toe is erythematous, swollen, purulent 

discharge evidenced.)





- Psychiatric


Psychiatric: A&O x's 3, appropriate affect





Left big toe displays erythema, swelling, purulent discharge and drainage.





Results


CBC & Chem 7: 


 18 08:10





 18 08:10


Labs: 


 Abnormal Lab Results - Last 24 Hours (Table)











  18 Range/Units





  12:01 17:10 20:36 


 


RBC     (3.80-5.40)  m/uL


 


Hgb     (11.4-16.0)  gm/dL


 


Hct     (34.0-46.0)  %


 


MCV     (80.0-100.0)  fL


 


MCHC     (31.0-37.0)  g/dL


 


RDW     (11.5-15.5)  %


 


Monocytes # (Manual)     (0-1.0)  k/uL


 


Sodium     (137-145)  mmol/L


 


Potassium     (3.5-5.1)  mmol/L


 


Chloride     ()  mmol/L


 


BUN     (7-17)  mg/dL


 


Creatinine     (0.52-1.04)  mg/dL


 


Glucose     (74-99)  mg/dL


 


POC Glucose (mg/dL)  279 H  236 H  277 H  (75-99)  mg/dL


 


Total Protein     (6.3-8.2)  g/dL


 


Albumin     (3.5-5.0)  g/dL














  12/10/18 12/10/18 12/10/18 Range/Units





  07:10 12:10 17:36 


 


RBC     (3.80-5.40)  m/uL


 


Hgb     (11.4-16.0)  gm/dL


 


Hct     (34.0-46.0)  %


 


MCV     (80.0-100.0)  fL


 


MCHC     (31.0-37.0)  g/dL


 


RDW     (11.5-15.5)  %


 


Monocytes # (Manual)     (0-1.0)  k/uL


 


Sodium     (137-145)  mmol/L


 


Potassium     (3.5-5.1)  mmol/L


 


Chloride     ()  mmol/L


 


BUN     (7-17)  mg/dL


 


Creatinine     (0.52-1.04)  mg/dL


 


Glucose     (74-99)  mg/dL


 


POC Glucose (mg/dL)  226 H  233 H  174 H  (75-99)  mg/dL


 


Total Protein     (6.3-8.2)  g/dL


 


Albumin     (3.5-5.0)  g/dL














  18 Range/Units





  20:46 07:01 11:44 


 


RBC     (3.80-5.40)  m/uL


 


Hgb     (11.4-16.0)  gm/dL


 


Hct     (34.0-46.0)  %


 


MCV     (80.0-100.0)  fL


 


MCHC     (31.0-37.0)  g/dL


 


RDW     (11.5-15.5)  %


 


Monocytes # (Manual)     (0-1.0)  k/uL


 


Sodium     (137-145)  mmol/L


 


Potassium     (3.5-5.1)  mmol/L


 


Chloride     ()  mmol/L


 


BUN     (7-17)  mg/dL


 


Creatinine     (0.52-1.04)  mg/dL


 


Glucose     (74-99)  mg/dL


 


POC Glucose (mg/dL)  133 H  235 H  241 H  (75-99)  mg/dL


 


Total Protein     (6.3-8.2)  g/dL


 


Albumin     (3.5-5.0)  g/dL














  18 Range/Units





  17:01 20:50 07:38 


 


RBC     (3.80-5.40)  m/uL


 


Hgb     (11.4-16.0)  gm/dL


 


Hct     (34.0-46.0)  %


 


MCV     (80.0-100.0)  fL


 


MCHC     (31.0-37.0)  g/dL


 


RDW     (11.5-15.5)  %


 


Monocytes # (Manual)     (0-1.0)  k/uL


 


Sodium     (137-145)  mmol/L


 


Potassium     (3.5-5.1)  mmol/L


 


Chloride     ()  mmol/L


 


BUN     (7-17)  mg/dL


 


Creatinine     (0.52-1.04)  mg/dL


 


Glucose     (74-99)  mg/dL


 


POC Glucose (mg/dL)  181 H  216 H  179 H  (75-99)  mg/dL


 


Total Protein     (6.3-8.2)  g/dL


 


Albumin     (3.5-5.0)  g/dL














  18 Range/Units





  08:10 08:10 11:54 


 


RBC  3.19 L    (3.80-5.40)  m/uL


 


Hgb  9.2 L    (11.4-16.0)  gm/dL


 


Hct  32.8 L    (34.0-46.0)  %


 


MCV  103.0 H    (80.0-100.0)  fL


 


MCHC  28.0 L    (31.0-37.0)  g/dL


 


RDW  15.6 H    (11.5-15.5)  %


 


Monocytes # (Manual)  1.19 H    (0-1.0)  k/uL


 


Sodium   133 L   (137-145)  mmol/L


 


Potassium   3.4 L   (3.5-5.1)  mmol/L


 


Chloride   93 L   ()  mmol/L


 


BUN   29 H   (7-17)  mg/dL


 


Creatinine   9.27 H*   (0.52-1.04)  mg/dL


 


Glucose   176 H   (74-99)  mg/dL


 


POC Glucose (mg/dL)    186 H  (75-99)  mg/dL


 


Total Protein   5.9 L   (6.3-8.2)  g/dL


 


Albumin   2.9 L   (3.5-5.0)  g/dL














  18 Range/Units





  17:21 


 


RBC   (3.80-5.40)  m/uL


 


Hgb   (11.4-16.0)  gm/dL


 


Hct   (34.0-46.0)  %


 


MCV   (80.0-100.0)  fL


 


MCHC   (31.0-37.0)  g/dL


 


RDW   (11.5-15.5)  %


 


Monocytes # (Manual)   (0-1.0)  k/uL


 


Sodium   (137-145)  mmol/L


 


Potassium   (3.5-5.1)  mmol/L


 


Chloride   ()  mmol/L


 


BUN   (7-17)  mg/dL


 


Creatinine   (0.52-1.04)  mg/dL


 


Glucose   (74-99)  mg/dL


 


POC Glucose (mg/dL)  212 H  (75-99)  mg/dL


 


Total Protein   (6.3-8.2)  g/dL


 


Albumin   (3.5-5.0)  g/dL














Assessment and Plan


Assessment: 





#1.  Gangrene left big toe


#2.  Fibromyalgia


#3.  Possible osteomyelitis


Plan: 





Recommended increase her dose of Norco to 10 mg/325 mg 3 times a day.  Also 

recommended an urgent vascular surgery consultation this patient will likely 

need some sort of surgical intervention for her diabetic ulcer in her toe.





PQRS Measure Charge Sheet


PQRS Narrative: 


 





Smoking Status                   Former smoker


Do You Want the Pneumonia        Vaccine Up to Date


Vaccine AT THIS TIME?            


Blood Pressure [Left Arm]        112/59


Blood Pressure [Right Arm]       89/49


Blood Pressure                   109/74


Pain Intensity [None]            7


Pain Intensity [Generalized]     5


Pain Intensity                   8


Pain Scale Used                  Numeric (1 - 10)


Scale Used                       Numeric (1 - 10)








Home Medications: 


Ambulatory Orders





Atorvastatin [Lipitor] 80 mg PO HS 09/13/15 


Levothyroxine Sodium [Synthroid] 175 mcg PO DAILY 09/13/15 


Aspirin 325 mg PO DAILY  tab 17 


Clopidogrel [Plavix] 75 mg PO DAILY 18 


Insulin Regular, Human [NovoLIN R] See Protocol SQ ACHS 18 


Ipratropium-Albuterol Nebulize [Duoneb 0.5 mg-3 mg/3 ml Soln] 3 ml INHALATION RT

-QID PRN 18 


Acetaminophen Tab [Tylenol Tab] 1,000 mg PO Q6HR PRN 18 


Albuterol Sulfate [Proair Hfa] 2 puff INHALATION RT-QID PRN 18 


Bimatoprost [Lumigan .01% Ophth Soln] 1 drop BOTH EYES HS 18 


Cholecalciferol [Vitamin D3] 5,000 unit PO DAILY 18 


Famotidine [Pepcid] 40 mg PO HS 18 


Fenofibrate,Micronized [Fenofibrate] 200 mg PO DAILY 18 


Folic Acid 0.8 mg PO DAILY 18 


Ketoconazole 2% Cream [Nizoral 2%] 1 applic TOPICAL BID 18 


Sevelamer [Renvela] 2,400 mg PO AC-TID 18 


Tiotropium 18 Mcg/Puff [Spiriva] 1 cap INHALATION RT-DAILY 18 


Triamcinolone 0.025% Cream [Kenalog 0.025% Cream] 1 applic TOPICAL BID 18 


B Complex W-C No.20/Folic Acid [Renal Caps Softgel] 1 cap PO DAILY 18 


HYDROcodone/APAP 7.5-325MG [Norco 7.5-325] 1 tab PO TID 18 


Insulin Aspart Protam & Aspart [Novolog Mix 70-30 Flexpen Syrn] 80 unit SQ BID 

18 


amLODIPine [Norvasc] 10 mg PO DAILY 18 


FLUoxetine HCL [PROzac] 20 mg PO DAILY 18

## 2018-12-15 LAB
ALBUMIN SERPL-MCNC: 2.6 G/DL (ref 3.5–5)
ALP SERPL-CCNC: 131 U/L (ref 38–126)
ALT SERPL-CCNC: 33 U/L (ref 9–52)
ANION GAP SERPL CALC-SCNC: 16 MMOL/L
AST SERPL-CCNC: 40 U/L (ref 14–36)
BASOPHILS # BLD AUTO: 0.1 K/UL (ref 0–0.2)
BASOPHILS NFR BLD AUTO: 1 %
BUN SERPL-SCNC: 30 MG/DL (ref 7–17)
CALCIUM SPEC-MCNC: 9 MG/DL (ref 8.4–10.2)
CHLORIDE SERPL-SCNC: 95 MMOL/L (ref 98–107)
CO2 SERPL-SCNC: 20 MMOL/L (ref 22–30)
EOSINOPHIL # BLD AUTO: 0.2 K/UL (ref 0–0.7)
EOSINOPHIL NFR BLD AUTO: 2 %
ERYTHROCYTE [DISTWIDTH] IN BLOOD BY AUTOMATED COUNT: 3.16 M/UL (ref 3.8–5.4)
ERYTHROCYTE [DISTWIDTH] IN BLOOD: 15.7 % (ref 11.5–15.5)
GLUCOSE BLD-MCNC: 108 MG/DL (ref 75–99)
GLUCOSE BLD-MCNC: 121 MG/DL (ref 75–99)
GLUCOSE BLD-MCNC: 131 MG/DL (ref 75–99)
GLUCOSE BLD-MCNC: 162 MG/DL (ref 75–99)
GLUCOSE BLD-MCNC: 206 MG/DL (ref 75–99)
GLUCOSE SERPL-MCNC: 147 MG/DL (ref 74–99)
HCT VFR BLD AUTO: 32.7 % (ref 34–46)
HGB BLD-MCNC: 9.5 GM/DL (ref 11.4–16)
LYMPHOCYTES # SPEC AUTO: 0.8 K/UL (ref 1–4.8)
LYMPHOCYTES NFR SPEC AUTO: 7 %
MCH RBC QN AUTO: 30.2 PG (ref 25–35)
MCHC RBC AUTO-ENTMCNC: 29.2 G/DL (ref 31–37)
MCV RBC AUTO: 103.4 FL (ref 80–100)
MONOCYTES # BLD AUTO: 1.3 K/UL (ref 0–1)
MONOCYTES NFR BLD AUTO: 11 %
NEUTROPHILS # BLD AUTO: 9.6 K/UL (ref 1.3–7.7)
NEUTROPHILS NFR BLD AUTO: 77 %
PLATELET # BLD AUTO: 376 K/UL (ref 150–450)
POTASSIUM SERPL-SCNC: 3.8 MMOL/L (ref 3.5–5.1)
PROT SERPL-MCNC: 5.6 G/DL (ref 6.3–8.2)
SODIUM SERPL-SCNC: 131 MMOL/L (ref 137–145)
WBC # BLD AUTO: 12.4 K/UL (ref 3.8–10.6)

## 2018-12-15 RX ADMIN — DEXTROSE MONOHYDRATE, SODIUM CHLORIDE, SODIUM LACTATE, CALCIUM CHLORIDE, MAGNESIUM CHLORIDE SCH MLS/HR: 2.5; 538; 448; 18.4; 5.08 SOLUTION INTRAPERITONEAL at 09:20

## 2018-12-15 RX ADMIN — VANCOMYCIN HYDROCHLORIDE SCH MLS/HR: 500 INJECTION, POWDER, LYOPHILIZED, FOR SOLUTION INTRAVENOUS at 22:41

## 2018-12-15 RX ADMIN — Medication SCH UNIT: at 12:09

## 2018-12-15 RX ADMIN — INSULIN ASPART SCH: 100 INJECTION, SOLUTION INTRAVENOUS; SUBCUTANEOUS at 12:09

## 2018-12-15 RX ADMIN — FENOFIBRATE SCH MG: 160 TABLET ORAL at 09:21

## 2018-12-15 RX ADMIN — Medication SCH: at 22:28

## 2018-12-15 RX ADMIN — FOLIC ACID SCH MG: 1 TABLET ORAL at 12:09

## 2018-12-15 RX ADMIN — TRIAMCINOLONE ACETONIDE SCH APPLIC: 1 CREAM TOPICAL at 22:43

## 2018-12-15 RX ADMIN — HYDROMORPHONE HYDROCHLORIDE PRN MG: 1 INJECTION, SOLUTION INTRAMUSCULAR; INTRAVENOUS; SUBCUTANEOUS at 00:57

## 2018-12-15 RX ADMIN — HYDROMORPHONE HYDROCHLORIDE PRN MG: 1 INJECTION, SOLUTION INTRAMUSCULAR; INTRAVENOUS; SUBCUTANEOUS at 14:03

## 2018-12-15 RX ADMIN — Medication SCH MG: at 09:21

## 2018-12-15 RX ADMIN — IPRATROPIUM BROMIDE SCH: 0.5 SOLUTION RESPIRATORY (INHALATION) at 07:29

## 2018-12-15 RX ADMIN — MEGESTROL ACETATE SCH MG: 40 TABLET ORAL at 09:23

## 2018-12-15 RX ADMIN — CLOTRIMAZOLE SCH: 0.01 CREAM TOPICAL at 23:04

## 2018-12-15 RX ADMIN — ENOXAPARIN SODIUM SCH MG: 30 INJECTION SUBCUTANEOUS at 09:21

## 2018-12-15 RX ADMIN — CLOTRIMAZOLE SCH: 0.01 CREAM TOPICAL at 09:20

## 2018-12-15 RX ADMIN — ALLOPURINOL SCH MG: 100 TABLET ORAL at 09:20

## 2018-12-15 RX ADMIN — FAMOTIDINE SCH MG: 20 TABLET, FILM COATED ORAL at 20:49

## 2018-12-15 RX ADMIN — ATORVASTATIN CALCIUM SCH MG: 80 TABLET, FILM COATED ORAL at 20:49

## 2018-12-15 RX ADMIN — LATANOPROST SCH DROPS: 50 SOLUTION OPHTHALMIC at 20:49

## 2018-12-15 RX ADMIN — DEXTROSE MONOHYDRATE, SODIUM CHLORIDE, SODIUM LACTATE, CALCIUM CHLORIDE, MAGNESIUM CHLORIDE SCH MLS/HR: 2.5; 538; 448; 18.4; 5.08 SOLUTION INTRAPERITONEAL at 01:43

## 2018-12-15 RX ADMIN — Medication SCH EACH: at 12:09

## 2018-12-15 RX ADMIN — MEGESTROL ACETATE SCH MG: 40 TABLET ORAL at 22:39

## 2018-12-15 RX ADMIN — MEGESTROL ACETATE SCH MG: 40 TABLET ORAL at 12:09

## 2018-12-15 RX ADMIN — HYDROMORPHONE HYDROCHLORIDE PRN MG: 1 INJECTION, SOLUTION INTRAMUSCULAR; INTRAVENOUS; SUBCUTANEOUS at 09:24

## 2018-12-15 RX ADMIN — IPRATROPIUM BROMIDE SCH: 0.5 SOLUTION RESPIRATORY (INHALATION) at 21:40

## 2018-12-15 RX ADMIN — MEGESTROL ACETATE SCH MG: 40 TABLET ORAL at 16:56

## 2018-12-15 RX ADMIN — TRIAMCINOLONE ACETONIDE SCH: 1 CREAM TOPICAL at 09:23

## 2018-12-15 RX ADMIN — SEVELAMER CARBONATE SCH MG: 800 TABLET, FILM COATED ORAL at 16:54

## 2018-12-15 RX ADMIN — VANCOMYCIN HYDROCHLORIDE SCH MLS/HR: 500 INJECTION, POWDER, LYOPHILIZED, FOR SOLUTION INTRAVENOUS at 18:03

## 2018-12-15 RX ADMIN — DOCUSATE SODIUM SCH: 100 CAPSULE, LIQUID FILLED ORAL at 09:21

## 2018-12-15 RX ADMIN — BUPROPION HYDROCHLORIDE SCH MG: 300 TABLET, FILM COATED, EXTENDED RELEASE ORAL at 09:20

## 2018-12-15 RX ADMIN — IPRATROPIUM BROMIDE SCH: 0.5 SOLUTION RESPIRATORY (INHALATION) at 11:55

## 2018-12-15 RX ADMIN — HYDROMORPHONE HYDROCHLORIDE PRN MG: 1 INJECTION, SOLUTION INTRAMUSCULAR; INTRAVENOUS; SUBCUTANEOUS at 22:18

## 2018-12-15 RX ADMIN — CEFAZOLIN SCH MLS/HR: 330 INJECTION, POWDER, FOR SOLUTION INTRAMUSCULAR; INTRAVENOUS at 16:56

## 2018-12-15 RX ADMIN — DEXTROSE MONOHYDRATE, SODIUM CHLORIDE, SODIUM LACTATE, CALCIUM CHLORIDE, MAGNESIUM CHLORIDE SCH MLS/HR: 2.5; 538; 448; 18.4; 5.08 SOLUTION INTRAPERITONEAL at 13:20

## 2018-12-15 RX ADMIN — INSULIN ASPART SCH: 100 INJECTION, SOLUTION INTRAVENOUS; SUBCUTANEOUS at 17:01

## 2018-12-15 RX ADMIN — INSULIN ASPART SCH: 100 INJECTION, SUSPENSION SUBCUTANEOUS at 22:28

## 2018-12-15 RX ADMIN — HYDROCODONE BITARTRATE AND ACETAMINOPHEN SCH EACH: 7.5; 325 TABLET ORAL at 09:22

## 2018-12-15 RX ADMIN — HYDROMORPHONE HYDROCHLORIDE PRN MG: 1 INJECTION, SOLUTION INTRAMUSCULAR; INTRAVENOUS; SUBCUTANEOUS at 18:03

## 2018-12-15 RX ADMIN — HYDROCODONE BITARTRATE AND ACETAMINOPHEN SCH EACH: 7.5; 325 TABLET ORAL at 16:54

## 2018-12-15 RX ADMIN — INSULIN ASPART SCH: 100 INJECTION, SOLUTION INTRAVENOUS; SUBCUTANEOUS at 22:21

## 2018-12-15 RX ADMIN — SEVELAMER CARBONATE SCH MG: 800 TABLET, FILM COATED ORAL at 09:19

## 2018-12-15 RX ADMIN — LEVOTHYROXINE SODIUM SCH MCG: 88 TABLET ORAL at 06:27

## 2018-12-15 RX ADMIN — SEVELAMER CARBONATE SCH MG: 800 TABLET, FILM COATED ORAL at 12:09

## 2018-12-15 RX ADMIN — DOCUSATE SODIUM SCH: 100 CAPSULE, LIQUID FILLED ORAL at 22:28

## 2018-12-15 RX ADMIN — HYDROMORPHONE HYDROCHLORIDE PRN MG: 1 INJECTION, SOLUTION INTRAMUSCULAR; INTRAVENOUS; SUBCUTANEOUS at 05:06

## 2018-12-15 RX ADMIN — IPRATROPIUM BROMIDE SCH: 0.5 SOLUTION RESPIRATORY (INHALATION) at 17:05

## 2018-12-15 RX ADMIN — HYDROCODONE BITARTRATE AND ACETAMINOPHEN SCH EACH: 7.5; 325 TABLET ORAL at 21:01

## 2018-12-15 RX ADMIN — INSULIN ASPART SCH UNIT: 100 INJECTION, SUSPENSION SUBCUTANEOUS at 09:22

## 2018-12-15 RX ADMIN — INSULIN ASPART SCH: 100 INJECTION, SOLUTION INTRAVENOUS; SUBCUTANEOUS at 08:12

## 2018-12-15 RX ADMIN — DEXTROSE MONOHYDRATE, SODIUM CHLORIDE, SODIUM LACTATE, CALCIUM CHLORIDE, MAGNESIUM CHLORIDE SCH MLS/HR: 2.5; 538; 448; 18.4; 5.08 SOLUTION INTRAPERITONEAL at 05:30

## 2018-12-15 NOTE — P.PN
Subjective


Progress Note Date: 12/15/18


Seen and examined for the follow-up of ESRD on peritoneal dialysis.  Lying in 

bed complaining of some pain and wanting some extra Dilaudid shots.








Objective





- Vital Signs


Vital signs: 


 Vital Signs











Temp  97 F L  12/15/18 09:34


 


Pulse  82   12/15/18 09:34


 


Resp  20   12/15/18 05:45


 


BP  119/56   12/15/18 09:34


 


Pulse Ox  100   12/15/18 09:34








 Intake & Output











 12/14/18 12/15/18 12/15/18





 18:59 06:59 18:59


 


Intake Total 50 400 


 


Output Total 0  


 


Balance 50 400 


 


Weight 86.5 kg 86.5 kg 


 


Intake:   


 


  Oral 50 400 


 


Output:   


 


  Urine 0  


 


Other:   


 


  Voiding Method Incontinent CAPD CAPD





 CAPD  


 


  # Voids 0 0 


 


  # Bowel Movements 0  














- Exam


No acute distress


S1-S2 heard


Abdomen PD catheter


Trace edema








- Labs


CBC & Chem 7: 


 12/15/18 08:05





 12/15/18 08:05


Labs: 


 Abnormal Lab Results - Last 24 Hours (Table)











  12/14/18 12/14/18 12/14/18 Range/Units





  07:19 12:28 17:12 


 


WBC     (3.8-10.6)  k/uL


 


RBC     (3.80-5.40)  m/uL


 


Hgb     (11.4-16.0)  gm/dL


 


Hct     (34.0-46.0)  %


 


MCV     (80.0-100.0)  fL


 


MCHC     (31.0-37.0)  g/dL


 


RDW     (11.5-15.5)  %


 


Neutrophils #     (1.3-7.7)  k/uL


 


Lymphocytes #     (1.0-4.8)  k/uL


 


Monocytes #     (0-1.0)  k/uL


 


Sodium     (137-145)  mmol/L


 


Chloride     ()  mmol/L


 


Carbon Dioxide     (22-30)  mmol/L


 


BUN     (7-17)  mg/dL


 


Creatinine     (0.52-1.04)  mg/dL


 


Glucose     (74-99)  mg/dL


 


POC Glucose (mg/dL)  223 H  177 H  173 H  (75-99)  mg/dL


 


AST     (14-36)  U/L


 


Alkaline Phosphatase     ()  U/L


 


Total Protein     (6.3-8.2)  g/dL


 


Albumin     (3.5-5.0)  g/dL














  12/14/18 12/15/18 12/15/18 Range/Units





  20:48 07:13 08:05 


 


WBC    12.4 H  (3.8-10.6)  k/uL


 


RBC    3.16 L  (3.80-5.40)  m/uL


 


Hgb    9.5 L  (11.4-16.0)  gm/dL


 


Hct    32.7 L  (34.0-46.0)  %


 


MCV    103.4 H  (80.0-100.0)  fL


 


MCHC    29.2 L  (31.0-37.0)  g/dL


 


RDW    15.7 H  (11.5-15.5)  %


 


Neutrophils #    9.6 H  (1.3-7.7)  k/uL


 


Lymphocytes #    0.8 L  (1.0-4.8)  k/uL


 


Monocytes #    1.3 H  (0-1.0)  k/uL


 


Sodium     (137-145)  mmol/L


 


Chloride     ()  mmol/L


 


Carbon Dioxide     (22-30)  mmol/L


 


BUN     (7-17)  mg/dL


 


Creatinine     (0.52-1.04)  mg/dL


 


Glucose     (74-99)  mg/dL


 


POC Glucose (mg/dL)  206 H  131 H   (75-99)  mg/dL


 


AST     (14-36)  U/L


 


Alkaline Phosphatase     ()  U/L


 


Total Protein     (6.3-8.2)  g/dL


 


Albumin     (3.5-5.0)  g/dL














  12/15/18 12/15/18 Range/Units





  08:05 11:48 


 


WBC    (3.8-10.6)  k/uL


 


RBC    (3.80-5.40)  m/uL


 


Hgb    (11.4-16.0)  gm/dL


 


Hct    (34.0-46.0)  %


 


MCV    (80.0-100.0)  fL


 


MCHC    (31.0-37.0)  g/dL


 


RDW    (11.5-15.5)  %


 


Neutrophils #    (1.3-7.7)  k/uL


 


Lymphocytes #    (1.0-4.8)  k/uL


 


Monocytes #    (0-1.0)  k/uL


 


Sodium  131 L   (137-145)  mmol/L


 


Chloride  95 L   ()  mmol/L


 


Carbon Dioxide  20 L   (22-30)  mmol/L


 


BUN  30 H   (7-17)  mg/dL


 


Creatinine  7.96 H*   (0.52-1.04)  mg/dL


 


Glucose  147 H   (74-99)  mg/dL


 


POC Glucose (mg/dL)   162 H  (75-99)  mg/dL


 


AST  40 H   (14-36)  U/L


 


Alkaline Phosphatase  131 H   ()  U/L


 


Total Protein  5.6 L   (6.3-8.2)  g/dL


 


Albumin  2.6 L   (3.5-5.0)  g/dL














Assessment and Plan


Assessment: 


#1 ESRD on PD every 4 exchanges


#2 volume overload now better


#3 depression


#4 hypokalemia


#5 morbid obesity


#6 anemia with chronic kidney disease


#6 hypervolemic hyponatremia








Plan: 


#1 continue with current PD orders.  Monitor closely


#2 replace electrolytes


#3 CKD medications

## 2018-12-15 NOTE — P.PN
Subjective


Progress Note Date: 12/15/18





This is a 58-year-old female, patient of [facility].  Patient has 

a known past medical history of end-stage renal disease on hemodialysis, 

congestive heart failure, COPD, diabetes mellitus, hypertension, hyperlipidemia

, obstructive sleep apnea, chronic hypoxic respiratory failure on 4-5 L of 

oxygen at home, depression and CVA.  History obtained from both patient and 

patient's sister.  Apparently their mother had passed away on November 27 

couple weeks ago.  Since then, patient's has not gotten out of bed.  She has 

not moved out of her bed for about 2 weeks per the sister.  She stopped taking 

all of her medications.  And she has not been eating or drinking much.  Blood 

sugar 469 on admission.  Per the sister the patient was started on Prozac 10 mg 

daily about a month ago.  She took it for about a week and then stopped taking 

it.  Before that patient had been on Zoloft.  Patient reports that she hurts 

everywhere.  She has extensive bruising in the upper thigh is growing area and 

back.  There are smaller bruises on the lower leg area.  She hurts anywhere she 

is touched.  She complains of pain in the chest as well as the abdomen and legs 

and back.  She denies any falling.  Reports some nausea and chest chest pain.  

Denies any fever, chills, sweats, cough, bowel movement changes.  She does not 

make urine and is on peritoneal dialysis. 








On 12/07/2018 patient is currently lying in bed currently getting CAPD.  

Patient is complaining of generalized pain throughout chest abdomen and legs.  

Patient also complaining of more severe pain in her right lower extremity.  

Patient would not let me examine right leg.  Explained to patient that due to 

her prolonged bed rest she is at increased risk for blood clot and then I'm 

concerned that she may have a blood clot in her right leg and that she would 

require a venous Doppler to assess.  Patient states she would not be able to 

tolerate a venous Doppler at this time due to the pain.  Will order Dilaudid 

for pain at this time and encouraged patient the importance of obtaining this 

test.  We'll also consult cardiology services at this time.








On 12/08/2018 patient currently getting CAPD.  Patient did have Doppler study 

completed but was un conclusive of DVT due to increased pain during test.  

Patient remains on Lovenox 1 mg/kg per renal dosing for DVT treatment.  This 

time patient states that she feels slightly improved but still has generalized 

pain all over.  Patient denies chest pain or shortness breath.  Patient denies 

any nausea vomiting or diarrhea.  Patient denies any urinary burning or 

frequency.





On 12/09/2018 Patient currently resting in bed. Patient is having significant 

to lower extremity pain. Will order venous doppler again due to unconclusive 

reading from initial test.  Will order ativan and diuladid to be given prior to 

exam in order to get adequate reading. Patient denies chest pain and shortness 

of breath. Denies nausea, vomitting or diarrhea. Denies any urinary burning or 

frequency  





12/10/2018 patient still complaining of pain all over the body.  She does 

report is slightly better than admission.  Complaining of constipation and his 

been about 3 days since her last bowel movement.  Hemoglobin has dropped from 

9.3-8.7.  Potassium is 3.1 and she received supplement.  Venous Doppler of the 

lower extremities showing a limited exam of the right leg and what could be 

seen was negative for DVT.  Patient refused the left leg to be completed due to 

pain.  Patient has been noncompliant with physical therapy.  Poor appetite.  

Pain service will be placed on consult.  Patient has been educated that she 

needs to participate with physical therapy.





12/11/2018 patient does report slight improvement in her pain.  Still 

complaining of pain throughout her body.  Patient was able to work with 

physical therapy yesterday.  She was a max assist.  She denies any chest pain 

or shortness of breath.  Denies any nausea or vomiting.  Still has not had a 

bowel movement for about 5 days.  Norvasc discontinued yesterday due to 

hypotension.  Blood pressures are 20 better today.  Nephrology is also adjusted 

the frequency of peritoneal dialysis to every 4 hours. 





On 12/12/2018 patient does report slight improvement with her pain.  Patient is 

still complaining of overall pain throughout her entire body.  Patient refused 

venous Doppler again due to pain for the third time.  D-dimer was negative.  

Lovenox was DC'd and Lovenox prophylaxis has been added.  Patient denies chest 

pain or shortness of breath.  Patient denies nausea vomiting or diarrhea.  

Patient denies any urinary burning or frequency





On 12/13/2018 patient does report slight improvement with pain today.  Patient'

s daughter currently at bedside.  Patient started still expressed concerns over 

lack of motivation from her mother to get out of bed.  Requesting that site 

revisit patient.  Still waiting on pain services to come and evaluate patient.  

At this time patient denies chest pain or shortness of breath.  Patient denies 

nausea vomiting or diarrhea.  Patient denies any urinary burning or frequency.  

Patient highly encouraged to continue working with physical therapy in order to 

prevent negative outcomes.  At this time planning discharge to [facility]





On 12/14/2018 patient states she feels slightly more improved.  Patient was 

seen by psych Megace has been added Wellbutrin has been increased.  Discussed 

case with social work and a lot of [facility] does not have any beds available.  

Still trying to be achieved placement due to peritoneal dialysis.  Patient also 

complaining of generalized pain.  Patient denies chest pain.  Patient denies 

nausea vomiting or diarrhea.  Patient denies any urinary burning





On 12/15/2018 patient is alert and responsive in no apparent distress, she 

states she is feeling a little bit better today, she is complaining of 

generalized pain, otherwise no complaints at this time there is no fever or 

chills no headache or dizziness no chest pain no shortness of breath no cough 

no nausea or vomiting no abdominal pain no diarrhea and no urinary symptoms





Objective





- Vital Signs


Vital signs: 


 Vital Signs











Temp  97 F L  12/15/18 09:34


 


Pulse  82   12/15/18 09:34


 


Resp  20   12/15/18 05:45


 


BP  119/56   12/15/18 09:34


 


Pulse Ox  100   12/15/18 09:34








 Intake & Output











 12/14/18 12/15/18 12/15/18





 18:59 06:59 18:59


 


Intake Total 50 400 


 


Output Total 0  


 


Balance 50 400 


 


Weight 86.5 kg 86.5 kg 


 


Intake:   


 


  Oral 50 400 


 


Output:   


 


  Urine 0  


 


Other:   


 


  Voiding Method Incontinent CAPD CAPD





 CAPD  


 


  # Voids 0 0 


 


  # Bowel Movements 0  














- Exam





Head normocephalic and atraumatic


Neck supple no JVD no goiter


Lungs clear to auscultation bilaterally no wheezing or crackles


Heart regular rate and rhythm S1-S2, no rub or gallop


Abdomen is soft diffuse tenderness with palpation nondistended positive bowel 

sounds no hepatosplenomegaly obese


Extremities no edema no cyanosis or clubbing


Neuro alert and orientated to 3


Skin: Patient has extensive bruising along the upper thighs lower back also 

smaller bruises along the lower legs..  Patient has tenderness all over body 

with palpation








- Labs


CBC & Chem 7: 


 12/15/18 08:05





 12/15/18 08:05


Labs: 


 Abnormal Lab Results - Last 24 Hours (Table)











  12/14/18 12/14/18 12/14/18 Range/Units





  07:19 17:12 20:48 


 


WBC     (3.8-10.6)  k/uL


 


RBC     (3.80-5.40)  m/uL


 


Hgb     (11.4-16.0)  gm/dL


 


Hct     (34.0-46.0)  %


 


MCV     (80.0-100.0)  fL


 


MCHC     (31.0-37.0)  g/dL


 


RDW     (11.5-15.5)  %


 


Neutrophils #     (1.3-7.7)  k/uL


 


Lymphocytes #     (1.0-4.8)  k/uL


 


Monocytes #     (0-1.0)  k/uL


 


Sodium     (137-145)  mmol/L


 


Chloride     ()  mmol/L


 


Carbon Dioxide     (22-30)  mmol/L


 


BUN     (7-17)  mg/dL


 


Creatinine     (0.52-1.04)  mg/dL


 


Glucose     (74-99)  mg/dL


 


POC Glucose (mg/dL)  223 H  173 H  206 H  (75-99)  mg/dL


 


AST     (14-36)  U/L


 


Alkaline Phosphatase     ()  U/L


 


Total Protein     (6.3-8.2)  g/dL


 


Albumin     (3.5-5.0)  g/dL














  12/15/18 12/15/18 12/15/18 Range/Units





  07:13 08:05 08:05 


 


WBC   12.4 H   (3.8-10.6)  k/uL


 


RBC   3.16 L   (3.80-5.40)  m/uL


 


Hgb   9.5 L   (11.4-16.0)  gm/dL


 


Hct   32.7 L   (34.0-46.0)  %


 


MCV   103.4 H   (80.0-100.0)  fL


 


MCHC   29.2 L   (31.0-37.0)  g/dL


 


RDW   15.7 H   (11.5-15.5)  %


 


Neutrophils #   9.6 H   (1.3-7.7)  k/uL


 


Lymphocytes #   0.8 L   (1.0-4.8)  k/uL


 


Monocytes #   1.3 H   (0-1.0)  k/uL


 


Sodium    131 L  (137-145)  mmol/L


 


Chloride    95 L  ()  mmol/L


 


Carbon Dioxide    20 L  (22-30)  mmol/L


 


BUN    30 H  (7-17)  mg/dL


 


Creatinine    7.96 H*  (0.52-1.04)  mg/dL


 


Glucose    147 H  (74-99)  mg/dL


 


POC Glucose (mg/dL)  131 H    (75-99)  mg/dL


 


AST    40 H  (14-36)  U/L


 


Alkaline Phosphatase    131 H  ()  U/L


 


Total Protein    5.6 L  (6.3-8.2)  g/dL


 


Albumin    2.6 L  (3.5-5.0)  g/dL














  12/15/18 Range/Units





  11:48 


 


WBC   (3.8-10.6)  k/uL


 


RBC   (3.80-5.40)  m/uL


 


Hgb   (11.4-16.0)  gm/dL


 


Hct   (34.0-46.0)  %


 


MCV   (80.0-100.0)  fL


 


MCHC   (31.0-37.0)  g/dL


 


RDW   (11.5-15.5)  %


 


Neutrophils #   (1.3-7.7)  k/uL


 


Lymphocytes #   (1.0-4.8)  k/uL


 


Monocytes #   (0-1.0)  k/uL


 


Sodium   (137-145)  mmol/L


 


Chloride   ()  mmol/L


 


Carbon Dioxide   (22-30)  mmol/L


 


BUN   (7-17)  mg/dL


 


Creatinine   (0.52-1.04)  mg/dL


 


Glucose   (74-99)  mg/dL


 


POC Glucose (mg/dL)  162 H  (75-99)  mg/dL


 


AST   (14-36)  U/L


 


Alkaline Phosphatase   ()  U/L


 


Total Protein   (6.3-8.2)  g/dL


 


Albumin   (3.5-5.0)  g/dL














Assessment and Plan


Plan: 





1.  Prolonged period of time in bed With pain throughout her body and extensive 

bruising.  Hold aspirin and Plavix.  Hemoglobin trending down to 8.7.  Consult 

pain service for pain management





2.  Insulin-dependent diabetes mellitus: Hyperglycemia with blood sugar 469 on 

admission.  Patient has not been taking insulin at home.  Acetone level 

negative.  A1c 7.7.  Blood sugar 158 this morning showing improvement.  We'll 

monitor.  continue NovoLog 70/30 to 82 units twice a day.  Continue sliding 

scale coverage.





3.  History of end-stage renal disease on peritoneal dialysis.  Nephrology 

following.  Continue peritoneal dialysis.





4.  Morbid obesity





5.  Medication noncompliance





6.  Severe Depression.  Patient denies any suicidal or homicidal ideation.  

Recently started on Prozac 10 mg daily outpatient.  Will restart Prozac at 20 

mg daily . Wellbutrin has been adder per psych.  At this time daughter 

requesting that psych revisit patient for reevaluation of patient's depression.

  Psychiatry has come to reevaluate patient.  Megace has been added and 

Wellbutrin has been increased.





7.  History of COPD stable





8.  History of CVA





9.  History of Essential hypertension





10.  Hyperlipidemia





11.  Obstructive sleep apnea uses CPAP





12.  Chronic hypoxic respiratory failure home O2 dependent on 4-5 L





13.  Right lower extremity pain.  Concerns for possible DVT due to prolonged 

time in bed.  Continue with Lovenox 140 mg subcu daily.  Patient unable to 

tolerate venous Doppler.  On 12/ 9 venous Doppler was a limited exam what was 

evaluated was negative for DVT in the right leg.  Patient refused the left leg 

to be completed.  Patient refused for the third time venous Doppler again due 

to pain.  D-dimer less than 0.17.  Lovenox 140 mg subcu daily discontinued.  

Lovenox DVT prophylaxis has been added





14.  Elevated cardiac enzymes.  Troponin 0.072.  EKG completed showing normal 

sinus rhythm.  Inferior infarct, age undetermined anterior infarct age 

undetermined.  Per cardiology services no further workup needed from cardiology 

standpoint.  No evidence for an acute cardiac event per cardiology





15.  Elevated lipase level of 410.  Has trended down.  No evidence of 

pancreatitis





16.  Hypovolemic Hyponatremia.  Sodium 128 now improving.  Sodium is up to 131.

  Possibly related to her hyperglycemia as well.  Nephrology following





17. Elevated uric acid.  Uric acid 8.4  Patient will be started on allopurinol





18.  Anemia of chronic kidney disease.  And evidence of iron deficiency anemia.

  Hemoglobin has down to 8.7.  Will start ferrous sulfate 325 mg twice a day. 

Aranesp has been added per nephrology





19.  Constipation add stool softener and lactulose





20.  Hypotension possibly related to the IV Dilaudid.  Nephrology discontinued 

the Norvasc.  Blood pressures showing improvement.








DVT prophylaxis Lovenox.  GI prophylaxis Pepcid


Discussed case with  and case management.  [facility] 

currently does not have a bed available.  Discussed case with case management 

and social work trying to find placement for patient with peritoneal dialysis 

needs.

## 2018-12-15 NOTE — XR
EXAMINATION TYPE: XR abdomen 1V

 

DATE OF EXAM: 12/15/2018

 

COMPARISON: NONE

 

HISTORY: Peritoneal dialysis catheter plugged.

 

TECHNIQUE: 2 views supine

 

FINDINGS: There is gas in the large bowel consistent with air swallowing. There is apparent gastric s
ling. There is a right lower quadrant peritoneal dialysis catheter noted. There is no sign of pneumop
eritoneum. There are numerous surgical clips in the mid abdomen.

 

IMPRESSION: Mild large bowel ileus or air swallowing. No free air.

## 2018-12-16 LAB
ALBUMIN SERPL-MCNC: 2.6 G/DL (ref 3.5–5)
ALP SERPL-CCNC: 151 U/L (ref 38–126)
ALT SERPL-CCNC: 32 U/L (ref 9–52)
ANION GAP SERPL CALC-SCNC: 14 MMOL/L
AST SERPL-CCNC: 38 U/L (ref 14–36)
BUN SERPL-SCNC: 29 MG/DL (ref 7–17)
CALCIUM SPEC-MCNC: 8.8 MG/DL (ref 8.4–10.2)
CELLS COUNTED: 200
CHLORIDE SERPL-SCNC: 93 MMOL/L (ref 98–107)
CO2 SERPL-SCNC: 23 MMOL/L (ref 22–30)
EOSINOPHIL # BLD MANUAL: 0.13 K/UL (ref 0–0.7)
ERYTHROCYTE [DISTWIDTH] IN BLOOD BY AUTOMATED COUNT: 3.18 M/UL (ref 3.8–5.4)
ERYTHROCYTE [DISTWIDTH] IN BLOOD: 15.8 % (ref 11.5–15.5)
GLUCOSE BLD-MCNC: 159 MG/DL (ref 75–99)
GLUCOSE BLD-MCNC: 164 MG/DL (ref 75–99)
GLUCOSE BLD-MCNC: 200 MG/DL (ref 75–99)
GLUCOSE BLD-MCNC: 215 MG/DL (ref 75–99)
GLUCOSE SERPL-MCNC: 223 MG/DL (ref 74–99)
HCT VFR BLD AUTO: 33.2 % (ref 34–46)
HGB BLD-MCNC: 9.3 GM/DL (ref 11.4–16)
LYMPHOCYTES # BLD MANUAL: 0.88 K/UL (ref 1–4.8)
MCH RBC QN AUTO: 29.2 PG (ref 25–35)
MCHC RBC AUTO-ENTMCNC: 27.9 G/DL (ref 31–37)
MCV RBC AUTO: 104.3 FL (ref 80–100)
METAMYELOCYTES # BLD: 0.38 K/UL
MONOCYTES # BLD MANUAL: 2.25 K/UL (ref 0–1)
MYELOCYTES # BLD MANUAL: 0.13 K/UL
NEUTROPHILS NFR BLD MANUAL: 65 %
NEUTS SEG # BLD MANUAL: 9 K/UL (ref 1.3–7.7)
PLATELET # BLD AUTO: 391 K/UL (ref 150–450)
POTASSIUM SERPL-SCNC: 3.9 MMOL/L (ref 3.5–5.1)
PROT SERPL-MCNC: 5.7 G/DL (ref 6.3–8.2)
SODIUM SERPL-SCNC: 130 MMOL/L (ref 137–145)
WBC # BLD AUTO: 12.5 K/UL (ref 3.8–10.6)

## 2018-12-16 RX ADMIN — GENTAMICIN SULFATE SCH APPLIC: 1 CREAM TOPICAL at 13:25

## 2018-12-16 RX ADMIN — HYDROCODONE BITARTRATE AND ACETAMINOPHEN SCH EACH: 7.5; 325 TABLET ORAL at 15:31

## 2018-12-16 RX ADMIN — HYDROCODONE BITARTRATE AND ACETAMINOPHEN SCH EACH: 7.5; 325 TABLET ORAL at 08:13

## 2018-12-16 RX ADMIN — HYDROMORPHONE HYDROCHLORIDE PRN MG: 1 INJECTION, SOLUTION INTRAMUSCULAR; INTRAVENOUS; SUBCUTANEOUS at 13:24

## 2018-12-16 RX ADMIN — IPRATROPIUM BROMIDE SCH: 0.5 SOLUTION RESPIRATORY (INHALATION) at 14:10

## 2018-12-16 RX ADMIN — HYDROMORPHONE HYDROCHLORIDE PRN MG: 1 INJECTION, SOLUTION INTRAMUSCULAR; INTRAVENOUS; SUBCUTANEOUS at 20:52

## 2018-12-16 RX ADMIN — LATANOPROST SCH DROPS: 50 SOLUTION OPHTHALMIC at 20:50

## 2018-12-16 RX ADMIN — MEGESTROL ACETATE SCH MG: 40 TABLET ORAL at 13:06

## 2018-12-16 RX ADMIN — FOLIC ACID SCH MG: 1 TABLET ORAL at 13:06

## 2018-12-16 RX ADMIN — CLOTRIMAZOLE SCH: 0.01 CREAM TOPICAL at 08:12

## 2018-12-16 RX ADMIN — ATORVASTATIN CALCIUM SCH MG: 80 TABLET, FILM COATED ORAL at 20:52

## 2018-12-16 RX ADMIN — TRIAMCINOLONE ACETONIDE SCH APPLIC: 1 CREAM TOPICAL at 08:11

## 2018-12-16 RX ADMIN — MEGESTROL ACETATE SCH MG: 40 TABLET ORAL at 17:35

## 2018-12-16 RX ADMIN — SEVELAMER CARBONATE SCH MG: 800 TABLET, FILM COATED ORAL at 13:06

## 2018-12-16 RX ADMIN — ALLOPURINOL SCH MG: 100 TABLET ORAL at 08:11

## 2018-12-16 RX ADMIN — VANCOMYCIN HYDROCHLORIDE SCH MLS/HR: 500 INJECTION, POWDER, LYOPHILIZED, FOR SOLUTION INTRAVENOUS at 05:38

## 2018-12-16 RX ADMIN — HYDROCODONE BITARTRATE AND ACETAMINOPHEN SCH EACH: 7.5; 325 TABLET ORAL at 23:12

## 2018-12-16 RX ADMIN — IPRATROPIUM BROMIDE SCH: 0.5 SOLUTION RESPIRATORY (INHALATION) at 10:36

## 2018-12-16 RX ADMIN — TRIAMCINOLONE ACETONIDE SCH: 1 CREAM TOPICAL at 22:56

## 2018-12-16 RX ADMIN — MEGESTROL ACETATE SCH MG: 40 TABLET ORAL at 23:12

## 2018-12-16 RX ADMIN — BUPROPION HYDROCHLORIDE SCH MG: 300 TABLET, FILM COATED, EXTENDED RELEASE ORAL at 08:11

## 2018-12-16 RX ADMIN — DOCUSATE SODIUM SCH MG: 100 CAPSULE, LIQUID FILLED ORAL at 08:12

## 2018-12-16 RX ADMIN — INSULIN ASPART SCH UNIT: 100 INJECTION, SUSPENSION SUBCUTANEOUS at 08:14

## 2018-12-16 RX ADMIN — FAMOTIDINE SCH MG: 20 TABLET, FILM COATED ORAL at 20:52

## 2018-12-16 RX ADMIN — HYDROMORPHONE HYDROCHLORIDE PRN MG: 1 INJECTION, SOLUTION INTRAMUSCULAR; INTRAVENOUS; SUBCUTANEOUS at 17:35

## 2018-12-16 RX ADMIN — VANCOMYCIN HYDROCHLORIDE SCH MLS/HR: 500 INJECTION, POWDER, LYOPHILIZED, FOR SOLUTION INTRAVENOUS at 09:33

## 2018-12-16 RX ADMIN — Medication SCH UNIT: at 13:05

## 2018-12-16 RX ADMIN — HYDROMORPHONE HYDROCHLORIDE PRN MG: 1 INJECTION, SOLUTION INTRAMUSCULAR; INTRAVENOUS; SUBCUTANEOUS at 02:11

## 2018-12-16 RX ADMIN — FENOFIBRATE SCH MG: 160 TABLET ORAL at 08:12

## 2018-12-16 RX ADMIN — INSULIN ASPART SCH UNIT: 100 INJECTION, SOLUTION INTRAVENOUS; SUBCUTANEOUS at 13:06

## 2018-12-16 RX ADMIN — MEGESTROL ACETATE SCH MG: 40 TABLET ORAL at 08:14

## 2018-12-16 RX ADMIN — Medication SCH EACH: at 13:06

## 2018-12-16 RX ADMIN — INSULIN ASPART SCH UNIT: 100 INJECTION, SOLUTION INTRAVENOUS; SUBCUTANEOUS at 22:54

## 2018-12-16 RX ADMIN — ENOXAPARIN SODIUM SCH MG: 30 INJECTION SUBCUTANEOUS at 08:12

## 2018-12-16 RX ADMIN — CLOTRIMAZOLE SCH: 0.01 CREAM TOPICAL at 20:54

## 2018-12-16 RX ADMIN — Medication SCH MG: at 08:12

## 2018-12-16 RX ADMIN — DOCUSATE SODIUM SCH: 100 CAPSULE, LIQUID FILLED ORAL at 20:53

## 2018-12-16 RX ADMIN — IPRATROPIUM BROMIDE SCH: 0.5 SOLUTION RESPIRATORY (INHALATION) at 20:42

## 2018-12-16 RX ADMIN — HYDROMORPHONE HYDROCHLORIDE PRN MG: 1 INJECTION, SOLUTION INTRAMUSCULAR; INTRAVENOUS; SUBCUTANEOUS at 05:42

## 2018-12-16 RX ADMIN — SEVELAMER CARBONATE SCH MG: 800 TABLET, FILM COATED ORAL at 17:35

## 2018-12-16 RX ADMIN — IPRATROPIUM BROMIDE SCH: 0.5 SOLUTION RESPIRATORY (INHALATION) at 16:26

## 2018-12-16 RX ADMIN — INSULIN ASPART SCH: 100 INJECTION, SOLUTION INTRAVENOUS; SUBCUTANEOUS at 07:36

## 2018-12-16 RX ADMIN — VANCOMYCIN HYDROCHLORIDE SCH MLS/HR: 500 INJECTION, POWDER, LYOPHILIZED, FOR SOLUTION INTRAVENOUS at 22:21

## 2018-12-16 RX ADMIN — HYDROMORPHONE HYDROCHLORIDE PRN MG: 1 INJECTION, SOLUTION INTRAMUSCULAR; INTRAVENOUS; SUBCUTANEOUS at 09:32

## 2018-12-16 RX ADMIN — CEFAZOLIN SCH MLS/HR: 330 INJECTION, POWDER, FOR SOLUTION INTRAMUSCULAR; INTRAVENOUS at 15:32

## 2018-12-16 RX ADMIN — INSULIN ASPART SCH: 100 INJECTION, SOLUTION INTRAVENOUS; SUBCUTANEOUS at 17:35

## 2018-12-16 RX ADMIN — LEVOTHYROXINE SODIUM SCH MCG: 88 TABLET ORAL at 05:43

## 2018-12-16 RX ADMIN — VANCOMYCIN HYDROCHLORIDE SCH MLS/HR: 500 INJECTION, POWDER, LYOPHILIZED, FOR SOLUTION INTRAVENOUS at 15:31

## 2018-12-16 RX ADMIN — Medication SCH: at 20:54

## 2018-12-16 RX ADMIN — INSULIN ASPART SCH UNIT: 100 INJECTION, SUSPENSION SUBCUTANEOUS at 22:54

## 2018-12-16 RX ADMIN — SEVELAMER CARBONATE SCH MG: 800 TABLET, FILM COATED ORAL at 08:11

## 2018-12-16 RX ADMIN — VANCOMYCIN HYDROCHLORIDE SCH MLS/HR: 500 INJECTION, POWDER, LYOPHILIZED, FOR SOLUTION INTRAVENOUS at 02:30

## 2018-12-16 NOTE — P.PN
Subjective


Progress Note Date: 12/16/18





This is a 58-year-old female, patient of [facility].  Patient has 

a known past medical history of end-stage renal disease on hemodialysis, 

congestive heart failure, COPD, diabetes mellitus, hypertension, hyperlipidemia

, obstructive sleep apnea, chronic hypoxic respiratory failure on 4-5 L of 

oxygen at home, depression and CVA.  History obtained from both patient and 

patient's sister.  Apparently their mother had passed away on November 27 

couple weeks ago.  Since then, patient's has not gotten out of bed.  She has 

not moved out of her bed for about 2 weeks per the sister.  She stopped taking 

all of her medications.  And she has not been eating or drinking much.  Blood 

sugar 469 on admission.  Per the sister the patient was started on Prozac 10 mg 

daily about a month ago.  She took it for about a week and then stopped taking 

it.  Before that patient had been on Zoloft.  Patient reports that she hurts 

everywhere.  She has extensive bruising in the upper thigh is growing area and 

back.  There are smaller bruises on the lower leg area.  She hurts anywhere she 

is touched.  She complains of pain in the chest as well as the abdomen and legs 

and back.  She denies any falling.  Reports some nausea and chest chest pain.  

Denies any fever, chills, sweats, cough, bowel movement changes.  She does not 

make urine and is on peritoneal dialysis. 








On 12/07/2018 patient is currently lying in bed currently getting CAPD.  

Patient is complaining of generalized pain throughout chest abdomen and legs.  

Patient also complaining of more severe pain in her right lower extremity.  

Patient would not let me examine right leg.  Explained to patient that due to 

her prolonged bed rest she is at increased risk for blood clot and then I'm 

concerned that she may have a blood clot in her right leg and that she would 

require a venous Doppler to assess.  Patient states she would not be able to 

tolerate a venous Doppler at this time due to the pain.  Will order Dilaudid 

for pain at this time and encouraged patient the importance of obtaining this 

test.  We'll also consult cardiology services at this time.








On 12/08/2018 patient currently getting CAPD.  Patient did have Doppler study 

completed but was un conclusive of DVT due to increased pain during test.  

Patient remains on Lovenox 1 mg/kg per renal dosing for DVT treatment.  This 

time patient states that she feels slightly improved but still has generalized 

pain all over.  Patient denies chest pain or shortness breath.  Patient denies 

any nausea vomiting or diarrhea.  Patient denies any urinary burning or 

frequency.





On 12/09/2018 Patient currently resting in bed. Patient is having significant 

to lower extremity pain. Will order venous doppler again due to unconclusive 

reading from initial test.  Will order ativan and diuladid to be given prior to 

exam in order to get adequate reading. Patient denies chest pain and shortness 

of breath. Denies nausea, vomitting or diarrhea. Denies any urinary burning or 

frequency  





12/10/2018 patient still complaining of pain all over the body.  She does 

report is slightly better than admission.  Complaining of constipation and his 

been about 3 days since her last bowel movement.  Hemoglobin has dropped from 

9.3-8.7.  Potassium is 3.1 and she received supplement.  Venous Doppler of the 

lower extremities showing a limited exam of the right leg and what could be 

seen was negative for DVT.  Patient refused the left leg to be completed due to 

pain.  Patient has been noncompliant with physical therapy.  Poor appetite.  

Pain service will be placed on consult.  Patient has been educated that she 

needs to participate with physical therapy.





12/11/2018 patient does report slight improvement in her pain.  Still 

complaining of pain throughout her body.  Patient was able to work with 

physical therapy yesterday.  She was a max assist.  She denies any chest pain 

or shortness of breath.  Denies any nausea or vomiting.  Still has not had a 

bowel movement for about 5 days.  Norvasc discontinued yesterday due to 

hypotension.  Blood pressures are 20 better today.  Nephrology is also adjusted 

the frequency of peritoneal dialysis to every 4 hours. 





On 12/12/2018 patient does report slight improvement with her pain.  Patient is 

still complaining of overall pain throughout her entire body.  Patient refused 

venous Doppler again due to pain for the third time.  D-dimer was negative.  

Lovenox was DC'd and Lovenox prophylaxis has been added.  Patient denies chest 

pain or shortness of breath.  Patient denies nausea vomiting or diarrhea.  

Patient denies any urinary burning or frequency





On 12/13/2018 patient does report slight improvement with pain today.  Patient'

s daughter currently at bedside.  Patient started still expressed concerns over 

lack of motivation from her mother to get out of bed.  Requesting that site 

revisit patient.  Still waiting on pain services to come and evaluate patient.  

At this time patient denies chest pain or shortness of breath.  Patient denies 

nausea vomiting or diarrhea.  Patient denies any urinary burning or frequency.  

Patient highly encouraged to continue working with physical therapy in order to 

prevent negative outcomes.  At this time planning discharge to [facility]





On 12/14/2018 patient states she feels slightly more improved.  Patient was 

seen by psych Megace has been added Wellbutrin has been increased.  Discussed 

case with social work and a lot of [facility] does not have any beds available.  

Still trying to be achieved placement due to peritoneal dialysis.  Patient also 

complaining of generalized pain.  Patient denies chest pain.  Patient denies 

nausea vomiting or diarrhea.  Patient denies any urinary burning





On 12/15/2018 patient is alert and responsive in no apparent distress, she 

states she is feeling a little bit better today, she is complaining of 

generalized pain, otherwise no complaints at this time there is no fever or 

chills no headache or dizziness no chest pain no shortness of breath no cough 

no nausea or vomiting no abdominal pain no diarrhea and no urinary symptoms.





On 12/16/2018 patient was seen and examined she is alert and responsive in no 

apparent distress.  Nursing staff reporting that patient has been uncooperative

, she is refusing to be turned in bed, she is refusing inspection of her sacral 

ulcers, she is refusing most of her other medical care.  Abdomen x-ray done 

yesterday reveals evidence of large bowel ileus.  Patient denies abdominal pain 

but is having pain when abdominal palpation is done.





Objective





- Vital Signs


Vital signs: 


 Vital Signs











Temp  97.6 F   12/16/18 07:23


 


Pulse  83   12/16/18 07:23


 


Resp  18   12/16/18 07:23


 


BP  97/53   12/16/18 07:23


 


Pulse Ox  98   12/16/18 07:23








 Intake & Output











 12/15/18 12/16/18 12/16/18





 18:59 06:59 18:59


 


Intake Total 530 300 


 


Output Total  0 


 


Balance 530 300 


 


Intake:   


 


  Oral 530 300 


 


Output:   


 


  Urine  0 


 


Other:   


 


  Voiding Method CAPD CAPD CAPD


 


  # Voids 0 0 


 


  # Bowel Movements 0 0 














- Exam





Head normocephalic and atraumatic


Neck supple no JVD no goiter


Lungs clear to auscultation bilaterally no wheezing or crackles


Heart regular rate and rhythm S1-S2, no rub or gallop


Abdomen is soft diffuse tenderness with palpation nondistended positive bowel 

sounds no hepatosplenomegaly obese


Extremities no edema no cyanosis or clubbing


Neuro alert and orientated to 3


Skin: Patient has extensive bruising along the upper thighs lower back also 

smaller bruises along the lower legs..  Patient has tenderness all over body 

with palpation








- Labs


CBC & Chem 7: 


 12/16/18 08:16





 12/16/18 08:16


Labs: 


 Abnormal Lab Results - Last 24 Hours (Table)











  12/15/18 12/15/18 12/15/18 Range/Units





  11:48 16:58 21:07 


 


WBC     (3.8-10.6)  k/uL


 


RBC     (3.80-5.40)  m/uL


 


Hgb     (11.4-16.0)  gm/dL


 


Hct     (34.0-46.0)  %


 


MCV     (80.0-100.0)  fL


 


MCHC     (31.0-37.0)  g/dL


 


RDW     (11.5-15.5)  %


 


Sodium     (137-145)  mmol/L


 


Chloride     ()  mmol/L


 


BUN     (7-17)  mg/dL


 


Creatinine     (0.52-1.04)  mg/dL


 


Glucose     (74-99)  mg/dL


 


POC Glucose (mg/dL)  162 H  108 H  121 H  (75-99)  mg/dL


 


AST     (14-36)  U/L


 


Alkaline Phosphatase     ()  U/L


 


Total Protein     (6.3-8.2)  g/dL


 


Albumin     (3.5-5.0)  g/dL














  12/16/18 12/16/18 Range/Units





  08:16 08:16 


 


WBC  12.5 H   (3.8-10.6)  k/uL


 


RBC  3.18 L   (3.80-5.40)  m/uL


 


Hgb  9.3 L   (11.4-16.0)  gm/dL


 


Hct  33.2 L   (34.0-46.0)  %


 


MCV  104.3 H   (80.0-100.0)  fL


 


MCHC  27.9 L   (31.0-37.0)  g/dL


 


RDW  15.8 H   (11.5-15.5)  %


 


Sodium   130 L  (137-145)  mmol/L


 


Chloride   93 L  ()  mmol/L


 


BUN   29 H  (7-17)  mg/dL


 


Creatinine   7.51 H*  (0.52-1.04)  mg/dL


 


Glucose   223 H  (74-99)  mg/dL


 


POC Glucose (mg/dL)    (75-99)  mg/dL


 


AST   38 H  (14-36)  U/L


 


Alkaline Phosphatase   151 H  ()  U/L


 


Total Protein   5.7 L  (6.3-8.2)  g/dL


 


Albumin   2.6 L  (3.5-5.0)  g/dL














Assessment and Plan


Plan: 





1.  Prolonged period of time in bed With pain throughout her body and extensive 

bruising.  Hold aspirin and Plavix.  Hemoglobin trending down to 8.7.  Consult 

pain service for pain management





2.  Insulin-dependent diabetes mellitus: Hyperglycemia with blood sugar 469 on 

admission.  Patient has not been taking insulin at home.  Acetone level 

negative.  A1c 7.7.  Blood sugar 158 this morning showing improvement.  We'll 

monitor.  continue NovoLog 70/30 to 82 units twice a day.  Continue sliding 

scale coverage.





3.  History of end-stage renal disease on peritoneal dialysis.  Nephrology 

following.  Continue peritoneal dialysis.





4.  Morbid obesity





5.  Medication noncompliance





6.  Severe Depression.  Patient denies any suicidal or homicidal ideation.  

Recently started on Prozac 10 mg daily outpatient.  Will restart Prozac at 20 

mg daily . Wellbutrin has been adder per psych.  At this time daughter 

requesting that psych revisit patient for reevaluation of patient's depression.

  Psychiatry has come to reevaluate patient.  Megace has been added and 

Wellbutrin has been increased.





7.  History of COPD stable





8.  History of CVA





9.  History of Essential hypertension





10.  Hyperlipidemia





11.  Obstructive sleep apnea uses CPAP





12.  Chronic hypoxic respiratory failure home O2 dependent on 4-5 L





13.  Right lower extremity pain.  Concerns for possible DVT due to prolonged 

time in bed.  Continue with Lovenox 140 mg subcu daily.  Patient unable to 

tolerate venous Doppler.  On 12/ 9 venous Doppler was a limited exam what was 

evaluated was negative for DVT in the right leg.  Patient refused the left leg 

to be completed.  Patient refused for the third time venous Doppler again due 

to pain.  D-dimer less than 0.17.  Lovenox 140 mg subcu daily discontinued.  

Lovenox DVT prophylaxis has been added





14.  Elevated cardiac enzymes.  Troponin 0.072.  EKG completed showing normal 

sinus rhythm.  Inferior infarct, age undetermined anterior infarct age 

undetermined.  Per cardiology services no further workup needed from cardiology 

standpoint.  No evidence for an acute cardiac event per cardiology





15.  Elevated lipase level of 410.  Has trended down.  No evidence of 

pancreatitis





16.  Hypovolemic Hyponatremia.  Sodium 128 now improving.  Sodium is up to 131.

  Possibly related to her hyperglycemia as well.  Nephrology following





17. Elevated uric acid.  Uric acid 8.4  Patient will be started on allopurinol





18.  Anemia of chronic kidney disease.  And evidence of iron deficiency anemia.

  Hemoglobin has down to 8.7.  Will start ferrous sulfate 325 mg twice a day. 

Aranesp has been added per nephrology





19.  Constipation add stool softener and lactulose





20.  Hypotension possibly related to the IV Dilaudid.  Nephrology discontinued 

the Norvasc.  Blood pressures showing improvement.





21.  Abdominal pain with abnormal x-ray Will consult Dr. Gastelum


DVT prophylaxis Lovenox.  GI prophylaxis Pepcid


Discussed case with  and case management.  [facility] 

currently does not have a bed available.  Discussed case with case management 

and social work trying to find placement for patient with peritoneal dialysis 

needs.

## 2018-12-16 NOTE — P.PN
Subjective


Progress Note Date: 12/16/18


Seen and examined for the follow-up of ESRD on peritoneal dialysis.  Her 

dialysis was not feeling in yesterday and heparin was added after that she 

started working fine.  X-ray showed some ileus and surgery was consulted.








Objective





- Vital Signs


Vital signs: 


 Vital Signs











Temp  97.6 F   12/16/18 07:23


 


Pulse  83   12/16/18 07:23


 


Resp  18   12/16/18 07:23


 


BP  97/53   12/16/18 07:23


 


Pulse Ox  98   12/16/18 07:23








 Intake & Output











 12/15/18 12/16/18 12/16/18





 18:59 06:59 18:59


 


Intake Total 530 300 


 


Output Total  0 


 


Balance 530 300 


 


Intake:   


 


  Oral 530 300 


 


Output:   


 


  Urine  0 


 


Other:   


 


  Voiding Method CAPD CAPD CAPD


 


  # Voids 0 0 


 


  # Bowel Movements 0 0 














- Exam


No acute distress


S1-S2 heard


Abdomen PD catheter


Trace edema








- Labs


CBC & Chem 7: 


 12/16/18 08:16





 12/16/18 08:16


Labs: 


 Abnormal Lab Results - Last 24 Hours (Table)











  12/15/18 12/15/18 12/16/18 Range/Units





  16:58 21:07 08:16 


 


WBC    12.5 H  (3.8-10.6)  k/uL


 


RBC    3.18 L  (3.80-5.40)  m/uL


 


Hgb    9.3 L  (11.4-16.0)  gm/dL


 


Hct    33.2 L  (34.0-46.0)  %


 


MCV    104.3 H  (80.0-100.0)  fL


 


MCHC    27.9 L  (31.0-37.0)  g/dL


 


RDW    15.8 H  (11.5-15.5)  %


 


Sodium     (137-145)  mmol/L


 


Chloride     ()  mmol/L


 


BUN     (7-17)  mg/dL


 


Creatinine     (0.52-1.04)  mg/dL


 


Glucose     (74-99)  mg/dL


 


POC Glucose (mg/dL)  108 H  121 H   (75-99)  mg/dL


 


AST     (14-36)  U/L


 


Alkaline Phosphatase     ()  U/L


 


Total Protein     (6.3-8.2)  g/dL


 


Albumin     (3.5-5.0)  g/dL














  12/16/18 12/16/18 Range/Units





  08:16 11:42 


 


WBC    (3.8-10.6)  k/uL


 


RBC    (3.80-5.40)  m/uL


 


Hgb    (11.4-16.0)  gm/dL


 


Hct    (34.0-46.0)  %


 


MCV    (80.0-100.0)  fL


 


MCHC    (31.0-37.0)  g/dL


 


RDW    (11.5-15.5)  %


 


Sodium  130 L   (137-145)  mmol/L


 


Chloride  93 L   ()  mmol/L


 


BUN  29 H   (7-17)  mg/dL


 


Creatinine  7.51 H*   (0.52-1.04)  mg/dL


 


Glucose  223 H   (74-99)  mg/dL


 


POC Glucose (mg/dL)   200 H  (75-99)  mg/dL


 


AST  38 H   (14-36)  U/L


 


Alkaline Phosphatase  151 H   ()  U/L


 


Total Protein  5.7 L   (6.3-8.2)  g/dL


 


Albumin  2.6 L   (3.5-5.0)  g/dL














Assessment and Plan


Assessment: 


#1 ESRD on PD every 4 exchanges, change it to every 6 hours exchanges today


#2 volume overload now better


#3 depression


#4 hypokalemia


#5 morbid obesity


#6 anemia with chronic kidney disease


#6 hypervolemic hyponatremia








Plan: 


#1 continue with current PD orders.  Monitor closely


#2 replace electrolytes


#3 CKD medications, add gentamicin topical cream to the Peritoneal dialysis 

exit site.

## 2018-12-16 NOTE — P.GSCN
History of Present Illness


Consult date: 18


History of present illness: 

















CHIEF COMPLAINT: Large bowel ileus with abdominal pain





HISTORY OF PRESENT ILLNESS: The patient is a 58-year-old female who was 

admitted with incidental large bowel ileus and abdominal pain. Patient has 

opiate induced constipation.  She takes at least several narcotics.  Last bowel 

movement over 9 days ago. Yesterday, she could not tolerate peritoneal dialysis 

and had poor return. Now, peritoneal dialysis is running very well.  General 

surgery is consulted for abnormal x-ray including abdominal pain





PAST MEDICAL HISTORY: 


See list.





PAST SURGICAL HISTORY: 


See list.





MEDICATIONS: 


See list.





ALLERGIES: 


See list.





SOCIAL HISTORY: No illicit drug use





FAMILY HISTORY:  No reports of Crohn's disease or inflammatory bowel disease





REVIEW OF ORGAN SYSTEMS: 


CONSTITUTIONAL: No fevers or chills


HEENT: No troubles with vision or hearing. No reports of dysphagia.  


ENDOCRINE: No reports of thyroid disorders. No diabetes. 


CARDIOVASCULAR: No heart attack. No chest pain. 


RESPIRATORY: Has shortness of breath or pneumonia.


GASTROINTESTINAL: No reports of recent blood in stools.  


NEURO: No reports of stroke or seizure disorders. 


PSYCH: Has depression. No suicidal ideation


HEMATOLOGIC: No easy bruising or bleeding


LYMPHATIC:  The patient denies any lumps and bumps around the neck. 


GENITOURINARY:  Has end-stage renal disease


MUSCULOSKELETAL:  Has back pain, stiffness or joint arthritis. 


SKIN: No skin cancer or rash.





PHYSICAL EXAM: 


VITAL SIGNS: Currently stable.


GENERAL: Well-developed in no acute distress. 


HEENT:  No sclera icterus. Extraocular movements grossly intact.  Moist buccal 

mucosa. 


Head is atraumatic, normocephalic. Hears conversational speech. No nasal 

drainage.  Has BiPAP apparatus


NECK:  Supple without lymphadenopathy.


CHEST:  Non-labored respirations and equal bilateral excursions. 


CARDIOVASCULAR:   Palpable 2+ radial pulses.


ABDOMEN:  Soft.  Diffusely tender.  Active peritoneal dialysis.  Catheter along 

right lower quadrant. 


MUSCULOSKELETAL:  No clubbing, cyanosis or edema.


NEUROLOGIC:  No focal or lateralizing signs.  Cranial nerves II through XII 

grossly intact.


PSYCH:  Appropriate affect.  Alert and oriented to person, place and time.


SKIN: Well perfused.  Good skin turgor.





LABS: Reviewed





STUDIES: AXR reviewed. 





ASSESSMENT: 


1.  Large bowel ileus due to opiate-induced constipation


2.  Morbid obesity excess calories, BMI over 50


3.  End-stage renal disease peritoneal dialysis


4.  Obstructive sleep apnea





PLAN: 


1. Recommend medications for opiate induced constipation as patient refuses 

enema, suppositories and taking laxatives. 


2. No surgical intervention. 


3. Adjustment of weight as patient is at least 30+kg more than 86.5 kg as 

documented.








Thank you for this kind consultation.





Past Medical History


Past Medical History: Asthma, Heart Failure, COPD, CVA/TIA, Diabetes Mellitus, 

Eye Disorder, Hyperlipidemia, Hypertension, Osteoarthritis (OA), Pneumonia, 

Renal Disease, Sleep Apnea/CPAP/BIPAP, Thyroid Disorder


Additional Past Medical History / Comment(s): sleep apnea, neuropathy, patient 

has one kidney, Stage III kidney failure, peritional dialysis- right side 

abdominal port.


History of Any Multi-Drug Resistant Organisms: None Reported


Past Surgical History: Bariatric Surgery, Hernia Repair, Orthopedic Surgery, 

Tonsillectomy


Additional Past Surgical History / Comment(s): rt kidney removed, Rt knee 

replacement


Past Anesthesia/Blood Transfusion Reactions: No Reported Reaction


Additional Past Anesthesia/Blood Transfusion Reaction / Comm: pt states [facility] 

[facility], "it made her itchy and they gave 

bendyl"


Past Psychological History: Anxiety, Depression


Smoking Status: Former smoker


Past Alcohol Use History: None Reported


Past Drug Use History: None Reported





- Past Family History


  ** Mother


Additional Family Medical History / Comment(s): skin CA, CHF, DM, thyroid 

disorder, HTN.





  ** Father


Family Medical History: Diabetes Mellitus, Hypertension, Thyroid Disorder


Additional Family Medical History / Comment(s): CHF.  Pt's father is .





Medications and Allergies


 Home Medications











 Medication  Instructions  Recorded  Confirmed  Type


 


Atorvastatin [Lipitor] 80 mg PO HS 09/13/15 12/06/18 History


 


Levothyroxine Sodium [Synthroid] 175 mcg PO DAILY 09/13/15 12/06/18 History


 


Aspirin 325 mg PO DAILY  tab 17 Rx


 


Clopidogrel [Plavix] 75 mg PO DAILY 18 History


 


Insulin Regular, Human [NovoLIN R] See Protocol SQ ACHS 18 

History


 


Ipratropium-Albuterol Nebulize 3 ml INHALATION RT-QID PRN 18 

History





[Duoneb 0.5 mg-3 mg/3 ml Soln]    


 


Acetaminophen Tab [Tylenol Tab] 1,000 mg PO Q6HR PRN 18 History


 


Albuterol Sulfate [Proair Hfa] 2 puff INHALATION RT-QID PRN 18 

History


 


Bimatoprost [Lumigan .01% Ophth 1 drop BOTH EYES HS 18 History





Soln]    


 


Cholecalciferol [Vitamin D3] 5,000 unit PO DAILY 18 History


 


Famotidine [Pepcid] 40 mg PO HS 18 History


 


Fenofibrate,Micronized 200 mg PO DAILY 18 History





[Fenofibrate]    


 


Folic Acid 0.8 mg PO DAILY 18 History


 


Ketoconazole 2% Cream [Nizoral 2%] 1 applic TOPICAL BID 18 

History


 


Sevelamer [Renvela] 2,400 mg PO AC-TID 18 History


 


Tiotropium 18 Mcg/Puff [Spiriva] 1 cap INHALATION RT-DAILY 18 

History


 


Triamcinolone 0.025% Cream 1 applic TOPICAL BID 18 History





[Kenalog 0.025% Cream]    


 


B Complex W-C No.20/Folic Acid 1 cap PO DAILY 18 History





[Renal Caps Softgel]    


 


HYDROcodone/APAP 7.5-325MG [Norco 1 tab PO TID 18 History





7.5-325]    


 


Insulin Aspart Protam & Aspart 80 unit SQ BID 18 History





[Novolog Mix 70-30 Flexpen Syrn]    


 


amLODIPine [Norvasc] 10 mg PO DAILY 18 History


 


FLUoxetine HCL [PROzac] 20 mg PO DAILY 18 History











 Allergies











Allergy/AdvReac Type Severity Reaction Status Date / Time


 


erythromycin base Allergy  Unknown Verified 18 14:06





[Erythromycin Base]     


 


NSAIDS (Non-Steroidal Allergy  Unknown Verified 18 14:06





Anti-Inflamma     


 


PAPER TAPE Allergy  Rash/Hives Uncoded 18 13:51














Surgical - Exam


 Vital Signs











Pulse Resp BP Pulse Ox


 


 76   16   104/59   100 


 


 18 13:44  18 13:44  18 13:44  18 13:44














Results





- Labs





 18 08:16





 18 08:16


 Abnormal Lab Results - Last 24 Hours (Table)











  12/15/18 12/15/18 12/16/18 Range/Units





  16:58 21:07 07:14 


 


WBC     (3.8-10.6)  k/uL


 


RBC     (3.80-5.40)  m/uL


 


Hgb     (11.4-16.0)  gm/dL


 


Hct     (34.0-46.0)  %


 


MCV     (80.0-100.0)  fL


 


MCHC     (31.0-37.0)  g/dL


 


RDW     (11.5-15.5)  %


 


Neutrophils # (Manual)     (1.3-7.7)  k/uL


 


Lymphocytes # (Manual)     (1.0-4.8)  k/uL


 


Monocytes # (Manual)     (0-1.0)  k/uL


 


Metamyelocytes # (Man)     (0)  k/uL


 


Myelocytes # (Manual)     (0)  k/uL


 


Sodium     (137-145)  mmol/L


 


Chloride     ()  mmol/L


 


BUN     (7-17)  mg/dL


 


Creatinine     (0.52-1.04)  mg/dL


 


Glucose     (74-99)  mg/dL


 


POC Glucose (mg/dL)  108 H  121 H  215 H  (75-99)  mg/dL


 


AST     (14-36)  U/L


 


Alkaline Phosphatase     ()  U/L


 


Total Protein     (6.3-8.2)  g/dL


 


Albumin     (3.5-5.0)  g/dL














  18 Range/Units





  08:16 08:16 11:42 


 


WBC  12.5 H    (3.8-10.6)  k/uL


 


RBC  3.18 L    (3.80-5.40)  m/uL


 


Hgb  9.3 L    (11.4-16.0)  gm/dL


 


Hct  33.2 L    (34.0-46.0)  %


 


MCV  104.3 H    (80.0-100.0)  fL


 


MCHC  27.9 L    (31.0-37.0)  g/dL


 


RDW  15.8 H    (11.5-15.5)  %


 


Neutrophils # (Manual)  9.00 H    (1.3-7.7)  k/uL


 


Lymphocytes # (Manual)  0.88 L    (1.0-4.8)  k/uL


 


Monocytes # (Manual)  2.25 H    (0-1.0)  k/uL


 


Metamyelocytes # (Man)  0.38 H    (0)  k/uL


 


Myelocytes # (Manual)  0.13 H    (0)  k/uL


 


Sodium   130 L   (137-145)  mmol/L


 


Chloride   93 L   ()  mmol/L


 


BUN   29 H   (7-17)  mg/dL


 


Creatinine   7.51 H*   (0.52-1.04)  mg/dL


 


Glucose   223 H   (74-99)  mg/dL


 


POC Glucose (mg/dL)    200 H  (75-99)  mg/dL


 


AST   38 H   (14-36)  U/L


 


Alkaline Phosphatase   151 H   ()  U/L


 


Total Protein   5.7 L   (6.3-8.2)  g/dL


 


Albumin   2.6 L   (3.5-5.0)  g/dL








 Diabetes panel











  18 Range/Units





  08:16 


 


Sodium  130 L  (137-145)  mmol/L


 


Potassium  3.9  (3.5-5.1)  mmol/L


 


Chloride  93 L  ()  mmol/L


 


Carbon Dioxide  23  (22-30)  mmol/L


 


BUN  29 H  (7-17)  mg/dL


 


Creatinine  7.51 H*  (0.52-1.04)  mg/dL


 


Glucose  223 H  (74-99)  mg/dL


 


Calcium  8.8  (8.4-10.2)  mg/dL


 


AST  38 H  (14-36)  U/L


 


ALT  32  (9-52)  U/L


 


Alkaline Phosphatase  151 H  ()  U/L


 


Total Protein  5.7 L  (6.3-8.2)  g/dL


 


Albumin  2.6 L  (3.5-5.0)  g/dL








 Calcium panel











  18 Range/Units





  08:16 


 


Calcium  8.8  (8.4-10.2)  mg/dL


 


Albumin  2.6 L  (3.5-5.0)  g/dL








 Pituitary panel











  18 Range/Units





  08:16 


 


Sodium  130 L  (137-145)  mmol/L


 


Potassium  3.9  (3.5-5.1)  mmol/L


 


Chloride  93 L  ()  mmol/L


 


Carbon Dioxide  23  (22-30)  mmol/L


 


BUN  29 H  (7-17)  mg/dL


 


Creatinine  7.51 H*  (0.52-1.04)  mg/dL


 


Glucose  223 H  (74-99)  mg/dL


 


Calcium  8.8  (8.4-10.2)  mg/dL








 Adrenal panel











  18 Range/Units





  08:16 


 


Sodium  130 L  (137-145)  mmol/L


 


Potassium  3.9  (3.5-5.1)  mmol/L


 


Chloride  93 L  ()  mmol/L


 


Carbon Dioxide  23  (22-30)  mmol/L


 


BUN  29 H  (7-17)  mg/dL


 


Creatinine  7.51 H*  (0.52-1.04)  mg/dL


 


Glucose  223 H  (74-99)  mg/dL


 


Calcium  8.8  (8.4-10.2)  mg/dL


 


Total Bilirubin  0.7  (0.2-1.3)  mg/dL


 


AST  38 H  (14-36)  U/L


 


ALT  32  (9-52)  U/L


 


Alkaline Phosphatase  151 H  ()  U/L


 


Total Protein  5.7 L  (6.3-8.2)  g/dL


 


Albumin  2.6 L  (3.5-5.0)  g/dL














- Imaging


Abdominal x-ray: report reviewed, image reviewed (Gaseous distention of colon 

without free air as I personally reviewed the images)





Assessment and Plan


(1) Constipation by delayed colonic transit


Current Visit: Yes   Status: Acute   Code(s): K59.01 - SLOW TRANSIT 

CONSTIPATION   SNOMED Code(s): 06841507


   





(2) Ileus


Current Visit: Yes   Status: Acute   Code(s): K56.7 - ILEUS, UNSPECIFIED   

SNOMED Code(s): 904703378


   





(3) Chronic renal failure syndrome


Current Visit: Yes   Status: Acute   Code(s): N18.9 - CHRONIC KIDNEY DISEASE, 

UNSPECIFIED   SNOMED Code(s): 92028258


   





(4) Congestive heart failure


Current Visit: Yes   Status: Acute   Code(s): I50.9 - HEART FAILURE, 

UNSPECIFIED   SNOMED Code(s): 41889280


   





(5) Depression


Current Visit: Yes   Status: Acute   Code(s): F32.9 - MAJOR DEPRESSIVE DISORDER

, SINGLE EPISODE, UNSPECIFIED   SNOMED Code(s): 73161902


   





(6) Morbid obesity


Current Visit: Yes   Status: Acute   Code(s): E66.01 - MORBID (SEVERE) OBESITY 

DUE TO EXCESS CALORIES   SNOMED Code(s): 260933296


   





(7) Asthma exacerbation in COPD


Current Visit: No   Status: Acute   Code(s): J44.1 - CHRONIC OBSTRUCTIVE 

PULMONARY DISEASE W (ACUTE) EXACERBATION; J45.901 - UNSPECIFIED ASTHMA WITH (

ACUTE) EXACERBATION   SNOMED Code(s): 0112947931449


   





(8) At risk for readmission to hospital


Current Visit: No   Status: Acute   Code(s): Z91.89 - OTH PERSONAL RISK FACTORS

, NOT ELSEWHERE CLASSIFIED   SNOMED Code(s): 3907461438433

## 2018-12-17 LAB
GLUCOSE BLD-MCNC: 100 MG/DL (ref 75–99)
GLUCOSE BLD-MCNC: 136 MG/DL (ref 75–99)
GLUCOSE BLD-MCNC: 163 MG/DL (ref 75–99)
GLUCOSE BLD-MCNC: 213 MG/DL (ref 75–99)
GLUCOSE BLD-MCNC: 39 MG/DL (ref 75–99)

## 2018-12-17 RX ADMIN — IPRATROPIUM BROMIDE SCH: 0.5 SOLUTION RESPIRATORY (INHALATION) at 15:18

## 2018-12-17 RX ADMIN — CEFAZOLIN SCH: 330 INJECTION, POWDER, FOR SOLUTION INTRAMUSCULAR; INTRAVENOUS at 15:41

## 2018-12-17 RX ADMIN — ATORVASTATIN CALCIUM SCH MG: 80 TABLET, FILM COATED ORAL at 21:46

## 2018-12-17 RX ADMIN — DOCUSATE SODIUM SCH MG: 100 CAPSULE, LIQUID FILLED ORAL at 07:45

## 2018-12-17 RX ADMIN — TRIAMCINOLONE ACETONIDE SCH: 1 CREAM TOPICAL at 07:46

## 2018-12-17 RX ADMIN — LATANOPROST SCH: 50 SOLUTION OPHTHALMIC at 21:47

## 2018-12-17 RX ADMIN — FENOFIBRATE SCH MG: 160 TABLET ORAL at 07:44

## 2018-12-17 RX ADMIN — Medication SCH MG: at 21:46

## 2018-12-17 RX ADMIN — VANCOMYCIN HYDROCHLORIDE SCH MLS/HR: 500 INJECTION, POWDER, LYOPHILIZED, FOR SOLUTION INTRAVENOUS at 03:30

## 2018-12-17 RX ADMIN — CEFAZOLIN SCH MLS/HR: 330 INJECTION, POWDER, FOR SOLUTION INTRAMUSCULAR; INTRAVENOUS at 17:04

## 2018-12-17 RX ADMIN — Medication SCH EACH: at 12:58

## 2018-12-17 RX ADMIN — HYDROCODONE BITARTRATE AND ACETAMINOPHEN SCH EACH: 7.5; 325 TABLET ORAL at 07:45

## 2018-12-17 RX ADMIN — INSULIN ASPART SCH UNIT: 100 INJECTION, SOLUTION INTRAVENOUS; SUBCUTANEOUS at 07:47

## 2018-12-17 RX ADMIN — INSULIN ASPART SCH UNIT: 100 INJECTION, SUSPENSION SUBCUTANEOUS at 07:47

## 2018-12-17 RX ADMIN — FAMOTIDINE SCH MG: 20 TABLET, FILM COATED ORAL at 21:46

## 2018-12-17 RX ADMIN — MEGESTROL ACETATE SCH MG: 40 TABLET ORAL at 17:18

## 2018-12-17 RX ADMIN — HYDROMORPHONE HYDROCHLORIDE PRN MG: 1 INJECTION, SOLUTION INTRAMUSCULAR; INTRAVENOUS; SUBCUTANEOUS at 01:35

## 2018-12-17 RX ADMIN — FOLIC ACID SCH MG: 1 TABLET ORAL at 12:58

## 2018-12-17 RX ADMIN — MEGESTROL ACETATE SCH MG: 40 TABLET ORAL at 07:46

## 2018-12-17 RX ADMIN — ALLOPURINOL SCH MG: 100 TABLET ORAL at 07:45

## 2018-12-17 RX ADMIN — HYDROCODONE BITARTRATE AND ACETAMINOPHEN PRN EACH: 10; 325 TABLET ORAL at 14:34

## 2018-12-17 RX ADMIN — INSULIN ASPART SCH: 100 INJECTION, SOLUTION INTRAVENOUS; SUBCUTANEOUS at 12:53

## 2018-12-17 RX ADMIN — IPRATROPIUM BROMIDE SCH: 0.5 SOLUTION RESPIRATORY (INHALATION) at 07:09

## 2018-12-17 RX ADMIN — INSULIN ASPART SCH UNIT: 100 INJECTION, SUSPENSION SUBCUTANEOUS at 21:46

## 2018-12-17 RX ADMIN — INSULIN ASPART SCH: 100 INJECTION, SOLUTION INTRAVENOUS; SUBCUTANEOUS at 21:47

## 2018-12-17 RX ADMIN — SEVELAMER CARBONATE SCH MG: 800 TABLET, FILM COATED ORAL at 17:18

## 2018-12-17 RX ADMIN — GENTAMICIN SULFATE SCH APPLIC: 1 CREAM TOPICAL at 07:47

## 2018-12-17 RX ADMIN — DOCUSATE SODIUM SCH MG: 100 CAPSULE, LIQUID FILLED ORAL at 21:46

## 2018-12-17 RX ADMIN — Medication SCH UNIT: at 12:58

## 2018-12-17 RX ADMIN — VANCOMYCIN HYDROCHLORIDE SCH MLS/HR: 500 INJECTION, POWDER, LYOPHILIZED, FOR SOLUTION INTRAVENOUS at 22:15

## 2018-12-17 RX ADMIN — VANCOMYCIN HYDROCHLORIDE SCH MLS/HR: 500 INJECTION, POWDER, LYOPHILIZED, FOR SOLUTION INTRAVENOUS at 09:09

## 2018-12-17 RX ADMIN — Medication SCH MG: at 07:45

## 2018-12-17 RX ADMIN — INSULIN ASPART SCH: 100 INJECTION, SOLUTION INTRAVENOUS; SUBCUTANEOUS at 16:52

## 2018-12-17 RX ADMIN — MEGESTROL ACETATE SCH MG: 40 TABLET ORAL at 21:46

## 2018-12-17 RX ADMIN — VANCOMYCIN HYDROCHLORIDE SCH MLS/HR: 500 INJECTION, POWDER, LYOPHILIZED, FOR SOLUTION INTRAVENOUS at 15:41

## 2018-12-17 RX ADMIN — TRIAMCINOLONE ACETONIDE SCH: 1 CREAM TOPICAL at 21:50

## 2018-12-17 RX ADMIN — BUPROPION HYDROCHLORIDE SCH MG: 300 TABLET, FILM COATED, EXTENDED RELEASE ORAL at 07:45

## 2018-12-17 RX ADMIN — SEVELAMER CARBONATE SCH MG: 800 TABLET, FILM COATED ORAL at 07:45

## 2018-12-17 RX ADMIN — INSULIN ASPART SCH: 100 INJECTION, SUSPENSION SUBCUTANEOUS at 21:52

## 2018-12-17 RX ADMIN — LEVOTHYROXINE SODIUM SCH MCG: 88 TABLET ORAL at 07:45

## 2018-12-17 RX ADMIN — CLOTRIMAZOLE SCH: 0.01 CREAM TOPICAL at 07:30

## 2018-12-17 RX ADMIN — IPRATROPIUM BROMIDE SCH: 0.5 SOLUTION RESPIRATORY (INHALATION) at 11:02

## 2018-12-17 RX ADMIN — MEGESTROL ACETATE SCH MG: 40 TABLET ORAL at 12:58

## 2018-12-17 RX ADMIN — IPRATROPIUM BROMIDE SCH: 0.5 SOLUTION RESPIRATORY (INHALATION) at 17:55

## 2018-12-17 RX ADMIN — CLOTRIMAZOLE SCH: 0.01 CREAM TOPICAL at 21:47

## 2018-12-17 RX ADMIN — ENOXAPARIN SODIUM SCH MG: 30 INJECTION SUBCUTANEOUS at 07:44

## 2018-12-17 RX ADMIN — SEVELAMER CARBONATE SCH MG: 800 TABLET, FILM COATED ORAL at 12:58

## 2018-12-17 RX ADMIN — DARBEPOETIN ALFA SCH MCG: 60 INJECTION, SOLUTION INTRAVENOUS; SUBCUTANEOUS at 22:15

## 2018-12-17 NOTE — P.PN
Progress Note - Text


Progress Note Date: 12/17/18








This is a 58-year-old female, with past medical history of end-stage renal 

disease on hemodialysis, congestive heart failure, COPD, diabetes mellitus, 

hypertension, hyperlipidemia, obstructive sleep apnea, chronic hypoxic 

respiratory failure on 4-5 L of oxygen at home, depression and CVA.  History 

obtained from both patient and patient's sister.  Patient reports that she 

hurts everywhere.  She reports most of her pain in the low back area  There are 

smaller bruises on the lower leg area.  She hurts anywhere , she continued to 

use Norco 7.5/325 and the current medication is not helping to control her pain

  She is on peritoneal dialysis, pushing diagnosed with decubitus ulcer  , and 

she reported that her low back and buttock area , hurting all the time and she 

is not able to move , I recommend to increase the Norco to 10/325 every 8 hours 

,

## 2018-12-17 NOTE — P.PN
Subjective


Progress Note Date: 12/17/18





This is a 58-year-old female, patient of [facility].  Patient has 

a known past medical history of end-stage renal disease on hemodialysis, 

congestive heart failure, COPD, diabetes mellitus, hypertension, hyperlipidemia

, obstructive sleep apnea, chronic hypoxic respiratory failure on 4-5 L of 

oxygen at home, depression and CVA.  History obtained from both patient and 

patient's sister.  Apparently their mother had passed away on November 27 

couple weeks ago.  Since then, patient's has not gotten out of bed.  She has 

not moved out of her bed for about 2 weeks per the sister.  She stopped taking 

all of her medications.  And she has not been eating or drinking much.  Blood 

sugar 469 on admission.  Per the sister the patient was started on Prozac 10 mg 

daily about a month ago.  She took it for about a week and then stopped taking 

it.  Before that patient had been on Zoloft.  Patient reports that she hurts 

everywhere.  She has extensive bruising in the upper thigh is growing area and 

back.  There are smaller bruises on the lower leg area.  She hurts anywhere she 

is touched.  She complains of pain in the chest as well as the abdomen and legs 

and back.  She denies any falling.  Reports some nausea and chest chest pain.  

Denies any fever, chills, sweats, cough, bowel movement changes.  She does not 

make urine and is on peritoneal dialysis. 








On 12/07/2018 patient is currently lying in bed currently getting CAPD.  

Patient is complaining of generalized pain throughout chest abdomen and legs.  

Patient also complaining of more severe pain in her right lower extremity.  

Patient would not let me examine right leg.  Explained to patient that due to 

her prolonged bed rest she is at increased risk for blood clot and then I'm 

concerned that she may have a blood clot in her right leg and that she would 

require a venous Doppler to assess.  Patient states she would not be able to 

tolerate a venous Doppler at this time due to the pain.  Will order Dilaudid 

for pain at this time and encouraged patient the importance of obtaining this 

test.  We'll also consult cardiology services at this time.








On 12/08/2018 patient currently getting CAPD.  Patient did have Doppler study 

completed but was un conclusive of DVT due to increased pain during test.  

Patient remains on Lovenox 1 mg/kg per renal dosing for DVT treatment.  This 

time patient states that she feels slightly improved but still has generalized 

pain all over.  Patient denies chest pain or shortness breath.  Patient denies 

any nausea vomiting or diarrhea.  Patient denies any urinary burning or 

frequency.





On 12/09/2018 Patient currently resting in bed. Patient is having significant 

to lower extremity pain. Will order venous doppler again due to unconclusive 

reading from initial test.  Will order ativan and diuladid to be given prior to 

exam in order to get adequate reading. Patient denies chest pain and shortness 

of breath. Denies nausea, vomitting or diarrhea. Denies any urinary burning or 

frequency  





12/10/2018 patient still complaining of pain all over the body.  She does 

report is slightly better than admission.  Complaining of constipation and his 

been about 3 days since her last bowel movement.  Hemoglobin has dropped from 

9.3-8.7.  Potassium is 3.1 and she received supplement.  Venous Doppler of the 

lower extremities showing a limited exam of the right leg and what could be 

seen was negative for DVT.  Patient refused the left leg to be completed due to 

pain.  Patient has been noncompliant with physical therapy.  Poor appetite.  

Pain service will be placed on consult.  Patient has been educated that she 

needs to participate with physical therapy.





12/11/2018 patient does report slight improvement in her pain.  Still 

complaining of pain throughout her body.  Patient was able to work with 

physical therapy yesterday.  She was a max assist.  She denies any chest pain 

or shortness of breath.  Denies any nausea or vomiting.  Still has not had a 

bowel movement for about 5 days.  Norvasc discontinued yesterday due to 

hypotension.  Blood pressures are 20 better today.  Nephrology is also adjusted 

the frequency of peritoneal dialysis to every 4 hours. 





On 12/12/2018 patient does report slight improvement with her pain.  Patient is 

still complaining of overall pain throughout her entire body.  Patient refused 

venous Doppler again due to pain for the third time.  D-dimer was negative.  

Lovenox was DC'd and Lovenox prophylaxis has been added.  Patient denies chest 

pain or shortness of breath.  Patient denies nausea vomiting or diarrhea.  

Patient denies any urinary burning or frequency





On 12/13/2018 patient does report slight improvement with pain today.  Patient'

s daughter currently at bedside.  Patient started still expressed concerns over 

lack of motivation from her mother to get out of bed.  Requesting that site 

revisit patient.  Still waiting on pain services to come and evaluate patient.  

At this time patient denies chest pain or shortness of breath.  Patient denies 

nausea vomiting or diarrhea.  Patient denies any urinary burning or frequency.  

Patient highly encouraged to continue working with physical therapy in order to 

prevent negative outcomes.  At this time planning discharge to [facility]





On 12/14/2018 patient states she feels slightly more improved.  Patient was 

seen by psych Megace has been added Wellbutrin has been increased.  Discussed 

case with social work and a lot of yellow does not have any beds available.  

Still trying to be achieved placement due to peritoneal dialysis.  Patient also 

complaining of generalized pain.  Patient denies chest pain.  Patient denies 

nausea vomiting or diarrhea.  Patient denies any urinary burning





On 12/15/2018 patient is alert and responsive in no apparent distress, she 

states she is feeling a little bit better today, she is complaining of 

generalized pain, otherwise no complaints at this time there is no fever or 

chills no headache or dizziness no chest pain no shortness of breath no cough 

no nausea or vomiting no abdominal pain no diarrhea and no urinary symptoms.





On 12/16/2018 patient was seen and examined she is alert and responsive in no 

apparent distress.  Nursing staff reporting that patient has been uncooperative

, she is refusing to be turned in bed, she is refusing inspection of her sacral 

ulcers, she is refusing most of her other medical care.  Abdomen x-ray done 

yesterday reveals evidence of large bowel ileus.  Patient denies abdominal pain 

but is having pain when abdominal palpation is done.








12/17/2018 patient lying in bed she is awake and alert.  Reporting poor 

appetite.  Still has been uncooperative with nursing staff.  Still complaining 

of pain all over her body.  Abdominal x-ray showing a large bowel ileus or air 

swallowing.  Discussed with surgical service they'll be through to evaluate 

patient this afternoon.  Patient still complaining of some abdominal pain.  No 

bowel movement for about 10 days.  She is now agreeable to a soapsuds enema and 

lactulose.  These have been ordered.  Denies any nausea or vomiting.  





Objective





- Vital Signs


Vital signs: 


 Vital Signs











Temp  98.2 F   12/17/18 07:06


 


Pulse  82   12/17/18 07:06


 


Resp  16   12/17/18 07:06


 


BP  88/56   12/17/18 07:06


 


Pulse Ox  100   12/17/18 07:06








 Intake & Output











 12/16/18 12/17/18 12/17/18





 18:59 06:59 18:59


 


Intake Total 600  200


 


Output Total  0 0


 


Balance 600 0 200


 


Weight 170.097 kg  178 kg


 


Intake:   


 


  Oral 600  200


 


Output:   


 


  Urine  0 0


 


Other:   


 


  Voiding Method CAPD CAPD CAPD


 


  # Voids  0 0


 


  # Bowel Movements  0 0














- Exam





Head normocephalic


Neck supple


Lungs clear to auscultation bilaterally no wheezing or crackles


Heart regular rate and rhythm S1-S2, no rub or gallop


Abdomen is soft nontender nondistended positive bowel sounds no 

hepatosplenomegaly


Extremities no edema


Neuro alert and orientated to 3


Skin extensive bruising along the upper thighs and lower back and small bruises 

on the legs.  Patient has tenderness all over the body with palpation.  Patient'

s tenderness with palpation is less severe and bruising are showing improvement





- Labs


CBC & Chem 7: 


 12/16/18 08:16





 12/16/18 08:16


Labs: 


 Abnormal Lab Results - Last 24 Hours (Table)











  12/16/18 12/16/18 12/16/18 Range/Units





  07:14 08:16 11:42 


 


Neutrophils # (Manual)   9.00 H   (1.3-7.7)  k/uL


 


Lymphocytes # (Manual)   0.88 L   (1.0-4.8)  k/uL


 


Monocytes # (Manual)   2.25 H   (0-1.0)  k/uL


 


Metamyelocytes # (Man)   0.38 H   (0)  k/uL


 


Myelocytes # (Manual)   0.13 H   (0)  k/uL


 


POC Glucose (mg/dL)  215 H   200 H  (75-99)  mg/dL














  12/16/18 12/16/18 12/17/18 Range/Units





  17:04 20:48 07:13 


 


Neutrophils # (Manual)     (1.3-7.7)  k/uL


 


Lymphocytes # (Manual)     (1.0-4.8)  k/uL


 


Monocytes # (Manual)     (0-1.0)  k/uL


 


Metamyelocytes # (Man)     (0)  k/uL


 


Myelocytes # (Manual)     (0)  k/uL


 


POC Glucose (mg/dL)  159 H  164 H  163 H  (75-99)  mg/dL














Assessment and Plan


Assessment: 





1.  Prolonged period of time in bed With pain throughout her body and extensive 

bruising.  Hold aspirin and Plavix.  





2.  Insulin-dependent diabetes mellitus: Hyperglycemia with blood sugar 469 on 

admission.  Patient has not been taking insulin at home.  Acetone level 

negative.  A1c 7.7.  Blood sugar 158 this morning showing improvement.  We'll 

monitor.  continue NovoLog 70/30 to 82 units twice a day.  Continue sliding 

scale coverage.





3.  History of end-stage renal disease on peritoneal dialysis.  Nephrology 

following.  Continue peritoneal dialysis.





4.  Morbid obesity





5.  Medication noncompliance





6.  Severe Depression.  Patient denies any suicidal or homicidal ideation.  

Recently started on Prozac 10 mg daily outpatient.  Will restart Prozac at 20 

mg daily . Wellbutrin has been adder per psych.  At this time daughter 

requesting that psych revisit patient for reevaluation of patient's depression.

  Psychiatry has come to reevaluate patient.  Megace has been added and 

Wellbutrin has been increased.





7.  History of COPD stable





8.  History of CVA





9.  History of Essential hypertension





10.  Hyperlipidemia





11.  Obstructive sleep apnea uses CPAP





12.  Chronic hypoxic respiratory failure home O2 dependent on 4-5 L





13.  Right lower extremity pain.  Concerns for possible DVT due to prolonged 

time in bed.  Continue with Lovenox 140 mg subcu daily.  Patient unable to 

tolerate venous Doppler.  On 12/ 9 venous Doppler was a limited exam what was 

evaluated was negative for DVT in the right leg.  Patient refused the left leg 

to be completed.  Patient refused for the third time venous Doppler again due 

to pain.  D-dimer less than 0.17.  Lovenox 140 mg subcu daily discontinued.  

Lovenox DVT prophylaxis has been added





14.  Elevated cardiac enzymes.  Troponin 0.072.  EKG completed showing normal 

sinus rhythm.  Inferior infarct, age undetermined anterior infarct age 

undetermined.  Per cardiology services no further workup needed from cardiology 

standpoint.  No evidence for an acute cardiac event per cardiology





15.  Elevated lipase level of 410.  Has trended down.  No evidence of 

pancreatitis





16.  Hypovolemic Hyponatremia.   Possibly related to her hyperglycemia as well.

  Nephrology following





17. Elevated uric acid.  Uric acid 8.4  Patient will be started on allopurinol





18.  Anemia of chronic kidney disease.  And evidence of iron deficiency anemia.

  Hemoglobin has down to 8.7.  Will start ferrous sulfate 325 mg twice a day. 

Aranesp has been added per nephrology





19.  Constipation add stool softener and lactulose





20.  Hypotension possibly related to the IV Dilaudid.  Nephrology discontinued 

the Norvasc.  Blood pressures showing improvement.





21.  Abdominal pain with abdominal distention abdominal x-ray showing ileus or 

air swallowing.  No free air.  Patient now agreeable to take lactulose and 

soapsuds enema.  Discussed with surgical service.  They will be through to 

evaluate patient this afternoon.  We'll await their further recommendations.








DVT prophylaxis Lovenox.  GI prophylaxis Pepcid





Discussed with .  When patient is stable for discharge, likely will 

discharge to [facility]





I performed an examination of the patient and discussed their management with 

the physician Assistant.  I have reviewed the Physician Assistant's notes and 

agree with the documented findings and plan of care

## 2018-12-17 NOTE — P.PN
Subjective





Patient is seen in follow-up for end-stage renal disease.  She is maintained on 

peritoneal dialysis.  She is tolerating PD exchanges well.  Currently on clear 

liquid diet.  Denies chest pain or shortness of breath.  





Vital signs are stable.


General: The patient appeared well nourished and normally developed. 


HEENT: Head exam is unremarkable. Neck is without jugular venous distension.


LUNGS: Lungs are clear to auscultation and percussion. Breath sounds decreased.


HEART: Rate and Rhythm are regular. First and second heart sounds normal. No 

murmurs, rubs or gallops. 


ABDOMEN: Abdominal exam reveals normal bowel sounds. Non-tender and non-

distended. No evidence of peritonitis.


EXTREMITITES: Trace edema.





Objective





- Vital Signs


Vital signs: 


 Vital Signs











Temp  98.2 F   12/17/18 07:06


 


Pulse  82   12/17/18 07:06


 


Resp  16   12/17/18 07:06


 


BP  88/56   12/17/18 07:06


 


Pulse Ox  100   12/17/18 07:06








 Intake & Output











 12/16/18 12/17/18 12/17/18





 18:59 06:59 18:59


 


Intake Total 600  


 


Output Total  0 0


 


Balance 600 0 0


 


Weight 170.097 kg  178 kg


 


Intake:   


 


  Oral 600  


 


Output:   


 


  Urine  0 0


 


Other:   


 


  Voiding Method CAPD CAPD CAPD


 


  # Voids  0 0


 


  # Bowel Movements  0 0














- Labs


CBC & Chem 7: 


 12/16/18 08:16





 12/16/18 08:16


Labs: 


 Abnormal Lab Results - Last 24 Hours (Table)











  12/16/18 12/16/18 12/16/18 Range/Units





  07:14 08:16 08:16 


 


WBC   12.5 H   (3.8-10.6)  k/uL


 


RBC   3.18 L   (3.80-5.40)  m/uL


 


Hgb   9.3 L   (11.4-16.0)  gm/dL


 


Hct   33.2 L   (34.0-46.0)  %


 


MCV   104.3 H   (80.0-100.0)  fL


 


MCHC   27.9 L   (31.0-37.0)  g/dL


 


RDW   15.8 H   (11.5-15.5)  %


 


Neutrophils # (Manual)   9.00 H   (1.3-7.7)  k/uL


 


Lymphocytes # (Manual)   0.88 L   (1.0-4.8)  k/uL


 


Monocytes # (Manual)   2.25 H   (0-1.0)  k/uL


 


Metamyelocytes # (Man)   0.38 H   (0)  k/uL


 


Myelocytes # (Manual)   0.13 H   (0)  k/uL


 


Sodium    130 L  (137-145)  mmol/L


 


Chloride    93 L  ()  mmol/L


 


BUN    29 H  (7-17)  mg/dL


 


Creatinine    7.51 H*  (0.52-1.04)  mg/dL


 


Glucose    223 H  (74-99)  mg/dL


 


POC Glucose (mg/dL)  215 H    (75-99)  mg/dL


 


AST    38 H  (14-36)  U/L


 


Alkaline Phosphatase    151 H  ()  U/L


 


Total Protein    5.7 L  (6.3-8.2)  g/dL


 


Albumin    2.6 L  (3.5-5.0)  g/dL














  12/16/18 12/16/18 12/16/18 Range/Units





  11:42 17:04 20:48 


 


WBC     (3.8-10.6)  k/uL


 


RBC     (3.80-5.40)  m/uL


 


Hgb     (11.4-16.0)  gm/dL


 


Hct     (34.0-46.0)  %


 


MCV     (80.0-100.0)  fL


 


MCHC     (31.0-37.0)  g/dL


 


RDW     (11.5-15.5)  %


 


Neutrophils # (Manual)     (1.3-7.7)  k/uL


 


Lymphocytes # (Manual)     (1.0-4.8)  k/uL


 


Monocytes # (Manual)     (0-1.0)  k/uL


 


Metamyelocytes # (Man)     (0)  k/uL


 


Myelocytes # (Manual)     (0)  k/uL


 


Sodium     (137-145)  mmol/L


 


Chloride     ()  mmol/L


 


BUN     (7-17)  mg/dL


 


Creatinine     (0.52-1.04)  mg/dL


 


Glucose     (74-99)  mg/dL


 


POC Glucose (mg/dL)  200 H  159 H  164 H  (75-99)  mg/dL


 


AST     (14-36)  U/L


 


Alkaline Phosphatase     ()  U/L


 


Total Protein     (6.3-8.2)  g/dL


 


Albumin     (3.5-5.0)  g/dL














  12/17/18 Range/Units





  07:13 


 


WBC   (3.8-10.6)  k/uL


 


RBC   (3.80-5.40)  m/uL


 


Hgb   (11.4-16.0)  gm/dL


 


Hct   (34.0-46.0)  %


 


MCV   (80.0-100.0)  fL


 


MCHC   (31.0-37.0)  g/dL


 


RDW   (11.5-15.5)  %


 


Neutrophils # (Manual)   (1.3-7.7)  k/uL


 


Lymphocytes # (Manual)   (1.0-4.8)  k/uL


 


Monocytes # (Manual)   (0-1.0)  k/uL


 


Metamyelocytes # (Man)   (0)  k/uL


 


Myelocytes # (Manual)   (0)  k/uL


 


Sodium   (137-145)  mmol/L


 


Chloride   ()  mmol/L


 


BUN   (7-17)  mg/dL


 


Creatinine   (0.52-1.04)  mg/dL


 


Glucose   (74-99)  mg/dL


 


POC Glucose (mg/dL)  163 H  (75-99)  mg/dL


 


AST   (14-36)  U/L


 


Alkaline Phosphatase   ()  U/L


 


Total Protein   (6.3-8.2)  g/dL


 


Albumin   (3.5-5.0)  g/dL














Assessment and Plan


Plan: 





Assessment:


1.  End-stage renal disease maintained on peritoneal dialysis.


2.  Hypervolemic hyponatremia.  Stable.


3.  Hypokalemia secondary to poor oral intake and PD losses.  Status post 

placement.


4.  Hypomagnesemia status post placement.


5.  Anemia of chronic kidney disease maintained on Aranesp.


6.  Ileus.  Surgery following.  Currently on clear liquid diet.


7.  Chronic kidney disease mineral bone disease.  Phosphorus level 6.8.  

Maintained on Renvela.


8.  Insulin-dependent diabetes mellitus.





Plan:


Maintain current PD exchanges with heparin.

## 2018-12-17 NOTE — P.PN
Progress Note - Text


Progress Note Date: 12/17/18





Patient seen at bedside.  Patient is asking that I leave the room states she 

does not want to see a surgeon is refusing surgical eval will inform attending 

patient's wishes and we'll see on an as-needed basis if patient agrees








The above impression and plan of care have been discussed and directed by 

signing physician. Evita Joe nurse practitioner acting as scribe for signing 

physician.

## 2018-12-18 LAB
ANION GAP SERPL CALC-SCNC: 16 MMOL/L
BUN SERPL-SCNC: 29 MG/DL (ref 7–17)
CALCIUM SPEC-MCNC: 8 MG/DL (ref 8.4–10.2)
CELLS COUNTED: 100
CHLORIDE SERPL-SCNC: 90 MMOL/L (ref 98–107)
CO2 SERPL-SCNC: 20 MMOL/L (ref 22–30)
ERYTHROCYTE [DISTWIDTH] IN BLOOD BY AUTOMATED COUNT: 3.03 M/UL (ref 3.8–5.4)
ERYTHROCYTE [DISTWIDTH] IN BLOOD: 15.8 % (ref 11.5–15.5)
GLUCOSE BLD-MCNC: 214 MG/DL (ref 75–99)
GLUCOSE BLD-MCNC: 287 MG/DL (ref 75–99)
GLUCOSE BLD-MCNC: 291 MG/DL (ref 75–99)
GLUCOSE BLD-MCNC: 294 MG/DL (ref 75–99)
GLUCOSE SERPL-MCNC: 271 MG/DL (ref 74–99)
HCT VFR BLD AUTO: 31.1 % (ref 34–46)
HGB BLD-MCNC: 9.5 GM/DL (ref 11.4–16)
LYMPHOCYTES # BLD MANUAL: 0.98 K/UL (ref 1–4.8)
MAGNESIUM SPEC-SCNC: 1.4 MG/DL (ref 1.6–2.3)
MCH RBC QN AUTO: 31.2 PG (ref 25–35)
MCHC RBC AUTO-ENTMCNC: 30.4 G/DL (ref 31–37)
MCV RBC AUTO: 102.7 FL (ref 80–100)
MONOCYTES # BLD MANUAL: 2.95 K/UL (ref 0–1)
NEUTROPHILS NFR BLD MANUAL: 82 %
NEUTS SEG # BLD MANUAL: 20.6 K/UL (ref 1.3–7.7)
NUC CELL # FLD: 7 /UL
PLATELET # BLD AUTO: 307 K/UL (ref 150–450)
POTASSIUM SERPL-SCNC: 4.3 MMOL/L (ref 3.5–5.1)
SODIUM SERPL-SCNC: 126 MMOL/L (ref 137–145)
WBC # BLD AUTO: 24.6 K/UL (ref 3.8–10.6)

## 2018-12-18 RX ADMIN — Medication SCH EACH: at 13:03

## 2018-12-18 RX ADMIN — MEGESTROL ACETATE SCH MG: 40 TABLET ORAL at 08:13

## 2018-12-18 RX ADMIN — SEVELAMER CARBONATE SCH MG: 800 TABLET, FILM COATED ORAL at 17:14

## 2018-12-18 RX ADMIN — MEGESTROL ACETATE SCH MG: 40 TABLET ORAL at 18:28

## 2018-12-18 RX ADMIN — SEVELAMER CARBONATE SCH MG: 800 TABLET, FILM COATED ORAL at 08:10

## 2018-12-18 RX ADMIN — Medication SCH MG: at 08:11

## 2018-12-18 RX ADMIN — FENOFIBRATE SCH MG: 160 TABLET ORAL at 08:11

## 2018-12-18 RX ADMIN — INSULIN ASPART SCH UNIT: 100 INJECTION, SOLUTION INTRAVENOUS; SUBCUTANEOUS at 21:51

## 2018-12-18 RX ADMIN — IPRATROPIUM BROMIDE SCH: 0.5 SOLUTION RESPIRATORY (INHALATION) at 11:14

## 2018-12-18 RX ADMIN — VANCOMYCIN HYDROCHLORIDE SCH MLS/HR: 500 INJECTION, POWDER, LYOPHILIZED, FOR SOLUTION INTRAVENOUS at 15:21

## 2018-12-18 RX ADMIN — POLYETHYLENE GLYCOL 3350 SCH GM: 17 POWDER, FOR SOLUTION ORAL at 21:26

## 2018-12-18 RX ADMIN — INSULIN ASPART SCH UNIT: 100 INJECTION, SUSPENSION SUBCUTANEOUS at 08:12

## 2018-12-18 RX ADMIN — DOCUSATE SODIUM SCH MG: 100 CAPSULE, LIQUID FILLED ORAL at 08:11

## 2018-12-18 RX ADMIN — ENOXAPARIN SODIUM SCH MG: 30 INJECTION SUBCUTANEOUS at 08:11

## 2018-12-18 RX ADMIN — IPRATROPIUM BROMIDE SCH: 0.5 SOLUTION RESPIRATORY (INHALATION) at 19:18

## 2018-12-18 RX ADMIN — HYDROCODONE BITARTRATE AND ACETAMINOPHEN PRN EACH: 10; 325 TABLET ORAL at 06:17

## 2018-12-18 RX ADMIN — ATORVASTATIN CALCIUM SCH MG: 80 TABLET, FILM COATED ORAL at 21:25

## 2018-12-18 RX ADMIN — GENTAMICIN SULFATE SCH APPLIC: 1 CREAM TOPICAL at 08:12

## 2018-12-18 RX ADMIN — Medication SCH UNIT: at 13:02

## 2018-12-18 RX ADMIN — LATANOPROST SCH: 50 SOLUTION OPHTHALMIC at 21:26

## 2018-12-18 RX ADMIN — CLOTRIMAZOLE SCH: 0.01 CREAM TOPICAL at 08:11

## 2018-12-18 RX ADMIN — FOLIC ACID SCH MG: 1 TABLET ORAL at 13:03

## 2018-12-18 RX ADMIN — FAMOTIDINE SCH MG: 20 TABLET, FILM COATED ORAL at 21:25

## 2018-12-18 RX ADMIN — CLOTRIMAZOLE SCH: 0.01 CREAM TOPICAL at 21:25

## 2018-12-18 RX ADMIN — Medication SCH MG: at 21:25

## 2018-12-18 RX ADMIN — LEVOTHYROXINE SODIUM SCH MCG: 88 TABLET ORAL at 06:17

## 2018-12-18 RX ADMIN — BUPROPION HYDROCHLORIDE SCH MG: 300 TABLET, FILM COATED, EXTENDED RELEASE ORAL at 08:11

## 2018-12-18 RX ADMIN — INSULIN ASPART SCH UNIT: 100 INJECTION, SOLUTION INTRAVENOUS; SUBCUTANEOUS at 08:10

## 2018-12-18 RX ADMIN — ALLOPURINOL SCH MG: 100 TABLET ORAL at 08:10

## 2018-12-18 RX ADMIN — INSULIN ASPART SCH UNIT: 100 INJECTION, SOLUTION INTRAVENOUS; SUBCUTANEOUS at 18:28

## 2018-12-18 RX ADMIN — HYDROCODONE BITARTRATE AND ACETAMINOPHEN PRN EACH: 10; 325 TABLET ORAL at 17:14

## 2018-12-18 RX ADMIN — IPRATROPIUM BROMIDE SCH: 0.5 SOLUTION RESPIRATORY (INHALATION) at 07:27

## 2018-12-18 RX ADMIN — PIPERACILLIN AND TAZOBACTAM SCH MLS/HR: 3; .375 INJECTION, POWDER, FOR SOLUTION INTRAVENOUS at 21:26

## 2018-12-18 RX ADMIN — SEVELAMER CARBONATE SCH MG: 800 TABLET, FILM COATED ORAL at 13:03

## 2018-12-18 RX ADMIN — MEGESTROL ACETATE SCH MG: 40 TABLET ORAL at 13:03

## 2018-12-18 RX ADMIN — VANCOMYCIN HYDROCHLORIDE SCH MLS/HR: 500 INJECTION, POWDER, LYOPHILIZED, FOR SOLUTION INTRAVENOUS at 22:12

## 2018-12-18 RX ADMIN — IPRATROPIUM BROMIDE SCH: 0.5 SOLUTION RESPIRATORY (INHALATION) at 16:30

## 2018-12-18 RX ADMIN — DOCUSATE SODIUM SCH MG: 100 CAPSULE, LIQUID FILLED ORAL at 21:25

## 2018-12-18 RX ADMIN — MEGESTROL ACETATE SCH MG: 40 TABLET ORAL at 21:51

## 2018-12-18 RX ADMIN — TRIAMCINOLONE ACETONIDE SCH: 1 CREAM TOPICAL at 08:13

## 2018-12-18 RX ADMIN — TRIAMCINOLONE ACETONIDE SCH: 1 CREAM TOPICAL at 21:26

## 2018-12-18 RX ADMIN — INSULIN ASPART SCH: 100 INJECTION, SUSPENSION SUBCUTANEOUS at 21:51

## 2018-12-18 RX ADMIN — INSULIN ASPART SCH UNIT: 100 INJECTION, SOLUTION INTRAVENOUS; SUBCUTANEOUS at 13:18

## 2018-12-18 RX ADMIN — VANCOMYCIN HYDROCHLORIDE SCH MLS/HR: 500 INJECTION, POWDER, LYOPHILIZED, FOR SOLUTION INTRAVENOUS at 04:02

## 2018-12-18 RX ADMIN — VANCOMYCIN HYDROCHLORIDE SCH MLS/HR: 500 INJECTION, POWDER, LYOPHILIZED, FOR SOLUTION INTRAVENOUS at 09:15

## 2018-12-18 NOTE — P.PN
Subjective





Patient is seen in follow-up for end-stage renal disease.  She is maintained on 

peritoneal dialysis.  She is tolerating PD exchanges well.  Currently on clear 

liquid diet.  Denies chest pain or shortness of breath.  She had a bowel 

movement last night.





Vital signs are stable.


General: The patient appeared well nourished and normally developed. 


HEENT: Head exam is unremarkable. Neck is without jugular venous distension.


LUNGS: Lungs are clear to auscultation and percussion. Breath sounds decreased.


HEART: Rate and Rhythm are regular. First and second heart sounds normal. No 

murmurs, rubs or gallops. 


ABDOMEN: Abdominal exam reveals normal bowel sounds. Non-tender and non-

distended. No evidence of peritonitis.


EXTREMITITES: Trace edema.





Objective





- Vital Signs


Vital signs: 


 Vital Signs











Temp  98.7 F   12/18/18 03:30


 


Pulse  90   12/18/18 03:30


 


Resp  18   12/18/18 03:30


 


BP  143/78   12/18/18 03:30


 


Pulse Ox  100   12/18/18 03:30








 Intake & Output











 12/17/18 12/18/18 12/18/18





 18:59 06:59 18:59


 


Intake Total 200  


 


Output Total 0  


 


Balance 200  


 


Weight 178 kg 169 kg 


 


Intake:   


 


  Oral 200  


 


Output:   


 


  Urine 0  


 


Other:   


 


  Voiding Method CAPD CAPD 


 


  # Voids 0 0 


 


  # Bowel Movements 0 1 














- Labs


CBC & Chem 7: 


 12/16/18 08:16





 12/16/18 08:16


Labs: 


 Abnormal Lab Results - Last 24 Hours (Table)











  12/16/18 12/17/18 12/17/18 Range/Units





  08:16 12:01 16:31 


 


POC Glucose (mg/dL)   100 H  40 L  (75-99)  mg/dL


 


Vitamin B12  2803.0 H    (200.0-944.0)  pg/mL














  12/17/18 12/17/18 12/17/18 Range/Units





  16:33 16:49 17:01 


 


POC Glucose (mg/dL)  39 L  46 L  213 H  (75-99)  mg/dL


 


Vitamin B12     (200.0-944.0)  pg/mL














  12/17/18 12/18/18 Range/Units





  21:07 07:22 


 


POC Glucose (mg/dL)  136 H  291 H  (75-99)  mg/dL


 


Vitamin B12    (200.0-944.0)  pg/mL














Assessment and Plan


Plan: 





Assessment:


1.  End-stage renal disease maintained on peritoneal dialysis.


2.  Hypervolemic hyponatremia.  Stable.


3.  Hypokalemia secondary to poor oral intake and PD losses.  Status post 

placement.


4.  Hypomagnesemia status post placement.


5.  Anemia of chronic kidney disease maintained on Aranesp.


6.  Ileus.  Surgery following.  Currently on clear liquid diet.


7.  Chronic kidney disease mineral bone disease.  Phosphorus level 6.8.  

Maintained on Renvela.


8.  Insulin-dependent diabetes mellitus.





Plan:


Maintain current PD exchanges with heparin.

## 2018-12-18 NOTE — P.PN
Subjective


Progress Note Date: 12/18/18





This is a 58-year-old female, patient of [facility].  Patient has 

a known past medical history of end-stage renal disease on hemodialysis, 

congestive heart failure, COPD, diabetes mellitus, hypertension, hyperlipidemia

, obstructive sleep apnea, chronic hypoxic respiratory failure on 4-5 L of 

oxygen at home, depression and CVA.  History obtained from both patient and 

patient's sister.  Apparently their mother had passed away on November 27 

couple weeks ago.  Since then, patient's has not gotten out of bed.  She has 

not moved out of her bed for about 2 weeks per the sister.  She stopped taking 

all of her medications.  And she has not been eating or drinking much.  Blood 

sugar 469 on admission.  Per the sister the patient was started on Prozac 10 mg 

daily about a month ago.  She took it for about a week and then stopped taking 

it.  Before that patient had been on Zoloft.  Patient reports that she hurts 

everywhere.  She has extensive bruising in the upper thigh is growing area and 

back.  There are smaller bruises on the lower leg area.  She hurts anywhere she 

is touched.  She complains of pain in the chest as well as the abdomen and legs 

and back.  She denies any falling.  Reports some nausea and chest chest pain.  

Denies any fever, chills, sweats, cough, bowel movement changes.  She does not 

make urine and is on peritoneal dialysis. 








On 12/07/2018 patient is currently lying in bed currently getting CAPD.  

Patient is complaining of generalized pain throughout chest abdomen and legs.  

Patient also complaining of more severe pain in her right lower extremity.  

Patient would not let me examine right leg.  Explained to patient that due to 

her prolonged bed rest she is at increased risk for blood clot and then I'm 

concerned that she may have a blood clot in her right leg and that she would 

require a venous Doppler to assess.  Patient states she would not be able to 

tolerate a venous Doppler at this time due to the pain.  Will order Dilaudid 

for pain at this time and encouraged patient the importance of obtaining this 

test.  We'll also consult cardiology services at this time.








On 12/08/2018 patient currently getting CAPD.  Patient did have Doppler study 

completed but was un conclusive of DVT due to increased pain during test.  

Patient remains on Lovenox 1 mg/kg per renal dosing for DVT treatment.  This 

time patient states that she feels slightly improved but still has generalized 

pain all over.  Patient denies chest pain or shortness breath.  Patient denies 

any nausea vomiting or diarrhea.  Patient denies any urinary burning or 

frequency.





On 12/09/2018 Patient currently resting in bed. Patient is having significant 

to lower extremity pain. Will order venous doppler again due to unconclusive 

reading from initial test.  Will order ativan and diuladid to be given prior to 

exam in order to get adequate reading. Patient denies chest pain and shortness 

of breath. Denies nausea, vomitting or diarrhea. Denies any urinary burning or 

frequency  





12/10/2018 patient still complaining of pain all over the body.  She does 

report is slightly better than admission.  Complaining of constipation and his 

been about 3 days since her last bowel movement.  Hemoglobin has dropped from 

9.3-8.7.  Potassium is 3.1 and she received supplement.  Venous Doppler of the 

lower extremities showing a limited exam of the right leg and what could be 

seen was negative for DVT.  Patient refused the left leg to be completed due to 

pain.  Patient has been noncompliant with physical therapy.  Poor appetite.  

Pain service will be placed on consult.  Patient has been educated that she 

needs to participate with physical therapy.





12/11/2018 patient does report slight improvement in her pain.  Still 

complaining of pain throughout her body.  Patient was able to work with 

physical therapy yesterday.  She was a max assist.  She denies any chest pain 

or shortness of breath.  Denies any nausea or vomiting.  Still has not had a 

bowel movement for about 5 days.  Norvasc discontinued yesterday due to 

hypotension.  Blood pressures are 20 better today.  Nephrology is also adjusted 

the frequency of peritoneal dialysis to every 4 hours. 





On 12/12/2018 patient does report slight improvement with her pain.  Patient is 

still complaining of overall pain throughout her entire body.  Patient refused 

venous Doppler again due to pain for the third time.  D-dimer was negative.  

Lovenox was DC'd and Lovenox prophylaxis has been added.  Patient denies chest 

pain or shortness of breath.  Patient denies nausea vomiting or diarrhea.  

Patient denies any urinary burning or frequency





On 12/13/2018 patient does report slight improvement with pain today.  Patient'

s daughter currently at bedside.  Patient started still expressed concerns over 

lack of motivation from her mother to get out of bed.  Requesting that site 

revisit patient.  Still waiting on pain services to come and evaluate patient.  

At this time patient denies chest pain or shortness of breath.  Patient denies 

nausea vomiting or diarrhea.  Patient denies any urinary burning or frequency.  

Patient highly encouraged to continue working with physical therapy in order to 

prevent negative outcomes.  At this time planning discharge to [facility]





On 12/14/2018 patient states she feels slightly more improved.  Patient was 

seen by psych Megace has been added Wellbutrin has been increased.  Discussed 

case with social work and a lot of yellow does not have any beds available.  

Still trying to be achieved placement due to peritoneal dialysis.  Patient also 

complaining of generalized pain.  Patient denies chest pain.  Patient denies 

nausea vomiting or diarrhea.  Patient denies any urinary burning





On 12/15/2018 patient is alert and responsive in no apparent distress, she 

states she is feeling a little bit better today, she is complaining of 

generalized pain, otherwise no complaints at this time there is no fever or 

chills no headache or dizziness no chest pain no shortness of breath no cough 

no nausea or vomiting no abdominal pain no diarrhea and no urinary symptoms.





On 12/16/2018 patient was seen and examined she is alert and responsive in no 

apparent distress.  Nursing staff reporting that patient has been uncooperative

, she is refusing to be turned in bed, she is refusing inspection of her sacral 

ulcers, she is refusing most of her other medical care.  Abdomen x-ray done 

yesterday reveals evidence of large bowel ileus.  Patient denies abdominal pain 

but is having pain when abdominal palpation is done.








12/17/2018 patient lying in bed she is awake and alert.  Reporting poor 

appetite.  Still has been uncooperative with nursing staff.  Still complaining 

of pain all over her body.  Abdominal x-ray showing a large bowel ileus or air 

swallowing.  Discussed with surgical service they'll be through to evaluate 

patient this afternoon.  Patient still complaining of some abdominal pain.  No 

bowel movement for about 10 days.  She is now agreeable to a soapsuds enema and 

lactulose.  These have been ordered.  Denies any nausea or vomiting.  





12/18/2018 patient is still being uncooperative.  She has refused both myself 

and nursing staff to evaluate her skin.  Apparently it has been reported that 

she has ulcers on the right side of her back.  Concerns for infection.  The 

patient refuses to be turned over.  She continues to lay on her back.  Patient 

refused surgical service evaluation yesterday.  She did have a bowel movement 

with the lactulose.  Refuses the enema.  Pain service came through and increase 

her Norco to 10 mg every 8 hours.  Blood pressures are better today.  However 

she does have a old fistula in the 1 arm that the blood pressures were being 

drawn from him he may have not been getting adequate blood pressure readings.  

White count has continued to increase to 24.6.  Hemoglobin 9.5 and sodium has 

dropped to 126.  Both infectious disease and nephrology have been a with made 

aware of his lab results.  Infectious disease will be back through later this 

evening to reevaluate patient at that time hopefully she will be cooperative 

for the physician to evaluate her skin.  Nephrology is recommending that 

patient be started on a diet to help with the sodium.  Since patient had a 

bowel movement we will advance diet to a consistent carbohydrate diet.  And 

nephrology is also recommending a 1500 mL fluid restriction.





Objective





- Vital Signs


Vital signs: 


 Vital Signs











Temp  98.7 F   12/18/18 03:30


 


Pulse  90   12/18/18 03:30


 


Resp  18   12/18/18 03:30


 


BP  143/78   12/18/18 03:30


 


Pulse Ox  100   12/18/18 03:30








 Intake & Output











 12/17/18 12/18/18 12/18/18





 18:59 06:59 18:59


 


Intake Total 200  


 


Output Total 0  


 


Balance 200  


 


Weight 178 kg 169 kg 


 


Intake:   


 


  Oral 200  


 


Output:   


 


  Urine 0  


 


Other:   


 


  Voiding Method CAPD CAPD CAPD


 


  # Voids 0 0 


 


  # Bowel Movements 0 1 














- Exam





Head normocephalic


Neck supple


Lungs clear to auscultation bilaterally no wheezing or crackles


Heart regular rate and rhythm S1-S2, no rub or gallop


Abdomen is soft nontender nondistended positive bowel sounds no 

hepatosplenomegaly.  Obese.  Peritoneal dialysis catheter site brown crusting 

or scabbing noted around the area.  No erythema.


Extremities no edema


Neuro alert and orientated to 3


Skin extensive bruising along the upper thighs and lower back and small bruises 

on the legs.  Patient has tenderness all over the body with palpation.  Patient'

s tenderness with palpation is less severe and bruising are showing improvement





- Labs


CBC & Chem 7: 


 12/18/18 11:26





 12/18/18 11:26


Labs: 


 Abnormal Lab Results - Last 24 Hours (Table)











  12/16/18 12/17/18 12/17/18 Range/Units





  08:16 16:31 16:33 


 


WBC     (3.8-10.6)  k/uL


 


RBC     (3.80-5.40)  m/uL


 


Hgb     (11.4-16.0)  gm/dL


 


Hct     (34.0-46.0)  %


 


MCV     (80.0-100.0)  fL


 


MCHC     (31.0-37.0)  g/dL


 


RDW     (11.5-15.5)  %


 


Neutrophils # (Manual)     (1.3-7.7)  k/uL


 


Lymphocytes # (Manual)     (1.0-4.8)  k/uL


 


Monocytes # (Manual)     (0-1.0)  k/uL


 


Sodium     (137-145)  mmol/L


 


Chloride     ()  mmol/L


 


Carbon Dioxide     (22-30)  mmol/L


 


BUN     (7-17)  mg/dL


 


Creatinine     (0.52-1.04)  mg/dL


 


Glucose     (74-99)  mg/dL


 


POC Glucose (mg/dL)   40 L  39 L  (75-99)  mg/dL


 


Calcium     (8.4-10.2)  mg/dL


 


Magnesium     (1.6-2.3)  mg/dL


 


Vitamin B12  2803.0 H    (200.0-944.0)  pg/mL














  12/17/18 12/17/18 12/17/18 Range/Units





  16:49 17:01 21:07 


 


WBC     (3.8-10.6)  k/uL


 


RBC     (3.80-5.40)  m/uL


 


Hgb     (11.4-16.0)  gm/dL


 


Hct     (34.0-46.0)  %


 


MCV     (80.0-100.0)  fL


 


MCHC     (31.0-37.0)  g/dL


 


RDW     (11.5-15.5)  %


 


Neutrophils # (Manual)     (1.3-7.7)  k/uL


 


Lymphocytes # (Manual)     (1.0-4.8)  k/uL


 


Monocytes # (Manual)     (0-1.0)  k/uL


 


Sodium     (137-145)  mmol/L


 


Chloride     ()  mmol/L


 


Carbon Dioxide     (22-30)  mmol/L


 


BUN     (7-17)  mg/dL


 


Creatinine     (0.52-1.04)  mg/dL


 


Glucose     (74-99)  mg/dL


 


POC Glucose (mg/dL)  46 L  213 H  136 H  (75-99)  mg/dL


 


Calcium     (8.4-10.2)  mg/dL


 


Magnesium     (1.6-2.3)  mg/dL


 


Vitamin B12     (200.0-944.0)  pg/mL














  12/18/18 12/18/18 12/18/18 Range/Units





  07:22 11:26 11:26 


 


WBC    24.6 H  (3.8-10.6)  k/uL


 


RBC    3.03 L  (3.80-5.40)  m/uL


 


Hgb    9.5 L  (11.4-16.0)  gm/dL


 


Hct    31.1 L  (34.0-46.0)  %


 


MCV    102.7 H  (80.0-100.0)  fL


 


MCHC    30.4 L  (31.0-37.0)  g/dL


 


RDW    15.8 H  (11.5-15.5)  %


 


Neutrophils # (Manual)    20.60 H  (1.3-7.7)  k/uL


 


Lymphocytes # (Manual)    0.98 L  (1.0-4.8)  k/uL


 


Monocytes # (Manual)    2.95 H  (0-1.0)  k/uL


 


Sodium   126 L   (137-145)  mmol/L


 


Chloride   90 L   ()  mmol/L


 


Carbon Dioxide   20 L   (22-30)  mmol/L


 


BUN   29 H   (7-17)  mg/dL


 


Creatinine   7.37 H*   (0.52-1.04)  mg/dL


 


Glucose   271 H   (74-99)  mg/dL


 


POC Glucose (mg/dL)  291 H    (75-99)  mg/dL


 


Calcium   8.0 L   (8.4-10.2)  mg/dL


 


Magnesium   1.4 L   (1.6-2.3)  mg/dL


 


Vitamin B12     (200.0-944.0)  pg/mL














  12/18/18 Range/Units





  12:12 


 


WBC   (3.8-10.6)  k/uL


 


RBC   (3.80-5.40)  m/uL


 


Hgb   (11.4-16.0)  gm/dL


 


Hct   (34.0-46.0)  %


 


MCV   (80.0-100.0)  fL


 


MCHC   (31.0-37.0)  g/dL


 


RDW   (11.5-15.5)  %


 


Neutrophils # (Manual)   (1.3-7.7)  k/uL


 


Lymphocytes # (Manual)   (1.0-4.8)  k/uL


 


Monocytes # (Manual)   (0-1.0)  k/uL


 


Sodium   (137-145)  mmol/L


 


Chloride   ()  mmol/L


 


Carbon Dioxide   (22-30)  mmol/L


 


BUN   (7-17)  mg/dL


 


Creatinine   (0.52-1.04)  mg/dL


 


Glucose   (74-99)  mg/dL


 


POC Glucose (mg/dL)  294 H  (75-99)  mg/dL


 


Calcium   (8.4-10.2)  mg/dL


 


Magnesium   (1.6-2.3)  mg/dL


 


Vitamin B12   (200.0-944.0)  pg/mL














Assessment and Plan


Assessment: 





1.  Prolonged period of time in bed With pain throughout her body and extensive 

bruising.  Hold aspirin and Plavix.  





2.  Insulin-dependent diabetes mellitus: Hyperglycemia with blood sugar 469 on 

admission.  Patient has not been taking insulin at home.  Acetone level 

negative.  A1c 7.7.  Patient did have some hypoglycemia due to insulin being 

given with her being on a clear liquid diet and poor appetite.  Diet has been 

advanced.  We will resume her NovoLog 70/30 at 80 units twice a day





3.  History of end-stage renal disease on peritoneal dialysis.  Nephrology 

following.  Continue peritoneal dialysis.





4.  Morbid obesity





5.  Medication noncompliance





6.  Severe Depression.  Patient denies any suicidal or homicidal ideation.  

Recently started on Prozac 10 mg daily outpatient.  Will restart Prozac at 20 

mg daily . Wellbutrin has been adder per psych.  At this time daughter 

requesting that psych revisit patient for reevaluation of patient's depression.

  Psychiatry has come to reevaluate patient.  Megace has been added and 

Wellbutrin has been increased.





7.  History of COPD stable





8.  History of CVA





9.  History of Essential hypertension





10.  Hyperlipidemia





11.  Obstructive sleep apnea uses CPAP





12.  Chronic hypoxic respiratory failure home O2 dependent on 4-5 L





13.  Right lower extremity pain.  Concerns for possible DVT due to prolonged 

time in bed.  Continue with Lovenox 140 mg subcu daily.  Patient unable to 

tolerate venous Doppler.  On 12/ 9 venous Doppler was a limited exam what was 

evaluated was negative for DVT in the right leg.  Patient refused the left leg 

to be completed.  Patient refused for the third time venous Doppler again due 

to pain.  D-dimer less than 0.17.  Lovenox 140 mg subcu daily discontinued.  

Lovenox DVT prophylaxis has been added





14.  Elevated cardiac enzymes.  Troponin 0.072.  EKG completed showing normal 

sinus rhythm.  Inferior infarct, age undetermined anterior infarct age 

undetermined.  Per cardiology services no further workup needed from cardiology 

standpoint.  No evidence for an acute cardiac event per cardiology





15.  Elevated lipase level of 410.  Has trended down.  No evidence of 

pancreatitis





16.  Hypovolemic Hyponatremia.   Possibly related to her hyperglycemia as well.

  Nephrology following





17. Elevated uric acid.  Uric acid 8.4  Patient will be started on allopurinol





18.  Anemia of chronic kidney disease.  And evidence of iron deficiency anemia.

   Will start ferrous sulfate 325 mg twice a day. Aranesp has been added per 

nephrology.  Continue to monitor hemoglobin





19.  Constipation add stool softener and lactulose





20.  Hypotension possibly related to the IV Dilaudid.  Nephrology discontinued 

the Norvasc.  Blood pressures showing improvement.





21.  Abdominal pain with ileus: abdominal x-ray showing ileus or air 

swallowing.  No free air.  Patient did have a bowel movement after lactulose 

yesterday.  Patient refused enema.  We'll advance diet to a consistent 

carbohydrate diet





22.  Hyponatremia due to poor nutritional intake.  Advance diet.  Nephrology 

notified.  They've also recommended fluid restrictions 1500 MLS per day





23.  Hypomagnesemia and patient receiving magnesium supplement.  Repeat 

magnesium level in a.m.





25.  Leukocytosis: White count continues to increase.   Patient be evaluated by 

infectious disease.  Patient refuses to turn over to have her skin evaluated.  

There are concern for skin ulcers.  








DVT prophylaxis Lovenox.  GI prophylaxis Pepcid





Discussed with .  When patient is stable for discharge, likely will 

discharge to [facility]





I performed an examination of the patient and discussed their management with 

the physician Assistant.  I have reviewed the Physician Assistant's notes and 

agree with the documented findings and plan of care

## 2018-12-19 LAB
ANION GAP SERPL CALC-SCNC: 15 MMOL/L
BUN SERPL-SCNC: 32 MG/DL (ref 7–17)
CALCIUM SPEC-MCNC: 8.4 MG/DL (ref 8.4–10.2)
CELLS COUNTED: 200
CHLORIDE SERPL-SCNC: 91 MMOL/L (ref 98–107)
CO2 SERPL-SCNC: 21 MMOL/L (ref 22–30)
ERYTHROCYTE [DISTWIDTH] IN BLOOD BY AUTOMATED COUNT: 2.8 M/UL (ref 3.8–5.4)
ERYTHROCYTE [DISTWIDTH] IN BLOOD: 16 % (ref 11.5–15.5)
GLUCOSE BLD-MCNC: 135 MG/DL (ref 75–99)
GLUCOSE BLD-MCNC: 147 MG/DL (ref 75–99)
GLUCOSE BLD-MCNC: 205 MG/DL (ref 75–99)
GLUCOSE BLD-MCNC: 226 MG/DL (ref 75–99)
GLUCOSE SERPL-MCNC: 181 MG/DL (ref 74–99)
HCT VFR BLD AUTO: 29 % (ref 34–46)
HGB BLD-MCNC: 8.1 GM/DL (ref 11.4–16)
LYMPHOCYTES # BLD MANUAL: 0.59 K/UL (ref 1–4.8)
MAGNESIUM SPEC-SCNC: 1.9 MG/DL (ref 1.6–2.3)
MCH RBC QN AUTO: 29.1 PG (ref 25–35)
MCHC RBC AUTO-ENTMCNC: 28 G/DL (ref 31–37)
MCV RBC AUTO: 103.7 FL (ref 80–100)
METAMYELOCYTES # BLD: 0.39 K/UL
MONOCYTES # BLD MANUAL: 2.36 K/UL (ref 0–1)
MYELOCYTES # BLD MANUAL: 0.39 K/UL
NEUTROPHILS NFR BLD MANUAL: 80 %
NEUTS SEG # BLD MANUAL: 16.3 K/UL (ref 1.3–7.7)
PLATELET # BLD AUTO: 330 K/UL (ref 150–450)
POTASSIUM SERPL-SCNC: 3.1 MMOL/L (ref 3.5–5.1)
SODIUM SERPL-SCNC: 127 MMOL/L (ref 137–145)
WBC # BLD AUTO: 19.7 K/UL (ref 3.8–10.6)

## 2018-12-19 RX ADMIN — ENOXAPARIN SODIUM SCH MG: 30 INJECTION SUBCUTANEOUS at 07:34

## 2018-12-19 RX ADMIN — INSULIN ASPART SCH: 100 INJECTION, SOLUTION INTRAVENOUS; SUBCUTANEOUS at 17:37

## 2018-12-19 RX ADMIN — INSULIN ASPART SCH: 100 INJECTION, SOLUTION INTRAVENOUS; SUBCUTANEOUS at 22:01

## 2018-12-19 RX ADMIN — IPRATROPIUM BROMIDE SCH: 0.5 SOLUTION RESPIRATORY (INHALATION) at 15:41

## 2018-12-19 RX ADMIN — TRIAMCINOLONE ACETONIDE SCH: 1 CREAM TOPICAL at 07:45

## 2018-12-19 RX ADMIN — FENOFIBRATE SCH MG: 160 TABLET ORAL at 07:35

## 2018-12-19 RX ADMIN — HYDROCODONE BITARTRATE AND ACETAMINOPHEN PRN EACH: 10; 325 TABLET ORAL at 08:59

## 2018-12-19 RX ADMIN — MEGESTROL ACETATE SCH MG: 40 TABLET ORAL at 22:16

## 2018-12-19 RX ADMIN — INSULIN ASPART SCH UNIT: 100 INJECTION, SOLUTION INTRAVENOUS; SUBCUTANEOUS at 13:26

## 2018-12-19 RX ADMIN — DOCUSATE SODIUM SCH MG: 100 CAPSULE, LIQUID FILLED ORAL at 22:03

## 2018-12-19 RX ADMIN — ASPIRIN 81 MG CHEWABLE TABLET SCH MG: 81 TABLET CHEWABLE at 07:37

## 2018-12-19 RX ADMIN — SEVELAMER CARBONATE SCH MG: 800 TABLET, FILM COATED ORAL at 07:35

## 2018-12-19 RX ADMIN — LATANOPROST SCH DROPS: 50 SOLUTION OPHTHALMIC at 22:15

## 2018-12-19 RX ADMIN — VANCOMYCIN HYDROCHLORIDE SCH MLS/HR: 500 INJECTION, POWDER, LYOPHILIZED, FOR SOLUTION INTRAVENOUS at 21:42

## 2018-12-19 RX ADMIN — MEGESTROL ACETATE SCH MG: 40 TABLET ORAL at 17:38

## 2018-12-19 RX ADMIN — INSULIN ASPART SCH UNIT: 100 INJECTION, SUSPENSION SUBCUTANEOUS at 07:45

## 2018-12-19 RX ADMIN — IPRATROPIUM BROMIDE SCH: 0.5 SOLUTION RESPIRATORY (INHALATION) at 19:18

## 2018-12-19 RX ADMIN — LEVOTHYROXINE SODIUM SCH MCG: 88 TABLET ORAL at 06:07

## 2018-12-19 RX ADMIN — GENTAMICIN SULFATE SCH APPLIC: 1 CREAM TOPICAL at 07:45

## 2018-12-19 RX ADMIN — CLOPIDOGREL BISULFATE SCH MG: 75 TABLET ORAL at 07:35

## 2018-12-19 RX ADMIN — POTASSIUM CHLORIDE SCH MEQ: 20 TABLET, EXTENDED RELEASE ORAL at 23:26

## 2018-12-19 RX ADMIN — ATORVASTATIN CALCIUM SCH MG: 80 TABLET, FILM COATED ORAL at 22:03

## 2018-12-19 RX ADMIN — HYDROCODONE BITARTRATE AND ACETAMINOPHEN PRN EACH: 10; 325 TABLET ORAL at 00:57

## 2018-12-19 RX ADMIN — TRIAMCINOLONE ACETONIDE SCH APPLIC: 1 CREAM TOPICAL at 22:14

## 2018-12-19 RX ADMIN — FOLIC ACID SCH MG: 1 TABLET ORAL at 11:28

## 2018-12-19 RX ADMIN — IPRATROPIUM BROMIDE SCH: 0.5 SOLUTION RESPIRATORY (INHALATION) at 11:06

## 2018-12-19 RX ADMIN — VANCOMYCIN HYDROCHLORIDE SCH MLS/HR: 500 INJECTION, POWDER, LYOPHILIZED, FOR SOLUTION INTRAVENOUS at 15:13

## 2018-12-19 RX ADMIN — MEGESTROL ACETATE SCH MG: 40 TABLET ORAL at 13:36

## 2018-12-19 RX ADMIN — PIPERACILLIN AND TAZOBACTAM SCH MLS/HR: 3; .375 INJECTION, POWDER, FOR SOLUTION INTRAVENOUS at 22:03

## 2018-12-19 RX ADMIN — BUPROPION HYDROCHLORIDE SCH MG: 300 TABLET, FILM COATED, EXTENDED RELEASE ORAL at 07:35

## 2018-12-19 RX ADMIN — ALLOPURINOL SCH MG: 100 TABLET ORAL at 07:36

## 2018-12-19 RX ADMIN — HYDROCODONE BITARTRATE AND ACETAMINOPHEN PRN EACH: 10; 325 TABLET ORAL at 16:35

## 2018-12-19 RX ADMIN — POLYETHYLENE GLYCOL 3350 SCH: 17 POWDER, FOR SOLUTION ORAL at 22:02

## 2018-12-19 RX ADMIN — SEVELAMER CARBONATE SCH: 800 TABLET, FILM COATED ORAL at 13:26

## 2018-12-19 RX ADMIN — IPRATROPIUM BROMIDE SCH: 0.5 SOLUTION RESPIRATORY (INHALATION) at 07:07

## 2018-12-19 RX ADMIN — Medication SCH MG: at 07:36

## 2018-12-19 RX ADMIN — MEGESTROL ACETATE SCH MG: 40 TABLET ORAL at 07:34

## 2018-12-19 RX ADMIN — VANCOMYCIN HYDROCHLORIDE SCH MLS/HR: 500 INJECTION, POWDER, LYOPHILIZED, FOR SOLUTION INTRAVENOUS at 03:35

## 2018-12-19 RX ADMIN — CLOTRIMAZOLE SCH: 0.01 CREAM TOPICAL at 07:38

## 2018-12-19 RX ADMIN — SEVELAMER CARBONATE SCH: 800 TABLET, FILM COATED ORAL at 17:37

## 2018-12-19 RX ADMIN — INSULIN ASPART SCH: 100 INJECTION, SUSPENSION SUBCUTANEOUS at 22:02

## 2018-12-19 RX ADMIN — INSULIN ASPART SCH UNIT: 100 INJECTION, SOLUTION INTRAVENOUS; SUBCUTANEOUS at 07:33

## 2018-12-19 RX ADMIN — Medication SCH EACH: at 11:18

## 2018-12-19 RX ADMIN — FAMOTIDINE SCH MG: 20 TABLET, FILM COATED ORAL at 22:04

## 2018-12-19 RX ADMIN — POTASSIUM CHLORIDE SCH MEQ: 20 TABLET, EXTENDED RELEASE ORAL at 22:03

## 2018-12-19 RX ADMIN — CEFAZOLIN SCH MLS/HR: 330 INJECTION, POWDER, FOR SOLUTION INTRAMUSCULAR; INTRAVENOUS at 09:04

## 2018-12-19 RX ADMIN — DOCUSATE SODIUM SCH: 100 CAPSULE, LIQUID FILLED ORAL at 07:39

## 2018-12-19 RX ADMIN — CLOTRIMAZOLE SCH: 0.01 CREAM TOPICAL at 22:18

## 2018-12-19 RX ADMIN — PIPERACILLIN AND TAZOBACTAM SCH MLS/HR: 3; .375 INJECTION, POWDER, FOR SOLUTION INTRAVENOUS at 09:03

## 2018-12-19 RX ADMIN — HYDROCODONE BITARTRATE AND ACETAMINOPHEN PRN EACH: 10; 325 TABLET ORAL at 23:26

## 2018-12-19 RX ADMIN — VANCOMYCIN HYDROCHLORIDE SCH MLS/HR: 500 INJECTION, POWDER, LYOPHILIZED, FOR SOLUTION INTRAVENOUS at 09:00

## 2018-12-19 RX ADMIN — Medication SCH MG: at 22:03

## 2018-12-19 RX ADMIN — Medication SCH UNIT: at 11:18

## 2018-12-19 NOTE — PN
PROGRESS NOTE



DATE OF SERVICE:

12/19/2018.



REASON FOR FOLLOWUP:

Unstageable sacral pressure ulcer and a question of cellulitis.



INTERVAL HISTORY:

The patient is currently afebrile.  The patient continues to refuse to move around and

change her position.  She has been complaining of pain all over and has been asking for

Dilaudid.  Denies having any chest pain or shortness of breath or cough.  No abdominal

pain or any diarrhea.



PHYSICAL EXAMINATION:

Blood pressure 125/67, pulse of 82, temperature 98.1, she is 92% on CPAP.

GENERAL DESCRIPTION: A middle-aged female lying in bed in no distress.

HEENT:  Pallor, no scleral icterus.  Oral mucosa is dry.

LUNGS: Unlabored breathing. Clear to auscultation anteriorly.

HEART: S1, S2.  Regular rate and rhythm.

ABDOMEN:  Soft.

EXTREMITIES: Left thigh bruise, area unchanged.



LABS:

Hemoglobin 8.1, white count of 19.7, BUN of 32, creatinine of 7.7.  Peritoneal fluid

has been negative.  Blood culture so far is negative.



DIAGNOSTIC IMPRESSION AND PLAN:

Patient with leukocytosis.  Source is likely unstageable sacral pressure ulcer with

possible secondary cellulitis.  The patient seemed to have some improvement in white

count, which is down to 19,000 with antibiotic that were started yesterday.  The

peritoneal fluid does not look infected.  Once again specific instructions to patient

for frequent change of position to prevent further worsening of the sacral wound.  We

will keep the patient on the vancomycin and Zosyn at this point to keep off area of

pressure and dry. Reevaluate the patient tomorrow.  Continue supportive care.



MMODL / IJN: [id number] / Job#: [id number]

## 2018-12-19 NOTE — P.PN
Subjective





Patient is seen in follow-up for end-stage renal disease.  She is maintained on 

peritoneal dialysis.  She is tolerating PD exchanges well.  Now on regular 

diet. Denies cp or sob. 





Vital signs are stable.


General: The patient appeared well nourished and normally developed. 


HEENT: Head exam is unremarkable. Neck is without jugular venous distension.


LUNGS: Lungs are clear to auscultation and percussion. Breath sounds decreased.


HEART: Rate and Rhythm are regular. First and second heart sounds normal. No 

murmurs, rubs or gallops. 


ABDOMEN: Abdominal exam reveals normal bowel sounds. Non-tender and non-

distended. Obese.


EXTREMITITES: Trace edema.





Objective





- Vital Signs


Vital signs: 


 Vital Signs











Temp  98 F   12/19/18 05:45


 


Pulse  84   12/19/18 05:45


 


Resp  20   12/19/18 08:00


 


BP  109/69   12/19/18 06:35


 


Pulse Ox  98   12/19/18 05:45








 Intake & Output











 12/18/18 12/19/18 12/19/18





 18:59 06:59 18:59


 


Intake Total 300 400 


 


Balance 300 400 


 


Intake:   


 


  Oral 300 400 


 


Other:   


 


  Voiding Method CAPD CAPD CAPD


 


  # Voids 0 0 


 


  # Bowel Movements  2 














- Labs


CBC & Chem 7: 


 12/18/18 11:26





 12/18/18 11:26


Labs: 


 Abnormal Lab Results - Last 24 Hours (Table)











  12/18/18 12/18/18 12/18/18 Range/Units





  11:26 11:26 12:12 


 


WBC   24.6 H   (3.8-10.6)  k/uL


 


RBC   3.03 L   (3.80-5.40)  m/uL


 


Hgb   9.5 L   (11.4-16.0)  gm/dL


 


Hct   31.1 L   (34.0-46.0)  %


 


MCV   102.7 H   (80.0-100.0)  fL


 


MCHC   30.4 L   (31.0-37.0)  g/dL


 


RDW   15.8 H   (11.5-15.5)  %


 


Neutrophils # (Manual)   20.60 H   (1.3-7.7)  k/uL


 


Lymphocytes # (Manual)   0.98 L   (1.0-4.8)  k/uL


 


Monocytes # (Manual)   2.95 H   (0-1.0)  k/uL


 


Sodium  126 L    (137-145)  mmol/L


 


Chloride  90 L    ()  mmol/L


 


Carbon Dioxide  20 L    (22-30)  mmol/L


 


BUN  29 H    (7-17)  mg/dL


 


Creatinine  7.37 H*    (0.52-1.04)  mg/dL


 


Glucose  271 H    (74-99)  mg/dL


 


POC Glucose (mg/dL)    294 H  (75-99)  mg/dL


 


Calcium  8.0 L    (8.4-10.2)  mg/dL


 


Magnesium  1.4 L    (1.6-2.3)  mg/dL














  12/18/18 12/18/18 12/19/18 Range/Units





  18:08 21:43 07:14 


 


WBC     (3.8-10.6)  k/uL


 


RBC     (3.80-5.40)  m/uL


 


Hgb     (11.4-16.0)  gm/dL


 


Hct     (34.0-46.0)  %


 


MCV     (80.0-100.0)  fL


 


MCHC     (31.0-37.0)  g/dL


 


RDW     (11.5-15.5)  %


 


Neutrophils # (Manual)     (1.3-7.7)  k/uL


 


Lymphocytes # (Manual)     (1.0-4.8)  k/uL


 


Monocytes # (Manual)     (0-1.0)  k/uL


 


Sodium     (137-145)  mmol/L


 


Chloride     ()  mmol/L


 


Carbon Dioxide     (22-30)  mmol/L


 


BUN     (7-17)  mg/dL


 


Creatinine     (0.52-1.04)  mg/dL


 


Glucose     (74-99)  mg/dL


 


POC Glucose (mg/dL)  287 H  214 H  226 H  (75-99)  mg/dL


 


Calcium     (8.4-10.2)  mg/dL


 


Magnesium     (1.6-2.3)  mg/dL








 Microbiology - Last 24 Hours (Table)











 12/18/18 23:00 Gram Stain - Preliminary





 Peritoneal Fluid Body Fluid Culture - Preliminary














Assessment and Plan


Plan: 





Assessment:


1.  End-stage renal disease maintained on peritoneal dialysis.


2.  Hypervolemic hyponatremia. Pt was also on clear liquid diet.


3.  Hypokalemia secondary to poor oral intake and PD losses.  Status post 

placement.


4.  Hypomagnesemia from poor oral intake status post placement.


5.  Anemia of chronic kidney disease maintained on Aranesp.


6.  Ileus.  Surgery following. Now advanced to regular diet.


7.  Chronic kidney disease mineral bone disease.  Phosphorus level 6.8.  

Maintained on Renvela.


8.  Insulin-dependent diabetes mellitus.





Plan:


Maintain current PD exchanges with heparin.


F/u morning labs.

## 2018-12-19 NOTE — CONS
CONSULTATION



DATE OF SERVICE:

12/18/2018.



REASON FOR CONSULTATION:

1. Leukocytosis.

2. Sacral wound.



HISTORY OF PRESENT ILLNESS:

The patient is a 58-year-old  female with past medical history 
significant for

end-stage renal disease on peritoneal dialysis, COPD, diabetes mellitus, the 
patient

has been admitted to the hospital on 12/06/2018.  The patient is a being 
brought in by

the family.  Apparently the patient has not gotten out of bed for the past 2 
weeks and

not taking her medication.  Currently patient is feeling very depressed.  No 
clear

history of any fever or nausea or any vomiting.  Patient subsequently has been

evaluated and since the patient has been admitted to the hospital no fever has 
been

recorded.  She did have a normal white count on admission of 7.1, however, has 
been

slowly creeping up, was 12.4 on 12/5, 12.5 on 12/16 and 24.6 as of today and 
that has

prompted this infectious disease consultation.  Patient per the nursing staff,

mentioned that the patient has not been getting out of the bed and not moving 
around

and has developed a wound on her sacral area.  The patient has been complaining 
of

significant pain all over the body and also to the left thigh and with back area
, more

of a sharp pain almost 10/10 and the patient continuously asking for Dilaudid

administration.  There is no history of any diarrhea.  Patient was constipated 
and did

have her first bowel movement today which was not loose.  The patient denies any

headache or chest pain or shortness of breath or cough.  No nausea or vomiting 
has been

noticed.  The patient has not received any steroids during this admission and 
has not

been on antibiotics.  Has been doing her peritoneal dialysis and fluid is not 
cloudy

per the nursing staff.



REVIEW OF SYSTEMS:

Positive points have been mentioned in the HPI. The other  systems have been 
negative.



PAST MEDICAL HISTORY:

End-stage renal disease on peritoneal dialysis, asthma, heart failure, COPD, 
CVA TIA,

diabetes mellitus, hypertension, hyperlipidemia, osteoarthritis, pneumonia,

hypothyroidism, anxiety, depression.



PAST SURGICAL HISTORY:

Bariatric surgery, hernia repair, tonsillectomy, right knee replacement,  right 
kidney

removed.



SOCIAL HISTORY:

Remote history of smoking.  No drinking or drug use.



FAMILY HISTORY:

Mother with history of cancer, diabetes, hypertension.  Father history of 
hypertension,

hypothyroidism, diabetes.



ALLERGIES:

TO ERYTHROMYCIN, BEES, NONSTEROIDAL INFLAMMATORY MEDICATION.



MEDICATION:

Medication currently the patient is on:  Kenalog locally, MiraLAX, Zofran, 
Narcan,

Nephrocaps, Megace, Synthroid, NovoLog, folic acid, Prozac, iron sulfate, Pepcid
,

Lovenox, Colace, aspirin, Plavix, Wellbutrin, Lipitor, Abilify, Zyloprim, DuoNeb

and Tylenol.



EXAMINATION:

Blood pressure is 132/81 with a pulse of 86, temperature of 98.2.  She is 100% 
on 2 L nasal cannula.

General description: The patient is a middle aged female, lying in bed in no 
distress.

No tachypnea or accessory muscles of respiration use.

HEENT:  Shows pallor.  No scleral icterus.  Oral mucous membranes dry.  No 
significant

erythema or thrush. Neck trachea central.  No thyromegaly.  Lungs unlabored 
breathing.

Clear to auscultation.  No wheeze or crackles. Heart S1, S2.  Regular rate and 
rhythm.

ABDOMEN:  Soft, no tenderness.  No guarding.  No rigidity.

EXTREMITIES:  Some chronic swelling.  Left thigh did have a bruise which is not 
new per

the nursing staff, but no redness.  Examination of the sacral area:  She did 
have

unstageable pressure ulcer with necrotic area.  No significant redness, though 
did have

minimal drainage.  Mild foul-smelling.  Neurological:  Patient is awake,  alert 
and

oriented times three. Mood and affect normal.



LABS:

Hemoglobin 9.5, white count 4.6, BUN of 29, and creatinine 7.37.  No culture 
during

this admission.



DIAGNOSTIC IMPRESSION AND PLAN:

Patient with an [place] [address] in this patient has been admitted

hospital with generalized weakness and has not been out of the bed, feeling 
depressed

after the death of the family and has developed a pressure ulcer on the sacral 
area

which is unstageable.  Does have a necrotic component to it, but no significant

fluctuation induration was noticed.  Minimal drainage.  Slight foul smelling 
however,

no sign of surrounding erythema.  The patient also has a bruise to the left 
thigh which

is tender with question of possible underlying hematoma with secondary due to 
the

likely source of this elevated white count, as the process started in the 
hospital need to cover for the

resistant gram-positive and gram-negative pathogen as did the peritoneal 
dialysis

associated peritonitis though clinically less likely.



PLAN:

1. Blood cultures will be obtained.

2. We will also send the peritoneal fluid for the cell count, differential and

    culture.

3. Lyndon the area of redness of necrotic area on the sacral area and keep the 
sacral

    area off the pressure by frequent change of position, keeping it dry.

4. We empirically started the patient on vancomycin pharmacy to dose and Zosyn 
3.375

    q24 hours.

5. We will follow up on clinical condition and culture to further adjust 
medication if

    needed.

Thank you for this consultation.  We will follow this patient along with you.





ANAL / PARMJITN: [id number] / Job#: [id number]

KRIS

## 2018-12-19 NOTE — P.PN
Subjective


Progress Note Date: 12/19/18


This is a 58-year-old female, patient of [facility].  Patient has 

a known past medical history of end-stage renal disease on hemodialysis, 

congestive heart failure, COPD, diabetes mellitus, hypertension, hyperlipidemia

, obstructive sleep apnea, chronic hypoxic respiratory failure on 4-5 L of 

oxygen at home, depression and CVA.  History obtained from both patient and 

patient's sister.  Apparently their mother had passed away on November 27 

couple weeks ago.  Since then, patient's has not gotten out of bed.  She has 

not moved out of her bed for about 2 weeks per the sister.  She stopped taking 

all of her medications.  And she has not been eating or drinking much.  Blood 

sugar 469 on admission.  Per the sister the patient was started on Prozac 10 mg 

daily about a month ago.  She took it for about a week and then stopped taking 

it.  Before that patient had been on Zoloft.  Patient reports that she hurts 

everywhere.  She has extensive bruising in the upper thigh is growing area and 

back.  There are smaller bruises on the lower leg area.  She hurts anywhere she 

is touched.  She complains of pain in the chest as well as the abdomen and legs 

and back.  She denies any falling.  Reports some nausea and chest chest pain.  

Denies any fever, chills, sweats, cough, bowel movement changes.  She does not 

make urine and is on peritoneal dialysis. 








On 12/07/2018 patient is currently lying in bed currently getting CAPD.  

Patient is complaining of generalized pain throughout chest abdomen and legs.  

Patient also complaining of more severe pain in her right lower extremity.  

Patient would not let me examine right leg.  Explained to patient that due to 

her prolonged bed rest she is at increased risk for blood clot and then I'm 

concerned that she may have a blood clot in her right leg and that she would 

require a venous Doppler to assess.  Patient states she would not be able to 

tolerate a venous Doppler at this time due to the pain.  Will order Dilaudid 

for pain at this time and encouraged patient the importance of obtaining this 

test.  We'll also consult cardiology services at this time.








On 12/08/2018 patient currently getting CAPD.  Patient did have Doppler study 

completed but was un conclusive of DVT due to increased pain during test.  

Patient remains on Lovenox 1 mg/kg per renal dosing for DVT treatment.  This 

time patient states that she feels slightly improved but still has generalized 

pain all over.  Patient denies chest pain or shortness breath.  Patient denies 

any nausea vomiting or diarrhea.  Patient denies any urinary burning or 

frequency.





On 12/09/2018 Patient currently resting in bed. Patient is having significant 

to lower extremity pain. Will order venous doppler again due to unconclusive 

reading from initial test.  Will order ativan and diuladid to be given prior to 

exam in order to get adequate reading. Patient denies chest pain and shortness 

of breath. Denies nausea, vomitting or diarrhea. Denies any urinary burning or 

frequency  





12/10/2018 patient still complaining of pain all over the body.  She does 

report is slightly better than admission.  Complaining of constipation and his 

been about 3 days since her last bowel movement.  Hemoglobin has dropped from 

9.3-8.7.  Potassium is 3.1 and she received supplement.  Venous Doppler of the 

lower extremities showing a limited exam of the right leg and what could be 

seen was negative for DVT.  Patient refused the left leg to be completed due to 

pain.  Patient has been noncompliant with physical therapy.  Poor appetite.  

Pain service will be placed on consult.  Patient has been educated that she 

needs to participate with physical therapy.





12/11/2018 patient does report slight improvement in her pain.  Still 

complaining of pain throughout her body.  Patient was able to work with 

physical therapy yesterday.  She was a max assist.  She denies any chest pain 

or shortness of breath.  Denies any nausea or vomiting.  Still has not had a 

bowel movement for about 5 days.  Norvasc discontinued yesterday due to 

hypotension.  Blood pressures are 20 better today.  Nephrology is also adjusted 

the frequency of peritoneal dialysis to every 4 hours. 





On 12/12/2018 patient does report slight improvement with her pain.  Patient is 

still complaining of overall pain throughout her entire body.  Patient refused 

venous Doppler again due to pain for the third time.  D-dimer was negative.  

Lovenox was DC'd and Lovenox prophylaxis has been added.  Patient denies chest 

pain or shortness of breath.  Patient denies nausea vomiting or diarrhea.  

Patient denies any urinary burning or frequency





On 12/13/2018 patient does report slight improvement with pain today.  Patient'

s daughter currently at bedside.  Patient started still expressed concerns over 

lack of motivation from her mother to get out of bed.  Requesting that site 

revisit patient.  Still waiting on pain services to come and evaluate patient.  

At this time patient denies chest pain or shortness of breath.  Patient denies 

nausea vomiting or diarrhea.  Patient denies any urinary burning or frequency.  

Patient highly encouraged to continue working with physical therapy in order to 

prevent negative outcomes.  At this time planning discharge to [facility]





On 12/14/2018 patient states she feels slightly more improved.  Patient was 

seen by psych Megace has been added Wellbutrin has been increased.  Discussed 

case with social work and a lot of yellow does not have any beds available.  

Still trying to be achieved placement due to peritoneal dialysis.  Patient also 

complaining of generalized pain.  Patient denies chest pain.  Patient denies 

nausea vomiting or diarrhea.  Patient denies any urinary burning





On 12/16/2018 patient was seen and examined she is alert and responsive in no 

apparent distress.  Nursing staff reporting that patient has been uncooperative

, she is refusing to be turned in bed, she is refusing inspection of her sacral 

ulcers, she is refusing most of her other medical care.  Abdomen x-ray done 

yesterday reveals evidence of large bowel ileus.  Patient denies abdominal pain 

but is having pain when abdominal palpation is done.








12/17/2018 patient lying in bed she is awake and alert.  Reporting poor 

appetite.  Still has been uncooperative with nursing staff.  Still complaining 

of pain all over her body.  Abdominal x-ray showing a large bowel ileus or air 

swallowing.  Discussed with surgical service they'll be through to evaluate 

patient this afternoon.  Patient still complaining of some abdominal pain.  No 

bowel movement for about 10 days.  She is now agreeable to a soapsuds enema and 

lactulose.  These have been ordered.  Denies any nausea or vomiting.  





12/18/2018 patient is still being uncooperative.  She has refused both myself 

and nursing staff to evaluate her skin.  Apparently it has been reported that 

she has ulcers on the right side of her back.  Concerns for infection.  The 

patient refuses to be turned over.  She continues to lay on her back.  Patient 

refused surgical service evaluation yesterday.  She did have a bowel movement 

with the lactulose.  Refuses the enema.  Pain service came through and increase 

her Norco to 10 mg every 8 hours.  Blood pressures are better today.  However 

she does have a old fistula in the 1 arm that the blood pressures were being 

drawn from him he may have not been getting adequate blood pressure readings.  

White count has continued to increase to 24.6.  Hemoglobin 9.5 and sodium has 

dropped to 126.  Both infectious disease and nephrology have been a with made 

aware of his lab results.  Infectious disease will be back through later this 

evening to reevaluate patient at that time hopefully she will be cooperative 

for the physician to evaluate her skin.  Nephrology is recommending that 

patient be started on a diet to help with the sodium.  Since patient had a 

bowel movement we will advance diet to a consistent carbohydrate diet.  And 

nephrology is also recommending a 1500 mL fluid restriction.








On 12/19/2018 per nursing staff patient did lack Dr. Ramey with infectious 

disease evaluate her skin.  Patient is still refusing to let myself or nursing 

staff do any further assessment for evaluation.  Patient does state she feels 

slightly more improved.  Denies chest pain or shortness of breath.  Patient 

denies nausea vomiting or diarrhea.  Patient denies any urinary burning or 

frequency.














Objective





- Vital Signs


Vital signs: 


 Vital Signs











Temp  98.0 F   12/19/18 09:00


 


Pulse  86   12/19/18 09:00


 


Resp  18   12/19/18 09:00


 


BP  90/53   12/19/18 10:10


 


Pulse Ox  100   12/19/18 09:00








 Intake & Output











 12/18/18 12/19/18 12/19/18





 18:59 06:59 18:59


 


Intake Total 300 400 


 


Balance 300 400 


 


Intake:   


 


  Oral 300 400 


 


Other:   


 


  Voiding Method CAPD CAPD CAPD


 


  # Voids 0 0 


 


  # Bowel Movements  2 














- Exam


Head normocephalic


Neck supple


Lungs clear to auscultation bilaterally no wheezing or crackles


Heart regular rate and rhythm S1-S2, no rub or gallop


Abdomen is soft diffuse tenderness with palpation nondistended positive bowel 

sounds no hepatosplenomegaly obese


Extremities no edema


Neuro alert and orientated to 3


Skin: Patient has extensive bruising along the upper thighs lower back also 

smaller bruises along the lower legs..  Patient has tenderness all over body 

with palpation








- Labs


CBC & Chem 7: 


 12/19/18 11:45





 12/19/18 11:45


Labs: 


 Abnormal Lab Results - Last 24 Hours (Table)











  12/18/18 12/18/18 12/18/18 Range/Units





  11:26 18:08 21:43 


 


WBC     (3.8-10.6)  k/uL


 


RBC     (3.80-5.40)  m/uL


 


Hgb     (11.4-16.0)  gm/dL


 


Hct     (34.0-46.0)  %


 


MCV     (80.0-100.0)  fL


 


MCHC     (31.0-37.0)  g/dL


 


RDW     (11.5-15.5)  %


 


Neutrophils # (Manual)  20.60 H    (1.3-7.7)  k/uL


 


Lymphocytes # (Manual)  0.98 L    (1.0-4.8)  k/uL


 


Monocytes # (Manual)  2.95 H    (0-1.0)  k/uL


 


Sodium     (137-145)  mmol/L


 


Potassium     (3.5-5.1)  mmol/L


 


Chloride     ()  mmol/L


 


Carbon Dioxide     (22-30)  mmol/L


 


BUN     (7-17)  mg/dL


 


Creatinine     (0.52-1.04)  mg/dL


 


Glucose     (74-99)  mg/dL


 


POC Glucose (mg/dL)   287 H  214 H  (75-99)  mg/dL














  12/19/18 12/19/18 12/19/18 Range/Units





  07:14 11:45 11:45 


 


WBC   19.7 H   (3.8-10.6)  k/uL


 


RBC   2.80 L   (3.80-5.40)  m/uL


 


Hgb   8.1 L   (11.4-16.0)  gm/dL


 


Hct   29.0 L   (34.0-46.0)  %


 


MCV   103.7 H   (80.0-100.0)  fL


 


MCHC   28.0 L   (31.0-37.0)  g/dL


 


RDW   16.0 H   (11.5-15.5)  %


 


Neutrophils # (Manual)     (1.3-7.7)  k/uL


 


Lymphocytes # (Manual)     (1.0-4.8)  k/uL


 


Monocytes # (Manual)     (0-1.0)  k/uL


 


Sodium    127 L  (137-145)  mmol/L


 


Potassium    3.1 L  (3.5-5.1)  mmol/L


 


Chloride    91 L  ()  mmol/L


 


Carbon Dioxide    21 L  (22-30)  mmol/L


 


BUN    32 H  (7-17)  mg/dL


 


Creatinine    7.08 H*  (0.52-1.04)  mg/dL


 


Glucose    181 H  (74-99)  mg/dL


 


POC Glucose (mg/dL)  226 H    (75-99)  mg/dL














  12/19/18 Range/Units





  12:15 


 


WBC   (3.8-10.6)  k/uL


 


RBC   (3.80-5.40)  m/uL


 


Hgb   (11.4-16.0)  gm/dL


 


Hct   (34.0-46.0)  %


 


MCV   (80.0-100.0)  fL


 


MCHC   (31.0-37.0)  g/dL


 


RDW   (11.5-15.5)  %


 


Neutrophils # (Manual)   (1.3-7.7)  k/uL


 


Lymphocytes # (Manual)   (1.0-4.8)  k/uL


 


Monocytes # (Manual)   (0-1.0)  k/uL


 


Sodium   (137-145)  mmol/L


 


Potassium   (3.5-5.1)  mmol/L


 


Chloride   ()  mmol/L


 


Carbon Dioxide   (22-30)  mmol/L


 


BUN   (7-17)  mg/dL


 


Creatinine   (0.52-1.04)  mg/dL


 


Glucose   (74-99)  mg/dL


 


POC Glucose (mg/dL)  205 H  (75-99)  mg/dL








 Microbiology - Last 24 Hours (Table)











 12/18/18 23:00 Gram Stain - Preliminary





 Peritoneal Fluid Body Fluid Culture - Preliminary














Assessment and Plan


Assessment: 


1.  Prolonged period of time in bed With pain throughout her body and extensive 

bruising.  Hemoglobin up to 9.4.  Aspirin and Plavix resumed





2.  Insulin-dependent diabetes mellitus: Hyperglycemia with blood sugar 469 on 

admission.  Patient has not been taking insulin at home.  Acetone level 

negative.  A1c 7.7.  Patient did have some hypoglycemia due to insulin being 

given with her being on a clear liquid diet and poor appetite.  Diet has been 

advanced.  We will resume her NovoLog 70/30 at 80 units twice a day





3.  History of end-stage renal disease on peritoneal dialysis.  Nephrology 

following.  Continue peritoneal dialysis.





4.  Morbid obesity





5.  Medication noncompliance





6.  Severe Depression.  Patient denies any suicidal or homicidal ideation.  

Recently started on Prozac 10 mg daily outpatient.  Will restart Prozac at 20 

mg daily . Wellbutrin has been adder per psych.  At this time daughter 

requesting that psych revisit patient for reevaluation of patient's depression.

  Psychiatry has come to reevaluate patient.  Megace has been added and 

Wellbutrin has been increased.  Abilify 2 mg amount has been added.





7.  History of COPD stable





8.  History of CVA





9.  History of Essential hypertension





10.  Hyperlipidemia





11.  Obstructive sleep apnea uses CPAP





12.  Chronic hypoxic respiratory failure home O2 dependent on 4-5 L





13.  Right lower extremity pain.  Concerns for possible DVT due to prolonged 

time in bed.  Continue with Lovenox 140 mg subcu daily.  Patient unable to 

tolerate venous Doppler.  On 12/ 9 venous Doppler was a limited exam what was 

evaluated was negative for DVT in the right leg.  Patient refused the left leg 

to be completed.  Patient refused for the third time venous Doppler again due 

to pain.  D-dimer less than 0.17.  Lovenox 140 mg subcu daily discontinued.  

Lovenox DVT prophylaxis has been added





14.  Elevated cardiac enzymes.  Troponin 0.072.  EKG completed showing normal 

sinus rhythm.  Inferior infarct, age undetermined anterior infarct age 

undetermined.  Per cardiology services no further workup needed from cardiology 

standpoint.  No evidence for an acute cardiac event per cardiology





15.  Elevated lipase level of 410.  Has trended down.  No evidence of 

pancreatitis





16.  Hypovolemic Hyponatremia.   Possibly related to her hyperglycemia as well.

  Nephrology following





17. Elevated uric acid.  Uric acid 8.4  Patient will be started on allopurinol





18.  Anemia of chronic kidney disease.  And evidence of iron deficiency anemia.

   Will start ferrous sulfate 325 mg twice a day. Aranesp has been added per 

nephrology.  Continue to monitor hemoglobin





19.  Constipation add stool softener and lactulose





20.  Hypotension possibly related to the IV Dilaudid.  Nephrology discontinued 

the Norvasc.  Blood pressures showing improvement.





21.  Abdominal pain with ileus: abdominal x-ray showing ileus or air 

swallowing.  No free air.  Patient did have a bowel movement after lactulose 

yesterday.  Patient refused enema.  We'll advance diet to a consistent 

carbohydrate diet





22.  Hyponatremia due to poor nutritional intake.  Advance diet.  Nephrology 

notified.  They've also recommended fluid restrictions 1500 MLS per day





23.  Hypomagnesemia and patient receiving magnesium supplement.  Repeat 

magnesium level in a.m.





25.  Leukocytosis: White count continues to increase.   CBC slightly improved 

at 19.7.  Per infectious disease blood cultures will be obtained.  Continue 

fluid for cell count, differential and culture.  Infected area on the sacral 

area marked.  Redness.  Recommending frequent change of position and keep dry.  

Patient remains on vancomycin and Zosyn





DVT prophylaxis Lovenox

## 2018-12-20 LAB
ALBUMIN SERPL-MCNC: 2.3 G/DL (ref 3.5–5)
ALP SERPL-CCNC: 200 U/L (ref 38–126)
ALT SERPL-CCNC: 43 U/L (ref 9–52)
ANION GAP SERPL CALC-SCNC: 19 MMOL/L
AST SERPL-CCNC: 52 U/L (ref 14–36)
BASOPHILS # BLD AUTO: 0.1 K/UL (ref 0–0.2)
BASOPHILS NFR BLD AUTO: 0 %
BUN SERPL-SCNC: 35 MG/DL (ref 7–17)
CALCIUM SPEC-MCNC: 8.5 MG/DL (ref 8.4–10.2)
CHLORIDE SERPL-SCNC: 92 MMOL/L (ref 98–107)
CO2 SERPL-SCNC: 19 MMOL/L (ref 22–30)
EOSINOPHIL # BLD AUTO: 0 K/UL (ref 0–0.7)
EOSINOPHIL NFR BLD AUTO: 0 %
ERYTHROCYTE [DISTWIDTH] IN BLOOD BY AUTOMATED COUNT: 2.99 M/UL (ref 3.8–5.4)
ERYTHROCYTE [DISTWIDTH] IN BLOOD: 15.9 % (ref 11.5–15.5)
GLUCOSE BLD-MCNC: 150 MG/DL (ref 75–99)
GLUCOSE BLD-MCNC: 166 MG/DL (ref 75–99)
GLUCOSE BLD-MCNC: 196 MG/DL (ref 75–99)
GLUCOSE BLD-MCNC: 249 MG/DL (ref 75–99)
GLUCOSE SERPL-MCNC: 240 MG/DL (ref 74–99)
HCT VFR BLD AUTO: 31.7 % (ref 34–46)
HGB BLD-MCNC: 8.6 GM/DL (ref 11.4–16)
LYMPHOCYTES # SPEC AUTO: 0.6 K/UL (ref 1–4.8)
LYMPHOCYTES NFR SPEC AUTO: 3 %
MCH RBC QN AUTO: 28.9 PG (ref 25–35)
MCHC RBC AUTO-ENTMCNC: 27.2 G/DL (ref 31–37)
MCV RBC AUTO: 106 FL (ref 80–100)
MONOCYTES # BLD AUTO: 0.7 K/UL (ref 0–1)
MONOCYTES NFR BLD AUTO: 4 %
NEUTROPHILS # BLD AUTO: 16.8 K/UL (ref 1.3–7.7)
NEUTROPHILS NFR BLD AUTO: 92 %
PLATELET # BLD AUTO: 327 K/UL (ref 150–450)
POTASSIUM SERPL-SCNC: 4 MMOL/L (ref 3.5–5.1)
PROT SERPL-MCNC: 5.1 G/DL (ref 6.3–8.2)
SODIUM SERPL-SCNC: 130 MMOL/L (ref 137–145)
VANCOMYCIN SERPL-MCNC: 22.9 UG/ML
WBC # BLD AUTO: 18.3 K/UL (ref 3.8–10.6)

## 2018-12-20 RX ADMIN — POTASSIUM CHLORIDE SCH MEQ: 20 TABLET, EXTENDED RELEASE ORAL at 00:03

## 2018-12-20 RX ADMIN — ONDANSETRON PRN MG: 2 INJECTION INTRAMUSCULAR; INTRAVENOUS at 10:41

## 2018-12-20 RX ADMIN — IPRATROPIUM BROMIDE SCH: 0.5 SOLUTION RESPIRATORY (INHALATION) at 11:27

## 2018-12-20 RX ADMIN — SEVELAMER CARBONATE SCH: 800 TABLET, FILM COATED ORAL at 17:53

## 2018-12-20 RX ADMIN — SEVELAMER CARBONATE SCH: 800 TABLET, FILM COATED ORAL at 11:39

## 2018-12-20 RX ADMIN — VANCOMYCIN HYDROCHLORIDE SCH MLS/HR: 500 INJECTION, POWDER, LYOPHILIZED, FOR SOLUTION INTRAVENOUS at 03:08

## 2018-12-20 RX ADMIN — INSULIN ASPART SCH: 100 INJECTION, SUSPENSION SUBCUTANEOUS at 20:54

## 2018-12-20 RX ADMIN — HYDROCODONE BITARTRATE AND ACETAMINOPHEN PRN EACH: 10; 325 TABLET ORAL at 15:18

## 2018-12-20 RX ADMIN — GENTAMICIN SULFATE SCH APPLIC: 1 CREAM TOPICAL at 07:45

## 2018-12-20 RX ADMIN — MEGESTROL ACETATE SCH: 40 TABLET ORAL at 17:53

## 2018-12-20 RX ADMIN — POLYETHYLENE GLYCOL 3350 SCH: 17 POWDER, FOR SOLUTION ORAL at 21:01

## 2018-12-20 RX ADMIN — Medication SCH EACH: at 11:28

## 2018-12-20 RX ADMIN — LEVOTHYROXINE SODIUM SCH MCG: 88 TABLET ORAL at 06:05

## 2018-12-20 RX ADMIN — ALLOPURINOL SCH MG: 100 TABLET ORAL at 07:41

## 2018-12-20 RX ADMIN — SEVELAMER CARBONATE SCH MG: 800 TABLET, FILM COATED ORAL at 07:40

## 2018-12-20 RX ADMIN — DOCUSATE SODIUM SCH: 100 CAPSULE, LIQUID FILLED ORAL at 07:44

## 2018-12-20 RX ADMIN — INSULIN ASPART SCH UNIT: 100 INJECTION, SOLUTION INTRAVENOUS; SUBCUTANEOUS at 17:50

## 2018-12-20 RX ADMIN — VANCOMYCIN HYDROCHLORIDE SCH MLS/HR: 500 INJECTION, POWDER, LYOPHILIZED, FOR SOLUTION INTRAVENOUS at 15:19

## 2018-12-20 RX ADMIN — FENOFIBRATE SCH MG: 160 TABLET ORAL at 07:41

## 2018-12-20 RX ADMIN — PIPERACILLIN AND TAZOBACTAM SCH MLS/HR: 3; .375 INJECTION, POWDER, FOR SOLUTION INTRAVENOUS at 07:45

## 2018-12-20 RX ADMIN — CLOTRIMAZOLE SCH: 0.01 CREAM TOPICAL at 22:32

## 2018-12-20 RX ADMIN — VANCOMYCIN HYDROCHLORIDE SCH MLS/HR: 500 INJECTION, POWDER, LYOPHILIZED, FOR SOLUTION INTRAVENOUS at 21:09

## 2018-12-20 RX ADMIN — IPRATROPIUM BROMIDE SCH: 0.5 SOLUTION RESPIRATORY (INHALATION) at 15:59

## 2018-12-20 RX ADMIN — INSULIN ASPART SCH UNIT: 100 INJECTION, SOLUTION INTRAVENOUS; SUBCUTANEOUS at 07:40

## 2018-12-20 RX ADMIN — FOLIC ACID SCH MG: 1 TABLET ORAL at 11:28

## 2018-12-20 RX ADMIN — MEGESTROL ACETATE SCH MG: 40 TABLET ORAL at 09:25

## 2018-12-20 RX ADMIN — VANCOMYCIN HYDROCHLORIDE SCH MLS/HR: 500 INJECTION, POWDER, LYOPHILIZED, FOR SOLUTION INTRAVENOUS at 08:25

## 2018-12-20 RX ADMIN — CLOTRIMAZOLE SCH: 0.01 CREAM TOPICAL at 07:51

## 2018-12-20 RX ADMIN — TRIAMCINOLONE ACETONIDE SCH APPLIC: 1 CREAM TOPICAL at 21:01

## 2018-12-20 RX ADMIN — IPRATROPIUM BROMIDE SCH: 0.5 SOLUTION RESPIRATORY (INHALATION) at 07:34

## 2018-12-20 RX ADMIN — BUPROPION HYDROCHLORIDE SCH MG: 300 TABLET, FILM COATED, EXTENDED RELEASE ORAL at 07:44

## 2018-12-20 RX ADMIN — ASPIRIN 81 MG CHEWABLE TABLET SCH MG: 81 TABLET CHEWABLE at 07:42

## 2018-12-20 RX ADMIN — TRIAMCINOLONE ACETONIDE SCH APPLIC: 1 CREAM TOPICAL at 07:45

## 2018-12-20 RX ADMIN — CLOPIDOGREL BISULFATE SCH MG: 75 TABLET ORAL at 07:42

## 2018-12-20 RX ADMIN — ONDANSETRON PRN MG: 2 INJECTION INTRAMUSCULAR; INTRAVENOUS at 21:42

## 2018-12-20 RX ADMIN — FAMOTIDINE SCH MG: 20 TABLET, FILM COATED ORAL at 20:41

## 2018-12-20 RX ADMIN — LATANOPROST SCH DROPS: 50 SOLUTION OPHTHALMIC at 20:45

## 2018-12-20 RX ADMIN — INSULIN ASPART SCH UNIT: 100 INJECTION, SOLUTION INTRAVENOUS; SUBCUTANEOUS at 13:22

## 2018-12-20 RX ADMIN — Medication SCH MG: at 07:43

## 2018-12-20 RX ADMIN — ATORVASTATIN CALCIUM SCH MG: 80 TABLET, FILM COATED ORAL at 20:42

## 2018-12-20 RX ADMIN — DOCUSATE SODIUM SCH: 100 CAPSULE, LIQUID FILLED ORAL at 20:42

## 2018-12-20 RX ADMIN — ENOXAPARIN SODIUM SCH MG: 30 INJECTION SUBCUTANEOUS at 07:42

## 2018-12-20 RX ADMIN — INSULIN ASPART SCH UNIT: 100 INJECTION, SUSPENSION SUBCUTANEOUS at 07:39

## 2018-12-20 RX ADMIN — INSULIN ASPART SCH UNIT: 100 INJECTION, SOLUTION INTRAVENOUS; SUBCUTANEOUS at 20:58

## 2018-12-20 RX ADMIN — PIPERACILLIN AND TAZOBACTAM SCH MLS/HR: 3; .375 INJECTION, POWDER, FOR SOLUTION INTRAVENOUS at 20:43

## 2018-12-20 RX ADMIN — Medication SCH: at 20:42

## 2018-12-20 RX ADMIN — MEGESTROL ACETATE SCH MG: 40 TABLET ORAL at 21:44

## 2018-12-20 RX ADMIN — IPRATROPIUM BROMIDE SCH: 0.5 SOLUTION RESPIRATORY (INHALATION) at 19:21

## 2018-12-20 RX ADMIN — Medication SCH UNIT: at 11:28

## 2018-12-20 RX ADMIN — MEGESTROL ACETATE SCH: 40 TABLET ORAL at 11:29

## 2018-12-20 RX ADMIN — HYDROCODONE BITARTRATE AND ACETAMINOPHEN PRN EACH: 10; 325 TABLET ORAL at 07:36

## 2018-12-20 RX ADMIN — HYDROCODONE BITARTRATE AND ACETAMINOPHEN PRN EACH: 10; 325 TABLET ORAL at 22:23

## 2018-12-20 NOTE — PN
PROGRESS NOTE



DATE OF SERVICE:

12/20/2018.



REASON FOR FOLLOWUP:

_____ ulcer with possible secondary cellulitis.



INTERVAL HISTORY:

The patient is currently afebrile.  She is breathing comfortably.  Denies having any

chest pain or shortness of breath.  No cough or any worsening pain to the sacral wound

area.



EXAMINATION:

Blood pressure 115/58 with a pulse of 82, temperature 98.2.  She is 96% on 4 L nasal

cannula.



General description is a middle-aged female lying in bed in no distress.  Respiratory

system:  Unlabored breathing.  Clear to auscultation anteriorly.  Heart S1, S2.

Regular rate and rhythm. Abdomen soft, no tenderness.



LABS:

Hemoglobin is 8.6, white count of 18.3.  Blood culture has been negative.



DIAGNOSTIC IMPRESSION AND PLAN:

Patient with leukocytosis likely secondary to surgical sacral pressure ulcer with

possible secondary cellulitis.  The patient's white count responded to the vancomycin,

Zosyn to continue for now.  We will re-evaluate the wound tomorrow.  Continue with

supportive care.





MMODL / IJN: [id number] / Job#: [id number]

## 2018-12-20 NOTE — XR
EXAMINATION TYPE: XR chest 1V portable

 

DATE OF EXAM: 12/20/2018

 

HISTORY: Shortness of breath.

 

COMPARISON: 12/6/2018

 

TECHNIQUE: Single view of the chest is submitted.

 

FINDINGS:

Demonstrated are scattered senescent parenchymal change.  

 

There is no evidence for focal infiltrate. 

 

The heart is stable.

 

Hilar and mediastinal structures are within normal limits.  

 

Degenerative changes are seen of the dorsal spine. 

 

 IMPRESSION: 

 

1.  Chronic changes without evidence for acute pulmonary disease.

## 2018-12-20 NOTE — P.PN
Subjective


Progress Note Date: 12/20/18





This is a 58-year-old female, patient of [facility].  Patient has 

a known past medical history of end-stage renal disease on hemodialysis, 

congestive heart failure, COPD, diabetes mellitus, hypertension, hyperlipidemia

, obstructive sleep apnea, chronic hypoxic respiratory failure on 4-5 L of 

oxygen at home, depression and CVA.  History obtained from both patient and 

patient's sister.  Apparently their mother had passed away on November 27 

couple weeks ago.  Since then, patient's has not gotten out of bed.  She has 

not moved out of her bed for about 2 weeks per the sister.  She stopped taking 

all of her medications.  And she has not been eating or drinking much.  Blood 

sugar 469 on admission.  Per the sister the patient was started on Prozac 10 mg 

daily about a month ago.  She took it for about a week and then stopped taking 

it.  Before that patient had been on Zoloft.  Patient reports that she hurts 

everywhere.  She has extensive bruising in the upper thigh is growing area and 

back.  There are smaller bruises on the lower leg area.  She hurts anywhere she 

is touched.  She complains of pain in the chest as well as the abdomen and legs 

and back.  She denies any falling.  Reports some nausea and chest chest pain.  

Denies any fever, chills, sweats, cough, bowel movement changes.  She does not 

make urine and is on peritoneal dialysis. 








On 12/07/2018 patient is currently lying in bed currently getting CAPD.  

Patient is complaining of generalized pain throughout chest abdomen and legs.  

Patient also complaining of more severe pain in her right lower extremity.  

Patient would not let me examine right leg.  Explained to patient that due to 

her prolonged bed rest she is at increased risk for blood clot and then I'm 

concerned that she may have a blood clot in her right leg and that she would 

require a venous Doppler to assess.  Patient states she would not be able to 

tolerate a venous Doppler at this time due to the pain.  Will order Dilaudid 

for pain at this time and encouraged patient the importance of obtaining this 

test.  We'll also consult cardiology services at this time.








On 12/08/2018 patient currently getting CAPD.  Patient did have Doppler study 

completed but was un conclusive of DVT due to increased pain during test.  

Patient remains on Lovenox 1 mg/kg per renal dosing for DVT treatment.  This 

time patient states that she feels slightly improved but still has generalized 

pain all over.  Patient denies chest pain or shortness breath.  Patient denies 

any nausea vomiting or diarrhea.  Patient denies any urinary burning or 

frequency.





On 12/09/2018 Patient currently resting in bed. Patient is having significant 

to lower extremity pain. Will order venous doppler again due to unconclusive 

reading from initial test.  Will order ativan and diuladid to be given prior to 

exam in order to get adequate reading. Patient denies chest pain and shortness 

of breath. Denies nausea, vomitting or diarrhea. Denies any urinary burning or 

frequency  





12/10/2018 patient still complaining of pain all over the body.  She does 

report is slightly better than admission.  Complaining of constipation and his 

been about 3 days since her last bowel movement.  Hemoglobin has dropped from 

9.3-8.7.  Potassium is 3.1 and she received supplement.  Venous Doppler of the 

lower extremities showing a limited exam of the right leg and what could be 

seen was negative for DVT.  Patient refused the left leg to be completed due to 

pain.  Patient has been noncompliant with physical therapy.  Poor appetite.  

Pain service will be placed on consult.  Patient has been educated that she 

needs to participate with physical therapy.





12/11/2018 patient does report slight improvement in her pain.  Still 

complaining of pain throughout her body.  Patient was able to work with 

physical therapy yesterday.  She was a max assist.  She denies any chest pain 

or shortness of breath.  Denies any nausea or vomiting.  Still has not had a 

bowel movement for about 5 days.  Norvasc discontinued yesterday due to 

hypotension.  Blood pressures are 20 better today.  Nephrology is also adjusted 

the frequency of peritoneal dialysis to every 4 hours. 





On 12/12/2018 patient does report slight improvement with her pain.  Patient is 

still complaining of overall pain throughout her entire body.  Patient refused 

venous Doppler again due to pain for the third time.  D-dimer was negative.  

Lovenox was DC'd and Lovenox prophylaxis has been added.  Patient denies chest 

pain or shortness of breath.  Patient denies nausea vomiting or diarrhea.  

Patient denies any urinary burning or frequency





On 12/13/2018 patient does report slight improvement with pain today.  Patient'

s daughter currently at bedside.  Patient started still expressed concerns over 

lack of motivation from her mother to get out of bed.  Requesting that site 

revisit patient.  Still waiting on pain services to come and evaluate patient.  

At this time patient denies chest pain or shortness of breath.  Patient denies 

nausea vomiting or diarrhea.  Patient denies any urinary burning or frequency.  

Patient highly encouraged to continue working with physical therapy in order to 

prevent negative outcomes.  At this time planning discharge to [facility]





On 12/14/2018 patient states she feels slightly more improved.  Patient was 

seen by psych Megace has been added Wellbutrin has been increased.  Discussed 

case with social work and a lot of yellow does not have any beds available.  

Still trying to be achieved placement due to peritoneal dialysis.  Patient also 

complaining of generalized pain.  Patient denies chest pain.  Patient denies 

nausea vomiting or diarrhea.  Patient denies any urinary burning





On 12/15/2018 patient is alert and responsive in no apparent distress, she 

states she is feeling a little bit better today, she is complaining of 

generalized pain, otherwise no complaints at this time there is no fever or 

chills no headache or dizziness no chest pain no shortness of breath no cough 

no nausea or vomiting no abdominal pain no diarrhea and no urinary symptoms.





On 12/16/2018 patient was seen and examined she is alert and responsive in no 

apparent distress.  Nursing staff reporting that patient has been uncooperative

, she is refusing to be turned in bed, she is refusing inspection of her sacral 

ulcers, she is refusing most of her other medical care.  Abdomen x-ray done 

yesterday reveals evidence of large bowel ileus.  Patient denies abdominal pain 

but is having pain when abdominal palpation is done.








12/17/2018 patient lying in bed she is awake and alert.  Reporting poor 

appetite.  Still has been uncooperative with nursing staff.  Still complaining 

of pain all over her body.  Abdominal x-ray showing a large bowel ileus or air 

swallowing.  Discussed with surgical service they'll be through to evaluate 

patient this afternoon.  Patient still complaining of some abdominal pain.  No 

bowel movement for about 10 days.  She is now agreeable to a soapsuds enema and 

lactulose.  These have been ordered.  Denies any nausea or vomiting.  





12/18/2018 patient is still being uncooperative.  She has refused both myself 

and nursing staff to evaluate her skin.  Apparently it has been reported that 

she has ulcers on the right side of her back.  Concerns for infection.  The 

patient refuses to be turned over.  She continues to lay on her back.  Patient 

refused surgical service evaluation yesterday.  She did have a bowel movement 

with the lactulose.  Refuses the enema.  Pain service came through and increase 

her Norco to 10 mg every 8 hours.  Blood pressures are better today.  However 

she does have a old fistula in the 1 arm that the blood pressures were being 

drawn from him he may have not been getting adequate blood pressure readings.  

White count has continued to increase to 24.6.  Hemoglobin 9.5 and sodium has 

dropped to 126.  Both infectious disease and nephrology have been a with made 

aware of his lab results.  Infectious disease will be back through later this 

evening to reevaluate patient at that time hopefully she will be cooperative 

for the physician to evaluate her skin.  Nephrology is recommending that 

patient be started on a diet to help with the sodium.  Since patient had a 

bowel movement we will advance diet to a consistent carbohydrate diet.  And 

nephrology is also recommending a 1500 mL fluid restriction.





On 12/19/2018 per nursing staff patient did let Dr. Ramey with infectious 

disease evaluate her skin.  Patient is still refusing to let myself or nursing 

staff do any further assessment for evaluation.  Patient does state she feels 

slightly more improved.  Denies chest pain or shortness of breath.  Patient 

denies nausea vomiting or diarrhea.  Patient denies any urinary burning or 

frequency.





12/20/2018 patient complaining of nausea.  No vomiting.  She is requesting pain 

medication.  Still reporting that she hurts all over.  When patient is ready 

for discharge she'll be discharged to [address] [place] [address].  She's currently on 

vancomycin and Zosyn due to sacral ulcer and cellulitis.  Chest x-ray completed 

due to elevated white count showing chronic changes no acute changes.  Patient 

is having bowel movements








Objective





- Vital Signs


Vital signs: 


 Vital Signs











Temp  98.7 F   12/20/18 08:31


 


Pulse  82   12/20/18 08:31


 


Resp  18   12/20/18 08:31


 


BP  100/62   12/20/18 08:31


 


Pulse Ox  98   12/20/18 08:31








 Intake & Output











 12/19/18 12/20/18 12/20/18





 18:59 06:59 18:59


 


Intake Total  400 


 


Output Total  1 1


 


Balance  399 -1


 


Weight  169 kg 169 kg


 


Intake:   


 


  Oral  400 


 


Output:   


 


  Urine  0 0


 


  Stool  1 1


 


Other:   


 


  Voiding Method CAPD CAPD CAPD


 


  # Voids 0 0 0


 


  # Bowel Movements 0  














- Exam





Head normocephalic


Neck supple


Lungs clear to auscultation bilaterally no wheezing or crackles


Heart regular rate and rhythm S1-S2, no rub or gallop


Abdomen is soft nontender nondistended positive bowel sounds no 

hepatosplenomegaly.  Obese.  Peritoneal dialysis catheter site brown crusting 

or scabbing noted around the area.  No erythema.


Extremities no edema


Neuro alert and orientated to 3


Skin extensive bruising along the upper thighs and lower back and small bruises 

on the legs.  Patient has tenderness all over the body with palpation.  Patient'

s tenderness with palpation is less severe and bruising are showing improvement





- Labs


CBC & Chem 7: 


 12/20/18 09:06





 12/20/18 09:06


Labs: 


 Abnormal Lab Results - Last 24 Hours (Table)











  12/19/18 12/19/18 12/19/18 Range/Units





  11:45 11:45 12:15 


 


WBC  19.7 H    (3.8-10.6)  k/uL


 


RBC  2.80 L    (3.80-5.40)  m/uL


 


Hgb  8.1 L    (11.4-16.0)  gm/dL


 


Hct  29.0 L    (34.0-46.0)  %


 


MCV  103.7 H    (80.0-100.0)  fL


 


MCHC  28.0 L    (31.0-37.0)  g/dL


 


RDW  16.0 H    (11.5-15.5)  %


 


Neutrophils #     (1.3-7.7)  k/uL


 


Neutrophils # (Manual)  16.30 H    (1.3-7.7)  k/uL


 


Lymphocytes #     (1.0-4.8)  k/uL


 


Lymphocytes # (Manual)  0.59 L    (1.0-4.8)  k/uL


 


Monocytes # (Manual)  2.36 H    (0-1.0)  k/uL


 


Metamyelocytes # (Man)  0.39 H    (0)  k/uL


 


Myelocytes # (Manual)  0.39 H    (0)  k/uL


 


Sodium   127 L   (137-145)  mmol/L


 


Potassium   3.1 L   (3.5-5.1)  mmol/L


 


Chloride   91 L   ()  mmol/L


 


Carbon Dioxide   21 L   (22-30)  mmol/L


 


BUN   32 H   (7-17)  mg/dL


 


Creatinine   7.08 H*   (0.52-1.04)  mg/dL


 


Glucose   181 H   (74-99)  mg/dL


 


POC Glucose (mg/dL)    205 H  (75-99)  mg/dL


 


AST     (14-36)  U/L


 


Alkaline Phosphatase     ()  U/L


 


Total Protein     (6.3-8.2)  g/dL


 


Albumin     (3.5-5.0)  g/dL














  12/19/18 12/19/18 12/20/18 Range/Units





  17:14 21:38 07:31 


 


WBC     (3.8-10.6)  k/uL


 


RBC     (3.80-5.40)  m/uL


 


Hgb     (11.4-16.0)  gm/dL


 


Hct     (34.0-46.0)  %


 


MCV     (80.0-100.0)  fL


 


MCHC     (31.0-37.0)  g/dL


 


RDW     (11.5-15.5)  %


 


Neutrophils #     (1.3-7.7)  k/uL


 


Neutrophils # (Manual)     (1.3-7.7)  k/uL


 


Lymphocytes #     (1.0-4.8)  k/uL


 


Lymphocytes # (Manual)     (1.0-4.8)  k/uL


 


Monocytes # (Manual)     (0-1.0)  k/uL


 


Metamyelocytes # (Man)     (0)  k/uL


 


Myelocytes # (Manual)     (0)  k/uL


 


Sodium     (137-145)  mmol/L


 


Potassium     (3.5-5.1)  mmol/L


 


Chloride     ()  mmol/L


 


Carbon Dioxide     (22-30)  mmol/L


 


BUN     (7-17)  mg/dL


 


Creatinine     (0.52-1.04)  mg/dL


 


Glucose     (74-99)  mg/dL


 


POC Glucose (mg/dL)  135 H  147 H  249 H  (75-99)  mg/dL


 


AST     (14-36)  U/L


 


Alkaline Phosphatase     ()  U/L


 


Total Protein     (6.3-8.2)  g/dL


 


Albumin     (3.5-5.0)  g/dL














  12/20/18 12/20/18 Range/Units





  09:06 09:06 


 


WBC  18.3 H   (3.8-10.6)  k/uL


 


RBC  2.99 L   (3.80-5.40)  m/uL


 


Hgb  8.6 L   (11.4-16.0)  gm/dL


 


Hct  31.7 L   (34.0-46.0)  %


 


MCV  106.0 H   (80.0-100.0)  fL


 


MCHC  27.2 L   (31.0-37.0)  g/dL


 


RDW  15.9 H   (11.5-15.5)  %


 


Neutrophils #  16.8 H   (1.3-7.7)  k/uL


 


Neutrophils # (Manual)    (1.3-7.7)  k/uL


 


Lymphocytes #  0.6 L   (1.0-4.8)  k/uL


 


Lymphocytes # (Manual)    (1.0-4.8)  k/uL


 


Monocytes # (Manual)    (0-1.0)  k/uL


 


Metamyelocytes # (Man)    (0)  k/uL


 


Myelocytes # (Manual)    (0)  k/uL


 


Sodium   130 L  (137-145)  mmol/L


 


Potassium    (3.5-5.1)  mmol/L


 


Chloride   92 L  ()  mmol/L


 


Carbon Dioxide   19 L  (22-30)  mmol/L


 


BUN   35 H  (7-17)  mg/dL


 


Creatinine   7.39 H*  (0.52-1.04)  mg/dL


 


Glucose   240 H  (74-99)  mg/dL


 


POC Glucose (mg/dL)    (75-99)  mg/dL


 


AST   52 H  (14-36)  U/L


 


Alkaline Phosphatase   200 H  ()  U/L


 


Total Protein   5.1 L  (6.3-8.2)  g/dL


 


Albumin   2.3 L  (3.5-5.0)  g/dL








 Microbiology - Last 24 Hours (Table)











 12/18/18 18:59 Blood Culture - Preliminary





 Blood    No Growth after 24 hours


 


 12/18/18 23:00 Gram Stain - Preliminary





 Peritoneal Fluid Body Fluid Culture - Preliminary














Assessment and Plan


Assessment: 





1.  Prolonged period of time in bed With pain throughout her body and extensive 

bruising.  Aspirin and Plavix resumed





2.  Insulin-dependent diabetes mellitus: Hyperglycemia with blood sugar 469 on 

admission.  Patient has not been taking insulin at home.  Acetone level 

negative.  A1c 7.7.  Patient did have some hypoglycemia due to insulin being 

given with her being on a clear liquid diet and poor appetite.  Diet has been 

advanced.  We will resume her NovoLog 70/30 at 80 units twice a day





3.  History of end-stage renal disease on peritoneal dialysis.  Nephrology 

following.  Continue peritoneal dialysis.





4.  Morbid obesity





5.  Medication noncompliance





6.  Severe Depression.  Patient denies any suicidal or homicidal ideation.  

Recently started on Prozac 10 mg daily outpatient.  Will restart Prozac at 20 

mg daily . Wellbutrin has been adder per psych.  At this time daughter 

requesting that psych revisit patient for reevaluation of patient's depression.

  Psychiatry has come to reevaluate patient.  Megace has been added and 

Wellbutrin has been increased.  Continue Abilify





7.  History of COPD stable





8.  History of CVA





9.  History of Essential hypertension





10.  Hyperlipidemia





11.  Obstructive sleep apnea uses CPAP





12.  Chronic hypoxic respiratory failure home O2 dependent on 4-5 L





13.  Right lower extremity pain.  Concerns for possible DVT due to prolonged 

time in bed.  Continue with Lovenox 140 mg subcu daily.  Patient unable to 

tolerate venous Doppler.  On 12/ 9 venous Doppler was a limited exam what was 

evaluated was negative for DVT in the right leg.  Patient refused the left leg 

to be completed.  Patient refused for the third time venous Doppler again due 

to pain.  D-dimer less than 0.17.  Lovenox 140 mg subcu daily discontinued.  

Lovenox DVT prophylaxis has been added





14.  Elevated cardiac enzymes.  Troponin 0.072.  EKG completed showing normal 

sinus rhythm.  Inferior infarct, age undetermined anterior infarct age 

undetermined.  Per cardiology services no further workup needed from cardiology 

standpoint.  No evidence for an acute cardiac event per cardiology





15.  Elevated lipase level of 410.  Has trended down.  No evidence of 

pancreatitis





16.  Hypovolemic Hyponatremia.   Possibly related to her hyperglycemia as well.

  Nephrology following





17. Elevated uric acid.  Uric acid 8.4  Patient will be started on allopurinol





18.  Anemia of chronic kidney disease.  And evidence of iron deficiency anemia.

   Will start ferrous sulfate 325 mg twice a day. Aranesp has been added per 

nephrology.  Continue to monitor hemoglobin.  Hemoglobin 8.6





19.  Constipation add stool softener and lactulose





20.  Hypotension possibly related to the IV Dilaudid.  Nephrology discontinued 

the Norvasc.  Blood pressures showing improvement.





21.  Abdominal pain with ileus: abdominal x-ray showing ileus or air 

swallowing.  No free air.  Patient tolerating diet.  Having stools.  Refuses 

surgical evaluation.





22.  Hyponatremia due to poor nutritional intake.  Advance diet.  Nephrology 

notified.  They've also recommended fluid restrictions 1500 MLS per day





23.  Hypomagnesemia and patient receiving magnesium supplement.  Repeat 

magnesium level in a.m.





25.  Leukocytosis: Likely source is unstageable sacral pressure ulcer with 

cellulitis.  Patient followed by infectious disease.  She's currently on 

vancomycin and Zosyn.  Peritoneal fluid is not look infected.  Chest x-ray no 

acute changes.  We'll await further infectious disease recommendations





Possible discharge to [facility] tomorrow








DVT prophylaxis Lovenox.  GI prophylaxis Pepcid





Discussed with .  When patient is stable for discharge, likely will 

discharge to [facility]





I performed an examination of the patient and discussed their management with 

the physician Assistant.  I have reviewed the Physician Assistant's notes and 

agree with the documented findings and plan of care

## 2018-12-21 LAB
ALBUMIN SERPL-MCNC: 2.1 G/DL (ref 3.5–5)
ALP SERPL-CCNC: 189 U/L (ref 38–126)
ALT SERPL-CCNC: 39 U/L (ref 9–52)
ANION GAP SERPL CALC-SCNC: 18 MMOL/L
AST SERPL-CCNC: 54 U/L (ref 14–36)
BASOPHILS # BLD AUTO: 0.1 K/UL (ref 0–0.2)
BASOPHILS NFR BLD AUTO: 0 %
BUN SERPL-SCNC: 36 MG/DL (ref 7–17)
CALCIUM SPEC-MCNC: 8.4 MG/DL (ref 8.4–10.2)
CHLORIDE SERPL-SCNC: 93 MMOL/L (ref 98–107)
CO2 SERPL-SCNC: 21 MMOL/L (ref 22–30)
EOSINOPHIL # BLD AUTO: 0.1 K/UL (ref 0–0.7)
EOSINOPHIL NFR BLD AUTO: 1 %
ERYTHROCYTE [DISTWIDTH] IN BLOOD BY AUTOMATED COUNT: 2.88 M/UL (ref 3.8–5.4)
ERYTHROCYTE [DISTWIDTH] IN BLOOD: 15.8 % (ref 11.5–15.5)
GLUCOSE BLD-MCNC: 118 MG/DL (ref 75–99)
GLUCOSE BLD-MCNC: 139 MG/DL (ref 75–99)
GLUCOSE BLD-MCNC: 226 MG/DL (ref 75–99)
GLUCOSE BLD-MCNC: 89 MG/DL (ref 75–99)
GLUCOSE SERPL-MCNC: 213 MG/DL (ref 74–99)
HCT VFR BLD AUTO: 29.6 % (ref 34–46)
HGB BLD-MCNC: 8.4 GM/DL (ref 11.4–16)
LYMPHOCYTES # SPEC AUTO: 0.7 K/UL (ref 1–4.8)
LYMPHOCYTES NFR SPEC AUTO: 3 %
MCH RBC QN AUTO: 29.2 PG (ref 25–35)
MCHC RBC AUTO-ENTMCNC: 28.4 G/DL (ref 31–37)
MCV RBC AUTO: 102.7 FL (ref 80–100)
MONOCYTES # BLD AUTO: 1.3 K/UL (ref 0–1)
MONOCYTES NFR BLD AUTO: 5 %
NEUTROPHILS # BLD AUTO: 22.2 K/UL (ref 1.3–7.7)
NEUTROPHILS NFR BLD AUTO: 90 %
PLATELET # BLD AUTO: 305 K/UL (ref 150–450)
POTASSIUM SERPL-SCNC: 4 MMOL/L (ref 3.5–5.1)
PROT SERPL-MCNC: 4.9 G/DL (ref 6.3–8.2)
SODIUM SERPL-SCNC: 132 MMOL/L (ref 137–145)
VANCOMYCIN SERPL-MCNC: 20.1 UG/ML
WBC # BLD AUTO: 24.8 K/UL (ref 3.8–10.6)

## 2018-12-21 RX ADMIN — SEVELAMER CARBONATE SCH MG: 800 TABLET, FILM COATED ORAL at 17:47

## 2018-12-21 RX ADMIN — HYDROCODONE BITARTRATE AND ACETAMINOPHEN PRN EACH: 10; 325 TABLET ORAL at 22:03

## 2018-12-21 RX ADMIN — IPRATROPIUM BROMIDE SCH: 0.5 SOLUTION RESPIRATORY (INHALATION) at 19:37

## 2018-12-21 RX ADMIN — VANCOMYCIN HYDROCHLORIDE SCH MLS/HR: 500 INJECTION, POWDER, LYOPHILIZED, FOR SOLUTION INTRAVENOUS at 08:49

## 2018-12-21 RX ADMIN — INSULIN ASPART SCH UNIT: 100 INJECTION, SUSPENSION SUBCUTANEOUS at 22:32

## 2018-12-21 RX ADMIN — MEGESTROL ACETATE SCH MG: 40 TABLET ORAL at 08:29

## 2018-12-21 RX ADMIN — ALLOPURINOL SCH MG: 100 TABLET ORAL at 08:27

## 2018-12-21 RX ADMIN — DOCUSATE SODIUM SCH MG: 100 CAPSULE, LIQUID FILLED ORAL at 08:27

## 2018-12-21 RX ADMIN — FAMOTIDINE SCH MG: 20 TABLET, FILM COATED ORAL at 22:03

## 2018-12-21 RX ADMIN — BUPROPION HYDROCHLORIDE SCH MG: 300 TABLET, FILM COATED, EXTENDED RELEASE ORAL at 08:31

## 2018-12-21 RX ADMIN — HYDROCODONE BITARTRATE AND ACETAMINOPHEN PRN EACH: 10; 325 TABLET ORAL at 13:14

## 2018-12-21 RX ADMIN — MEGESTROL ACETATE SCH MG: 40 TABLET ORAL at 11:47

## 2018-12-21 RX ADMIN — LATANOPROST SCH DROPS: 50 SOLUTION OPHTHALMIC at 22:09

## 2018-12-21 RX ADMIN — HYDROCODONE BITARTRATE AND ACETAMINOPHEN PRN EACH: 10; 325 TABLET ORAL at 05:24

## 2018-12-21 RX ADMIN — ONDANSETRON PRN MG: 2 INJECTION INTRAMUSCULAR; INTRAVENOUS at 16:15

## 2018-12-21 RX ADMIN — Medication SCH MG: at 22:03

## 2018-12-21 RX ADMIN — POLYETHYLENE GLYCOL 3350 SCH: 17 POWDER, FOR SOLUTION ORAL at 22:05

## 2018-12-21 RX ADMIN — IPRATROPIUM BROMIDE SCH: 0.5 SOLUTION RESPIRATORY (INHALATION) at 13:18

## 2018-12-21 RX ADMIN — Medication SCH EACH: at 11:46

## 2018-12-21 RX ADMIN — IPRATROPIUM BROMIDE SCH: 0.5 SOLUTION RESPIRATORY (INHALATION) at 16:12

## 2018-12-21 RX ADMIN — TRIAMCINOLONE ACETONIDE SCH: 1 CREAM TOPICAL at 22:14

## 2018-12-21 RX ADMIN — Medication SCH MG: at 08:28

## 2018-12-21 RX ADMIN — LEVOTHYROXINE SODIUM SCH MCG: 88 TABLET ORAL at 06:23

## 2018-12-21 RX ADMIN — VANCOMYCIN HYDROCHLORIDE SCH MLS/HR: 500 INJECTION, POWDER, LYOPHILIZED, FOR SOLUTION INTRAVENOUS at 14:56

## 2018-12-21 RX ADMIN — CLOPIDOGREL BISULFATE SCH MG: 75 TABLET ORAL at 08:27

## 2018-12-21 RX ADMIN — INSULIN ASPART SCH: 100 INJECTION, SOLUTION INTRAVENOUS; SUBCUTANEOUS at 17:47

## 2018-12-21 RX ADMIN — INSULIN ASPART SCH: 100 INJECTION, SOLUTION INTRAVENOUS; SUBCUTANEOUS at 22:06

## 2018-12-21 RX ADMIN — INSULIN ASPART SCH UNIT: 100 INJECTION, SUSPENSION SUBCUTANEOUS at 08:27

## 2018-12-21 RX ADMIN — MEGESTROL ACETATE SCH MG: 40 TABLET ORAL at 17:47

## 2018-12-21 RX ADMIN — VANCOMYCIN HYDROCHLORIDE SCH MLS/HR: 500 INJECTION, POWDER, LYOPHILIZED, FOR SOLUTION INTRAVENOUS at 22:13

## 2018-12-21 RX ADMIN — INSULIN ASPART SCH UNIT: 100 INJECTION, SOLUTION INTRAVENOUS; SUBCUTANEOUS at 08:27

## 2018-12-21 RX ADMIN — ASPIRIN 81 MG CHEWABLE TABLET SCH MG: 81 TABLET CHEWABLE at 08:27

## 2018-12-21 RX ADMIN — FENOFIBRATE SCH MG: 160 TABLET ORAL at 08:27

## 2018-12-21 RX ADMIN — CLOTRIMAZOLE SCH: 0.01 CREAM TOPICAL at 22:03

## 2018-12-21 RX ADMIN — TRIAMCINOLONE ACETONIDE SCH APPLIC: 1 CREAM TOPICAL at 08:31

## 2018-12-21 RX ADMIN — IPRATROPIUM BROMIDE SCH: 0.5 SOLUTION RESPIRATORY (INHALATION) at 09:55

## 2018-12-21 RX ADMIN — PIPERACILLIN AND TAZOBACTAM SCH MLS/HR: 3; .375 INJECTION, POWDER, FOR SOLUTION INTRAVENOUS at 08:27

## 2018-12-21 RX ADMIN — Medication SCH UNIT: at 11:45

## 2018-12-21 RX ADMIN — SEVELAMER CARBONATE SCH MG: 800 TABLET, FILM COATED ORAL at 11:46

## 2018-12-21 RX ADMIN — CLOTRIMAZOLE SCH: 0.01 CREAM TOPICAL at 08:32

## 2018-12-21 RX ADMIN — GENTAMICIN SULFATE SCH APPLIC: 1 CREAM TOPICAL at 08:31

## 2018-12-21 RX ADMIN — FOLIC ACID SCH MG: 1 TABLET ORAL at 11:44

## 2018-12-21 RX ADMIN — DOCUSATE SODIUM SCH MG: 100 CAPSULE, LIQUID FILLED ORAL at 22:03

## 2018-12-21 RX ADMIN — ONDANSETRON PRN MG: 2 INJECTION INTRAMUSCULAR; INTRAVENOUS at 08:38

## 2018-12-21 RX ADMIN — ENOXAPARIN SODIUM SCH MG: 30 INJECTION SUBCUTANEOUS at 08:27

## 2018-12-21 RX ADMIN — PIPERACILLIN AND TAZOBACTAM SCH MLS/HR: 3; .375 INJECTION, POWDER, FOR SOLUTION INTRAVENOUS at 22:05

## 2018-12-21 RX ADMIN — SEVELAMER CARBONATE SCH MG: 800 TABLET, FILM COATED ORAL at 08:27

## 2018-12-21 RX ADMIN — INSULIN ASPART SCH UNIT: 100 INJECTION, SOLUTION INTRAVENOUS; SUBCUTANEOUS at 13:15

## 2018-12-21 RX ADMIN — MEGESTROL ACETATE SCH: 40 TABLET ORAL at 22:34

## 2018-12-21 RX ADMIN — VANCOMYCIN HYDROCHLORIDE SCH MLS/HR: 500 INJECTION, POWDER, LYOPHILIZED, FOR SOLUTION INTRAVENOUS at 03:11

## 2018-12-21 NOTE — P.PN
Subjective


Progress Note Date: 12/21/18





This is a 58-year-old female, patient of [facility].  Patient has 

a known past medical history of end-stage renal disease on hemodialysis, 

congestive heart failure, COPD, diabetes mellitus, hypertension, hyperlipidemia

, obstructive sleep apnea, chronic hypoxic respiratory failure on 4-5 L of 

oxygen at home, depression and CVA.  History obtained from both patient and 

patient's sister.  Apparently their mother had passed away on November 27 

couple weeks ago.  Since then, patient's has not gotten out of bed.  She has 

not moved out of her bed for about 2 weeks per the sister.  She stopped taking 

all of her medications.  And she has not been eating or drinking much.  Blood 

sugar 469 on admission.  Per the sister the patient was started on Prozac 10 mg 

daily about a month ago.  She took it for about a week and then stopped taking 

it.  Before that patient had been on Zoloft.  Patient reports that she hurts 

everywhere.  She has extensive bruising in the upper thigh is growing area and 

back.  There are smaller bruises on the lower leg area.  She hurts anywhere she 

is touched.  She complains of pain in the chest as well as the abdomen and legs 

and back.  She denies any falling.  Reports some nausea and chest chest pain.  

Denies any fever, chills, sweats, cough, bowel movement changes.  She does not 

make urine and is on peritoneal dialysis. 








On 12/07/2018 patient is currently lying in bed currently getting CAPD.  

Patient is complaining of generalized pain throughout chest abdomen and legs.  

Patient also complaining of more severe pain in her right lower extremity.  

Patient would not let me examine right leg.  Explained to patient that due to 

her prolonged bed rest she is at increased risk for blood clot and then I'm 

concerned that she may have a blood clot in her right leg and that she would 

require a venous Doppler to assess.  Patient states she would not be able to 

tolerate a venous Doppler at this time due to the pain.  Will order Dilaudid 

for pain at this time and encouraged patient the importance of obtaining this 

test.  We'll also consult cardiology services at this time.








On 12/08/2018 patient currently getting CAPD.  Patient did have Doppler study 

completed but was un conclusive of DVT due to increased pain during test.  

Patient remains on Lovenox 1 mg/kg per renal dosing for DVT treatment.  This 

time patient states that she feels slightly improved but still has generalized 

pain all over.  Patient denies chest pain or shortness breath.  Patient denies 

any nausea vomiting or diarrhea.  Patient denies any urinary burning or 

frequency.





On 12/09/2018 Patient currently resting in bed. Patient is having significant 

to lower extremity pain. Will order venous doppler again due to unconclusive 

reading from initial test.  Will order ativan and diuladid to be given prior to 

exam in order to get adequate reading. Patient denies chest pain and shortness 

of breath. Denies nausea, vomitting or diarrhea. Denies any urinary burning or 

frequency  





12/10/2018 patient still complaining of pain all over the body.  She does 

report is slightly better than admission.  Complaining of constipation and his 

been about 3 days since her last bowel movement.  Hemoglobin has dropped from 

9.3-8.7.  Potassium is 3.1 and she received supplement.  Venous Doppler of the 

lower extremities showing a limited exam of the right leg and what could be 

seen was negative for DVT.  Patient refused the left leg to be completed due to 

pain.  Patient has been noncompliant with physical therapy.  Poor appetite.  

Pain service will be placed on consult.  Patient has been educated that she 

needs to participate with physical therapy.





12/11/2018 patient does report slight improvement in her pain.  Still 

complaining of pain throughout her body.  Patient was able to work with 

physical therapy yesterday.  She was a max assist.  She denies any chest pain 

or shortness of breath.  Denies any nausea or vomiting.  Still has not had a 

bowel movement for about 5 days.  Norvasc discontinued yesterday due to 

hypotension.  Blood pressures are 20 better today.  Nephrology is also adjusted 

the frequency of peritoneal dialysis to every 4 hours. 





On 12/12/2018 patient does report slight improvement with her pain.  Patient is 

still complaining of overall pain throughout her entire body.  Patient refused 

venous Doppler again due to pain for the third time.  D-dimer was negative.  

Lovenox was DC'd and Lovenox prophylaxis has been added.  Patient denies chest 

pain or shortness of breath.  Patient denies nausea vomiting or diarrhea.  

Patient denies any urinary burning or frequency





On 12/13/2018 patient does report slight improvement with pain today.  Patient'

s daughter currently at bedside.  Patient started still expressed concerns over 

lack of motivation from her mother to get out of bed.  Requesting that site 

revisit patient.  Still waiting on pain services to come and evaluate patient.  

At this time patient denies chest pain or shortness of breath.  Patient denies 

nausea vomiting or diarrhea.  Patient denies any urinary burning or frequency.  

Patient highly encouraged to continue working with physical therapy in order to 

prevent negative outcomes.  At this time planning discharge to [facility]





On 12/14/2018 patient states she feels slightly more improved.  Patient was 

seen by psych Megace has been added Wellbutrin has been increased.  Discussed 

case with social work and a lot of yellow does not have any beds available.  

Still trying to be achieved placement due to peritoneal dialysis.  Patient also 

complaining of generalized pain.  Patient denies chest pain.  Patient denies 

nausea vomiting or diarrhea.  Patient denies any urinary burning





On 12/15/2018 patient is alert and responsive in no apparent distress, she 

states she is feeling a little bit better today, she is complaining of 

generalized pain, otherwise no complaints at this time there is no fever or 

chills no headache or dizziness no chest pain no shortness of breath no cough 

no nausea or vomiting no abdominal pain no diarrhea and no urinary symptoms.





On 12/16/2018 patient was seen and examined she is alert and responsive in no 

apparent distress.  Nursing staff reporting that patient has been uncooperative

, she is refusing to be turned in bed, she is refusing inspection of her sacral 

ulcers, she is refusing most of her other medical care.  Abdomen x-ray done 

yesterday reveals evidence of large bowel ileus.  Patient denies abdominal pain 

but is having pain when abdominal palpation is done.








12/17/2018 patient lying in bed she is awake and alert.  Reporting poor 

appetite.  Still has been uncooperative with nursing staff.  Still complaining 

of pain all over her body.  Abdominal x-ray showing a large bowel ileus or air 

swallowing.  Discussed with surgical service they'll be through to evaluate 

patient this afternoon.  Patient still complaining of some abdominal pain.  No 

bowel movement for about 10 days.  She is now agreeable to a soapsuds enema and 

lactulose.  These have been ordered.  Denies any nausea or vomiting.  





12/18/2018 patient is still being uncooperative.  She has refused both myself 

and nursing staff to evaluate her skin.  Apparently it has been reported that 

she has ulcers on the right side of her back.  Concerns for infection.  The 

patient refuses to be turned over.  She continues to lay on her back.  Patient 

refused surgical service evaluation yesterday.  She did have a bowel movement 

with the lactulose.  Refuses the enema.  Pain service came through and increase 

her Norco to 10 mg every 8 hours.  Blood pressures are better today.  However 

she does have a old fistula in the 1 arm that the blood pressures were being 

drawn from him he may have not been getting adequate blood pressure readings.  

White count has continued to increase to 24.6.  Hemoglobin 9.5 and sodium has 

dropped to 126.  Both infectious disease and nephrology have been a with made 

aware of his lab results.  Infectious disease will be back through later this 

evening to reevaluate patient at that time hopefully she will be cooperative 

for the physician to evaluate her skin.  Nephrology is recommending that 

patient be started on a diet to help with the sodium.  Since patient had a 

bowel movement we will advance diet to a consistent carbohydrate diet.  And 

nephrology is also recommending a 1500 mL fluid restriction.





On 12/19/2018 per nursing staff patient did let Dr. Ramey with infectious 

disease evaluate her skin.  Patient is still refusing to let myself or nursing 

staff do any further assessment for evaluation.  Patient does state she feels 

slightly more improved.  Denies chest pain or shortness of breath.  Patient 

denies nausea vomiting or diarrhea.  Patient denies any urinary burning or 

frequency.





12/20/2018 patient complaining of nausea.  No vomiting.  She is requesting pain 

medication.  Still reporting that she hurts all over.  When patient is ready 

for discharge she'll be discharged to [facility].  She's currently 

on vancomycin and Zosyn due to sacral ulcer and cellulitis.  Chest x-ray 

completed due to elevated white count showing chronic changes no acute changes.

  Patient is having bowel movements








12/21/2018 patient is still complaining of pain everywhere.   Norco is helping.

  She still being uncooperative with nursing staff.  Patient is having bowel 

movements.  Denies any nausea or vomiting.  Denies any diarrhea.  Denies cough.

  Denies chest pain or shortness breath.  White count has increased to 24.8.  

Infectious disease is following.  Patient is on vancomycin and Zosyn





Objective





- Vital Signs


Vital signs: 


 Vital Signs











Temp  97.8 F   12/21/18 09:00


 


Pulse  81   12/21/18 09:00


 


Resp  16   12/21/18 09:00


 


BP  111/56   12/21/18 09:00


 


Pulse Ox  96   12/21/18 09:00








 Intake & Output











 12/20/18 12/21/18 12/21/18





 18:59 06:59 18:59


 


Intake Total 500  


 


Output Total 3 0 


 


Balance 497 0 


 


Weight 169 kg  169 kg


 


Intake:   


 


  Oral 500  


 


Output:   


 


  Urine 0 0 


 


  Stool 3  


 


Other:   


 


  Voiding Method CAPD CAPD CAPD


 


  # Voids 0 0 


 


  # Bowel Movements  0 














- Exam





Head normocephalic


Neck supple


Lungs clear to auscultation bilaterally no wheezing or crackles


Heart regular rate and rhythm S1-S2, no rub or gallop


Abdomen is soft nontender nondistended positive bowel sounds no 

hepatosplenomegaly.  Obese.  Peritoneal dialysis catheter site brown crusting 

or scabbing noted around the area.  No erythema.


Extremities no edema


Neuro alert and orientated to 3


Skin extensive bruising along the upper thighs and lower back and small bruises 

on the legs.  Patient has tenderness all over the body with palpation.  Patient'

s tenderness with palpation is less severe and bruising are showing improvement





- Labs


CBC & Chem 7: 


 12/21/18 08:08





 12/21/18 08:08


Labs: 


 Abnormal Lab Results - Last 24 Hours (Table)











  12/20/18 12/20/18 12/20/18 Range/Units





  12:20 17:23 20:52 


 


WBC     (3.8-10.6)  k/uL


 


RBC     (3.80-5.40)  m/uL


 


Hgb     (11.4-16.0)  gm/dL


 


Hct     (34.0-46.0)  %


 


MCV     (80.0-100.0)  fL


 


MCHC     (31.0-37.0)  g/dL


 


RDW     (11.5-15.5)  %


 


Neutrophils #     (1.3-7.7)  k/uL


 


Lymphocytes #     (1.0-4.8)  k/uL


 


Monocytes #     (0-1.0)  k/uL


 


Sodium     (137-145)  mmol/L


 


Chloride     ()  mmol/L


 


Carbon Dioxide     (22-30)  mmol/L


 


BUN     (7-17)  mg/dL


 


Creatinine     (0.52-1.04)  mg/dL


 


Glucose     (74-99)  mg/dL


 


POC Glucose (mg/dL)  196 H  166 H  150 H  (75-99)  mg/dL


 


AST     (14-36)  U/L


 


Alkaline Phosphatase     ()  U/L


 


Total Protein     (6.3-8.2)  g/dL


 


Albumin     (3.5-5.0)  g/dL














  12/21/18 12/21/18 12/21/18 Range/Units





  07:03 08:08 08:08 


 


WBC   24.8 H   (3.8-10.6)  k/uL


 


RBC   2.88 L   (3.80-5.40)  m/uL


 


Hgb   8.4 L   (11.4-16.0)  gm/dL


 


Hct   29.6 L   (34.0-46.0)  %


 


MCV   102.7 H   (80.0-100.0)  fL


 


MCHC   28.4 L   (31.0-37.0)  g/dL


 


RDW   15.8 H   (11.5-15.5)  %


 


Neutrophils #   22.2 H   (1.3-7.7)  k/uL


 


Lymphocytes #   0.7 L   (1.0-4.8)  k/uL


 


Monocytes #   1.3 H   (0-1.0)  k/uL


 


Sodium    132 L  (137-145)  mmol/L


 


Chloride    93 L  ()  mmol/L


 


Carbon Dioxide    21 L  (22-30)  mmol/L


 


BUN    36 H  (7-17)  mg/dL


 


Creatinine    7.15 H*  (0.52-1.04)  mg/dL


 


Glucose    213 H  (74-99)  mg/dL


 


POC Glucose (mg/dL)  226 H    (75-99)  mg/dL


 


AST    54 H  (14-36)  U/L


 


Alkaline Phosphatase    189 H  ()  U/L


 


Total Protein    4.9 L  (6.3-8.2)  g/dL


 


Albumin    2.1 L  (3.5-5.0)  g/dL








 Microbiology - Last 24 Hours (Table)











 12/18/18 18:59 Blood Culture - Preliminary





 Blood    No Growth after 48 hours


 


 12/18/18 23:00 Gram Stain - Preliminary





 Peritoneal Fluid Body Fluid Culture - Preliminary














Assessment and Plan


Assessment: 





1.  Prolonged period of time in bed With pain throughout her body and extensive 

bruising.  Aspirin and Plavix resumed





2.  Insulin-dependent diabetes mellitus: Hyperglycemia with blood sugar 469 on 

admission.  Patient has not been taking insulin at home.  Acetone level 

negative.  A1c 7.7.  Patient did have some hypoglycemia due to insulin being 

given with her being on a clear liquid diet and poor appetite.  Diet has been 

advanced.  We will resume her NovoLog 70/30 at 80 units twice a day.  Patient 

did have some episodes of hyperglycemia.  Continue to monitor.  Appetite is 

still poor.





3.  History of end-stage renal disease on peritoneal dialysis.  Nephrology 

following.  Continue peritoneal dialysis.





4.  Morbid obesity





5.  Medication noncompliance





6.  Severe Depression.  Patient denies any suicidal or homicidal ideation.  

Recently started on Prozac 10 mg daily outpatient.  Will restart Prozac at 20 

mg daily . Wellbutrin has been adder per psych.  At this time daughter 

requesting that psych revisit patient for reevaluation of patient's depression.

  Psychiatry has come to reevaluate patient.  Megace has been added and 

Wellbutrin has been increased.  Continue Abilify





7.  History of COPD stable





8.  History of CVA





9.  History of Essential hypertension





10.  Hyperlipidemia





11.  Obstructive sleep apnea uses CPAP





12.  Chronic hypoxic respiratory failure home O2 dependent on 4-5 L





13.  Right lower extremity pain.  Concerns for possible DVT due to prolonged 

time in bed.  Continue with Lovenox 140 mg subcu daily.  Patient unable to 

tolerate venous Doppler.  On 12/ 9 venous Doppler was a limited exam what was 

evaluated was negative for DVT in the right leg.  Patient refused the left leg 

to be completed.  Patient refused for the third time venous Doppler again due 

to pain.  D-dimer less than 0.17.  Lovenox 140 mg subcu daily discontinued.  

Lovenox DVT prophylaxis has been added





14.  Elevated cardiac enzymes.  Troponin 0.072.  EKG completed showing normal 

sinus rhythm.  Inferior infarct, age undetermined anterior infarct age 

undetermined.  Per cardiology services no further workup needed from cardiology 

standpoint.  No evidence for an acute cardiac event per cardiology





15.  Elevated lipase level of 410.  Has trended down.  No evidence of 

pancreatitis





16.  Hypovolemic Hyponatremia.   Possibly related to her hyperglycemia as well.

  Nephrology following





17. Elevated uric acid.  Uric acid 8.4  Patient will be started on allopurinol





18.  Anemia of chronic kidney disease.  And evidence of iron deficiency anemia.

   Will start ferrous sulfate 325 mg twice a day. Aranesp has been added per 

nephrology.  Continue to monitor hemoglobin.  





19.  Constipation add stool softener and lactulose





20.  Hypotension possibly related to the IV Dilaudid.  Nephrology discontinued 

the Norvasc.  Blood pressures showing improvement.





21.  Abdominal pain with ileus: abdominal x-ray showing ileus or air 

swallowing.  No free air.  Patient tolerating diet.  Having stools.  Refuses 

surgical evaluation.





22.  Hyponatremia due to poor nutritional intake.  Advance diet.  Nephrology 

notified.  They've also recommended fluid restrictions 1500 MLS per day





23.  Hypomagnesemia and patient receiving magnesium supplement.  Repeat 

magnesium level in a.m.





25.  Leukocytosis: Likely source is unstageable sacral pressure ulcer with 

cellulitis.  Patient followed by infectious disease.  She's currently on 

vancomycin and Zosyn.  Peritoneal fluid is not look infected.  Chest x-ray no 

acute changes.  patient's white count has worsened.  It is now 24.8.  We'll 

await further infectious disease evaluation 





 26.  Mildly elevated LFTs :AST 54 alk phos 189.  ALT is normal.  Discontinue 

Lipitor and fenofibrate.  Repeat labs in a.m. 








DVT prophylaxis Lovenox.  GI prophylaxis Pepcid





Discussed with .  When patient is stable for discharge, likely will 

discharge to [facility]





I performed an examination of the patient and discussed their management with 

the physician Assistant.  I have reviewed the Physician Assistant's notes and 

agree with the documented findings and plan of care

## 2018-12-21 NOTE — P.GSCN
History of Present Illness


Consult date: 18


Reason for Consult: 





Decubitus ulcer


History of present illness: 





58-year-old female with a history of renal failure.  Patient on peritoneal 

dialysis currently.  We were consulted for evaluation and possible debridement 

of a decubitus ulcer.  Patient complaining of pain in the decubitus ulcer site.

  Patient with a persistent leukocytosis.  Patient however refusing exam at 

this time.





Review of Systems





The patient denies any acute changes in vision or hearing, no dysphagia or 

odynophagia, no chest pain, no dysuria or hematuria, no headache, no runny nose

, no rectal bleeding or melena, no unexplained weight loss 





Past Medical History


Past Medical History: Asthma, Heart Failure, COPD, CVA/TIA, Diabetes Mellitus, 

Eye Disorder, Hyperlipidemia, Hypertension, Osteoarthritis (OA), Pneumonia, 

Renal Disease, Sleep Apnea/CPAP/BIPAP, Thyroid Disorder


Additional Past Medical History / Comment(s): sleep apnea, neuropathy, patient 

has one kidney, Stage III kidney failure, peritional dialysis- right side 

abdominal port.


History of Any Multi-Drug Resistant Organisms: None Reported


Past Surgical History: Bariatric Surgery, Hernia Repair, Orthopedic Surgery, 

Tonsillectomy


Additional Past Surgical History / Comment(s): rt kidney removed, Rt knee 

replacement


Past Anesthesia/Blood Transfusion Reactions: No Reported Reaction


Additional Past Anesthesia/Blood Transfusion Reaction / Comm: pt states she had 

a blood transfusion at [facility] 2017, "it made her itchy and they gave 

bendyl"


Past Psychological History: Anxiety, Depression


Smoking Status: Former smoker


Past Alcohol Use History: None Reported


Past Drug Use History: None Reported





- Past Family History


  ** Mother


Additional Family Medical History / Comment(s): skin CA, CHF, DM, thyroid 

disorder, HTN.





  ** Father


Family Medical History: Diabetes Mellitus, Hypertension, Thyroid Disorder


Additional Family Medical History / Comment(s): CHF.  Pt's father is .





Medications and Allergies


 Home Medications











 Medication  Instructions  Recorded  Confirmed  Type


 


Atorvastatin [Lipitor] 80 mg PO HS 09/13/15 12/06/18 History


 


Levothyroxine Sodium [Synthroid] 175 mcg PO DAILY 09/13/15 12/06/18 History


 


Aspirin 325 mg PO DAILY  tab 17 Rx


 


Clopidogrel [Plavix] 75 mg PO DAILY 18 History


 


Insulin Regular, Human [NovoLIN R] See Protocol SQ ACHS 18 

History


 


Ipratropium-Albuterol Nebulize 3 ml INHALATION RT-QID PRN 18 

History





[Duoneb 0.5 mg-3 mg/3 ml Soln]    


 


Acetaminophen Tab [Tylenol Tab] 1,000 mg PO Q6HR PRN 18 History


 


Albuterol Sulfate [Proair Hfa] 2 puff INHALATION RT-QID PRN 18 

History


 


Bimatoprost [Lumigan .01% Ophth 1 drop BOTH EYES HS 18 History





Soln]    


 


Cholecalciferol [Vitamin D3] 5,000 unit PO DAILY 18 History


 


Famotidine [Pepcid] 40 mg PO HS 18 History


 


Fenofibrate,Micronized 200 mg PO DAILY 18 History





[Fenofibrate]    


 


Folic Acid 0.8 mg PO DAILY 18 History


 


Ketoconazole 2% Cream [Nizoral 2%] 1 applic TOPICAL BID 18 

History


 


Sevelamer [Renvela] 2,400 mg PO AC-TID 18 History


 


Tiotropium 18 Mcg/Puff [Spiriva] 1 cap INHALATION RT-DAILY 18 

History


 


Triamcinolone 0.025% Cream 1 applic TOPICAL BID 18 History





[Kenalog 0.025% Cream]    


 


B Complex W-C No.20/Folic Acid 1 cap PO DAILY 18 History





[Renal Caps Softgel]    


 


HYDROcodone/APAP 7.5-325MG [Norco 1 tab PO TID 18 History





7.5-325]    


 


Insulin Aspart Protam & Aspart 80 unit SQ BID 18 History





[Novolog Mix 70-30 Flexpen Syrn]    


 


amLODIPine [Norvasc] 10 mg PO DAILY 18 History


 


FLUoxetine HCL [PROzac] 20 mg PO DAILY 18 History











 Allergies











Allergy/AdvReac Type Severity Reaction Status Date / Time


 


erythromycin base Allergy  Unknown Verified 18 14:06





[Erythromycin Base]     


 


NSAIDS (Non-Steroidal Allergy  Unknown Verified 18 14:06





Anti-Inflamma     


 


PAPER TAPE Allergy  Rash/Hives Uncoded 18 13:51














Surgical - Exam


 Vital Signs











Pulse Resp BP Pulse Ox


 


 76   16   104/59   100 


 


 18 13:44  18 13:44  18 13:44  18 13:44

















Physical exam:


General: Obese female with CPAP in place complaining of pain on the backside, 

refusing any manipulation to evaluate her sacral region


HEENT: Normocephalic, sclerae nonicteric


Abdomen: Nontender, nondistended


Extremities: Bilateral edema


Neuro: Alert and oriented





Results





- Labs





 18 08:08





 18 08:08


 Abnormal Lab Results - Last 24 Hours (Table)











  18 Range/Units





  17:23 20:52 07:03 


 


WBC     (3.8-10.6)  k/uL


 


RBC     (3.80-5.40)  m/uL


 


Hgb     (11.4-16.0)  gm/dL


 


Hct     (34.0-46.0)  %


 


MCV     (80.0-100.0)  fL


 


MCHC     (31.0-37.0)  g/dL


 


RDW     (11.5-15.5)  %


 


Neutrophils #     (1.3-7.7)  k/uL


 


Lymphocytes #     (1.0-4.8)  k/uL


 


Monocytes #     (0-1.0)  k/uL


 


Sodium     (137-145)  mmol/L


 


Chloride     ()  mmol/L


 


Carbon Dioxide     (22-30)  mmol/L


 


BUN     (7-17)  mg/dL


 


Creatinine     (0.52-1.04)  mg/dL


 


Glucose     (74-99)  mg/dL


 


POC Glucose (mg/dL)  166 H  150 H  226 H  (75-99)  mg/dL


 


AST     (14-36)  U/L


 


Alkaline Phosphatase     ()  U/L


 


Total Protein     (6.3-8.2)  g/dL


 


Albumin     (3.5-5.0)  g/dL














  18 Range/Units





  08:08 08:08 12:29 


 


WBC  24.8 H    (3.8-10.6)  k/uL


 


RBC  2.88 L    (3.80-5.40)  m/uL


 


Hgb  8.4 L    (11.4-16.0)  gm/dL


 


Hct  29.6 L    (34.0-46.0)  %


 


MCV  102.7 H    (80.0-100.0)  fL


 


MCHC  28.4 L    (31.0-37.0)  g/dL


 


RDW  15.8 H    (11.5-15.5)  %


 


Neutrophils #  22.2 H    (1.3-7.7)  k/uL


 


Lymphocytes #  0.7 L    (1.0-4.8)  k/uL


 


Monocytes #  1.3 H    (0-1.0)  k/uL


 


Sodium   132 L   (137-145)  mmol/L


 


Chloride   93 L   ()  mmol/L


 


Carbon Dioxide   21 L   (22-30)  mmol/L


 


BUN   36 H   (7-17)  mg/dL


 


Creatinine   7.15 H*   (0.52-1.04)  mg/dL


 


Glucose   213 H   (74-99)  mg/dL


 


POC Glucose (mg/dL)    139 H  (75-99)  mg/dL


 


AST   54 H   (14-36)  U/L


 


Alkaline Phosphatase   189 H   ()  U/L


 


Total Protein   4.9 L   (6.3-8.2)  g/dL


 


Albumin   2.1 L   (3.5-5.0)  g/dL








 Microbiology - Last 24 Hours (Table)











 18 18:59 Blood Culture - Preliminary





 Blood    No Growth after 48 hours


 


 18 23:00 Gram Stain - Preliminary





 Peritoneal Fluid Body Fluid Culture - Preliminary








 Diabetes panel











  18 Range/Units





  08:08 


 


Sodium  132 L  (137-145)  mmol/L


 


Potassium  4.0  (3.5-5.1)  mmol/L


 


Chloride  93 L  ()  mmol/L


 


Carbon Dioxide  21 L  (22-30)  mmol/L


 


BUN  36 H  (7-17)  mg/dL


 


Creatinine  7.15 H*  (0.52-1.04)  mg/dL


 


Glucose  213 H  (74-99)  mg/dL


 


Calcium  8.4  (8.4-10.2)  mg/dL


 


AST  54 H  (14-36)  U/L


 


ALT  39  (9-52)  U/L


 


Alkaline Phosphatase  189 H  ()  U/L


 


Total Protein  4.9 L  (6.3-8.2)  g/dL


 


Albumin  2.1 L  (3.5-5.0)  g/dL








 Calcium panel











  18 Range/Units





  08:08 


 


Calcium  8.4  (8.4-10.2)  mg/dL


 


Albumin  2.1 L  (3.5-5.0)  g/dL








 Pituitary panel











  18 Range/Units





  08:08 


 


Sodium  132 L  (137-145)  mmol/L


 


Potassium  4.0  (3.5-5.1)  mmol/L


 


Chloride  93 L  ()  mmol/L


 


Carbon Dioxide  21 L  (22-30)  mmol/L


 


BUN  36 H  (7-17)  mg/dL


 


Creatinine  7.15 H*  (0.52-1.04)  mg/dL


 


Glucose  213 H  (74-99)  mg/dL


 


Calcium  8.4  (8.4-10.2)  mg/dL








 Adrenal panel











  18 Range/Units





  08:08 


 


Sodium  132 L  (137-145)  mmol/L


 


Potassium  4.0  (3.5-5.1)  mmol/L


 


Chloride  93 L  ()  mmol/L


 


Carbon Dioxide  21 L  (22-30)  mmol/L


 


BUN  36 H  (7-17)  mg/dL


 


Creatinine  7.15 H*  (0.52-1.04)  mg/dL


 


Glucose  213 H  (74-99)  mg/dL


 


Calcium  8.4  (8.4-10.2)  mg/dL


 


Total Bilirubin  0.7  (0.2-1.3)  mg/dL


 


AST  54 H  (14-36)  U/L


 


ALT  39  (9-52)  U/L


 


Alkaline Phosphatase  189 H  ()  U/L


 


Total Protein  4.9 L  (6.3-8.2)  g/dL


 


Albumin  2.1 L  (3.5-5.0)  g/dL














Assessment and Plan


(1) Decubitus ulcer


Narrative/Plan: 


Patient has agreed to let me examine this decubitus ulcer tomorrow morning.  

Continue antibiotics per infectious disease.  Final recommendations after 

physical exam tomorrow morning.


Current Visit: Yes   Status: Acute   Code(s): L89.90 - PRESSURE ULCER OF 

UNSPECIFIED SITE, UNSPECIFIED STAGE   SNOMED Code(s): 421087269

## 2018-12-21 NOTE — PN
PROGRESS NOTE



DATE OF SERVICE:

12/21/2018.



REASON FOR FOLLOWUP:

Sacral wound with secondary cellulitis and leukocytosis.



INTERVAL HISTORY:

The patient denies any fever or chills.  Unfortunately, the patient remains to be in

the bed and would not change her position as has been requested.  The patient denies

having any chest pain or shortness of breath.  No cough.  She has generalized body

aches and pains and requesting for pain medication most of the pain in the sacral wound

area though.



EXAMINATION:

Blood pressure 134/67 with a pulse of 88, temperature 98.4.  She is 98% on 4 L nasal

cannula.

General description is a middle aged female lying in bed in no distress.  Respiratory

system:  Unlabored breathing, clear to auscultation anteriorly.  Heart S1, S2.  Regular

rate and rhythm.

ABDOMEN:  Soft, no tenderness. Examination of sacral wound has shown a significant

worsening with mostly necrotic tissue.  Minimal surrounding erythema but no foul

smelling drainage.



LABS:

Hemoglobin 8.4, white count 4.8 with a BUN of 36, creatinine 7.15.



DIAGNOSTIC IMPRESSION AND PLAN:

Patient with leukocytosis which is likely multifactorial in this patient likely a

component of the infected sacral pressure ulcer, unstageable.  Plan of care discussed

with the surgical team.  The patient likely need a surgical debridement of this wound

with deep cultures that should cover further antibiotic therapy.  The patient is

currently on vancomycin, Zosyn will be continued for now watching her clinical course

closely.  Continue supportive care.





MMODL / IJN: [id number] / Job#: [id number]

## 2018-12-21 NOTE — PN
PROGRESS NOTE



Patient is seen for followup for end-stage renal disease.  She is currently lying in

bed.  Patient denies any complaints.  She is awaiting placement.



On examination this morning blood pressure was 111/56, heart rate 81 per minute.

Patient is afebrile.  Examination of the heart S1, S2.  Examination lungs bilateral

breath sounds are heard.  Abdomen is soft.  Morbidly obese.  Examination of lower

extremities shows no significant edema.  Bruising is noted.



LABS SHOW:

Sodium 132, potassium of 4.0, hemoglobin 8.4 g/dL.



ASSESSMENT:

1. End-stage renal disease, on peritoneal dialysis.  Continue current PD exchanges.

2. Depression, slowly improving.

3. Ileus, the patient is currently tolerating oral intake fairly well.  Her diet is

    advanced to regular.

4. Chronic kidney disease mineral bone disorder maintained on Renvela.



PLAN:

Continue current PD exchanges.  Maintain patient on Aranesp.





MMODL / IJN: [id number] / Job#: [id number]

## 2018-12-22 LAB
ALBUMIN SERPL-MCNC: 2.7 G/DL (ref 3.5–5)
ALP SERPL-CCNC: 215 U/L (ref 38–126)
ALT SERPL-CCNC: 46 U/L (ref 9–52)
ANION GAP SERPL CALC-SCNC: 17 MMOL/L
AST SERPL-CCNC: 78 U/L (ref 14–36)
BUN SERPL-SCNC: 37 MG/DL (ref 7–17)
CALCIUM SPEC-MCNC: 9.2 MG/DL (ref 8.4–10.2)
CELLS COUNTED: 200
CHLORIDE SERPL-SCNC: 93 MMOL/L (ref 98–107)
CO2 SERPL-SCNC: 23 MMOL/L (ref 22–30)
EOSINOPHIL # BLD MANUAL: 0.34 K/UL (ref 0–0.7)
ERYTHROCYTE [DISTWIDTH] IN BLOOD BY AUTOMATED COUNT: 3.49 M/UL (ref 3.8–5.4)
ERYTHROCYTE [DISTWIDTH] IN BLOOD: 15.9 % (ref 11.5–15.5)
GLUCOSE BLD-MCNC: 105 MG/DL (ref 75–99)
GLUCOSE BLD-MCNC: 132 MG/DL (ref 75–99)
GLUCOSE BLD-MCNC: 134 MG/DL (ref 75–99)
GLUCOSE BLD-MCNC: 198 MG/DL (ref 75–99)
GLUCOSE BLD-MCNC: 54 MG/DL (ref 75–99)
GLUCOSE BLD-MCNC: 93 MG/DL (ref 75–99)
GLUCOSE SERPL-MCNC: 75 MG/DL (ref 74–99)
HCT VFR BLD AUTO: 35.6 % (ref 34–46)
HGB BLD-MCNC: 10.6 GM/DL (ref 11.4–16)
LYMPHOCYTES # BLD MANUAL: 2.01 K/UL (ref 1–4.8)
MCH RBC QN AUTO: 30.3 PG (ref 25–35)
MCHC RBC AUTO-ENTMCNC: 29.8 G/DL (ref 31–37)
MCV RBC AUTO: 101.8 FL (ref 80–100)
MONOCYTES # BLD MANUAL: 2.68 K/UL (ref 0–1)
MYELOCYTES # BLD MANUAL: 0.34 K/UL
NEUTROPHILS NFR BLD MANUAL: 79 %
NEUTS SEG # BLD MANUAL: 28.4 K/UL (ref 1.3–7.7)
PLATELET # BLD AUTO: 273 K/UL (ref 150–450)
POTASSIUM SERPL-SCNC: 4.3 MMOL/L (ref 3.5–5.1)
PROT SERPL-MCNC: 6.5 G/DL (ref 6.3–8.2)
SODIUM SERPL-SCNC: 133 MMOL/L (ref 137–145)
WBC # BLD AUTO: 33.5 K/UL (ref 3.8–10.6)

## 2018-12-22 RX ADMIN — ALLOPURINOL SCH MG: 100 TABLET ORAL at 08:19

## 2018-12-22 RX ADMIN — VANCOMYCIN HYDROCHLORIDE SCH MLS/HR: 500 INJECTION, POWDER, LYOPHILIZED, FOR SOLUTION INTRAVENOUS at 22:23

## 2018-12-22 RX ADMIN — DOCUSATE SODIUM SCH MG: 100 CAPSULE, LIQUID FILLED ORAL at 22:04

## 2018-12-22 RX ADMIN — MEGESTROL ACETATE SCH MG: 40 TABLET ORAL at 08:21

## 2018-12-22 RX ADMIN — BUPROPION HYDROCHLORIDE SCH MG: 300 TABLET, FILM COATED, EXTENDED RELEASE ORAL at 08:20

## 2018-12-22 RX ADMIN — CLOPIDOGREL BISULFATE SCH MG: 75 TABLET ORAL at 08:19

## 2018-12-22 RX ADMIN — HYDROCODONE BITARTRATE AND ACETAMINOPHEN PRN EACH: 10; 325 TABLET ORAL at 12:42

## 2018-12-22 RX ADMIN — DOCUSATE SODIUM SCH MG: 100 CAPSULE, LIQUID FILLED ORAL at 08:19

## 2018-12-22 RX ADMIN — POLYETHYLENE GLYCOL 3350 SCH GM: 17 POWDER, FOR SOLUTION ORAL at 22:05

## 2018-12-22 RX ADMIN — TRIAMCINOLONE ACETONIDE SCH APPLIC: 1 CREAM TOPICAL at 22:06

## 2018-12-22 RX ADMIN — GENTAMICIN SULFATE SCH APPLIC: 1 CREAM TOPICAL at 08:21

## 2018-12-22 RX ADMIN — COLLAGENASE SANTYL SCH: 250 OINTMENT TOPICAL at 03:30

## 2018-12-22 RX ADMIN — Medication SCH MG: at 08:19

## 2018-12-22 RX ADMIN — IPRATROPIUM BROMIDE SCH: 0.5 SOLUTION RESPIRATORY (INHALATION) at 11:23

## 2018-12-22 RX ADMIN — INSULIN ASPART SCH UNIT: 100 INJECTION, SOLUTION INTRAVENOUS; SUBCUTANEOUS at 22:04

## 2018-12-22 RX ADMIN — LEVOTHYROXINE SODIUM SCH MCG: 88 TABLET ORAL at 05:23

## 2018-12-22 RX ADMIN — Medication SCH UNIT: at 08:19

## 2018-12-22 RX ADMIN — INSULIN ASPART SCH UNIT: 100 INJECTION, SOLUTION INTRAVENOUS; SUBCUTANEOUS at 12:42

## 2018-12-22 RX ADMIN — IPRATROPIUM BROMIDE SCH: 0.5 SOLUTION RESPIRATORY (INHALATION) at 08:02

## 2018-12-22 RX ADMIN — FOLIC ACID SCH MG: 1 TABLET ORAL at 08:19

## 2018-12-22 RX ADMIN — VANCOMYCIN HYDROCHLORIDE SCH MLS/HR: 500 INJECTION, POWDER, LYOPHILIZED, FOR SOLUTION INTRAVENOUS at 16:02

## 2018-12-22 RX ADMIN — INSULIN ASPART SCH: 100 INJECTION, SOLUTION INTRAVENOUS; SUBCUTANEOUS at 07:37

## 2018-12-22 RX ADMIN — FAMOTIDINE SCH MG: 20 TABLET, FILM COATED ORAL at 22:04

## 2018-12-22 RX ADMIN — MEGESTROL ACETATE SCH MG: 40 TABLET ORAL at 17:47

## 2018-12-22 RX ADMIN — PIPERACILLIN AND TAZOBACTAM SCH MLS/HR: 3; .375 INJECTION, POWDER, FOR SOLUTION INTRAVENOUS at 09:27

## 2018-12-22 RX ADMIN — MEGESTROL ACETATE SCH MG: 40 TABLET ORAL at 22:05

## 2018-12-22 RX ADMIN — IPRATROPIUM BROMIDE SCH: 0.5 SOLUTION RESPIRATORY (INHALATION) at 15:52

## 2018-12-22 RX ADMIN — INSULIN ASPART SCH UNIT: 100 INJECTION, SUSPENSION SUBCUTANEOUS at 22:03

## 2018-12-22 RX ADMIN — ASPIRIN 81 MG CHEWABLE TABLET SCH MG: 81 TABLET CHEWABLE at 08:19

## 2018-12-22 RX ADMIN — Medication SCH MG: at 22:04

## 2018-12-22 RX ADMIN — VANCOMYCIN HYDROCHLORIDE SCH MLS/HR: 500 INJECTION, POWDER, LYOPHILIZED, FOR SOLUTION INTRAVENOUS at 10:03

## 2018-12-22 RX ADMIN — INSULIN ASPART SCH: 100 INJECTION, SOLUTION INTRAVENOUS; SUBCUTANEOUS at 17:33

## 2018-12-22 RX ADMIN — SEVELAMER CARBONATE SCH MG: 800 TABLET, FILM COATED ORAL at 08:19

## 2018-12-22 RX ADMIN — PIPERACILLIN AND TAZOBACTAM SCH MLS/HR: 3; .375 INJECTION, POWDER, FOR SOLUTION INTRAVENOUS at 22:02

## 2018-12-22 RX ADMIN — MEGESTROL ACETATE SCH MG: 40 TABLET ORAL at 12:45

## 2018-12-22 RX ADMIN — CLOTRIMAZOLE SCH APPLIC: 0.01 CREAM TOPICAL at 09:42

## 2018-12-22 RX ADMIN — COLLAGENASE SANTYL SCH APPLIC: 250 OINTMENT TOPICAL at 08:29

## 2018-12-22 RX ADMIN — IPRATROPIUM BROMIDE SCH: 0.5 SOLUTION RESPIRATORY (INHALATION) at 19:17

## 2018-12-22 RX ADMIN — VANCOMYCIN HYDROCHLORIDE SCH MLS/HR: 500 INJECTION, POWDER, LYOPHILIZED, FOR SOLUTION INTRAVENOUS at 04:06

## 2018-12-22 RX ADMIN — HYDROCODONE BITARTRATE AND ACETAMINOPHEN PRN EACH: 10; 325 TABLET ORAL at 05:24

## 2018-12-22 RX ADMIN — TRIAMCINOLONE ACETONIDE SCH APPLIC: 1 CREAM TOPICAL at 08:29

## 2018-12-22 RX ADMIN — Medication SCH EACH: at 08:20

## 2018-12-22 RX ADMIN — LATANOPROST SCH DROPS: 50 SOLUTION OPHTHALMIC at 22:03

## 2018-12-22 RX ADMIN — SEVELAMER CARBONATE SCH MG: 800 TABLET, FILM COATED ORAL at 12:42

## 2018-12-22 RX ADMIN — CLOTRIMAZOLE SCH APPLIC: 0.01 CREAM TOPICAL at 22:06

## 2018-12-22 RX ADMIN — HYDROCODONE BITARTRATE AND ACETAMINOPHEN PRN EACH: 10; 325 TABLET ORAL at 19:02

## 2018-12-22 RX ADMIN — INSULIN ASPART SCH: 100 INJECTION, SUSPENSION SUBCUTANEOUS at 12:23

## 2018-12-22 RX ADMIN — SEVELAMER CARBONATE SCH: 800 TABLET, FILM COATED ORAL at 17:33

## 2018-12-22 RX ADMIN — ENOXAPARIN SODIUM SCH MG: 30 INJECTION SUBCUTANEOUS at 08:20

## 2018-12-22 RX ADMIN — ONDANSETRON PRN MG: 2 INJECTION INTRAMUSCULAR; INTRAVENOUS at 05:23

## 2018-12-22 NOTE — P.PN
Subjective


Progress Note Date: 12/22/18


Principal diagnosis: 





Right hip decubitus





Patient's daughter present at the bedside today.  With her assistance we were 

able to convince the patient to allow us to examine her wound site.  The 

patient states her pain has persisted.  She is afebrile.  White blood cell 

count increased at 33.5 today.





Objective





- Vital Signs


Vital signs: 


 Vital Signs











Temp  96.5 F L  12/22/18 10:04


 


Pulse  79   12/22/18 10:04


 


Resp  14   12/22/18 10:04


 


BP  160/70   12/22/18 10:04


 


Pulse Ox  100   12/22/18 10:04








 Intake & Output











 12/21/18 12/22/18 12/22/18





 18:59 06:59 18:59


 


Weight 169 kg  


 


Other:   


 


  Voiding Method CAPD CAPD CAPD


 


  # Voids  0 


 


  # Bowel Movements 1 1 














- Exam





Patient with scattered areas of ecchymosis involving torso and extremities.  

Right hip decubitus ulcer identified.  2 separate areas of skin necrosis.  This 

does appear relatively superficial.  This is tender however the patient is also 

tender at all areas of ecchymosis.  No fluctuant.  No definite erythema





- Labs


CBC & Chem 7: 


 12/22/18 07:31





 12/22/18 07:31


Labs: 


 Abnormal Lab Results - Last 24 Hours (Table)











  12/21/18 12/21/18 12/22/18 Range/Units





  12:29 20:58 06:59 


 


WBC     (3.8-10.6)  k/uL


 


RBC     (3.80-5.40)  m/uL


 


Hgb     (11.4-16.0)  gm/dL


 


MCV     (80.0-100.0)  fL


 


MCHC     (31.0-37.0)  g/dL


 


RDW     (11.5-15.5)  %


 


Neutrophils # (Manual)     (1.3-7.7)  k/uL


 


Monocytes # (Manual)     (0-1.0)  k/uL


 


Myelocytes # (Manual)     (0)  k/uL


 


Sodium     (137-145)  mmol/L


 


Chloride     ()  mmol/L


 


BUN     (7-17)  mg/dL


 


Creatinine     (0.52-1.04)  mg/dL


 


POC Glucose (mg/dL)  139 H  118 H  54 L  (75-99)  mg/dL


 


AST     (14-36)  U/L


 


Alkaline Phosphatase     ()  U/L


 


Albumin     (3.5-5.0)  g/dL














  12/22/18 12/22/18 12/22/18 Range/Units





  07:17 07:31 07:31 


 


WBC    33.5 H  (3.8-10.6)  k/uL


 


RBC    3.49 L  (3.80-5.40)  m/uL


 


Hgb    10.6 L  (11.4-16.0)  gm/dL


 


MCV    101.8 H  (80.0-100.0)  fL


 


MCHC    29.8 L  (31.0-37.0)  g/dL


 


RDW    15.9 H  (11.5-15.5)  %


 


Neutrophils # (Manual)    28.40 H  (1.3-7.7)  k/uL


 


Monocytes # (Manual)    2.68 H  (0-1.0)  k/uL


 


Myelocytes # (Manual)    0.34 H  (0)  k/uL


 


Sodium   133 L   (137-145)  mmol/L


 


Chloride   93 L   ()  mmol/L


 


BUN   37 H   (7-17)  mg/dL


 


Creatinine   7.12 H*   (0.52-1.04)  mg/dL


 


POC Glucose (mg/dL)  57 L    (75-99)  mg/dL


 


AST   78 H   (14-36)  U/L


 


Alkaline Phosphatase   215 H   ()  U/L


 


Albumin   2.7 L   (3.5-5.0)  g/dL














  12/22/18 Range/Units





  10:23 


 


WBC   (3.8-10.6)  k/uL


 


RBC   (3.80-5.40)  m/uL


 


Hgb   (11.4-16.0)  gm/dL


 


MCV   (80.0-100.0)  fL


 


MCHC   (31.0-37.0)  g/dL


 


RDW   (11.5-15.5)  %


 


Neutrophils # (Manual)   (1.3-7.7)  k/uL


 


Monocytes # (Manual)   (0-1.0)  k/uL


 


Myelocytes # (Manual)   (0)  k/uL


 


Sodium   (137-145)  mmol/L


 


Chloride   ()  mmol/L


 


BUN   (7-17)  mg/dL


 


Creatinine   (0.52-1.04)  mg/dL


 


POC Glucose (mg/dL)  105 H  (75-99)  mg/dL


 


AST   (14-36)  U/L


 


Alkaline Phosphatase   ()  U/L


 


Albumin   (3.5-5.0)  g/dL








 Microbiology - Last 24 Hours (Table)











 12/18/18 18:59 Blood Culture - Preliminary





 Blood    No Growth after 72 hours


 


 12/18/18 23:00 Gram Stain - Preliminary





 Peritoneal Fluid Body Fluid Culture - Preliminary














Assessment and Plan


(1) Decubitus ulcer


Narrative/Plan: 


Case discussed with the patient her daughter and also Dr. Ramey.  At this point 

we'll proceed with debridement of the necrotic skin and suspected superficial 

subcutaneous fat to identify whether there is any deeper tracking.  Risks of 

the debridement included bleeding infection wound formation poor healing 

respiratory failure and anesthesia related complications.  They understand and 

wish to proceed.


Current Visit: Yes   Status: Acute   Code(s): L89.90 - PRESSURE ULCER OF 

UNSPECIFIED SITE, UNSPECIFIED STAGE   SNOMED Code(s): 594955521

## 2018-12-22 NOTE — P.PN
Subjective


Progress Note Date: 12/22/18





This is a 58-year-old female, patient of [facility].  Patient has 

a known past medical history of end-stage renal disease on hemodialysis, 

congestive heart failure, COPD, diabetes mellitus, hypertension, hyperlipidemia

, obstructive sleep apnea, chronic hypoxic respiratory failure on 4-5 L of 

oxygen at home, depression and CVA.  History obtained from both patient and 

patient's sister.  Apparently their mother had passed away on November 27 

couple weeks ago.  Since then, patient's has not gotten out of bed.  She has 

not moved out of her bed for about 2 weeks per the sister.  She stopped taking 

all of her medications.  And she has not been eating or drinking much.  Blood 

sugar 469 on admission.  Per the sister the patient was started on Prozac 10 mg 

daily about a month ago.  She took it for about a week and then stopped taking 

it.  Before that patient had been on Zoloft.  Patient reports that she hurts 

everywhere.  She has extensive bruising in the upper thigh is growing area and 

back.  There are smaller bruises on the lower leg area.  She hurts anywhere she 

is touched.  She complains of pain in the chest as well as the abdomen and legs 

and back.  She denies any falling.  Reports some nausea and chest chest pain.  

Denies any fever, chills, sweats, cough, bowel movement changes.  She does not 

make urine and is on peritoneal dialysis. 








On 12/07/2018 patient is currently lying in bed currently getting CAPD.  

Patient is complaining of generalized pain throughout chest abdomen and legs.  

Patient also complaining of more severe pain in her right lower extremity.  

Patient would not let me examine right leg.  Explained to patient that due to 

her prolonged bed rest she is at increased risk for blood clot and then I'm 

concerned that she may have a blood clot in her right leg and that she would 

require a venous Doppler to assess.  Patient states she would not be able to 

tolerate a venous Doppler at this time due to the pain.  Will order Dilaudid 

for pain at this time and encouraged patient the importance of obtaining this 

test.  We'll also consult cardiology services at this time.








On 12/08/2018 patient currently getting CAPD.  Patient did have Doppler study 

completed but was un conclusive of DVT due to increased pain during test.  

Patient remains on Lovenox 1 mg/kg per renal dosing for DVT treatment.  This 

time patient states that she feels slightly improved but still has generalized 

pain all over.  Patient denies chest pain or shortness breath.  Patient denies 

any nausea vomiting or diarrhea.  Patient denies any urinary burning or 

frequency.





On 12/09/2018 Patient currently resting in bed. Patient is having significant 

to lower extremity pain. Will order venous doppler again due to unconclusive 

reading from initial test.  Will order ativan and diuladid to be given prior to 

exam in order to get adequate reading. Patient denies chest pain and shortness 

of breath. Denies nausea, vomitting or diarrhea. Denies any urinary burning or 

frequency  





12/10/2018 patient still complaining of pain all over the body.  She does 

report is slightly better than admission.  Complaining of constipation and his 

been about 3 days since her last bowel movement.  Hemoglobin has dropped from 

9.3-8.7.  Potassium is 3.1 and she received supplement.  Venous Doppler of the 

lower extremities showing a limited exam of the right leg and what could be 

seen was negative for DVT.  Patient refused the left leg to be completed due to 

pain.  Patient has been noncompliant with physical therapy.  Poor appetite.  

Pain service will be placed on consult.  Patient has been educated that she 

needs to participate with physical therapy.





12/11/2018 patient does report slight improvement in her pain.  Still 

complaining of pain throughout her body.  Patient was able to work with 

physical therapy yesterday.  She was a max assist.  She denies any chest pain 

or shortness of breath.  Denies any nausea or vomiting.  Still has not had a 

bowel movement for about 5 days.  Norvasc discontinued yesterday due to 

hypotension.  Blood pressures are 20 better today.  Nephrology is also adjusted 

the frequency of peritoneal dialysis to every 4 hours. 





On 12/12/2018 patient does report slight improvement with her pain.  Patient is 

still complaining of overall pain throughout her entire body.  Patient refused 

venous Doppler again due to pain for the third time.  D-dimer was negative.  

Lovenox was DC'd and Lovenox prophylaxis has been added.  Patient denies chest 

pain or shortness of breath.  Patient denies nausea vomiting or diarrhea.  

Patient denies any urinary burning or frequency





On 12/13/2018 patient does report slight improvement with pain today.  Patient'

s daughter currently at bedside.  Patient started still expressed concerns over 

lack of motivation from her mother to get out of bed.  Requesting that site 

revisit patient.  Still waiting on pain services to come and evaluate patient.  

At this time patient denies chest pain or shortness of breath.  Patient denies 

nausea vomiting or diarrhea.  Patient denies any urinary burning or frequency.  

Patient highly encouraged to continue working with physical therapy in order to 

prevent negative outcomes.  At this time planning discharge to [facility]





On 12/14/2018 patient states she feels slightly more improved.  Patient was 

seen by psych Megace has been added Wellbutrin has been increased.  Discussed 

case with social work and a lot of yellow does not have any beds available.  

Still trying to be achieved placement due to peritoneal dialysis.  Patient also 

complaining of generalized pain.  Patient denies chest pain.  Patient denies 

nausea vomiting or diarrhea.  Patient denies any urinary burning





On 12/15/2018 patient is alert and responsive in no apparent distress, she 

states she is feeling a little bit better today, she is complaining of 

generalized pain, otherwise no complaints at this time there is no fever or 

chills no headache or dizziness no chest pain no shortness of breath no cough 

no nausea or vomiting no abdominal pain no diarrhea and no urinary symptoms.





On 12/16/2018 patient was seen and examined she is alert and responsive in no 

apparent distress.  Nursing staff reporting that patient has been uncooperative

, she is refusing to be turned in bed, she is refusing inspection of her sacral 

ulcers, she is refusing most of her other medical care.  Abdomen x-ray done 

yesterday reveals evidence of large bowel ileus.  Patient denies abdominal pain 

but is having pain when abdominal palpation is done.








12/17/2018 patient lying in bed she is awake and alert.  Reporting poor 

appetite.  Still has been uncooperative with nursing staff.  Still complaining 

of pain all over her body.  Abdominal x-ray showing a large bowel ileus or air 

swallowing.  Discussed with surgical service they'll be through to evaluate 

patient this afternoon.  Patient still complaining of some abdominal pain.  No 

bowel movement for about 10 days.  She is now agreeable to a soapsuds enema and 

lactulose.  These have been ordered.  Denies any nausea or vomiting.  





12/18/2018 patient is still being uncooperative.  She has refused both myself 

and nursing staff to evaluate her skin.  Apparently it has been reported that 

she has ulcers on the right side of her back.  Concerns for infection.  The 

patient refuses to be turned over.  She continues to lay on her back.  Patient 

refused surgical service evaluation yesterday.  She did have a bowel movement 

with the lactulose.  Refuses the enema.  Pain service came through and increase 

her Norco to 10 mg every 8 hours.  Blood pressures are better today.  However 

she does have a old fistula in the 1 arm that the blood pressures were being 

drawn from him he may have not been getting adequate blood pressure readings.  

White count has continued to increase to 24.6.  Hemoglobin 9.5 and sodium has 

dropped to 126.  Both infectious disease and nephrology have been a with made 

aware of his lab results.  Infectious disease will be back through later this 

evening to reevaluate patient at that time hopefully she will be cooperative 

for the physician to evaluate her skin.  Nephrology is recommending that 

patient be started on a diet to help with the sodium.  Since patient had a 

bowel movement we will advance diet to a consistent carbohydrate diet.  And 

nephrology is also recommending a 1500 mL fluid restriction.





On 12/19/2018 per nursing staff patient did let Dr. Ramey with infectious 

disease evaluate her skin.  Patient is still refusing to let myself or nursing 

staff do any further assessment for evaluation.  Patient does state she feels 

slightly more improved.  Denies chest pain or shortness of breath.  Patient 

denies nausea vomiting or diarrhea.  Patient denies any urinary burning or 

frequency.





12/20/2018 patient complaining of nausea.  No vomiting.  She is requesting pain 

medication.  Still reporting that she hurts all over.  When patient is ready 

for discharge she'll be discharged to [facility].  She's currently 

on vancomycin and Zosyn due to sacral ulcer and cellulitis.  Chest x-ray 

completed due to elevated white count showing chronic changes no acute changes.

  Patient is having bowel movements








12/21/2018 patient is still complaining of pain everywhere.   Norco is helping.

  She still being uncooperative with nursing staff.  Patient is having bowel 

movements.  Denies any nausea or vomiting.  Denies any diarrhea.  Denies cough.

  Denies chest pain or shortness breath.  White count has increased to 24.8.  

Infectious disease is following.  Patient is on vancomycin and Zosyn








On 12/22/2018 patient is alert and oriented in no apparent distress still 

complaining of pain, today she is complaining of vertigo, there is no fever or 

chills no headache or dizziness no chest pain no shortness of breath no cough 

no nausea or vomiting no abdominal pain no diarrhea and no urinary symptoms.





Objective





- Vital Signs


Vital signs: 


 Vital Signs











Temp  96.5 F L  12/22/18 10:04


 


Pulse  79   12/22/18 10:04


 


Resp  14   12/22/18 10:04


 


BP  160/70   12/22/18 10:04


 


Pulse Ox  100   12/22/18 10:04








 Intake & Output











 12/21/18 12/22/18 12/22/18





 18:59 06:59 18:59


 


Weight 169 kg  


 


Other:   


 


  Voiding Method CAPD CAPD CAPD


 


  # Voids  0 


 


  # Bowel Movements 1 1 














- Exam





Head normocephalic


Neck supple


Lungs clear to auscultation bilaterally no wheezing or crackles


Heart regular rate and rhythm S1-S2, no rub or gallop


Abdomen is soft nontender nondistended positive bowel sounds no 

hepatosplenomegaly.  Obese.  Peritoneal dialysis catheter site brown crusting 

or scabbing noted around the area.  No erythema.


Extremities no edema


Neuro alert and orientated to 3


Skin extensive bruising along the upper thighs and lower back and small bruises 

on the legs.  Patient has tenderness all over the body with palpation.  Patient'

s tenderness with palpation is less severe and bruising are showing improvement








- Labs


CBC & Chem 7: 


 12/22/18 07:31





 12/22/18 07:31


Labs: 


 Abnormal Lab Results - Last 24 Hours (Table)











  12/21/18 12/21/18 12/22/18 Range/Units





  12:29 20:58 06:59 


 


WBC     (3.8-10.6)  k/uL


 


RBC     (3.80-5.40)  m/uL


 


Hgb     (11.4-16.0)  gm/dL


 


MCV     (80.0-100.0)  fL


 


MCHC     (31.0-37.0)  g/dL


 


RDW     (11.5-15.5)  %


 


Neutrophils # (Manual)     (1.3-7.7)  k/uL


 


Monocytes # (Manual)     (0-1.0)  k/uL


 


Myelocytes # (Manual)     (0)  k/uL


 


Sodium     (137-145)  mmol/L


 


Chloride     ()  mmol/L


 


BUN     (7-17)  mg/dL


 


Creatinine     (0.52-1.04)  mg/dL


 


POC Glucose (mg/dL)  139 H  118 H  54 L  (75-99)  mg/dL


 


AST     (14-36)  U/L


 


Alkaline Phosphatase     ()  U/L


 


Albumin     (3.5-5.0)  g/dL














  12/22/18 12/22/18 12/22/18 Range/Units





  07:17 07:31 07:31 


 


WBC    33.5 H  (3.8-10.6)  k/uL


 


RBC    3.49 L  (3.80-5.40)  m/uL


 


Hgb    10.6 L  (11.4-16.0)  gm/dL


 


MCV    101.8 H  (80.0-100.0)  fL


 


MCHC    29.8 L  (31.0-37.0)  g/dL


 


RDW    15.9 H  (11.5-15.5)  %


 


Neutrophils # (Manual)    28.40 H  (1.3-7.7)  k/uL


 


Monocytes # (Manual)    2.68 H  (0-1.0)  k/uL


 


Myelocytes # (Manual)    0.34 H  (0)  k/uL


 


Sodium   133 L   (137-145)  mmol/L


 


Chloride   93 L   ()  mmol/L


 


BUN   37 H   (7-17)  mg/dL


 


Creatinine   7.12 H*   (0.52-1.04)  mg/dL


 


POC Glucose (mg/dL)  57 L    (75-99)  mg/dL


 


AST   78 H   (14-36)  U/L


 


Alkaline Phosphatase   215 H   ()  U/L


 


Albumin   2.7 L   (3.5-5.0)  g/dL














  12/22/18 Range/Units





  10:23 


 


WBC   (3.8-10.6)  k/uL


 


RBC   (3.80-5.40)  m/uL


 


Hgb   (11.4-16.0)  gm/dL


 


MCV   (80.0-100.0)  fL


 


MCHC   (31.0-37.0)  g/dL


 


RDW   (11.5-15.5)  %


 


Neutrophils # (Manual)   (1.3-7.7)  k/uL


 


Monocytes # (Manual)   (0-1.0)  k/uL


 


Myelocytes # (Manual)   (0)  k/uL


 


Sodium   (137-145)  mmol/L


 


Chloride   ()  mmol/L


 


BUN   (7-17)  mg/dL


 


Creatinine   (0.52-1.04)  mg/dL


 


POC Glucose (mg/dL)  105 H  (75-99)  mg/dL


 


AST   (14-36)  U/L


 


Alkaline Phosphatase   ()  U/L


 


Albumin   (3.5-5.0)  g/dL








 Microbiology - Last 24 Hours (Table)











 12/18/18 18:59 Blood Culture - Preliminary





 Blood    No Growth after 72 hours


 


 12/18/18 23:00 Gram Stain - Preliminary





 Peritoneal Fluid Body Fluid Culture - Preliminary














Assessment and Plan


Plan: 





1.  Prolonged period of time in bed With pain throughout her body and extensive 

bruising.  Aspirin and Plavix resumed





2.  Insulin-dependent diabetes mellitus: Hyperglycemia with blood sugar 469 on 

admission.  Patient has not been taking insulin at home.  Acetone level 

negative.  A1c 7.7.  Patient did have some hypoglycemia due to insulin being 

given with her being on a clear liquid diet and poor appetite.  Diet has been 

advanced.  We will resume her NovoLog 70/30 at 80 units twice a day.  Patient 

did have some episodes of hyperglycemia.  Continue to monitor.  Appetite is 

still poor.


Today patient is having episodes of hypoglycemia will decrease NovoLog 7030-60 

units twice daily continue with insulin sliding scale





3.  History of end-stage renal disease on peritoneal dialysis.  Nephrology 

following.  Continue peritoneal dialysis.





4.  Morbid obesity





5.  Medication noncompliance





6.  Severe Depression.  Patient denies any suicidal or homicidal ideation.  

Recently started on Prozac 10 mg daily outpatient.  Will restart Prozac at 20 

mg daily . Wellbutrin has been adder per psych.  At this time daughter 

requesting that psych revisit patient for reevaluation of patient's depression.

  Psychiatry has come to reevaluate patient.  Megace has been added and 

Wellbutrin has been increased.  Continue Abilify





7.  History of COPD stable





8.  History of CVA





9.  History of Essential hypertension





10.  Hyperlipidemia





11.  Obstructive sleep apnea uses CPAP





12.  Chronic hypoxic respiratory failure home O2 dependent on 4-5 L





13.  Right lower extremity pain.  Concerns for possible DVT due to prolonged 

time in bed.  Continue with Lovenox 140 mg subcu daily.  Patient unable to 

tolerate venous Doppler.  On 12/ 9 venous Doppler was a limited exam what was 

evaluated was negative for DVT in the right leg.  Patient refused the left leg 

to be completed.  Patient refused for the third time venous Doppler again due 

to pain.  D-dimer less than 0.17.  Lovenox 140 mg subcu daily discontinued.  

Lovenox DVT prophylaxis has been added





14.  Elevated cardiac enzymes.  Troponin 0.072.  EKG completed showing normal 

sinus rhythm.  Inferior infarct, age undetermined anterior infarct age 

undetermined.  Per cardiology services no further workup needed from cardiology 

standpoint.  No evidence for an acute cardiac event per cardiology





15.  Elevated lipase level of 410.  Has trended down.  No evidence of 

pancreatitis





16.  Hypovolemic Hyponatremia.   Possibly related to her hyperglycemia as well.

  Nephrology following





17. Elevated uric acid.  Uric acid 8.4  Patient will be started on allopurinol





18.  Anemia of chronic kidney disease.  And evidence of iron deficiency anemia.

   Will start ferrous sulfate 325 mg twice a day. Aranesp has been added per 

nephrology.  Continue to monitor hemoglobin.  





19.  Constipation add stool softener and lactulose





20.  Hypotension possibly related to the IV Dilaudid.  Nephrology discontinued 

the Norvasc.  Blood pressures showing improvement.





21.  Abdominal pain with ileus: abdominal x-ray showing ileus or air 

swallowing.  No free air.  Patient tolerating diet.  Having stools.  Refuses 

surgical evaluation.





22.  Hyponatremia due to poor nutritional intake.  Advance diet.  Nephrology 

notified.  They've also recommended fluid restrictions 1500 MLS per day





23.  Hypomagnesemia and patient receiving magnesium supplement.  Repeat 

magnesium level in a.m.





25.  Leukocytosis: Likely source is unstageable sacral pressure ulcer with 

cellulitis.  Patient followed by infectious disease.  She's currently on 

vancomycin and Zosyn.  Peritoneal fluid is not look infected.  Chest x-ray no 

acute changes.  patient's white count has worsened.  It is now 24.8.  We'll 

await further infectious disease evaluation .  Patient is also being evaluated 

by surgery for possible ulcer debridement.





 26.  Mildly elevated LFTs :AST 54 alk phos 189.  ALT is normal.  Discontinue 

Lipitor and fenofibrate.  Repeat labs in a.m. 








DVT prophylaxis Lovenox.  GI prophylaxis Pepcid

## 2018-12-23 LAB
ALBUMIN SERPL-MCNC: 2.4 G/DL (ref 3.5–5)
ALP SERPL-CCNC: 188 U/L (ref 38–126)
ALT SERPL-CCNC: 47 U/L (ref 9–52)
ANION GAP SERPL CALC-SCNC: 18 MMOL/L
AST SERPL-CCNC: 72 U/L (ref 14–36)
BASOPHILS # BLD AUTO: 0.1 K/UL (ref 0–0.2)
BASOPHILS NFR BLD AUTO: 0 %
BUN SERPL-SCNC: 41 MG/DL (ref 7–17)
CALCIUM SPEC-MCNC: 9 MG/DL (ref 8.4–10.2)
CHLORIDE SERPL-SCNC: 92 MMOL/L (ref 98–107)
CO2 SERPL-SCNC: 25 MMOL/L (ref 22–30)
EOSINOPHIL # BLD AUTO: 0.1 K/UL (ref 0–0.7)
EOSINOPHIL NFR BLD AUTO: 0 %
ERYTHROCYTE [DISTWIDTH] IN BLOOD BY AUTOMATED COUNT: 3.13 M/UL (ref 3.8–5.4)
ERYTHROCYTE [DISTWIDTH] IN BLOOD: 16.2 % (ref 11.5–15.5)
GLUCOSE BLD-MCNC: 101 MG/DL (ref 75–99)
GLUCOSE BLD-MCNC: 109 MG/DL (ref 75–99)
GLUCOSE BLD-MCNC: 125 MG/DL (ref 75–99)
GLUCOSE BLD-MCNC: 78 MG/DL (ref 75–99)
GLUCOSE BLD-MCNC: 81 MG/DL (ref 75–99)
GLUCOSE BLD-MCNC: 98 MG/DL (ref 75–99)
GLUCOSE SERPL-MCNC: 105 MG/DL (ref 74–99)
HCT VFR BLD AUTO: 32.2 % (ref 34–46)
HGB BLD-MCNC: 9.5 GM/DL (ref 11.4–16)
LYMPHOCYTES # SPEC AUTO: 1.3 K/UL (ref 1–4.8)
LYMPHOCYTES NFR SPEC AUTO: 4 %
MAGNESIUM SPEC-SCNC: 2.2 MG/DL (ref 1.6–2.3)
MCH RBC QN AUTO: 30.2 PG (ref 25–35)
MCHC RBC AUTO-ENTMCNC: 29.4 G/DL (ref 31–37)
MCV RBC AUTO: 102.8 FL (ref 80–100)
MONOCYTES # BLD AUTO: 1.6 K/UL (ref 0–1)
MONOCYTES NFR BLD AUTO: 5 %
NEUTROPHILS # BLD AUTO: 27.7 K/UL (ref 1.3–7.7)
NEUTROPHILS NFR BLD AUTO: 89 %
PLATELET # BLD AUTO: 270 K/UL (ref 150–450)
POTASSIUM SERPL-SCNC: 4.2 MMOL/L (ref 3.5–5.1)
PROT SERPL-MCNC: 5.4 G/DL (ref 6.3–8.2)
SODIUM SERPL-SCNC: 135 MMOL/L (ref 137–145)
WBC # BLD AUTO: 31.3 K/UL (ref 3.8–10.6)

## 2018-12-23 PROCEDURE — 0JBL0ZZ EXCISION OF RIGHT UPPER LEG SUBCUTANEOUS TISSUE AND FASCIA, OPEN APPROACH: ICD-10-PCS

## 2018-12-23 RX ADMIN — INSULIN ASPART SCH: 100 INJECTION, SUSPENSION SUBCUTANEOUS at 20:52

## 2018-12-23 RX ADMIN — VANCOMYCIN HYDROCHLORIDE SCH MLS/HR: 500 INJECTION, POWDER, LYOPHILIZED, FOR SOLUTION INTRAVENOUS at 04:34

## 2018-12-23 RX ADMIN — DOCUSATE SODIUM SCH MG: 100 CAPSULE, LIQUID FILLED ORAL at 20:29

## 2018-12-23 RX ADMIN — Medication SCH: at 11:51

## 2018-12-23 RX ADMIN — HYDROCODONE BITARTRATE AND ACETAMINOPHEN PRN EACH: 10; 325 TABLET ORAL at 05:21

## 2018-12-23 RX ADMIN — DOCUSATE SODIUM SCH: 100 CAPSULE, LIQUID FILLED ORAL at 09:49

## 2018-12-23 RX ADMIN — GENTAMICIN SULFATE SCH: 1 CREAM TOPICAL at 09:06

## 2018-12-23 RX ADMIN — PIPERACILLIN AND TAZOBACTAM SCH MLS/HR: 3; .375 INJECTION, POWDER, FOR SOLUTION INTRAVENOUS at 20:48

## 2018-12-23 RX ADMIN — MEGESTROL ACETATE SCH: 40 TABLET ORAL at 18:32

## 2018-12-23 RX ADMIN — IPRATROPIUM BROMIDE SCH: 0.5 SOLUTION RESPIRATORY (INHALATION) at 20:57

## 2018-12-23 RX ADMIN — VANCOMYCIN HYDROCHLORIDE SCH MLS/HR: 500 INJECTION, POWDER, LYOPHILIZED, FOR SOLUTION INTRAVENOUS at 16:36

## 2018-12-23 RX ADMIN — TRIAMCINOLONE ACETONIDE SCH APPLIC: 1 CREAM TOPICAL at 09:07

## 2018-12-23 RX ADMIN — BUPROPION HYDROCHLORIDE SCH: 300 TABLET, FILM COATED, EXTENDED RELEASE ORAL at 09:49

## 2018-12-23 RX ADMIN — LEVOTHYROXINE SODIUM SCH: 88 TABLET ORAL at 06:20

## 2018-12-23 RX ADMIN — SEVELAMER CARBONATE SCH: 800 TABLET, FILM COATED ORAL at 11:52

## 2018-12-23 RX ADMIN — SEVELAMER CARBONATE SCH: 800 TABLET, FILM COATED ORAL at 18:31

## 2018-12-23 RX ADMIN — INSULIN ASPART SCH: 100 INJECTION, SOLUTION INTRAVENOUS; SUBCUTANEOUS at 17:53

## 2018-12-23 RX ADMIN — INSULIN ASPART SCH: 100 INJECTION, SOLUTION INTRAVENOUS; SUBCUTANEOUS at 12:18

## 2018-12-23 RX ADMIN — LATANOPROST SCH: 50 SOLUTION OPHTHALMIC at 21:58

## 2018-12-23 RX ADMIN — Medication SCH: at 21:00

## 2018-12-23 RX ADMIN — INSULIN ASPART SCH: 100 INJECTION, SOLUTION INTRAVENOUS; SUBCUTANEOUS at 08:36

## 2018-12-23 RX ADMIN — MEGESTROL ACETATE SCH: 40 TABLET ORAL at 11:52

## 2018-12-23 RX ADMIN — FAMOTIDINE SCH: 20 TABLET, FILM COATED ORAL at 21:00

## 2018-12-23 RX ADMIN — Medication SCH: at 08:37

## 2018-12-23 RX ADMIN — ENOXAPARIN SODIUM SCH: 30 INJECTION SUBCUTANEOUS at 07:06

## 2018-12-23 RX ADMIN — FAMOTIDINE SCH MG: 20 TABLET, FILM COATED ORAL at 20:29

## 2018-12-23 RX ADMIN — INSULIN ASPART SCH: 100 INJECTION, SUSPENSION SUBCUTANEOUS at 11:49

## 2018-12-23 RX ADMIN — ALLOPURINOL SCH: 100 TABLET ORAL at 09:49

## 2018-12-23 RX ADMIN — MEGESTROL ACETATE SCH MG: 40 TABLET ORAL at 20:29

## 2018-12-23 RX ADMIN — IPRATROPIUM BROMIDE SCH: 0.5 SOLUTION RESPIRATORY (INHALATION) at 09:02

## 2018-12-23 RX ADMIN — POLYETHYLENE GLYCOL 3350 SCH: 17 POWDER, FOR SOLUTION ORAL at 21:58

## 2018-12-23 RX ADMIN — DOCUSATE SODIUM SCH: 100 CAPSULE, LIQUID FILLED ORAL at 21:00

## 2018-12-23 RX ADMIN — ACETAMINOPHEN PRN MG: 500 TABLET ORAL at 16:46

## 2018-12-23 RX ADMIN — COLLAGENASE SANTYL SCH APPLIC: 250 OINTMENT TOPICAL at 09:06

## 2018-12-23 RX ADMIN — Medication SCH MG: at 20:29

## 2018-12-23 RX ADMIN — VANCOMYCIN HYDROCHLORIDE SCH MLS/HR: 500 INJECTION, POWDER, LYOPHILIZED, FOR SOLUTION INTRAVENOUS at 09:34

## 2018-12-23 RX ADMIN — HYDROCODONE BITARTRATE AND ACETAMINOPHEN PRN EACH: 10; 325 TABLET ORAL at 20:29

## 2018-12-23 RX ADMIN — IPRATROPIUM BROMIDE SCH: 0.5 SOLUTION RESPIRATORY (INHALATION) at 20:58

## 2018-12-23 RX ADMIN — FOLIC ACID SCH: 1 TABLET ORAL at 11:51

## 2018-12-23 RX ADMIN — IPRATROPIUM BROMIDE SCH: 0.5 SOLUTION RESPIRATORY (INHALATION) at 12:02

## 2018-12-23 RX ADMIN — ASPIRIN 81 MG CHEWABLE TABLET SCH: 81 TABLET CHEWABLE at 09:49

## 2018-12-23 RX ADMIN — PIPERACILLIN AND TAZOBACTAM SCH MLS/HR: 3; .375 INJECTION, POWDER, FOR SOLUTION INTRAVENOUS at 09:04

## 2018-12-23 RX ADMIN — TRIAMCINOLONE ACETONIDE SCH: 1 CREAM TOPICAL at 21:58

## 2018-12-23 RX ADMIN — SEVELAMER CARBONATE SCH: 800 TABLET, FILM COATED ORAL at 08:36

## 2018-12-23 RX ADMIN — MEGESTROL ACETATE SCH: 40 TABLET ORAL at 08:37

## 2018-12-23 RX ADMIN — INSULIN ASPART SCH: 100 INJECTION, SOLUTION INTRAVENOUS; SUBCUTANEOUS at 20:52

## 2018-12-23 NOTE — PN
PROGRESS NOTE



DATE OF SERVICE:

12/22/2018.



REASON FOR FOLLOWUP:

1. _____sacral pressure ulcer with a question of cellulitis.

2. Elevated white count.



INTERVAL HISTORY:

The patient is afebrile.  She is feeling slightly better.  She is breathing

comfortably.  Denies having any chest pain.  No cough. No abdominal pain.  No nausea,

vomiting, or any diarrhea.



PHYSICAL EXAMINATION:

Blood pressure 121/48, pulse of 79, temperature 98.5.  She is 100% on 4 L nasal

cannula. General description is a middle aged female lying in bed in no distress.

Respiratory system:  Unlabored breathing.  Clear to auscultation anteriorly.  Heart S1,

S2.  Regular rate and rhythm.  Abdomen soft, no redness.



LABS:

Hemoglobin 10.8, white count 3.5, BUN of 37, creatinine 7.12.



DIAGNOSTIC IMPRESSION AND PLAN:

Patient with elevated white count with concern for likely _____pressure ulcer with

associated necrotic tissue what could be responsible for this elevated white count with

possible _____ tomorrow.  Advised _____culture should be added with one dose of

Diflucan _____ response.  Keep the patient on vancomycin and Zosyn at this point.

Continue supportive care.





MMODL / IJN: [id number] / Job#: [id number]

## 2018-12-23 NOTE — PN
PROGRESS NOTE



DATE OF SERVICE:

12/23/2018.



REASON FOR FOLLOWUP:

Leukocytosis with left hip wound pressure ulcer.



INTERVAL HISTORY:

The patient was taken to the OR.  The patient is status post debridement of the left

sacral wound.  Apparently no purulence was noticed and no cultures were done.

Postprocedure, the patient was noticed to be hypotensive, has been transferred to the

ICU.  She is breathing comfortably.  Denies any chest pain or cough.  No abdominal

pain.  No nausea, vomiting, or any diarrhea.



PHYSICAL EXAMINATION:

Blood pressure is 113/48 with a pulse of 83, temperature 98, she is 100% on a CPAP.

GENERAL DESCRIPTION: A middle aged female lying in bed in no distress.

RESPIRATORY SYSTEM:  Unlabored breathing. Clear to auscultation anteriorly.

HEART: S1, S2.  Regular rate and rhythm.

ABDOMEN: No tenderness, guarding or rigidity.



LABS:

Hemoglobin is 9.5, white count 1.3, BUN of 41, creatinine 1.13.  Blood culture has been

negative so far.



DIAGNOSTIC IMPRESSION AND PLAN:

Patient with leukocytosis.  The patient did have a large unstageable sacral/left hip

pressure ulcer status post debridement.  The patient has been on broad-spectrum

antibiotic, however, did have persistent worsening of the white count, could have be

related to the necrotic skin and soft tissue of the back that has been debrided.  We

will keep an eye on her clinical condition.  Continue current broad spectrum

antibiotics.  Overall prognosis remains to be guarded. Continue supportive care.





MMODL / IJN: [id number] / Job#: [id number]

## 2018-12-23 NOTE — P.PN
Subjective


Progress Note Date: 12/23/18





This is a 58-year-old female, patient of [facility].  Patient has 

a known past medical history of end-stage renal disease on hemodialysis, 

congestive heart failure, COPD, diabetes mellitus, hypertension, hyperlipidemia

, obstructive sleep apnea, chronic hypoxic respiratory failure on 4-5 L of 

oxygen at home, depression and CVA.  History obtained from both patient and 

patient's sister.  Apparently their mother had passed away on November 27 

couple weeks ago.  Since then, patient's has not gotten out of bed.  She has 

not moved out of her bed for about 2 weeks per the sister.  She stopped taking 

all of her medications.  And she has not been eating or drinking much.  Blood 

sugar 469 on admission.  Per the sister the patient was started on Prozac 10 mg 

daily about a month ago.  She took it for about a week and then stopped taking 

it.  Before that patient had been on Zoloft.  Patient reports that she hurts 

everywhere.  She has extensive bruising in the upper thigh is growing area and 

back.  There are smaller bruises on the lower leg area.  She hurts anywhere she 

is touched.  She complains of pain in the chest as well as the abdomen and legs 

and back.  She denies any falling.  Reports some nausea and chest chest pain.  

Denies any fever, chills, sweats, cough, bowel movement changes.  She does not 

make urine and is on peritoneal dialysis. 








On 12/07/2018 patient is currently lying in bed currently getting CAPD.  

Patient is complaining of generalized pain throughout chest abdomen and legs.  

Patient also complaining of more severe pain in her right lower extremity.  

Patient would not let me examine right leg.  Explained to patient that due to 

her prolonged bed rest she is at increased risk for blood clot and then I'm 

concerned that she may have a blood clot in her right leg and that she would 

require a venous Doppler to assess.  Patient states she would not be able to 

tolerate a venous Doppler at this time due to the pain.  Will order Dilaudid 

for pain at this time and encouraged patient the importance of obtaining this 

test.  We'll also consult cardiology services at this time.








On 12/08/2018 patient currently getting CAPD.  Patient did have Doppler study 

completed but was un conclusive of DVT due to increased pain during test.  

Patient remains on Lovenox 1 mg/kg per renal dosing for DVT treatment.  This 

time patient states that she feels slightly improved but still has generalized 

pain all over.  Patient denies chest pain or shortness breath.  Patient denies 

any nausea vomiting or diarrhea.  Patient denies any urinary burning or 

frequency.





On 12/09/2018 Patient currently resting in bed. Patient is having significant 

to lower extremity pain. Will order venous doppler again due to unconclusive 

reading from initial test.  Will order ativan and diuladid to be given prior to 

exam in order to get adequate reading. Patient denies chest pain and shortness 

of breath. Denies nausea, vomitting or diarrhea. Denies any urinary burning or 

frequency  





12/10/2018 patient still complaining of pain all over the body.  She does 

report is slightly better than admission.  Complaining of constipation and his 

been about 3 days since her last bowel movement.  Hemoglobin has dropped from 

9.3-8.7.  Potassium is 3.1 and she received supplement.  Venous Doppler of the 

lower extremities showing a limited exam of the right leg and what could be 

seen was negative for DVT.  Patient refused the left leg to be completed due to 

pain.  Patient has been noncompliant with physical therapy.  Poor appetite.  

Pain service will be placed on consult.  Patient has been educated that she 

needs to participate with physical therapy.





12/11/2018 patient does report slight improvement in her pain.  Still 

complaining of pain throughout her body.  Patient was able to work with 

physical therapy yesterday.  She was a max assist.  She denies any chest pain 

or shortness of breath.  Denies any nausea or vomiting.  Still has not had a 

bowel movement for about 5 days.  Norvasc discontinued yesterday due to 

hypotension.  Blood pressures are 20 better today.  Nephrology is also adjusted 

the frequency of peritoneal dialysis to every 4 hours. 





On 12/12/2018 patient does report slight improvement with her pain.  Patient is 

still complaining of overall pain throughout her entire body.  Patient refused 

venous Doppler again due to pain for the third time.  D-dimer was negative.  

Lovenox was DC'd and Lovenox prophylaxis has been added.  Patient denies chest 

pain or shortness of breath.  Patient denies nausea vomiting or diarrhea.  

Patient denies any urinary burning or frequency





On 12/13/2018 patient does report slight improvement with pain today.  Patient'

s daughter currently at bedside.  Patient started still expressed concerns over 

lack of motivation from her mother to get out of bed.  Requesting that site 

revisit patient.  Still waiting on pain services to come and evaluate patient.  

At this time patient denies chest pain or shortness of breath.  Patient denies 

nausea vomiting or diarrhea.  Patient denies any urinary burning or frequency.  

Patient highly encouraged to continue working with physical therapy in order to 

prevent negative outcomes.  At this time planning discharge to [facility]





On 12/14/2018 patient states she feels slightly more improved.  Patient was 

seen by psych Megace has been added Wellbutrin has been increased.  Discussed 

case with social work and a lot of yellow does not have any beds available.  

Still trying to be achieved placement due to peritoneal dialysis.  Patient also 

complaining of generalized pain.  Patient denies chest pain.  Patient denies 

nausea vomiting or diarrhea.  Patient denies any urinary burning





On 12/15/2018 patient is alert and responsive in no apparent distress, she 

states she is feeling a little bit better today, she is complaining of 

generalized pain, otherwise no complaints at this time there is no fever or 

chills no headache or dizziness no chest pain no shortness of breath no cough 

no nausea or vomiting no abdominal pain no diarrhea and no urinary symptoms.





On 12/16/2018 patient was seen and examined she is alert and responsive in no 

apparent distress.  Nursing staff reporting that patient has been uncooperative

, she is refusing to be turned in bed, she is refusing inspection of her sacral 

ulcers, she is refusing most of her other medical care.  Abdomen x-ray done 

yesterday reveals evidence of large bowel ileus.  Patient denies abdominal pain 

but is having pain when abdominal palpation is done.








12/17/2018 patient lying in bed she is awake and alert.  Reporting poor 

appetite.  Still has been uncooperative with nursing staff.  Still complaining 

of pain all over her body.  Abdominal x-ray showing a large bowel ileus or air 

swallowing.  Discussed with surgical service they'll be through to evaluate 

patient this afternoon.  Patient still complaining of some abdominal pain.  No 

bowel movement for about 10 days.  She is now agreeable to a soapsuds enema and 

lactulose.  These have been ordered.  Denies any nausea or vomiting.  





12/18/2018 patient is still being uncooperative.  She has refused both myself 

and nursing staff to evaluate her skin.  Apparently it has been reported that 

she has ulcers on the right side of her back.  Concerns for infection.  The 

patient refuses to be turned over.  She continues to lay on her back.  Patient 

refused surgical service evaluation yesterday.  She did have a bowel movement 

with the lactulose.  Refuses the enema.  Pain service came through and increase 

her Norco to 10 mg every 8 hours.  Blood pressures are better today.  However 

she does have a old fistula in the 1 arm that the blood pressures were being 

drawn from him he may have not been getting adequate blood pressure readings.  

White count has continued to increase to 24.6.  Hemoglobin 9.5 and sodium has 

dropped to 126.  Both infectious disease and nephrology have been a with made 

aware of his lab results.  Infectious disease will be back through later this 

evening to reevaluate patient at that time hopefully she will be cooperative 

for the physician to evaluate her skin.  Nephrology is recommending that 

patient be started on a diet to help with the sodium.  Since patient had a 

bowel movement we will advance diet to a consistent carbohydrate diet.  And 

nephrology is also recommending a 1500 mL fluid restriction.





On 12/19/2018 per nursing staff patient did let Dr. Ramey with infectious 

disease evaluate her skin.  Patient is still refusing to let myself or nursing 

staff do any further assessment for evaluation.  Patient does state she feels 

slightly more improved.  Denies chest pain or shortness of breath.  Patient 

denies nausea vomiting or diarrhea.  Patient denies any urinary burning or 

frequency.





12/20/2018 patient complaining of nausea.  No vomiting.  She is requesting pain 

medication.  Still reporting that she hurts all over.  When patient is ready 

for discharge she'll be discharged to [facility].  She's currently 

on vancomycin and Zosyn due to sacral ulcer and cellulitis.  Chest x-ray 

completed due to elevated white count showing chronic changes no acute changes.

  Patient is having bowel movements








12/21/2018 patient is still complaining of pain everywhere.   Norco is helping.

  She still being uncooperative with nursing staff.  Patient is having bowel 

movements.  Denies any nausea or vomiting.  Denies any diarrhea.  Denies cough.

  Denies chest pain or shortness breath.  White count has increased to 24.8.  

Infectious disease is following.  Patient is on vancomycin and Zosyn








On 12/22/2018 patient is alert and oriented in no apparent distress still 

complaining of pain, today she is complaining of vertigo, there is no fever or 

chills no headache or dizziness no chest pain no shortness of breath no cough 

no nausea or vomiting no abdominal pain no diarrhea and no urinary symptoms.





On 12/23/2018 patient was seen and examined in intensive care unit, earlier she 

was taken to operating room for debridement of her hip ulcer subsequence the 

she had episodes of hypotension was blood pressure of 84/50 he was started on 

fluid boluses and was transferred to intensive care unit critical care 

consultation was requested.  Patient is alert and oriented 3 she is 

complaining of pain at the surgical site otherwise she denies any complaints at 

this time.





Objective





- Vital Signs


Vital signs: 


 Vital Signs











Temp  97.8 F   12/23/18 12:27


 


Pulse  80   12/23/18 13:00


 


Resp  20   12/23/18 12:45


 


BP  80/40   12/23/18 13:00


 


Pulse Ox  100   12/23/18 12:45








 Intake & Output











 12/22/18 12/23/18 12/23/18





 18:59 06:59 18:59


 


Intake Total 600  150


 


Balance 600  150


 


Weight  169 kg 


 


Intake:   


 


  IV   150


 


  Oral 600  


 


Other:   


 


  Voiding Method CAPD CAPD CAPD


 


  # Voids  0 














- Exam





Head normocephalic


Neck supple


Lungs clear to auscultation bilaterally no wheezing or crackles


Heart regular rate and rhythm S1-S2, no rub or gallop


Abdomen is soft nontender nondistended positive bowel sounds no 

hepatosplenomegaly.  Obese.  Peritoneal dialysis catheter site brown crusting 

or scabbing noted around the area.  No erythema.


Extremities no edema


Neuro alert and orientated to 3


Skin extensive bruising along the upper thighs and lower back and small bruises 

on the legs.  Patient has tenderness all over the body with palpation.  Patient'

s tenderness with palpation is less severe and bruising are showing improvement








- Labs


CBC & Chem 7: 


 12/23/18 06:55





 12/23/18 06:55


Labs: 


 Abnormal Lab Results - Last 24 Hours (Table)











  12/22/18 12/22/18 12/23/18 Range/Units





  16:43 21:04 06:55 


 


WBC     (3.8-10.6)  k/uL


 


RBC     (3.80-5.40)  m/uL


 


Hgb     (11.4-16.0)  gm/dL


 


Hct     (34.0-46.0)  %


 


MCV     (80.0-100.0)  fL


 


MCHC     (31.0-37.0)  g/dL


 


RDW     (11.5-15.5)  %


 


Neutrophils #     (1.3-7.7)  k/uL


 


Monocytes #     (0-1.0)  k/uL


 


Sodium    135 L  (137-145)  mmol/L


 


Chloride    92 L  ()  mmol/L


 


BUN    41 H  (7-17)  mg/dL


 


Creatinine    7.13 H*  (0.52-1.04)  mg/dL


 


Glucose    105 H  (74-99)  mg/dL


 


POC Glucose (mg/dL)  132 H  198 H   (75-99)  mg/dL


 


AST    72 H  (14-36)  U/L


 


Alkaline Phosphatase    188 H  ()  U/L


 


Total Protein    5.4 L  (6.3-8.2)  g/dL


 


Albumin    2.4 L  (3.5-5.0)  g/dL














  12/23/18 12/23/18 12/23/18 Range/Units





  06:55 13:42 14:40 


 


WBC  31.3 H    (3.8-10.6)  k/uL


 


RBC  3.13 L    (3.80-5.40)  m/uL


 


Hgb  9.5 L    (11.4-16.0)  gm/dL


 


Hct  32.2 L    (34.0-46.0)  %


 


MCV  102.8 H    (80.0-100.0)  fL


 


MCHC  29.4 L    (31.0-37.0)  g/dL


 


RDW  16.2 H    (11.5-15.5)  %


 


Neutrophils #  27.7 H    (1.3-7.7)  k/uL


 


Monocytes #  1.6 H    (0-1.0)  k/uL


 


Sodium     (137-145)  mmol/L


 


Chloride     ()  mmol/L


 


BUN     (7-17)  mg/dL


 


Creatinine     (0.52-1.04)  mg/dL


 


Glucose     (74-99)  mg/dL


 


POC Glucose (mg/dL)   125 H  109 H  (75-99)  mg/dL


 


AST     (14-36)  U/L


 


Alkaline Phosphatase     ()  U/L


 


Total Protein     (6.3-8.2)  g/dL


 


Albumin     (3.5-5.0)  g/dL








 Microbiology - Last 24 Hours (Table)











 12/22/18 12:03 Blood Culture - Preliminary





 Blood    No Growth after 24 hours


 


 12/18/18 18:59 Blood Culture - Preliminary





 Blood    No Growth after 96 hours


 


 12/18/18 23:00 Gram Stain - Final





 Peritoneal Fluid Body Fluid Culture - Final














Assessment and Plan


Plan: 





1.  Prolonged period of time in bed With pain throughout her body and extensive 

bruising.  Aspirin and Plavix resumed





2.  Insulin-dependent diabetes mellitus: Hyperglycemia with blood sugar 469 on 

admission.  Patient has not been taking insulin at home.  Acetone level 

negative.  A1c 7.7.  Patient did have some hypoglycemia due to insulin being 

given with her being on a clear liquid diet and poor appetite.  Diet has been 

advanced.  We will resume her NovoLog 70/30 at 80 units twice a day.  Patient 

did have some episodes of hyperglycemia.  Continue to monitor.  Appetite is 

still poor.


Today patient is having episodes of hypoglycemia will decrease NovoLog 7030-60 

units twice daily continue with insulin sliding scale





3.  History of end-stage renal disease on peritoneal dialysis.  Nephrology 

following.  Continue peritoneal dialysis.





4.  Morbid obesity





5.  Medication noncompliance





6.  Severe Depression.  Patient denies any suicidal or homicidal ideation.  

Recently started on Prozac 10 mg daily outpatient.  Will restart Prozac at 20 

mg daily . Wellbutrin has been adder per psych.  At this time daughter 

requesting that psych revisit patient for reevaluation of patient's depression.

  Psychiatry has come to reevaluate patient.  Megace has been added and 

Wellbutrin has been increased.  Continue Abilify





7.  History of COPD stable





8.  History of CVA





9.  History of Essential hypertension





10.  Hyperlipidemia





11.  Obstructive sleep apnea uses CPAP





12.  Chronic hypoxic respiratory failure home O2 dependent on 4-5 L





13.  Right lower extremity pain.  Concerns for possible DVT due to prolonged 

time in bed.  Continue with Lovenox 140 mg subcu daily.  Patient unable to 

tolerate venous Doppler.  On 12/ 9 venous Doppler was a limited exam what was 

evaluated was negative for DVT in the right leg.  Patient refused the left leg 

to be completed.  Patient refused for the third time venous Doppler again due 

to pain.  D-dimer less than 0.17.  Lovenox 140 mg subcu daily discontinued.  

Lovenox DVT prophylaxis has been added





14.  Elevated cardiac enzymes.  Troponin 0.072.  EKG completed showing normal 

sinus rhythm.  Inferior infarct, age undetermined anterior infarct age 

undetermined.  Per cardiology services no further workup needed from cardiology 

standpoint.  No evidence for an acute cardiac event per cardiology





15.  Elevated lipase level of 410.  Has trended down.  No evidence of 

pancreatitis





16.  Hypovolemic Hyponatremia.   Possibly related to her hyperglycemia as well.

  Nephrology following





17. Elevated uric acid.  Uric acid 8.4  Patient will be started on allopurinol





18.  Anemia of chronic kidney disease.  And evidence of iron deficiency anemia.

   Will start ferrous sulfate 325 mg twice a day. Aranesp has been added per 

nephrology.  Continue to monitor hemoglobin.  





19.  Constipation add stool softener and lactulose





20.  Hypotension possibly related to the IV Dilaudid.  Nephrology discontinued 

the Norvasc.  Blood pressures showing improvement.





21.  Abdominal pain with ileus: abdominal x-ray showing ileus or air 

swallowing.  No free air.  Patient tolerating diet.  Having stools.  Refuses 

surgical evaluation.





22.  Hyponatremia due to poor nutritional intake.  Advance diet.  Nephrology 

notified.  They've also recommended fluid restrictions 1500 MLS per day





23.  Hypomagnesemia and patient receiving magnesium supplement.  Repeat 

magnesium level in a.m.





25.  Leukocytosis: Likely source is unstageable sacral pressure ulcer with 

cellulitis.  Patient followed by infectious disease.  She's currently on 

vancomycin and Zosyn.  Peritoneal fluid is not look infected.  Chest x-ray no 

acute changes.  patient's white count has worsened.  It is now 24.8.  We'll 

await further infectious disease evaluation .  Patient is also being evaluated 

by surgery for possible ulcer debridement.





 26.  Mildly elevated LFTs :AST 54 alk phos 189.  ALT is normal.  Discontinue 

Lipitor and fenofibrate.  Repeat labs in a.m. 





27.  Postoperative hypotension patient transferred to intensive care unit and 

was started on fluid boluses.  Will monitor closely


DVT prophylaxis Lovenox.  GI prophylaxis Pepcid

## 2018-12-23 NOTE — P.OP
Date of Procedure: 12/23/18


Procedure(s) Performed: 


PREOPERATIVE DIAGNOSIS: Right hip decubitus ulcer


POSTOPERATIVE DIAGNOSIS: Same


PROCEDURE: Debridement right hip decubitus


SURGEON: Saurav DUNNL: Minimal


ANESTHESIA: Local and sedation


COMPLICATIONS: None


OPERATIVE PROCEDURE: Patient placed in the left decubitus position.  Patient 

sedated per anesthesia.  Right hip decubitus prepped and draped in usual 

sterile fashion.  Patient had evidence of skin necrosis measuring 15 x 10 cm.  

This superficial skin and superficial subcutaneous fat was sharply debridein an 

excisional manner.  The 15 x 10 cm portion of necrotic skin and subcutaneous 

fat was removed.  Underlying healthy subcutaneous fat was visualized.  There 

was no tunneling or abscess formation.  This did not appear to be the source of 

this patient's significant leukocytosis.  There was no fluid to sample for 

culturing.  The tissues debrided were sent for microscopic evaluation.  The 

wound was then dressed with a lightly moistened Kerlix gauze and ABDs.  The 

left hip was addressed.  The patient had a small blister there but no other 

wounds requiring debridement.


DISPOSITION: Stable to recovery room

## 2018-12-24 LAB
ANION GAP SERPL CALC-SCNC: 17 MMOL/L
BUN SERPL-SCNC: 45 MG/DL (ref 7–17)
CALCIUM SPEC-MCNC: 8.2 MG/DL (ref 8.4–10.2)
CELLS COUNTED: 100
CELLS COUNTED: 100
CELLS COUNTED: 200
CHLORIDE SERPL-SCNC: 96 MMOL/L (ref 98–107)
CO2 SERPL-SCNC: 19 MMOL/L (ref 22–30)
ERYTHROCYTE [DISTWIDTH] IN BLOOD BY AUTOMATED COUNT: 2.33 M/UL (ref 3.8–5.4)
ERYTHROCYTE [DISTWIDTH] IN BLOOD BY AUTOMATED COUNT: 2.35 M/UL (ref 3.8–5.4)
ERYTHROCYTE [DISTWIDTH] IN BLOOD BY AUTOMATED COUNT: 2.7 M/UL (ref 3.8–5.4)
ERYTHROCYTE [DISTWIDTH] IN BLOOD: 16.7 % (ref 11.5–15.5)
ERYTHROCYTE [DISTWIDTH] IN BLOOD: 16.9 % (ref 11.5–15.5)
ERYTHROCYTE [DISTWIDTH] IN BLOOD: 19.7 % (ref 11.5–15.5)
GLUCOSE BLD-MCNC: 129 MG/DL (ref 75–99)
GLUCOSE BLD-MCNC: 202 MG/DL (ref 75–99)
GLUCOSE BLD-MCNC: 211 MG/DL (ref 75–99)
GLUCOSE BLD-MCNC: 63 MG/DL (ref 75–99)
GLUCOSE BLD-MCNC: 78 MG/DL (ref 75–99)
GLUCOSE BLD-MCNC: 99 MG/DL (ref 75–99)
GLUCOSE SERPL-MCNC: 94 MG/DL (ref 74–99)
HCT VFR BLD AUTO: 23.4 % (ref 34–46)
HCT VFR BLD AUTO: 24.8 % (ref 34–46)
HCT VFR BLD AUTO: 26.7 % (ref 34–46)
HGB BLD-MCNC: 6.7 GM/DL (ref 11.4–16)
HGB BLD-MCNC: 6.7 GM/DL (ref 11.4–16)
HGB BLD-MCNC: 7.6 GM/DL (ref 11.4–16)
LYMPHOCYTES # BLD MANUAL: 0.51 K/UL (ref 1–4.8)
LYMPHOCYTES # BLD MANUAL: 1.31 K/UL (ref 1–4.8)
LYMPHOCYTES # BLD MANUAL: 2.55 K/UL (ref 1–4.8)
MAGNESIUM SPEC-SCNC: 2.1 MG/DL (ref 1.6–2.3)
MCH RBC QN AUTO: 28.3 PG (ref 25–35)
MCH RBC QN AUTO: 28.6 PG (ref 25–35)
MCH RBC QN AUTO: 28.9 PG (ref 25–35)
MCHC RBC AUTO-ENTMCNC: 27.1 G/DL (ref 31–37)
MCHC RBC AUTO-ENTMCNC: 28.6 G/DL (ref 31–37)
MCHC RBC AUTO-ENTMCNC: 28.8 G/DL (ref 31–37)
MCV RBC AUTO: 100.4 FL (ref 80–100)
MCV RBC AUTO: 105.6 FL (ref 80–100)
MCV RBC AUTO: 99 FL (ref 80–100)
METAMYELOCYTES # BLD: 0.51 K/UL
METAMYELOCYTES # BLD: 0.98 K/UL
MONOCYTES # BLD MANUAL: 0.51 K/UL (ref 0–1)
MONOCYTES # BLD MANUAL: 1.13 K/UL (ref 0–1)
MONOCYTES # BLD MANUAL: 2.29 K/UL (ref 0–1)
NEUTROPHILS NFR BLD MANUAL: 81 %
NEUTROPHILS NFR BLD MANUAL: 85 %
NEUTROPHILS NFR BLD MANUAL: 86 %
NEUTS SEG # BLD MANUAL: 23.8 K/UL (ref 1.3–7.7)
NEUTS SEG # BLD MANUAL: 24.6 K/UL (ref 1.3–7.7)
NEUTS SEG # BLD MANUAL: 28.1 K/UL (ref 1.3–7.7)
PLATELET # BLD AUTO: 210 K/UL (ref 150–450)
PLATELET # BLD AUTO: 222 K/UL (ref 150–450)
PLATELET # BLD AUTO: 245 K/UL (ref 150–450)
POTASSIUM SERPL-SCNC: 4.8 MMOL/L (ref 3.5–5.1)
SODIUM SERPL-SCNC: 132 MMOL/L (ref 137–145)
VANCOMYCIN SERPL-MCNC: 31.9 UG/ML
WBC # BLD AUTO: 25.4 K/UL (ref 3.8–10.6)
WBC # BLD AUTO: 28.3 K/UL (ref 3.8–10.6)
WBC # BLD AUTO: 32.7 K/UL (ref 3.8–10.6)

## 2018-12-24 PROCEDURE — 30233N1 TRANSFUSION OF NONAUTOLOGOUS RED BLOOD CELLS INTO PERIPHERAL VEIN, PERCUTANEOUS APPROACH: ICD-10-PCS

## 2018-12-24 RX ADMIN — Medication SCH EACH: at 19:10

## 2018-12-24 RX ADMIN — DOCUSATE SODIUM SCH: 100 CAPSULE, LIQUID FILLED ORAL at 19:51

## 2018-12-24 RX ADMIN — Medication SCH: at 19:53

## 2018-12-24 RX ADMIN — DARBEPOETIN ALFA SCH MCG: 60 INJECTION, SOLUTION INTRAVENOUS; SUBCUTANEOUS at 22:10

## 2018-12-24 RX ADMIN — Medication SCH: at 14:10

## 2018-12-24 RX ADMIN — MONTELUKAST SODIUM SCH MG: 10 TABLET, FILM COATED ORAL at 19:58

## 2018-12-24 RX ADMIN — SEVELAMER CARBONATE SCH: 800 TABLET, FILM COATED ORAL at 10:47

## 2018-12-24 RX ADMIN — INSULIN ASPART SCH: 100 INJECTION, SOLUTION INTRAVENOUS; SUBCUTANEOUS at 07:11

## 2018-12-24 RX ADMIN — MEGESTROL ACETATE SCH: 40 TABLET ORAL at 14:10

## 2018-12-24 RX ADMIN — LEVOTHYROXINE SODIUM SCH MCG: 88 TABLET ORAL at 19:09

## 2018-12-24 RX ADMIN — FOLIC ACID SCH: 1 TABLET ORAL at 17:10

## 2018-12-24 RX ADMIN — BUDESONIDE SCH: 0.5 INHALANT ORAL at 19:08

## 2018-12-24 RX ADMIN — SEVELAMER CARBONATE SCH: 800 TABLET, FILM COATED ORAL at 18:24

## 2018-12-24 RX ADMIN — LATANOPROST SCH DROPS: 50 SOLUTION OPHTHALMIC at 20:01

## 2018-12-24 RX ADMIN — ALLOPURINOL SCH MG: 100 TABLET ORAL at 19:10

## 2018-12-24 RX ADMIN — IPRATROPIUM BROMIDE SCH: 0.5 SOLUTION RESPIRATORY (INHALATION) at 19:08

## 2018-12-24 RX ADMIN — ASPIRIN 81 MG CHEWABLE TABLET SCH MG: 81 TABLET CHEWABLE at 19:10

## 2018-12-24 RX ADMIN — POLYETHYLENE GLYCOL 3350 SCH: 17 POWDER, FOR SOLUTION ORAL at 19:54

## 2018-12-24 RX ADMIN — GENTAMICIN SULFATE SCH: 1 CREAM TOPICAL at 10:55

## 2018-12-24 RX ADMIN — MEGESTROL ACETATE SCH MG: 40 TABLET ORAL at 19:09

## 2018-12-24 RX ADMIN — FLUOXETINE HYDROCHLORIDE SCH MG: 20 SOLUTION ORAL at 19:48

## 2018-12-24 RX ADMIN — BUPROPION HYDROCHLORIDE SCH: 300 TABLET, FILM COATED, EXTENDED RELEASE ORAL at 10:56

## 2018-12-24 RX ADMIN — INSULIN ASPART SCH UNIT: 100 INJECTION, SUSPENSION SUBCUTANEOUS at 22:10

## 2018-12-24 RX ADMIN — ACETAMINOPHEN PRN MG: 500 TABLET ORAL at 06:30

## 2018-12-24 RX ADMIN — ALLOPURINOL SCH: 100 TABLET ORAL at 10:56

## 2018-12-24 RX ADMIN — PIPERACILLIN AND TAZOBACTAM SCH: 3; .375 INJECTION, POWDER, FOR SOLUTION INTRAVENOUS at 10:57

## 2018-12-24 RX ADMIN — HYDROCODONE BITARTRATE AND ACETAMINOPHEN PRN EACH: 10; 325 TABLET ORAL at 13:38

## 2018-12-24 RX ADMIN — DOCUSATE SODIUM SCH: 100 CAPSULE, LIQUID FILLED ORAL at 10:56

## 2018-12-24 RX ADMIN — MEGESTROL ACETATE SCH: 40 TABLET ORAL at 18:24

## 2018-12-24 RX ADMIN — BUPROPION HYDROCHLORIDE SCH MG: 300 TABLET, FILM COATED, EXTENDED RELEASE ORAL at 19:10

## 2018-12-24 RX ADMIN — IPRATROPIUM BROMIDE SCH: 0.5 SOLUTION RESPIRATORY (INHALATION) at 09:01

## 2018-12-24 RX ADMIN — MEGESTROL ACETATE SCH: 40 TABLET ORAL at 10:57

## 2018-12-24 RX ADMIN — ENOXAPARIN SODIUM SCH: 30 INJECTION SUBCUTANEOUS at 10:56

## 2018-12-24 RX ADMIN — INSULIN ASPART SCH: 100 INJECTION, SUSPENSION SUBCUTANEOUS at 10:57

## 2018-12-24 RX ADMIN — INSULIN ASPART SCH: 100 INJECTION, SOLUTION INTRAVENOUS; SUBCUTANEOUS at 18:23

## 2018-12-24 RX ADMIN — DEXTROSE MONOHYDRATE, SODIUM CHLORIDE, SODIUM LACTATE, CALCIUM CHLORIDE, MAGNESIUM CHLORIDE SCH MLS/HR: 1.5; 538; 448; 18.4; 5.08 SOLUTION INTRAPERITONEAL at 18:19

## 2018-12-24 RX ADMIN — INSULIN ASPART SCH: 100 INJECTION, SOLUTION INTRAVENOUS; SUBCUTANEOUS at 14:10

## 2018-12-24 RX ADMIN — DEXTROSE MONOHYDRATE, SODIUM CHLORIDE, SODIUM LACTATE, CALCIUM CHLORIDE, MAGNESIUM CHLORIDE SCH MLS/HR: 1.5; 538; 448; 18.4; 5.08 SOLUTION INTRAPERITONEAL at 12:32

## 2018-12-24 RX ADMIN — Medication SCH MG: at 19:10

## 2018-12-24 RX ADMIN — ASPIRIN 81 MG CHEWABLE TABLET SCH: 81 TABLET CHEWABLE at 10:56

## 2018-12-24 RX ADMIN — IPRATROPIUM BROMIDE SCH: 0.5 SOLUTION RESPIRATORY (INHALATION) at 11:49

## 2018-12-24 RX ADMIN — LEVOTHYROXINE SODIUM SCH: 88 TABLET ORAL at 07:11

## 2018-12-24 RX ADMIN — INSULIN ASPART SCH UNIT: 100 INJECTION, SOLUTION INTRAVENOUS; SUBCUTANEOUS at 22:09

## 2018-12-24 RX ADMIN — TRIAMCINOLONE ACETONIDE SCH: 1 CREAM TOPICAL at 10:57

## 2018-12-24 RX ADMIN — ACETAMINOPHEN PRN MG: 500 TABLET ORAL at 00:28

## 2018-12-24 RX ADMIN — FAMOTIDINE SCH MG: 20 TABLET, FILM COATED ORAL at 19:10

## 2018-12-24 RX ADMIN — COLLAGENASE SANTYL SCH: 250 OINTMENT TOPICAL at 10:56

## 2018-12-24 RX ADMIN — TRIAMCINOLONE ACETONIDE SCH APPLIC: 1 CREAM TOPICAL at 20:11

## 2018-12-24 RX ADMIN — Medication SCH: at 10:57

## 2018-12-24 RX ADMIN — IPRATROPIUM BROMIDE SCH: 0.5 SOLUTION RESPIRATORY (INHALATION) at 15:36

## 2018-12-24 RX ADMIN — DEXTROSE MONOHYDRATE, SODIUM CHLORIDE, SODIUM LACTATE, CALCIUM CHLORIDE, MAGNESIUM CHLORIDE SCH MLS/HR: 1.5; 538; 448; 18.4; 5.08 SOLUTION INTRAPERITONEAL at 05:38

## 2018-12-24 RX ADMIN — PIPERACILLIN AND TAZOBACTAM SCH MLS/HR: 3; .375 INJECTION, POWDER, FOR SOLUTION INTRAVENOUS at 14:08

## 2018-12-24 RX ADMIN — SEVELAMER CARBONATE SCH: 800 TABLET, FILM COATED ORAL at 14:10

## 2018-12-24 NOTE — P.CN
Psychiatric Consult





- .


Consult date: 18


Consult:: 





18 16:39


Chief Complaint : Worsening depression


HPI : Ms. Bree Sargent is 57 yo  female with h/o major depression 

ansd multiple medical problems admitted here secondary to low blood pressure. 

Reportedly she has stopped eating and drinking fluids. She has been feeling 

very depressed after her mother  a month ago. According to staff she has 

been refusing medications. She is isolating and withdrawing. Denies any active 

suicidal ideation but admits being hopeless and helpless. She has given up on 

her self. Denies symptoms of psychoses, es or OCD to me.





Past Psych Hx: Depression


Past Medical History : Please see HPi


Personal/Social : Lives by herself. Disabled. Using to work in medical billing


Review of systems : Refer to HPI





Mental status Exam : Drowsy, sleepy but oriented in all spheres. Markedly obese 

laying in her bed. Poor eye contact. Speech soft tone. Mood depressed with 

congruent affect. Denies suicidal or homicidal ideation. No symptoms of 

psychoses. insight and judgment improving.





A/P :   Major Depression, severe recurrent


         Bereavement





Will titrate up Prozac to 40 mg po qdaily to optimize efficacy of medication. 

Encouraged to be complaint on medications. Supportive therapy provided.

## 2018-12-24 NOTE — P.PN
Subjective


Progress Note Date: 12/24/18


This is a 58-year-old female, patient of [facility].  Patient has 

a known past medical history of end-stage renal disease on hemodialysis, 

congestive heart failure, COPD, diabetes mellitus, hypertension, hyperlipidemia

, obstructive sleep apnea, chronic hypoxic respiratory failure on 4-5 L of 

oxygen at home, depression and CVA.  History obtained from both patient and 

patient's sister.  Apparently their mother had passed away on November 27 

couple weeks ago.  Since then, patient's has not gotten out of bed.  She has 

not moved out of her bed for about 2 weeks per the sister.  She stopped taking 

all of her medications.  And she has not been eating or drinking much.  Blood 

sugar 469 on admission.  Per the sister the patient was started on Prozac 10 mg 

daily about a month ago.  She took it for about a week and then stopped taking 

it.  Before that patient had been on Zoloft.  Patient reports that she hurts 

everywhere.  She has extensive bruising in the upper thigh is growing area and 

back.  There are smaller bruises on the lower leg area.  She hurts anywhere she 

is touched.  She complains of pain in the chest as well as the abdomen and legs 

and back.  She denies any falling.  Reports some nausea and chest chest pain.  

Denies any fever, chills, sweats, cough, bowel movement changes.  She does not 

make urine and is on peritoneal dialysis. 








On 12/07/2018 patient is currently lying in bed currently getting CAPD.  

Patient is complaining of generalized pain throughout chest abdomen and legs.  

Patient also complaining of more severe pain in her right lower extremity.  

Patient would not let me examine right leg.  Explained to patient that due to 

her prolonged bed rest she is at increased risk for blood clot and then I'm 

concerned that she may have a blood clot in her right leg and that she would 

require a venous Doppler to assess.  Patient states she would not be able to 

tolerate a venous Doppler at this time due to the pain.  Will order Dilaudid 

for pain at this time and encouraged patient the importance of obtaining this 

test.  We'll also consult cardiology services at this time.








On 12/08/2018 patient currently getting CAPD.  Patient did have Doppler study 

completed but was un conclusive of DVT due to increased pain during test.  

Patient remains on Lovenox 1 mg/kg per renal dosing for DVT treatment.  This 

time patient states that she feels slightly improved but still has generalized 

pain all over.  Patient denies chest pain or shortness breath.  Patient denies 

any nausea vomiting or diarrhea.  Patient denies any urinary burning or 

frequency.





On 12/09/2018 Patient currently resting in bed. Patient is having significant 

to lower extremity pain. Will order venous doppler again due to unconclusive 

reading from initial test.  Will order ativan and diuladid to be given prior to 

exam in order to get adequate reading. Patient denies chest pain and shortness 

of breath. Denies nausea, vomitting or diarrhea. Denies any urinary burning or 

frequency  





12/10/2018 patient still complaining of pain all over the body.  She does 

report is slightly better than admission.  Complaining of constipation and his 

been about 3 days since her last bowel movement.  Hemoglobin has dropped from 

9.3-8.7.  Potassium is 3.1 and she received supplement.  Venous Doppler of the 

lower extremities showing a limited exam of the right leg and what could be 

seen was negative for DVT.  Patient refused the left leg to be completed due to 

pain.  Patient has been noncompliant with physical therapy.  Poor appetite.  

Pain service will be placed on consult.  Patient has been educated that she 

needs to participate with physical therapy.





12/11/2018 patient does report slight improvement in her pain.  Still 

complaining of pain throughout her body.  Patient was able to work with 

physical therapy yesterday.  She was a max assist.  She denies any chest pain 

or shortness of breath.  Denies any nausea or vomiting.  Still has not had a 

bowel movement for about 5 days.  Norvasc discontinued yesterday due to 

hypotension.  Blood pressures are 20 better today.  Nephrology is also adjusted 

the frequency of peritoneal dialysis to every 4 hours. 





On 12/12/2018 patient does report slight improvement with her pain.  Patient is 

still complaining of overall pain throughout her entire body.  Patient refused 

venous Doppler again due to pain for the third time.  D-dimer was negative.  

Lovenox was DC'd and Lovenox prophylaxis has been added.  Patient denies chest 

pain or shortness of breath.  Patient denies nausea vomiting or diarrhea.  

Patient denies any urinary burning or frequency





On 12/13/2018 patient does report slight improvement with pain today.  Patient'

s daughter currently at bedside.  Patient started still expressed concerns over 

lack of motivation from her mother to get out of bed.  Requesting that site 

revisit patient.  Still waiting on pain services to come and evaluate patient.  

At this time patient denies chest pain or shortness of breath.  Patient denies 

nausea vomiting or diarrhea.  Patient denies any urinary burning or frequency.  

Patient highly encouraged to continue working with physical therapy in order to 

prevent negative outcomes.  At this time planning discharge to [facility]





On 12/14/2018 patient states she feels slightly more improved.  Patient was 

seen by psych Megace has been added Wellbutrin has been increased.  Discussed 

case with social work and a lot of yellow does not have any beds available.  

Still trying to be achieved placement due to peritoneal dialysis.  Patient also 

complaining of generalized pain.  Patient denies chest pain.  Patient denies 

nausea vomiting or diarrhea.  Patient denies any urinary burning





On 12/15/2018 patient is alert and responsive in no apparent distress, she 

states she is feeling a little bit better today, she is complaining of 

generalized pain, otherwise no complaints at this time there is no fever or 

chills no headache or dizziness no chest pain no shortness of breath no cough 

no nausea or vomiting no abdominal pain no diarrhea and no urinary symptoms.





On 12/16/2018 patient was seen and examined she is alert and responsive in no 

apparent distress.  Nursing staff reporting that patient has been uncooperative

, she is refusing to be turned in bed, she is refusing inspection of her sacral 

ulcers, she is refusing most of her other medical care.  Abdomen x-ray done 

yesterday reveals evidence of large bowel ileus.  Patient denies abdominal pain 

but is having pain when abdominal palpation is done.








12/17/2018 patient lying in bed she is awake and alert.  Reporting poor 

appetite.  Still has been uncooperative with nursing staff.  Still complaining 

of pain all over her body.  Abdominal x-ray showing a large bowel ileus or air 

swallowing.  Discussed with surgical service they'll be through to evaluate 

patient this afternoon.  Patient still complaining of some abdominal pain.  No 

bowel movement for about 10 days.  She is now agreeable to a soapsuds enema and 

lactulose.  These have been ordered.  Denies any nausea or vomiting.  





12/18/2018 patient is still being uncooperative.  She has refused both myself 

and nursing staff to evaluate her skin.  Apparently it has been reported that 

she has ulcers on the right side of her back.  Concerns for infection.  The 

patient refuses to be turned over.  She continues to lay on her back.  Patient 

refused surgical service evaluation yesterday.  She did have a bowel movement 

with the lactulose.  Refuses the enema.  Pain service came through and increase 

her Norco to 10 mg every 8 hours.  Blood pressures are better today.  However 

she does have a old fistula in the 1 arm that the blood pressures were being 

drawn from him he may have not been getting adequate blood pressure readings.  

White count has continued to increase to 24.6.  Hemoglobin 9.5 and sodium has 

dropped to 126.  Both infectious disease and nephrology have been a with made 

aware of his lab results.  Infectious disease will be back through later this 

evening to reevaluate patient at that time hopefully she will be cooperative 

for the physician to evaluate her skin.  Nephrology is recommending that 

patient be started on a diet to help with the sodium.  Since patient had a 

bowel movement we will advance diet to a consistent carbohydrate diet.  And 

nephrology is also recommending a 1500 mL fluid restriction.





On 12/19/2018 per nursing staff patient did let Dr. Ramey with infectious 

disease evaluate her skin.  Patient is still refusing to let myself or nursing 

staff do any further assessment for evaluation.  Patient does state she feels 

slightly more improved.  Denies chest pain or shortness of breath.  Patient 

denies nausea vomiting or diarrhea.  Patient denies any urinary burning or 

frequency.





12/20/2018 patient complaining of nausea.  No vomiting.  She is requesting pain 

medication.  Still reporting that she hurts all over.  When patient is ready 

for discharge she'll be discharged to [facility].  She's currently 

on vancomycin and Zosyn due to sacral ulcer and cellulitis.  Chest x-ray 

completed due to elevated white count showing chronic changes no acute changes.

  Patient is having bowel movements








12/21/2018 patient is still complaining of pain everywhere.   Norco is helping.

  She still being uncooperative with nursing staff.  Patient is having bowel 

movements.  Denies any nausea or vomiting.  Denies any diarrhea.  Denies cough.

  Denies chest pain or shortness breath.  White count has increased to 24.8.  

Infectious disease is following.  Patient is on vancomycin and Zosyn








On 12/22/2018 patient is alert and oriented in no apparent distress still 

complaining of pain, today she is complaining of vertigo, there is no fever or 

chills no headache or dizziness no chest pain no shortness of breath no cough 

no nausea or vomiting no abdominal pain no diarrhea and no urinary symptoms.





On 12/23/2018 patient was seen and examined in intensive care unit, earlier she 

was taken to operating room for debridement of her hip ulcer subsequence the 

she had episodes of hypotension was blood pressure of 84/50 he was started on 

fluid boluses and was transferred to intensive care unit critical care 

consultation was requested.  Patient is alert and oriented 3 she is 

complaining of pain at the surgical site otherwise she denies any complaints at 

this time.





On 12/24/2018 patient currently in the intensive care unit.  Patient did have a 

hemoglobin dropped to 6.7.  Patient to receive blood per surgical services.  

Right hip site still oozing.  Patient remains alert and oriented.  Patient 

denies nausea vomiting or diarrhea.  Patient denies chest pain or shortness 

breath.  Patient denies any urinary burning or frequency.











Objective





- Vital Signs


Vital signs: 


 Vital Signs











Temp  97.5 F L  12/24/18 00:00


 


Pulse  80   12/24/18 07:00


 


Resp  22   12/24/18 07:00


 


BP  86/54   12/24/18 07:00


 


Pulse Ox  98   12/24/18 07:00








 Intake & Output











 12/23/18 12/24/18 12/24/18





 18:59 06:59 18:59


 


Intake Total 1690 240 20


 


Output Total  0 


 


Balance 1690 240 20


 


Intake:   


 


   240 20


 


    NS 40 240 20


 


  Intake, IV Titration 1500  





  Amount   


 


    Sodium Chloride 0.9% 500 1500  





    ml 500 ml @ 999 mls/hr IV   





    .Q31M ONE Rx#:[id number]   


 


Output:   


 


  Urine  0 


 


Other:   


 


  Voiding Method CAPD CAPD 














- Exam





Head normocephalic


Neck supple


Lungs clear to auscultation bilaterally no wheezing or crackles


Heart regular rate and rhythm S1-S2, no rub or gallop


Abdomen is soft nontender nondistended positive bowel sounds no 

hepatosplenomegaly.  Obese.  Peritoneal dialysis catheter site brown crusting 

or scabbing noted around the area.  No erythema.


Extremities no edema


Neuro alert and orientated to 3


Skin extensive bruising along the upper thighs and lower back and small bruises 

on the legs.  Patient has tenderness all over the body with palpation.  Patient'

s tenderness with palpation is less severe and bruising are showing 

improvement.  Right hip debridement site with the drainage noted








- Labs


CBC & Chem 7: 


 12/24/18 07:48





 12/24/18 04:54


Labs: 


 Abnormal Lab Results - Last 24 Hours (Table)











  12/23/18 12/23/18 12/23/18 Range/Units





  06:55 13:42 14:40 


 


WBC     (3.8-10.6)  k/uL


 


RBC     (3.80-5.40)  m/uL


 


Hgb     (11.4-16.0)  gm/dL


 


Hct     (34.0-46.0)  %


 


MCV     (80.0-100.0)  fL


 


MCHC     (31.0-37.0)  g/dL


 


RDW     (11.5-15.5)  %


 


Neutrophils # (Manual)     (1.3-7.7)  k/uL


 


Lymphocytes # (Manual)     (1.0-4.8)  k/uL


 


Monocytes # (Manual)     (0-1.0)  k/uL


 


Metamyelocytes # (Man)     (0)  k/uL


 


Sodium  135 L    (137-145)  mmol/L


 


Chloride  92 L    ()  mmol/L


 


Carbon Dioxide     (22-30)  mmol/L


 


BUN  41 H    (7-17)  mg/dL


 


Creatinine  7.13 H*    (0.52-1.04)  mg/dL


 


Glucose  105 H    (74-99)  mg/dL


 


POC Glucose (mg/dL)   125 H  109 H  (75-99)  mg/dL


 


Calcium     (8.4-10.2)  mg/dL


 


Phosphorus  6.7 H    (2.5-4.5)  mg/dL


 


AST  72 H    (14-36)  U/L


 


Alkaline Phosphatase  188 H    ()  U/L


 


Total Protein  5.4 L    (6.3-8.2)  g/dL


 


Albumin  2.4 L    (3.5-5.0)  g/dL


 


Crossmatch     














  12/23/18 12/24/18 12/24/18 Range/Units





  17:52 04:54 04:54 


 


WBC   25.4 H   (3.8-10.6)  k/uL


 


RBC   2.35 L   (3.80-5.40)  m/uL


 


Hgb   6.7 L* D   (11.4-16.0)  gm/dL


 


Hct   24.8 L   (34.0-46.0)  %


 


MCV   105.6 H   (80.0-100.0)  fL


 


MCHC   27.1 L   (31.0-37.0)  g/dL


 


RDW   16.7 H   (11.5-15.5)  %


 


Neutrophils # (Manual)   23.80 H   (1.3-7.7)  k/uL


 


Lymphocytes # (Manual)   0.51 L   (1.0-4.8)  k/uL


 


Monocytes # (Manual)     (0-1.0)  k/uL


 


Metamyelocytes # (Man)   0.51 H   (0)  k/uL


 


Sodium    132 L  (137-145)  mmol/L


 


Chloride    96 L  ()  mmol/L


 


Carbon Dioxide    19 L  (22-30)  mmol/L


 


BUN    45 H  (7-17)  mg/dL


 


Creatinine    6.96 H  (0.52-1.04)  mg/dL


 


Glucose     (74-99)  mg/dL


 


POC Glucose (mg/dL)  101 H    (75-99)  mg/dL


 


Calcium    8.2 L  (8.4-10.2)  mg/dL


 


Phosphorus    8.2 H  (2.5-4.5)  mg/dL


 


AST     (14-36)  U/L


 


Alkaline Phosphatase     ()  U/L


 


Total Protein     (6.3-8.2)  g/dL


 


Albumin     (3.5-5.0)  g/dL


 


Crossmatch     














  12/24/18 12/24/18 12/24/18 Range/Units





  07:02 07:48 07:48 


 


WBC   32.7 H   (3.8-10.6)  k/uL


 


RBC   2.33 L   (3.80-5.40)  m/uL


 


Hgb   6.7 L*   (11.4-16.0)  gm/dL


 


Hct   23.4 L   (34.0-46.0)  %


 


MCV   100.4 H D   (80.0-100.0)  fL


 


MCHC   28.8 L   (31.0-37.0)  g/dL


 


RDW   16.9 H   (11.5-15.5)  %


 


Neutrophils # (Manual)   28.10 H   (1.3-7.7)  k/uL


 


Lymphocytes # (Manual)     (1.0-4.8)  k/uL


 


Monocytes # (Manual)   2.29 H   (0-1.0)  k/uL


 


Metamyelocytes # (Man)   0.98 H   (0)  k/uL


 


Sodium     (137-145)  mmol/L


 


Chloride     ()  mmol/L


 


Carbon Dioxide     (22-30)  mmol/L


 


BUN     (7-17)  mg/dL


 


Creatinine     (0.52-1.04)  mg/dL


 


Glucose     (74-99)  mg/dL


 


POC Glucose (mg/dL)  63 L    (75-99)  mg/dL


 


Calcium     (8.4-10.2)  mg/dL


 


Phosphorus     (2.5-4.5)  mg/dL


 


AST     (14-36)  U/L


 


Alkaline Phosphatase     ()  U/L


 


Total Protein     (6.3-8.2)  g/dL


 


Albumin     (3.5-5.0)  g/dL


 


Crossmatch    See Detail  














  12/24/18 Range/Units





  09:43 


 


WBC   (3.8-10.6)  k/uL


 


RBC   (3.80-5.40)  m/uL


 


Hgb   (11.4-16.0)  gm/dL


 


Hct   (34.0-46.0)  %


 


MCV   (80.0-100.0)  fL


 


MCHC   (31.0-37.0)  g/dL


 


RDW   (11.5-15.5)  %


 


Neutrophils # (Manual)   (1.3-7.7)  k/uL


 


Lymphocytes # (Manual)   (1.0-4.8)  k/uL


 


Monocytes # (Manual)   (0-1.0)  k/uL


 


Metamyelocytes # (Man)   (0)  k/uL


 


Sodium   (137-145)  mmol/L


 


Chloride   ()  mmol/L


 


Carbon Dioxide   (22-30)  mmol/L


 


BUN   (7-17)  mg/dL


 


Creatinine   (0.52-1.04)  mg/dL


 


Glucose   (74-99)  mg/dL


 


POC Glucose (mg/dL)   (75-99)  mg/dL


 


Calcium   (8.4-10.2)  mg/dL


 


Phosphorus   (2.5-4.5)  mg/dL


 


AST   (14-36)  U/L


 


Alkaline Phosphatase   ()  U/L


 


Total Protein   (6.3-8.2)  g/dL


 


Albumin   (3.5-5.0)  g/dL


 


Crossmatch  See Detail  








 Microbiology - Last 24 Hours (Table)











 12/18/18 18:59 Blood Culture - Preliminary





 Blood    No Growth after 120 hours


 


 12/22/18 12:03 Blood Culture - Preliminary





 Blood    No Growth after 24 hours














Assessment and Plan


Assessment: 





1.  Prolonged period of time in bed With pain throughout her body and extensive 

bruising.  Aspirin and Plavix resumed





2.  Insulin-dependent diabetes mellitus: Hyperglycemia with blood sugar 469 on 

admission.  Patient has not been taking insulin at home.  Acetone level 

negative.  A1c 7.7.  Patient did have some hypoglycemia due to insulin being 

given with her being on a clear liquid diet and poor appetite.  Diet has been 

advanced.  We will resume her NovoLog 70/30 at 80 units twice a day.  Patient 

did have some episodes of hyperglycemia.  Continue to monitor.  Appetite is 

still poor.


Today patient is having episodes of hypoglycemia will decrease NovoLog 7030-60 

units twice daily continue with insulin sliding scale





3.  History of end-stage renal disease on peritoneal dialysis.  Nephrology 

following.  Continue peritoneal dialysis.





4.  Morbid obesity





5.  Medication noncompliance





6.  Severe Depression.  Patient denies any suicidal or homicidal ideation.  

Recently started on Prozac 10 mg daily outpatient.  Will restart Prozac at 20 

mg daily . Wellbutrin has been adder per psych.  At this time daughter 

requesting that psych revisit patient for reevaluation of patient's depression.

  Psychiatry has come to reevaluate patient.  Megace has been added and 

Wellbutrin has been increased.  Continue Abilify





7.  History of COPD stable





8.  History of CVA





9.  History of Essential hypertension





10.  Hyperlipidemia





11.  Obstructive sleep apnea uses CPAP





12.  Chronic hypoxic respiratory failure home O2 dependent on 4-5 L





13.  Right lower extremity pain.  Concerns for possible DVT due to prolonged 

time in bed.  Continue with Lovenox 140 mg subcu daily.  Patient unable to 

tolerate venous Doppler.  On 12/ 9 venous Doppler was a limited exam what was 

evaluated was negative for DVT in the right leg.  Patient refused the left leg 

to be completed.  Patient refused for the third time venous Doppler again due 

to pain.  D-dimer less than 0.17.  Lovenox 140 mg subcu daily discontinued.  

Lovenox DVT prophylaxis has been added





14.  Elevated cardiac enzymes.  Troponin 0.072.  EKG completed showing normal 

sinus rhythm.  Inferior infarct, age undetermined anterior infarct age 

undetermined.  Per cardiology services no further workup needed from cardiology 

standpoint.  No evidence for an acute cardiac event per cardiology





15.  Elevated lipase level of 410.  Has trended down.  No evidence of 

pancreatitis





16.  Hypovolemic Hyponatremia.   Possibly related to her hyperglycemia as well.

  Nephrology following





17. Elevated uric acid.  Uric acid 8.4  Patient will be started on allopurinol





18.  Anemia of chronic kidney disease.  And evidence of iron deficiency anemia.

   Will start ferrous sulfate 325 mg twice a day. Aranesp has been added per 

nephrology.  Continue to monitor hemoglobin.  





19.  Constipation add stool softener and lactulose





20.  Hypotension possibly related to the IV Dilaudid.  Nephrology discontinued 

the Norvasc.  Blood pressures showing improvement.





21.  Abdominal pain with ileus: abdominal x-ray showing ileus or air 

swallowing.  No free air.  Patient tolerating diet.  Having stools.  Refuses 

surgical evaluation.





22.  Hyponatremia due to poor nutritional intake.  Advance diet.  Nephrology 

notified.  They've also recommended fluid restrictions 1500 MLS per day





23.  Hypomagnesemia and patient receiving magnesium supplement.  Repeat 

magnesium level in a.m.





25.  Leukocytosis: Likely source is unstageable sacral pressure ulcer with 

cellulitis.  Patient followed by infectious disease.  She's currently on 

vancomycin and Zosyn.  Peritoneal fluid is not look infected.  Chest x-ray no 

acute changes.  patient's white count has worsened.  It is now 24.8.  We'll 

await further infectious disease evaluation .  Patient is also being evaluated 

by surgery for possible ulcer debridement.  Remains on Zosyn and vancomycin





 26.  Mildly elevated LFTs :AST 54 alk phos 189.  ALT is normal.  Discontinue 

Lipitor and fenofibrate.  Repeat labs in a.m. 





27.  Postoperative hypotension patient transferred to intensive care unit and 

was started on fluid boluses.  Will monitor closely


DVT prophylaxis Lovenox.  GI prophylaxis Pepcid





20.  Anemia due to acute blood loss postoperatively.  Hemoglobin 6.71 unit has 

been ordered per surgical services

## 2018-12-24 NOTE — CT
EXAMINATION TYPE: CT lower extremity LT w con

 

DATE OF EXAM: 12/24/2018

 

COMPARISON: CT abdomen and pelvis 8/10/2017

 

HISTORY: 58-year-old female with left thigh wound.

 

TECHNIQUE: Contiguous axial scanning of the low pelvis and left thigh performed with IV Contrast, pat
ient injected with 100 mL of Isovue 300. Coronal/sagittal reconstructions performed. (Patient labs B-
45 C-6.96 GFR-6, will be receiving dialysis @1800.)

 

 

CT DLP: 1158.20 mGycm

Automated exposure control for dose reduction was used.

 

FINDINGS: 

Anasarca type changes present. Rounded nodularity in the anterior subcutaneous fat of the left lower 
quadrant likely relates to injections. Moderate pelvic free fluid is new from 8/10/2017.

 

Moderate degenerative change at the hips. The anterolateral left thigh is excluded from view due to p
atient's large size.

 

There is a rounded density protuberance anteriorly along the midline measuring 7.0 cm wide, axial alana
ge 55, possible lipoma. Some soft tissue stranding is present within, especially along the right ante
rior margin.

 

Lateral patellar translation at the knee. No knee joint effusion or Baker's cyst. Patient's thigh wou
nd is not well demonstrated on the provided images.

 

The visualized right total knee arthroplasty.

 

No acute fracture seen.

 

IMPRESSION: 

 

1. ANASARCA TYPE CHANGE. MODERATE PELVIC ASCITES. CORRELATE AS TO ETIOLOGY.

2. NOTE THAT DUE TO THE PATIENT'S LARGE SIZE, THE ANTEROLATERAL ASPECT OF THE LEFT THIGH IS CUT OFF A
ND NOT ASSESSED.

3. ROUNDED FATTY PORT TERMINATES CENTERED WITHIN THE ANTERIOR SUBCUTANEOUS ADIPOSE LAYER OF THE LEFT 
MID THIGH MEASURES UP TO 7.0 CM WIDE AND MAY REPRESENT A LIPOMA. CORRELATE WITH PHYSICAL EXAM FINDING
S. THERE IS SOME SOFT TISSUE STRANDING WITHIN THIS AREA AND ATYPICAL LIPOMATOUS TUMOR IS IN THE DIFFE
RENTIAL. IF ANY GROWTH IS NOTED, EXCISION CAN BE CONSIDERED.

4. THE PATIENT'S LEFT THIGH WOUND IS NOT CLEARLY SEEN. THE EXAM CAN BE REVIEWED WITH DIRECTED ATTENTI
ON IF DESIRED.

## 2018-12-24 NOTE — P.PN
Subjective


Progress Note Date: 12/24/18


Principal diagnosis: 





Right hip decubitus





Patient somewhat more lethargic today than usual.  Still refusing much of her 

care.  Apparently was hypotensive postoperatively and for that reason went to 

the ICU.  I was contacted this morning that she was having some bleeding 

through the dressings.  Overall the volume from talking to the nursing staff 

does not sound like it was a great deal of blood.  The dressings were removed.  

Dry dressings were applied.  The patient was then placed in the slight right 

decubitus position.  No further bleeding of significance was noted at that 

time.  Hemoglobin however was lower today at 6.7 and repeated again at 6.7.  1 

unit of blood is been ordered.





Objective





- Vital Signs


Vital signs: 


 Vital Signs











Temp  97.5 F L  12/24/18 00:00


 


Pulse  80   12/24/18 07:00


 


Resp  22   12/24/18 07:00


 


BP  86/54   12/24/18 07:00


 


Pulse Ox  98   12/24/18 07:00








 Intake & Output











 12/23/18 12/24/18 12/24/18





 18:59 06:59 18:59


 


Intake Total 1690 240 20


 


Output Total  0 


 


Balance 1690 240 20


 


Intake:   


 


   240 20


 


    NS 40 240 20


 


  Intake, IV Titration 1500  





  Amount   


 


    Sodium Chloride 0.9% 500 1500  





    ml 500 ml @ 999 mls/hr IV   





    .Q31M ONE Rx#:[id number]   


 


Output:   


 


  Urine  0 


 


Other:   


 


  Voiding Method CAPD CAPD 














- Exam





Right decubitus wound clean, no significant bleeding at this time, tenderness 

persisting diffusely





- Labs


CBC & Chem 7: 


 12/24/18 07:48





 12/24/18 04:54


Labs: 


 Abnormal Lab Results - Last 24 Hours (Table)











  12/23/18 12/23/18 12/23/18 Range/Units





  06:55 13:42 14:40 


 


WBC     (3.8-10.6)  k/uL


 


RBC     (3.80-5.40)  m/uL


 


Hgb     (11.4-16.0)  gm/dL


 


Hct     (34.0-46.0)  %


 


MCV     (80.0-100.0)  fL


 


MCHC     (31.0-37.0)  g/dL


 


RDW     (11.5-15.5)  %


 


Neutrophils # (Manual)     (1.3-7.7)  k/uL


 


Lymphocytes # (Manual)     (1.0-4.8)  k/uL


 


Monocytes # (Manual)     (0-1.0)  k/uL


 


Metamyelocytes # (Man)     (0)  k/uL


 


Sodium  135 L    (137-145)  mmol/L


 


Chloride  92 L    ()  mmol/L


 


Carbon Dioxide     (22-30)  mmol/L


 


BUN  41 H    (7-17)  mg/dL


 


Creatinine  7.13 H*    (0.52-1.04)  mg/dL


 


Glucose  105 H    (74-99)  mg/dL


 


POC Glucose (mg/dL)   125 H  109 H  (75-99)  mg/dL


 


Calcium     (8.4-10.2)  mg/dL


 


Phosphorus  6.7 H    (2.5-4.5)  mg/dL


 


AST  72 H    (14-36)  U/L


 


Alkaline Phosphatase  188 H    ()  U/L


 


Total Protein  5.4 L    (6.3-8.2)  g/dL


 


Albumin  2.4 L    (3.5-5.0)  g/dL


 


Crossmatch     














  12/23/18 12/24/18 12/24/18 Range/Units





  17:52 04:54 04:54 


 


WBC   25.4 H   (3.8-10.6)  k/uL


 


RBC   2.35 L   (3.80-5.40)  m/uL


 


Hgb   6.7 L* D   (11.4-16.0)  gm/dL


 


Hct   24.8 L   (34.0-46.0)  %


 


MCV   105.6 H   (80.0-100.0)  fL


 


MCHC   27.1 L   (31.0-37.0)  g/dL


 


RDW   16.7 H   (11.5-15.5)  %


 


Neutrophils # (Manual)   23.80 H   (1.3-7.7)  k/uL


 


Lymphocytes # (Manual)   0.51 L   (1.0-4.8)  k/uL


 


Monocytes # (Manual)     (0-1.0)  k/uL


 


Metamyelocytes # (Man)   0.51 H   (0)  k/uL


 


Sodium    132 L  (137-145)  mmol/L


 


Chloride    96 L  ()  mmol/L


 


Carbon Dioxide    19 L  (22-30)  mmol/L


 


BUN    45 H  (7-17)  mg/dL


 


Creatinine    6.96 H  (0.52-1.04)  mg/dL


 


Glucose     (74-99)  mg/dL


 


POC Glucose (mg/dL)  101 H    (75-99)  mg/dL


 


Calcium    8.2 L  (8.4-10.2)  mg/dL


 


Phosphorus    8.2 H  (2.5-4.5)  mg/dL


 


AST     (14-36)  U/L


 


Alkaline Phosphatase     ()  U/L


 


Total Protein     (6.3-8.2)  g/dL


 


Albumin     (3.5-5.0)  g/dL


 


Crossmatch     














  12/24/18 12/24/18 12/24/18 Range/Units





  07:02 07:48 07:48 


 


WBC   32.7 H   (3.8-10.6)  k/uL


 


RBC   2.33 L   (3.80-5.40)  m/uL


 


Hgb   6.7 L*   (11.4-16.0)  gm/dL


 


Hct   23.4 L   (34.0-46.0)  %


 


MCV   100.4 H D   (80.0-100.0)  fL


 


MCHC   28.8 L   (31.0-37.0)  g/dL


 


RDW   16.9 H   (11.5-15.5)  %


 


Neutrophils # (Manual)   28.10 H   (1.3-7.7)  k/uL


 


Lymphocytes # (Manual)     (1.0-4.8)  k/uL


 


Monocytes # (Manual)   2.29 H   (0-1.0)  k/uL


 


Metamyelocytes # (Man)   0.98 H   (0)  k/uL


 


Sodium     (137-145)  mmol/L


 


Chloride     ()  mmol/L


 


Carbon Dioxide     (22-30)  mmol/L


 


BUN     (7-17)  mg/dL


 


Creatinine     (0.52-1.04)  mg/dL


 


Glucose     (74-99)  mg/dL


 


POC Glucose (mg/dL)  63 L    (75-99)  mg/dL


 


Calcium     (8.4-10.2)  mg/dL


 


Phosphorus     (2.5-4.5)  mg/dL


 


AST     (14-36)  U/L


 


Alkaline Phosphatase     ()  U/L


 


Total Protein     (6.3-8.2)  g/dL


 


Albumin     (3.5-5.0)  g/dL


 


Crossmatch    See Detail  








 Microbiology - Last 24 Hours (Table)











 12/18/18 18:59 Blood Culture - Preliminary





 Blood    No Growth after 120 hours


 


 12/22/18 12:03 Blood Culture - Preliminary





 Blood    No Growth after 24 hours














Assessment and Plan


(1) Decubitus ulcer


Narrative/Plan: 


Continue local wound care.  No further debridement plan.  Overall prognosis 

quite poor.


Current Visit: Yes   Status: Acute   Code(s): L89.90 - PRESSURE ULCER OF 

UNSPECIFIED SITE, UNSPECIFIED STAGE   SNOMED Code(s): 651249917

## 2018-12-24 NOTE — XR
EXAMINATION TYPE: XR chest 1V

 

DATE OF EXAM: 12/24/2018

 

CLINICAL HISTORY: Difficulty breathing and fluid overload.  

 

TECHNIQUE: Single AP portable semiupright view of the chest is obtained.

 

COMPARISON: Chest x-ray from 4 days earlier

 

FINDINGS:  Cardiac silhouette size is stable and mildly enlarged. There is mild chronic parenchymal c
hange without new suspicious focal airspace opacity, pleural effusion, or pneumothorax seen bilateral
ly. No significant central vascular congestion is felt present. Osseous structures are intact.

 

IMPRESSION:  Overall stable findings,  mild cardiomegaly without suspicious acute pulmonary process. 
No significant change from most recent prior.

## 2018-12-24 NOTE — P.PN
Subjective


Progress Note Date: 12/24/18


Seen and examined for the follow-up of ESRD on peritoneal dialysis. She had 

debridement yesterday for her unstageable ulcer.  Peritoneal dialysis is not 

working and has difficulty filling in.  Blood pressure is still low and as per 

the nursing staff she is refusing care.  Currently not on vasopressors. 








Objective





- Vital Signs


Vital signs: 


 Vital Signs











Temp  97.5 F L  12/24/18 00:00


 


Pulse  80   12/24/18 07:00


 


Resp  22   12/24/18 07:00


 


BP  86/54   12/24/18 07:00


 


Pulse Ox  98   12/24/18 07:00








 Intake & Output











 12/23/18 12/24/18 12/24/18





 18:59 06:59 18:59


 


Intake Total 1690 240 20


 


Output Total  0 


 


Balance 1690 240 20


 


Intake:   


 


   240 20


 


    NS 40 240 20


 


  Intake, IV Titration 1500  





  Amount   


 


    Sodium Chloride 0.9% 500 1500  





    ml 500 ml @ 999 mls/hr IV   





    .Q31M ONE Rx#:[id number]   


 


Output:   


 


  Urine  0 


 


Other:   


 


  Voiding Method CAPD CAPD 














- Exam


No acute distress


S1-S2 heard


Abdomen PD catheter


Trace edema








- Labs


CBC & Chem 7: 


 12/24/18 07:48





 12/24/18 04:54


Labs: 


 Abnormal Lab Results - Last 24 Hours (Table)











  12/23/18 12/23/18 12/23/18 Range/Units





  06:55 13:42 14:40 


 


WBC     (3.8-10.6)  k/uL


 


RBC     (3.80-5.40)  m/uL


 


Hgb     (11.4-16.0)  gm/dL


 


Hct     (34.0-46.0)  %


 


MCV     (80.0-100.0)  fL


 


MCHC     (31.0-37.0)  g/dL


 


RDW     (11.5-15.5)  %


 


Neutrophils # (Manual)     (1.3-7.7)  k/uL


 


Lymphocytes # (Manual)     (1.0-4.8)  k/uL


 


Monocytes # (Manual)     (0-1.0)  k/uL


 


Metamyelocytes # (Man)     (0)  k/uL


 


Sodium  135 L    (137-145)  mmol/L


 


Chloride  92 L    ()  mmol/L


 


Carbon Dioxide     (22-30)  mmol/L


 


BUN  41 H    (7-17)  mg/dL


 


Creatinine  7.13 H*    (0.52-1.04)  mg/dL


 


Glucose  105 H    (74-99)  mg/dL


 


POC Glucose (mg/dL)   125 H  109 H  (75-99)  mg/dL


 


Calcium     (8.4-10.2)  mg/dL


 


Phosphorus  6.7 H    (2.5-4.5)  mg/dL


 


AST  72 H    (14-36)  U/L


 


Alkaline Phosphatase  188 H    ()  U/L


 


Total Protein  5.4 L    (6.3-8.2)  g/dL


 


Albumin  2.4 L    (3.5-5.0)  g/dL














  12/23/18 12/24/18 12/24/18 Range/Units





  17:52 04:54 04:54 


 


WBC   25.4 H   (3.8-10.6)  k/uL


 


RBC   2.35 L   (3.80-5.40)  m/uL


 


Hgb   6.7 L* D   (11.4-16.0)  gm/dL


 


Hct   24.8 L   (34.0-46.0)  %


 


MCV   105.6 H   (80.0-100.0)  fL


 


MCHC   27.1 L   (31.0-37.0)  g/dL


 


RDW   16.7 H   (11.5-15.5)  %


 


Neutrophils # (Manual)   23.80 H   (1.3-7.7)  k/uL


 


Lymphocytes # (Manual)   0.51 L   (1.0-4.8)  k/uL


 


Monocytes # (Manual)     (0-1.0)  k/uL


 


Metamyelocytes # (Man)   0.51 H   (0)  k/uL


 


Sodium    132 L  (137-145)  mmol/L


 


Chloride    96 L  ()  mmol/L


 


Carbon Dioxide    19 L  (22-30)  mmol/L


 


BUN    45 H  (7-17)  mg/dL


 


Creatinine    6.96 H  (0.52-1.04)  mg/dL


 


Glucose     (74-99)  mg/dL


 


POC Glucose (mg/dL)  101 H    (75-99)  mg/dL


 


Calcium    8.2 L  (8.4-10.2)  mg/dL


 


Phosphorus    8.2 H  (2.5-4.5)  mg/dL


 


AST     (14-36)  U/L


 


Alkaline Phosphatase     ()  U/L


 


Total Protein     (6.3-8.2)  g/dL


 


Albumin     (3.5-5.0)  g/dL














  12/24/18 12/24/18 Range/Units





  07:02 07:48 


 


WBC   32.7 H  (3.8-10.6)  k/uL


 


RBC   2.33 L  (3.80-5.40)  m/uL


 


Hgb   6.7 L*  (11.4-16.0)  gm/dL


 


Hct   23.4 L  (34.0-46.0)  %


 


MCV   100.4 H D  (80.0-100.0)  fL


 


MCHC   28.8 L  (31.0-37.0)  g/dL


 


RDW   16.9 H  (11.5-15.5)  %


 


Neutrophils # (Manual)   28.10 H  (1.3-7.7)  k/uL


 


Lymphocytes # (Manual)    (1.0-4.8)  k/uL


 


Monocytes # (Manual)   2.29 H  (0-1.0)  k/uL


 


Metamyelocytes # (Man)   0.98 H  (0)  k/uL


 


Sodium    (137-145)  mmol/L


 


Chloride    ()  mmol/L


 


Carbon Dioxide    (22-30)  mmol/L


 


BUN    (7-17)  mg/dL


 


Creatinine    (0.52-1.04)  mg/dL


 


Glucose    (74-99)  mg/dL


 


POC Glucose (mg/dL)  63 L   (75-99)  mg/dL


 


Calcium    (8.4-10.2)  mg/dL


 


Phosphorus    (2.5-4.5)  mg/dL


 


AST    (14-36)  U/L


 


Alkaline Phosphatase    ()  U/L


 


Total Protein    (6.3-8.2)  g/dL


 


Albumin    (3.5-5.0)  g/dL








 Microbiology - Last 24 Hours (Table)











 12/18/18 18:59 Blood Culture - Preliminary





 Blood    No Growth after 120 hours


 


 12/22/18 12:03 Blood Culture - Preliminary





 Blood    No Growth after 24 hours














Assessment and Plan


Assessment: 


#1 ESRD on PD every 6 hours exchanges.


#2 sepsis with unstageable ulcer.


#3 anemia multifactorial


#4 morbid obesity


#5 hypokalemia








Plan: 


#1 currently her predominant dialysis not working.  We will rechallenge with 

using heparin.  If still continued to have issues she needs to be switched to 

hemodialysis if the family is agreeable.  


#2 with multiple comorbid conditions she is hospice appropriate.


#3 chronic kidney disease medications including Renvela and her Aranesp

## 2018-12-24 NOTE — CONS
CONSULTATION



Bree Sargent is a 58-year-old female who is well known to me and actively follows

in my office with severe asthma, who was admitted to the hospital almost 20 days ago,

on 12/06/2018.  At that time she had come in because of altered mentation and inability

to walk with severe weakness.  She was transferred to the ICU and a consultation was

placed.  Initially, it was placed to Dr. Suman Morse.  Subsequently, the medical

director of the ICU asked the nurse to put in a consult for me as this is a patient

that I actively follow in my outpatient setting for several years and previously in the

inpatient setting as well.  When I came to see her, she was lying in bed.  She was

somewhat anxious.  She was in pain in the right hip area and was somewhat short of

breath when she laid down.  She has been in the hospital several days and recently

underwent debridement of a right hip abscess.  She had been losing some blood from this

area as well, and was hypotensive with anemia and is being transfused 1 unit of packed

cells.  She has a known history of chronic renal failure on dialysis with a CAPD

catheter, which has not been working and consideration at this time is being given for

possible hemodialysis.



PAST MEDICAL HISTORY:

Positive for severe asthma, obstructive sleep apnea, obesity, chronic renal failure for

which she is on dialysis, depression.



FAMILY HISTORY:

Is positive for diabetes mellitus, renal failure, and asthma.  Mother recently passed

away.



SOCIAL HISTORY:

Patient does not smoke.  Does not drink alcohol excessively.



MEDICATIONS:

In the hospital were reviewed.



PHYSICAL EXAMINATION:

Respiratory rate is 18, pulse rate is 76, blood pressure is 124/80, O2 saturation on 4

L by nasal cannula is 92%.  HEENT reveals pupils are equal.  She has a short thick

neck.  No jugular venous distention is obvious.  Chest reveals decreased breath sounds

with no clear wheezing.  Cardiovascular system reveals S1, S2.  No S3, no S4.  There is

a short systolic murmur.  Abdomen is soft.  There is 1+ to 2+ study.  Abdomen is soft

with a CAPD catheter in place.  There is 1+ to 2+ pedal edema.  There is an ecchymotic

area in her left thigh.



LABS:

Reveal a white count of 32.7, hemoglobin of 6.7, platelet count of 245,000.  Sodium

132, potassium 4.8, chloride 96, bicarb 19, BUN 45, creatinine of 6.96.  Vancomycin is

31.9.



Medications at this time are albuterol with ipratropium, (  ) acetaminophen, Abilify,

aspirin, Wellbutrin, vitamin D3, santal, arinesp, Colace, Lovenox, Pepcid, Feosol,

Prozac, folic acid, gentamicin, Renagel and cannula.



Chest x-ray from 12/23/2018 shows mild cardiomegaly.



IMPRESSION:

At this time.

1. Severe asthma.

2. Obesity with severe obstructive sleep apnea.

3. Hemodynamic instability, in part due to severe anemia.

4. Right hip area abscess.

5. Chronic renal failure with nonfunctioning coronary artery disease catheter.

6. Metabolic acidosis in part due to her renal failure.

7. Depression.

At this point in time from a pulmonary standpoint, would continue on bronchodilators.

Transfuse with blood.  Consider hemodialysis if her CAPD catheter is not working.  Add

inhaled steroids and montelukast regimen.  Support or Bi PAP is tolerated.  Her

prognosis at this time remains guarded.



I would like to thank you for allowing me the privilege of participating in the care





MMODL / IJN: [id number] / Job#: [id number]

## 2018-12-24 NOTE — P.PN
Progress Note - Text


Progress Note Date: 12/24/18





Dr. Hill will be covering for Dr. Cuadra 12/25-12/31

## 2018-12-25 LAB
ANION GAP SERPL CALC-SCNC: 18 MMOL/L
BUN SERPL-SCNC: 51 MG/DL (ref 7–17)
CALCIUM SPEC-MCNC: 8.6 MG/DL (ref 8.4–10.2)
CELLS COUNTED: 200
CHLORIDE SERPL-SCNC: 95 MMOL/L (ref 98–107)
CO2 SERPL-SCNC: 19 MMOL/L (ref 22–30)
EOSINOPHIL # BLD MANUAL: 0.28 K/UL (ref 0–0.7)
ERYTHROCYTE [DISTWIDTH] IN BLOOD BY AUTOMATED COUNT: 2.8 M/UL (ref 3.8–5.4)
ERYTHROCYTE [DISTWIDTH] IN BLOOD: 20 % (ref 11.5–15.5)
GLUCOSE BLD-MCNC: 123 MG/DL (ref 75–99)
GLUCOSE BLD-MCNC: 137 MG/DL (ref 75–99)
GLUCOSE BLD-MCNC: 156 MG/DL (ref 75–99)
GLUCOSE BLD-MCNC: 64 MG/DL (ref 75–99)
GLUCOSE BLD-MCNC: 65 MG/DL (ref 75–99)
GLUCOSE BLD-MCNC: 80 MG/DL (ref 75–99)
GLUCOSE SERPL-MCNC: 81 MG/DL (ref 74–99)
HCT VFR BLD AUTO: 26.9 % (ref 34–46)
HGB BLD-MCNC: 7.9 GM/DL (ref 11.4–16)
LYMPHOCYTES # BLD MANUAL: 2.82 K/UL (ref 1–4.8)
MAGNESIUM SPEC-SCNC: 2 MG/DL (ref 1.6–2.3)
MCH RBC QN AUTO: 28.4 PG (ref 25–35)
MCHC RBC AUTO-ENTMCNC: 29.6 G/DL (ref 31–37)
MCV RBC AUTO: 96 FL (ref 80–100)
MONOCYTES # BLD MANUAL: 3.67 K/UL (ref 0–1)
MYELOCYTES # BLD MANUAL: 0.28 K/UL
NEUTROPHILS NFR BLD MANUAL: 75 %
NEUTS SEG # BLD MANUAL: 21.7 K/UL (ref 1.3–7.7)
PLATELET # BLD AUTO: 196 K/UL (ref 150–450)
POTASSIUM SERPL-SCNC: 4.7 MMOL/L (ref 3.5–5.1)
SODIUM SERPL-SCNC: 132 MMOL/L (ref 137–145)
WBC # BLD AUTO: 28.2 K/UL (ref 3.8–10.6)

## 2018-12-25 RX ADMIN — IPRATROPIUM BROMIDE SCH: 0.5 SOLUTION RESPIRATORY (INHALATION) at 20:59

## 2018-12-25 RX ADMIN — DOCUSATE SODIUM SCH: 100 CAPSULE, LIQUID FILLED ORAL at 11:16

## 2018-12-25 RX ADMIN — SEVELAMER CARBONATE SCH: 800 TABLET, FILM COATED ORAL at 14:32

## 2018-12-25 RX ADMIN — LEVOTHYROXINE SODIUM SCH: 88 TABLET ORAL at 06:54

## 2018-12-25 RX ADMIN — DOCUSATE SODIUM SCH MG: 100 CAPSULE, LIQUID FILLED ORAL at 23:19

## 2018-12-25 RX ADMIN — DEXTROSE MONOHYDRATE, SODIUM CHLORIDE, SODIUM LACTATE, CALCIUM CHLORIDE, MAGNESIUM CHLORIDE SCH MLS/HR: 1.5; 538; 448; 18.4; 5.08 SOLUTION INTRAPERITONEAL at 00:56

## 2018-12-25 RX ADMIN — BUPROPION HYDROCHLORIDE SCH MG: 300 TABLET, FILM COATED, EXTENDED RELEASE ORAL at 14:32

## 2018-12-25 RX ADMIN — IPRATROPIUM BROMIDE SCH: 0.5 SOLUTION RESPIRATORY (INHALATION) at 11:31

## 2018-12-25 RX ADMIN — BUDESONIDE SCH: 0.5 INHALANT ORAL at 20:59

## 2018-12-25 RX ADMIN — Medication SCH MG: at 23:20

## 2018-12-25 RX ADMIN — DEXTROSE MONOHYDRATE, SODIUM CHLORIDE, SODIUM LACTATE, CALCIUM CHLORIDE, MAGNESIUM CHLORIDE SCH MLS/HR: 1.5; 538; 448; 18.4; 5.08 SOLUTION INTRAPERITONEAL at 13:00

## 2018-12-25 RX ADMIN — PIPERACILLIN AND TAZOBACTAM SCH MLS/HR: 3; .375 INJECTION, POWDER, FOR SOLUTION INTRAVENOUS at 14:10

## 2018-12-25 RX ADMIN — HYDROCODONE BITARTRATE AND ACETAMINOPHEN PRN EACH: 10; 325 TABLET ORAL at 14:31

## 2018-12-25 RX ADMIN — DEXTROSE MONOHYDRATE, SODIUM CHLORIDE, SODIUM LACTATE, CALCIUM CHLORIDE, MAGNESIUM CHLORIDE SCH MLS/HR: 1.5; 538; 448; 18.4; 5.08 SOLUTION INTRAPERITONEAL at 17:50

## 2018-12-25 RX ADMIN — INSULIN ASPART SCH: 100 INJECTION, SUSPENSION SUBCUTANEOUS at 11:17

## 2018-12-25 RX ADMIN — GENTAMICIN SULFATE SCH APPLIC: 1 CREAM TOPICAL at 15:47

## 2018-12-25 RX ADMIN — ENOXAPARIN SODIUM SCH MG: 30 INJECTION SUBCUTANEOUS at 11:17

## 2018-12-25 RX ADMIN — SEVELAMER CARBONATE SCH: 800 TABLET, FILM COATED ORAL at 07:45

## 2018-12-25 RX ADMIN — BUDESONIDE SCH: 0.5 INHALANT ORAL at 07:17

## 2018-12-25 RX ADMIN — PIPERACILLIN AND TAZOBACTAM SCH MLS/HR: 3; .375 INJECTION, POWDER, FOR SOLUTION INTRAVENOUS at 01:04

## 2018-12-25 RX ADMIN — ASPIRIN 81 MG CHEWABLE TABLET SCH MG: 81 TABLET CHEWABLE at 11:16

## 2018-12-25 RX ADMIN — ALLOPURINOL SCH MG: 100 TABLET ORAL at 11:15

## 2018-12-25 RX ADMIN — COLLAGENASE SANTYL SCH APPLIC: 250 OINTMENT TOPICAL at 11:16

## 2018-12-25 RX ADMIN — INSULIN ASPART SCH: 100 INJECTION, SOLUTION INTRAVENOUS; SUBCUTANEOUS at 22:22

## 2018-12-25 RX ADMIN — MONTELUKAST SODIUM SCH MG: 10 TABLET, FILM COATED ORAL at 23:19

## 2018-12-25 RX ADMIN — LATANOPROST SCH DROPS: 50 SOLUTION OPHTHALMIC at 23:20

## 2018-12-25 RX ADMIN — Medication SCH UNIT: at 11:18

## 2018-12-25 RX ADMIN — INSULIN ASPART SCH: 100 INJECTION, SOLUTION INTRAVENOUS; SUBCUTANEOUS at 06:55

## 2018-12-25 RX ADMIN — FAMOTIDINE SCH MG: 20 TABLET, FILM COATED ORAL at 23:25

## 2018-12-25 RX ADMIN — MEGESTROL ACETATE SCH MG: 40 TABLET ORAL at 14:32

## 2018-12-25 RX ADMIN — IPRATROPIUM BROMIDE SCH: 0.5 SOLUTION RESPIRATORY (INHALATION) at 07:17

## 2018-12-25 RX ADMIN — FOLIC ACID SCH MG: 1 TABLET ORAL at 11:18

## 2018-12-25 RX ADMIN — ACETAMINOPHEN PRN MG: 500 TABLET ORAL at 23:20

## 2018-12-25 RX ADMIN — INSULIN ASPART SCH: 100 INJECTION, SOLUTION INTRAVENOUS; SUBCUTANEOUS at 17:40

## 2018-12-25 RX ADMIN — Medication SCH EACH: at 14:32

## 2018-12-25 RX ADMIN — TRIAMCINOLONE ACETONIDE SCH APPLIC: 1 CREAM TOPICAL at 23:21

## 2018-12-25 RX ADMIN — Medication SCH MG: at 11:18

## 2018-12-25 RX ADMIN — INSULIN ASPART SCH UNIT: 100 INJECTION, SUSPENSION SUBCUTANEOUS at 23:21

## 2018-12-25 RX ADMIN — MEGESTROL ACETATE SCH: 40 TABLET ORAL at 17:40

## 2018-12-25 RX ADMIN — TRIAMCINOLONE ACETONIDE SCH APPLIC: 1 CREAM TOPICAL at 11:17

## 2018-12-25 RX ADMIN — IPRATROPIUM BROMIDE SCH: 0.5 SOLUTION RESPIRATORY (INHALATION) at 16:12

## 2018-12-25 RX ADMIN — MEGESTROL ACETATE SCH: 40 TABLET ORAL at 14:12

## 2018-12-25 RX ADMIN — SEVELAMER CARBONATE SCH: 800 TABLET, FILM COATED ORAL at 17:40

## 2018-12-25 RX ADMIN — INSULIN ASPART SCH: 100 INJECTION, SOLUTION INTRAVENOUS; SUBCUTANEOUS at 14:33

## 2018-12-25 RX ADMIN — POLYETHYLENE GLYCOL 3350 SCH: 17 POWDER, FOR SOLUTION ORAL at 23:18

## 2018-12-25 RX ADMIN — HYDROCODONE BITARTRATE AND ACETAMINOPHEN PRN EACH: 10; 325 TABLET ORAL at 04:23

## 2018-12-25 RX ADMIN — DEXTROSE MONOHYDRATE, SODIUM CHLORIDE, SODIUM LACTATE, CALCIUM CHLORIDE, MAGNESIUM CHLORIDE SCH MLS/HR: 1.5; 538; 448; 18.4; 5.08 SOLUTION INTRAPERITONEAL at 05:55

## 2018-12-25 RX ADMIN — FLUOXETINE HYDROCHLORIDE SCH MG: 20 SOLUTION ORAL at 14:32

## 2018-12-25 NOTE — P.PN
Subjective


Progress Note Date: 12/25/18


Principal diagnosis: 


12/25/2018, patient seen eval examined in the ICU where she is bordering for 

select Care, she is arousable opens eyes follow simple commands, patient has a 

diffuse erythematous ecchymosis the lower extremity patient is being followed 

by infectious disease service as well as the renal services, currently patient 

denies any chest pain or shortness of breath respiratory status is relatively 

stable, labs reviewed medications reviewed, white cell count remains stable 

hemoglobin posttransfusion have been stable 7.9 now





This is a 58-year-old female, patient of [facility].  Patient has 

a known past medical history of end-stage renal disease on hemodialysis, 

congestive heart failure, COPD, diabetes mellitus, hypertension, hyperlipidemia

, obstructive sleep apnea, chronic hypoxic respiratory failure on 4-5 L of 

oxygen at home, depression and CVA.  History obtained from both patient and 

patient's sister.  Apparently their mother had passed away on November 27 

couple weeks ago.  Since then, patient's has not gotten out of bed.  She has 

not moved out of her bed for about 2 weeks per the sister.  She stopped taking 

all of her medications.  And she has not been eating or drinking much.  Blood 

sugar 469 on admission.  Per the sister the patient was started on Prozac 10 mg 

daily about a month ago.  She took it for about a week and then stopped taking 

it.  Before that patient had been on Zoloft.  Patient reports that she hurts 

everywhere.  She has extensive bruising in the upper thigh is growing area and 

back.  There are smaller bruises on the lower leg area.  She hurts anywhere she 

is touched.  She complains of pain in the chest as well as the abdomen and legs 

and back.  She denies any falling.  Reports some nausea and chest chest pain.  

Denies any fever, chills, sweats, cough, bowel movement changes.  She does not 

make urine and is on peritoneal dialysis.





Objective





- Vital Signs


Vital signs: 


 Vital Signs











Temp  97.9 F   12/25/18 18:00


 


Pulse  72   12/25/18 18:00


 


Resp  18   12/25/18 18:00


 


BP  92/40   12/25/18 18:00


 


Pulse Ox  95   12/25/18 18:00








 Intake & Output











 12/25/18 12/25/18 12/26/18





 06:59 18:59 06:59


 


Intake Total 255 500 


 


Balance 255 500 


 


Weight  139 kg 


 


Intake:   


 


   400 


 


    Anidulafungin 100 mg In  100 





    Sodium Chloride 0.9% 100   





    ml @ 84 mls/hr IVPB DAILY   





    [facility] Rx#:[id number]   


 


     100 


 


    Piperacillin-Tazobactam 3 75 200 





    .375 gm In Sodium   





    Chloride 0.9% 100 ml @ 25   





    mls/hr IVPB Q12H [facility] Rx#   





    :[id number]   


 


  Oral  100 














- Exam


Head normocephalic


Neck supple


Lungs clear to auscultation bilaterally no wheezing or crackles


Heart regular rate and rhythm S1-S2, no rub or gallop


Abdomen is soft nontender nondistended positive bowel sounds no 

hepatosplenomegaly.  Obese.  Peritoneal dialysis catheter site brown crusting 

or scabbing noted around the area.  No erythema.


Extremities no edema


Neuro alert and orientated to 3


Skin extensive bruising along the upper thighs and lower back and small bruises 

on the legs.  Patient has tenderness all over the body with palpation.  Patient'

s tenderness with palpation is less severe and bruising are showing 

improvement.  Right hip debridement site with the drainage noted








- Labs


CBC & Chem 7: 


 12/25/18 05:20





 12/25/18 08:39


Labs: 


 Abnormal Lab Results - Last 24 Hours (Table)











  12/25/18 12/25/18 12/25/18 Range/Units





  01:34 05:20 05:20 


 


WBC   28.2 H   (3.8-10.6)  k/uL


 


RBC   2.80 L   (3.80-5.40)  m/uL


 


Hgb   7.9 L   (11.4-16.0)  gm/dL


 


Hct   26.9 L   (34.0-46.0)  %


 


MCHC   29.6 L   (31.0-37.0)  g/dL


 


RDW   20.0 H   (11.5-15.5)  %


 


Neutrophils # (Manual)   21.70 H   (1.3-7.7)  k/uL


 


Monocytes # (Manual)   3.67 H   (0-1.0)  k/uL


 


Myelocytes # (Manual)   0.28 H   (0)  k/uL


 


Nucleated RBCs   1 H   (0-0)  /100 WBC


 


Sodium    132 L  (137-145)  mmol/L


 


Chloride    95 L  ()  mmol/L


 


Carbon Dioxide    19 L  (22-30)  mmol/L


 


BUN    51 H  (7-17)  mg/dL


 


Creatinine    6.69 H  (0.52-1.04)  mg/dL


 


POC Glucose (mg/dL)  137 H    (75-99)  mg/dL


 


Phosphorus    7.9 H  (2.5-4.5)  mg/dL














  12/25/18 12/25/18 12/25/18 Range/Units





  06:31 06:52 16:58 


 


WBC     (3.8-10.6)  k/uL


 


RBC     (3.80-5.40)  m/uL


 


Hgb     (11.4-16.0)  gm/dL


 


Hct     (34.0-46.0)  %


 


MCHC     (31.0-37.0)  g/dL


 


RDW     (11.5-15.5)  %


 


Neutrophils # (Manual)     (1.3-7.7)  k/uL


 


Monocytes # (Manual)     (0-1.0)  k/uL


 


Myelocytes # (Manual)     (0)  k/uL


 


Nucleated RBCs     (0-0)  /100 WBC


 


Sodium     (137-145)  mmol/L


 


Chloride     ()  mmol/L


 


Carbon Dioxide     (22-30)  mmol/L


 


BUN     (7-17)  mg/dL


 


Creatinine     (0.52-1.04)  mg/dL


 


POC Glucose (mg/dL)  65 L  64 L  123 H  (75-99)  mg/dL


 


Phosphorus     (2.5-4.5)  mg/dL














  12/25/18 Range/Units





  20:27 


 


WBC   (3.8-10.6)  k/uL


 


RBC   (3.80-5.40)  m/uL


 


Hgb   (11.4-16.0)  gm/dL


 


Hct   (34.0-46.0)  %


 


MCHC   (31.0-37.0)  g/dL


 


RDW   (11.5-15.5)  %


 


Neutrophils # (Manual)   (1.3-7.7)  k/uL


 


Monocytes # (Manual)   (0-1.0)  k/uL


 


Myelocytes # (Manual)   (0)  k/uL


 


Nucleated RBCs   (0-0)  /100 WBC


 


Sodium   (137-145)  mmol/L


 


Chloride   ()  mmol/L


 


Carbon Dioxide   (22-30)  mmol/L


 


BUN   (7-17)  mg/dL


 


Creatinine   (0.52-1.04)  mg/dL


 


POC Glucose (mg/dL)  156 H  (75-99)  mg/dL


 


Phosphorus   (2.5-4.5)  mg/dL








 Microbiology - Last 24 Hours (Table)











 12/22/18 12:03 Blood Culture - Preliminary





 Blood    No Growth after 72 hours


 


 12/18/18 18:59 Blood Culture - Final





 Blood    No Growth after 144 hours














Assessment and Plan


Assessment: 


Chronic anemia status post blood transfusion hemoglobin has been stable





Insulin-dependent diabetes mellitus and uncontrolled diabetes





Chronic renal failure and peritoneal dialysis





Severe morbid obesity





COPD





Hypertension and hypertensive cardiovascular disease





Chronic hypoxic respiratory failure as well as severe degree of sleep disorder 

breathing and sleep apnea





Dyslipidemia





Lower extremity pain especially on the right side patient has been treated for 

DVT presumed however no switched to DVT prophylaxis





Plan: 


Continue aspirin and Plavix





Glucose monitoring and adjustment of insulin





Continue CPAP each night and when necessary during the day





Continue supplemental oxygen





Continue peritoneal dialys supportive care





Long-term prognosis is very poor





Time with Patient: Greater than 30

## 2018-12-25 NOTE — P.PN
Subjective


Progress Note Date: 12/25/18


Principal diagnosis: 





Right hip decubitus





Patient remains in the ICU.  White blood cell count improved.  Still 

complaining of pain.  Patient refusing much of her care still.





Objective





- Vital Signs


Vital signs: 


 Vital Signs











Temp  98.0 F   12/25/18 10:00


 


Pulse  75   12/25/18 10:00


 


Resp  16   12/25/18 10:00


 


BP  100/48   12/25/18 10:00


 


Pulse Ox  95   12/25/18 10:00








 Intake & Output











 12/24/18 12/25/18 12/25/18





 18:59 06:59 18:59


 


Intake Total 960 255 280


 


Balance 960 255 280


 


Intake:   


 


   255 280


 


    Anidulafungin 100 mg In 260  100





    Sodium Chloride 0.9% 100   





    ml @ 84 mls/hr IVPB DAILY   





    [facility] Rx#:[id number]   


 


     180 80


 


    Piperacillin-Tazobactam 3 100 75 100





    .375 gm In Sodium   





    Chloride 0.9% 100 ml @ 25   





    mls/hr IVPB Q12H [facility] Rx#   





    :[id number]   


 


  Oral 50  


 


  Blood Product 310  


 


    Rc As-1  Unit 310  





    [id number]   


 


Other:   


 


  Voiding Method CAPD  














- Exam





Right hip decubitus wound clean, no active bleeding





- Labs


CBC & Chem 7: 


 12/25/18 05:20





 12/25/18 08:39


Labs: 


 Abnormal Lab Results - Last 24 Hours (Table)











  12/24/18 12/24/18 12/24/18 Range/Units





  09:43 16:51 22:01 


 


WBC     (3.8-10.6)  k/uL


 


RBC     (3.80-5.40)  m/uL


 


Hgb     (11.4-16.0)  gm/dL


 


Hct     (34.0-46.0)  %


 


MCHC     (31.0-37.0)  g/dL


 


RDW     (11.5-15.5)  %


 


Neutrophils # (Manual)     (1.3-7.7)  k/uL


 


Monocytes # (Manual)     (0-1.0)  k/uL


 


Myelocytes # (Manual)     (0)  k/uL


 


Nucleated RBCs     (0-0)  /100 WBC


 


Sodium     (137-145)  mmol/L


 


Chloride     ()  mmol/L


 


Carbon Dioxide     (22-30)  mmol/L


 


BUN     (7-17)  mg/dL


 


Creatinine     (0.52-1.04)  mg/dL


 


POC Glucose (mg/dL)   129 H  211 H  (75-99)  mg/dL


 


Phosphorus     (2.5-4.5)  mg/dL


 


Crossmatch  See Detail    














  12/24/18 12/24/18 12/25/18 Range/Units





  22:03 22:15 01:34 


 


WBC   28.3 H   (3.8-10.6)  k/uL


 


RBC   2.70 L   (3.80-5.40)  m/uL


 


Hgb   7.6 L   (11.4-16.0)  gm/dL


 


Hct   26.7 L   (34.0-46.0)  %


 


MCHC   28.6 L   (31.0-37.0)  g/dL


 


RDW   19.7 H   (11.5-15.5)  %


 


Neutrophils # (Manual)   24.60 H   (1.3-7.7)  k/uL


 


Monocytes # (Manual)   1.13 H   (0-1.0)  k/uL


 


Myelocytes # (Manual)     (0)  k/uL


 


Nucleated RBCs     (0-0)  /100 WBC


 


Sodium     (137-145)  mmol/L


 


Chloride     ()  mmol/L


 


Carbon Dioxide     (22-30)  mmol/L


 


BUN     (7-17)  mg/dL


 


Creatinine     (0.52-1.04)  mg/dL


 


POC Glucose (mg/dL)  202 H   137 H  (75-99)  mg/dL


 


Phosphorus     (2.5-4.5)  mg/dL


 


Crossmatch     














  12/25/18 12/25/18 12/25/18 Range/Units





  05:20 05:20 06:31 


 


WBC  28.2 H    (3.8-10.6)  k/uL


 


RBC  2.80 L    (3.80-5.40)  m/uL


 


Hgb  7.9 L    (11.4-16.0)  gm/dL


 


Hct  26.9 L    (34.0-46.0)  %


 


MCHC  29.6 L    (31.0-37.0)  g/dL


 


RDW  20.0 H    (11.5-15.5)  %


 


Neutrophils # (Manual)  21.70 H    (1.3-7.7)  k/uL


 


Monocytes # (Manual)  3.67 H    (0-1.0)  k/uL


 


Myelocytes # (Manual)  0.28 H    (0)  k/uL


 


Nucleated RBCs  1 H    (0-0)  /100 WBC


 


Sodium   132 L   (137-145)  mmol/L


 


Chloride   95 L   ()  mmol/L


 


Carbon Dioxide   19 L   (22-30)  mmol/L


 


BUN   51 H   (7-17)  mg/dL


 


Creatinine   6.69 H   (0.52-1.04)  mg/dL


 


POC Glucose (mg/dL)    65 L  (75-99)  mg/dL


 


Phosphorus   7.9 H   (2.5-4.5)  mg/dL


 


Crossmatch     














  12/25/18 Range/Units





  06:52 


 


WBC   (3.8-10.6)  k/uL


 


RBC   (3.80-5.40)  m/uL


 


Hgb   (11.4-16.0)  gm/dL


 


Hct   (34.0-46.0)  %


 


MCHC   (31.0-37.0)  g/dL


 


RDW   (11.5-15.5)  %


 


Neutrophils # (Manual)   (1.3-7.7)  k/uL


 


Monocytes # (Manual)   (0-1.0)  k/uL


 


Myelocytes # (Manual)   (0)  k/uL


 


Nucleated RBCs   (0-0)  /100 WBC


 


Sodium   (137-145)  mmol/L


 


Chloride   ()  mmol/L


 


Carbon Dioxide   (22-30)  mmol/L


 


BUN   (7-17)  mg/dL


 


Creatinine   (0.52-1.04)  mg/dL


 


POC Glucose (mg/dL)  64 L  (75-99)  mg/dL


 


Phosphorus   (2.5-4.5)  mg/dL


 


Crossmatch   








 Microbiology - Last 24 Hours (Table)











 12/18/18 18:59 Blood Culture - Final





 Blood    No Growth after 144 hours


 


 12/22/18 12:03 Blood Culture - Preliminary





 Blood    No Growth after 48 hours














Assessment and Plan


(1) Decubitus ulcer


Narrative/Plan: 


Continue local wound care.  We'll defer wound care to infectious disease at 

this point.  Patient's prognosis remains poor.  No surgical intervention 

planned at this time.  We'll sign off.  Please call if needed.


Current Visit: Yes   Status: Acute   Code(s): L89.90 - PRESSURE ULCER OF 

UNSPECIFIED SITE, UNSPECIFIED STAGE   SNOMED Code(s): 848359230

## 2018-12-25 NOTE — PN
PROGRESS NOTE



DATE OF SERVICE:

12/25/2018.



Her CAPD catheter is starting to work.  She is not complaining of any worsening

shortness of breath. She has pain in her right hip area where she has had the wound

debrided.



On physical examination her blood pressure is 100/48, respiratory rate is 16, pulse

rate 75, temperature 98, O2 saturation on 4 L by nasal cannula is 95%.  HEENT reveals

no new changes.  Chest is clear.  Cardiovascular system reveals an S1, S2.  Abdomen is

soft. There is 2+ pedal edema.  There is a wound in the right hip area.



LABS:

Reveal white count of 28.2, hemoglobin 7.9, sodium 132, potassium 4.7, chloride 95,

bicarb 19, BUN 51, creatinine of 6.69.



IMPRESSION:

1. Asthma, for which the patient is fairly stable.

2. Obstructive sleep apnea for which she will continue BiPAP.

3. Depression.

4. Right hip wound infection.

5. Acute on chronic renal failure.

At this point in time, would continue current medications. I agree with transferring

her out of the ICU.  She is status post transfusion of 1 unit of packed cells.  Her

prognosis is guarded.





MMODL / IJN: [id number] / Job#: [id number]

## 2018-12-25 NOTE — PN
PROGRESS NOTE



DATE OF SERVICE:

12/24/2018.



REASON FOR FOLLOWUP:

1. Leukocytosis.

2. Sacral pressure ulcer.



INTERVAL HISTORY:

The patient is afebrile. She is breathing comfortably.  Denies having any chest pain or

shortness of breath. No cough. No abdominal pain and no diarrhea.



PHYSICAL EXAMINATION:

Blood pressure 96/76, pulse of 75, temperature 97.5.  She is 100% on 2 L nasal cannula.

GENERAL DESCRIPTION: A middle-aged female lying in bed in no distress.

RESPIRATORY: Unlabored breathing. Clear to auscultation anteriorly.

HEART: S1, S2.  Regular rate and rhythm.

ABDOMEN: Soft.

EXTREMITIES: Left thigh with area of discoloration, has become more marked. There is no

surrounding redness.  Slight indurated but no tenderness was noticed.



LABS:

Hemoglobin is 6.7, white count 32.7.



DIAGNOSTIC IMPRESSION AND PLAN:

1. Patient with a pressure ulcer, status post debridement.  No evidence of any

    purulence.  Continue local wound care as ordered.  Keep the area off the pressure.

2. The patient with elevated white count.  The patient noticed to have more marked

    excoriation of her left thigh area.  A CT was requested, however, did not show

    anything suggestive of an abscess or infection.  The CT will be reviewed with

    radiologist.  She will be continued on broad-spectrum antibiotic with vancomycin.

    An antifungal has been added today.  Overall prognosis remains to be guarded.

    Continue supportive care.



MMODL / IJN: [id number] / Job#: [id number]

## 2018-12-25 NOTE — XR
EXAMINATION TYPE: XR chest 1V

 

DATE OF EXAM: 12/25/2018

 

COMPARISON: Prior chest x-ray 12/24/2018

 

HISTORY: Abnormal chest x-ray

 

TECHNIQUE: Single frontal view of the chest is obtained.

 

FINDINGS:  There is no significant interval change.

 

IMPRESSION:  Suspect cardiomegaly.

## 2018-12-25 NOTE — P.PN
Subjective


Progress Note Date: 12/25/18


Seen and examined for the follow-up of ESRD on peritoneal dialysis. She had 

debridement for her unstageable ulcer. Blood pressure is still low and as per 

the nursing staff she is refusing care.  Currently not on vasopressors.  

Peritoneal dialysis working since yesterday.








Objective





- Vital Signs


Vital signs: 


 Vital Signs











Temp  97.5 F L  12/25/18 08:00


 


Pulse  75   12/25/18 09:00


 


Resp  18   12/25/18 09:00


 


BP  106/40   12/25/18 09:00


 


Pulse Ox  100   12/25/18 09:00








 Intake & Output











 12/24/18 12/25/18 12/25/18





 18:59 06:59 18:59


 


Intake Total 960 255 20


 


Balance 960 255 20


 


Intake:   


 


   255 20


 


    Anidulafungin 100 mg In 260  





    Sodium Chloride 0.9% 100   





    ml @ 84 mls/hr IVPB DAILY   





    [facility] Rx#:[id number]   


 


     180 20


 


    Piperacillin-Tazobactam 3 100 75 





    .375 gm In Sodium   





    Chloride 0.9% 100 ml @ 25   





    mls/hr IVPB Q12H [facility] Rx#   





    :[id number]   


 


  Oral 50  


 


  Blood Product 310  


 


    Rc As-1  Unit 310  





    [id number]   


 


Other:   


 


  Voiding Method CAPD  














- Exam


No acute distress


S1-S2 heard


Abdomen PD catheter


Trace edema








- Labs


CBC & Chem 7: 


 12/25/18 05:20





 12/25/18 08:39


Labs: 


 Abnormal Lab Results - Last 24 Hours (Table)











  12/24/18 12/24/18 12/24/18 Range/Units





  07:48 09:43 16:51 


 


WBC     (3.8-10.6)  k/uL


 


RBC     (3.80-5.40)  m/uL


 


Hgb     (11.4-16.0)  gm/dL


 


Hct     (34.0-46.0)  %


 


MCHC     (31.0-37.0)  g/dL


 


RDW     (11.5-15.5)  %


 


Neutrophils # (Manual)     (1.3-7.7)  k/uL


 


Monocytes # (Manual)     (0-1.0)  k/uL


 


Myelocytes # (Manual)     (0)  k/uL


 


Nucleated RBCs     (0-0)  /100 WBC


 


Sodium     (137-145)  mmol/L


 


Chloride     ()  mmol/L


 


Carbon Dioxide     (22-30)  mmol/L


 


BUN     (7-17)  mg/dL


 


Creatinine     (0.52-1.04)  mg/dL


 


POC Glucose (mg/dL)    129 H  (75-99)  mg/dL


 


Phosphorus     (2.5-4.5)  mg/dL


 


Crossmatch  See Detail  See Detail   














  12/24/18 12/24/18 12/24/18 Range/Units





  22:01 22:03 22:15 


 


WBC    28.3 H  (3.8-10.6)  k/uL


 


RBC    2.70 L  (3.80-5.40)  m/uL


 


Hgb    7.6 L  (11.4-16.0)  gm/dL


 


Hct    26.7 L  (34.0-46.0)  %


 


MCHC    28.6 L  (31.0-37.0)  g/dL


 


RDW    19.7 H  (11.5-15.5)  %


 


Neutrophils # (Manual)    24.60 H  (1.3-7.7)  k/uL


 


Monocytes # (Manual)    1.13 H  (0-1.0)  k/uL


 


Myelocytes # (Manual)     (0)  k/uL


 


Nucleated RBCs     (0-0)  /100 WBC


 


Sodium     (137-145)  mmol/L


 


Chloride     ()  mmol/L


 


Carbon Dioxide     (22-30)  mmol/L


 


BUN     (7-17)  mg/dL


 


Creatinine     (0.52-1.04)  mg/dL


 


POC Glucose (mg/dL)  211 H  202 H   (75-99)  mg/dL


 


Phosphorus     (2.5-4.5)  mg/dL


 


Crossmatch     














  12/25/18 12/25/18 12/25/18 Range/Units





  01:34 05:20 05:20 


 


WBC   28.2 H   (3.8-10.6)  k/uL


 


RBC   2.80 L   (3.80-5.40)  m/uL


 


Hgb   7.9 L   (11.4-16.0)  gm/dL


 


Hct   26.9 L   (34.0-46.0)  %


 


MCHC   29.6 L   (31.0-37.0)  g/dL


 


RDW   20.0 H   (11.5-15.5)  %


 


Neutrophils # (Manual)   21.70 H   (1.3-7.7)  k/uL


 


Monocytes # (Manual)   3.67 H   (0-1.0)  k/uL


 


Myelocytes # (Manual)   0.28 H   (0)  k/uL


 


Nucleated RBCs   1 H   (0-0)  /100 WBC


 


Sodium    132 L  (137-145)  mmol/L


 


Chloride    95 L  ()  mmol/L


 


Carbon Dioxide    19 L  (22-30)  mmol/L


 


BUN    51 H  (7-17)  mg/dL


 


Creatinine    6.69 H  (0.52-1.04)  mg/dL


 


POC Glucose (mg/dL)  137 H    (75-99)  mg/dL


 


Phosphorus    7.9 H  (2.5-4.5)  mg/dL


 


Crossmatch     














  12/25/18 12/25/18 Range/Units





  06:31 06:52 


 


WBC    (3.8-10.6)  k/uL


 


RBC    (3.80-5.40)  m/uL


 


Hgb    (11.4-16.0)  gm/dL


 


Hct    (34.0-46.0)  %


 


MCHC    (31.0-37.0)  g/dL


 


RDW    (11.5-15.5)  %


 


Neutrophils # (Manual)    (1.3-7.7)  k/uL


 


Monocytes # (Manual)    (0-1.0)  k/uL


 


Myelocytes # (Manual)    (0)  k/uL


 


Nucleated RBCs    (0-0)  /100 WBC


 


Sodium    (137-145)  mmol/L


 


Chloride    ()  mmol/L


 


Carbon Dioxide    (22-30)  mmol/L


 


BUN    (7-17)  mg/dL


 


Creatinine    (0.52-1.04)  mg/dL


 


POC Glucose (mg/dL)  65 L  64 L  (75-99)  mg/dL


 


Phosphorus    (2.5-4.5)  mg/dL


 


Crossmatch    








 Microbiology - Last 24 Hours (Table)











 12/18/18 18:59 Blood Culture - Final





 Blood    No Growth after 144 hours


 


 12/22/18 12:03 Blood Culture - Preliminary





 Blood    No Growth after 48 hours














Assessment and Plan


Assessment: 


#1 ESRD on PD every 6 hours exchanges.


#2 sepsis with unstageable ulcer.


#3 anemia multifactorial


#4 morbid obesity


#5 hypokalemia








Plan: 


#1 continue with her peritoneal dialysis.  CT scan was done yesterday to figure 

out the reason for sepsis with a hidden abscess but unable to identify because 

of body habitus.  Entry on antibiotics as per infectious disease.  

Hemodynamically stable today


#2 with multiple comorbid conditions she is hospice appropriate.


#3 chronic kidney disease medications including Renvela and her Aranesp

## 2018-12-26 ENCOUNTER — HOSPITAL ENCOUNTER (INPATIENT)
Dept: HOSPITAL 47 - 2SICU | Age: 58
LOS: 1 days | DRG: 951 | End: 2018-12-27
Payer: MEDICAID

## 2018-12-26 VITALS
RESPIRATION RATE: 21 BRPM | SYSTOLIC BLOOD PRESSURE: 102 MMHG | HEART RATE: 78 BPM | DIASTOLIC BLOOD PRESSURE: 73 MMHG | TEMPERATURE: 97.8 F

## 2018-12-26 DIAGNOSIS — E11.40: ICD-10-CM

## 2018-12-26 DIAGNOSIS — E66.01: ICD-10-CM

## 2018-12-26 DIAGNOSIS — Z79.890: ICD-10-CM

## 2018-12-26 DIAGNOSIS — E11.22: ICD-10-CM

## 2018-12-26 DIAGNOSIS — Z66: ICD-10-CM

## 2018-12-26 DIAGNOSIS — Z87.891: ICD-10-CM

## 2018-12-26 DIAGNOSIS — J44.9: ICD-10-CM

## 2018-12-26 DIAGNOSIS — E11.65: ICD-10-CM

## 2018-12-26 DIAGNOSIS — Z90.5: ICD-10-CM

## 2018-12-26 DIAGNOSIS — Z51.5: Primary | ICD-10-CM

## 2018-12-26 DIAGNOSIS — Z79.4: ICD-10-CM

## 2018-12-26 DIAGNOSIS — G47.33: ICD-10-CM

## 2018-12-26 DIAGNOSIS — M19.90: ICD-10-CM

## 2018-12-26 DIAGNOSIS — N18.6: ICD-10-CM

## 2018-12-26 DIAGNOSIS — I12.0: ICD-10-CM

## 2018-12-26 DIAGNOSIS — Z80.8: ICD-10-CM

## 2018-12-26 DIAGNOSIS — Z86.73: ICD-10-CM

## 2018-12-26 DIAGNOSIS — Z83.3: ICD-10-CM

## 2018-12-26 DIAGNOSIS — Z83.49: ICD-10-CM

## 2018-12-26 DIAGNOSIS — Z96.651: ICD-10-CM

## 2018-12-26 DIAGNOSIS — Z98.84: ICD-10-CM

## 2018-12-26 DIAGNOSIS — M62.82: ICD-10-CM

## 2018-12-26 DIAGNOSIS — Z99.2: ICD-10-CM

## 2018-12-26 DIAGNOSIS — E78.5: ICD-10-CM

## 2018-12-26 DIAGNOSIS — Z79.82: ICD-10-CM

## 2018-12-26 DIAGNOSIS — Z79.02: ICD-10-CM

## 2018-12-26 DIAGNOSIS — Z79.899: ICD-10-CM

## 2018-12-26 DIAGNOSIS — J96.11: ICD-10-CM

## 2018-12-26 DIAGNOSIS — Z82.49: ICD-10-CM

## 2018-12-26 DIAGNOSIS — Z99.89: ICD-10-CM

## 2018-12-26 DIAGNOSIS — Z99.81: ICD-10-CM

## 2018-12-26 DIAGNOSIS — F32.9: ICD-10-CM

## 2018-12-26 LAB
ANION GAP SERPL CALC-SCNC: 18 MMOL/L
BUN SERPL-SCNC: 55 MG/DL (ref 7–17)
CALCIUM SPEC-MCNC: 8.4 MG/DL (ref 8.4–10.2)
CELLS COUNTED: 200
CHLORIDE SERPL-SCNC: 97 MMOL/L (ref 98–107)
CO2 SERPL-SCNC: 17 MMOL/L (ref 22–30)
ERYTHROCYTE [DISTWIDTH] IN BLOOD BY AUTOMATED COUNT: 1.85 M/UL (ref 3.8–5.4)
ERYTHROCYTE [DISTWIDTH] IN BLOOD BY AUTOMATED COUNT: 2.09 M/UL (ref 3.8–5.4)
ERYTHROCYTE [DISTWIDTH] IN BLOOD: 21 % (ref 11.5–15.5)
ERYTHROCYTE [DISTWIDTH] IN BLOOD: 21.2 % (ref 11.5–15.5)
GLUCOSE BLD-MCNC: 103 MG/DL (ref 75–99)
GLUCOSE BLD-MCNC: 107 MG/DL (ref 75–99)
GLUCOSE BLD-MCNC: 99 MG/DL (ref 75–99)
GLUCOSE SERPL-MCNC: 90 MG/DL (ref 74–99)
HCT VFR BLD AUTO: 18.3 % (ref 34–46)
HCT VFR BLD AUTO: 20 % (ref 34–46)
HGB BLD-MCNC: 5.5 GM/DL (ref 11.4–16)
HGB BLD-MCNC: 6 GM/DL (ref 11.4–16)
LYMPHOCYTES # BLD MANUAL: 1.71 K/UL (ref 1–4.8)
MAGNESIUM SPEC-SCNC: 2 MG/DL (ref 1.6–2.3)
MCH RBC QN AUTO: 28.6 PG (ref 25–35)
MCH RBC QN AUTO: 29.6 PG (ref 25–35)
MCHC RBC AUTO-ENTMCNC: 29.9 G/DL (ref 31–37)
MCHC RBC AUTO-ENTMCNC: 30.1 G/DL (ref 31–37)
MCV RBC AUTO: 95.5 FL (ref 80–100)
MCV RBC AUTO: 98.5 FL (ref 80–100)
METAMYELOCYTES # BLD: 0.21 K/UL
MONOCYTES # BLD MANUAL: 1.28 K/UL (ref 0–1)
NEUTROPHILS NFR BLD MANUAL: 72 %
NEUTS SEG # BLD MANUAL: 18.6 K/UL (ref 1.3–7.7)
PLATELET # BLD AUTO: 149 K/UL (ref 150–450)
PLATELET # BLD AUTO: 154 K/UL (ref 150–450)
POTASSIUM SERPL-SCNC: 5.1 MMOL/L (ref 3.5–5.1)
SODIUM SERPL-SCNC: 132 MMOL/L (ref 137–145)
VANCOMYCIN SERPL-MCNC: 26.7 UG/ML
WBC # BLD AUTO: 21.4 K/UL (ref 3.8–10.6)
WBC # BLD AUTO: 25.8 K/UL (ref 3.8–10.6)

## 2018-12-26 RX ADMIN — ACETAMINOPHEN PRN MG: 500 TABLET ORAL at 07:08

## 2018-12-26 RX ADMIN — MORPHINE SULFATE SCH MLS/HR: 50 INJECTION, SOLUTION, CONCENTRATE INTRAVENOUS at 15:45

## 2018-12-26 RX ADMIN — IPRATROPIUM BROMIDE SCH: 0.5 SOLUTION RESPIRATORY (INHALATION) at 08:48

## 2018-12-26 RX ADMIN — MEGESTROL ACETATE SCH MG: 40 TABLET ORAL at 01:17

## 2018-12-26 RX ADMIN — INSULIN ASPART SCH: 100 INJECTION, SOLUTION INTRAVENOUS; SUBCUTANEOUS at 08:26

## 2018-12-26 RX ADMIN — DEXTROSE MONOHYDRATE, SODIUM CHLORIDE, SODIUM LACTATE, CALCIUM CHLORIDE, MAGNESIUM CHLORIDE SCH MLS/HR: 1.5; 538; 448; 18.4; 5.08 SOLUTION INTRAPERITONEAL at 01:16

## 2018-12-26 RX ADMIN — SEVELAMER CARBONATE SCH: 800 TABLET, FILM COATED ORAL at 08:26

## 2018-12-26 RX ADMIN — PIPERACILLIN AND TAZOBACTAM SCH MLS/HR: 3; .375 INJECTION, POWDER, FOR SOLUTION INTRAVENOUS at 02:50

## 2018-12-26 RX ADMIN — LEVOTHYROXINE SODIUM SCH MCG: 88 TABLET ORAL at 07:08

## 2018-12-26 RX ADMIN — BUDESONIDE SCH: 0.5 INHALANT ORAL at 08:48

## 2018-12-26 RX ADMIN — IPRATROPIUM BROMIDE SCH: 0.5 SOLUTION RESPIRATORY (INHALATION) at 12:01

## 2018-12-26 RX ADMIN — HYDROCODONE BITARTRATE AND ACETAMINOPHEN PRN EACH: 10; 325 TABLET ORAL at 03:08

## 2018-12-26 RX ADMIN — DEXTROSE MONOHYDRATE, SODIUM CHLORIDE, SODIUM LACTATE, CALCIUM CHLORIDE, MAGNESIUM CHLORIDE SCH MLS/HR: 1.5; 538; 448; 18.4; 5.08 SOLUTION INTRAPERITONEAL at 06:29

## 2018-12-26 NOTE — P.PN
Subjective


Progress Note Date: 12/26/18


Principal diagnosis: 


12/25/2018, patient seen eval examined in the ICU where she is bordering for 

select Care, she is arousable opens eyes follow simple commands, patient has a 

diffuse erythematous ecchymosis the lower extremity patient is being followed 

by infectious disease service as well as the renal services, currently patient 

denies any chest pain or shortness of breath respiratory status is relatively 

stable, labs reviewed medications reviewed, white cell count remains stable 

hemoglobin posttransfusion have been stable 7.9 now





This is a 58-year-old female, patient of [facility].  Patient has 

a known past medical history of end-stage renal disease on hemodialysis, 

congestive heart failure, COPD, diabetes mellitus, hypertension, hyperlipidemia

, obstructive sleep apnea, chronic hypoxic respiratory failure on 4-5 L of 

oxygen at home, depression and CVA.  History obtained from both patient and 

patient's sister.  Apparently their mother had passed away on November 27 

couple weeks ago.  Since then, patient's has not gotten out of bed.  She has 

not moved out of her bed for about 2 weeks per the sister.  She stopped taking 

all of her medications.  And she has not been eating or drinking much.  Blood 

sugar 469 on admission.  Per the sister the patient was started on Prozac 10 mg 

daily about a month ago.  She took it for about a week and then stopped taking 

it.  Before that patient had been on Zoloft.  Patient reports that she hurts 

everywhere.  She has extensive bruising in the upper thigh is growing area and 

back.  There are smaller bruises on the lower leg area.  She hurts anywhere she 

is touched.  She complains of pain in the chest as well as the abdomen and legs 

and back.  She denies any falling.  Reports some nausea and chest chest pain.  

Denies any fever, chills, sweats, cough, bowel movement changes.  She does not 

make urine and is on peritoneal dialysis.


12/26/2018, patient seen eval examined during the rounds clinically patient has 

been doing not very well, patient has been noted to have a bright red blood per 

rectum in addition extensive sloughing of the skin of the thigh area and lower 

abdomen has been noted, ID service has been following, vascular surgery does 

not feel any intervention can be performed, these issues has been discussed 

with the patient's family as length as well as infectious disease and primary 

pulmonologist, family is leaning towards hospice care consultation, overall long

-term prognosis and as well as short-term processes prognosis is very poor








12/25/2018, patient seen eval examined in the ICU where she is bordering for 

select Care, she is arousable opens eyes follow simple commands, patient has a 

diffuse erythematous ecchymosis the lower extremity patient is being followed 

by infectious disease service as well as the renal services, currently patient 

denies any chest pain or shortness of breath respiratory status is relatively 

stable, labs reviewed medications reviewed, white cell count remains stable 

hemoglobin posttransfusion have been stable 7.9 now





This is a 58-year-old female, patient of [facility].  Patient has 

a known past medical history of end-stage renal disease on hemodialysis, 

congestive heart failure, COPD, diabetes mellitus, hypertension, hyperlipidemia

, obstructive sleep apnea, chronic hypoxic respiratory failure on 4-5 L of 

oxygen at home, depression and CVA.  History obtained from both patient and 

patient's sister.  Apparently their mother had passed away on November 27 

couple weeks ago.  Since then, patient's has not gotten out of bed.  She has 

not moved out of her bed for about 2 weeks per the sister.  She stopped taking 

all of her medications.  And she has not been eating or drinking much.  Blood 

sugar 469 on admission.  Per the sister the patient was started on Prozac 10 mg 

daily about a month ago.  She took it for about a week and then stopped taking 

it.  Before that patient had been on Zoloft.  Patient reports that she hurts 

everywhere.  She has extensive bruising in the upper thigh is growing area and 

back.  There are smaller bruises on the lower leg area.  She hurts anywhere she 

is touched.  She complains of pain in the chest as well as the abdomen and legs 

and back.  She denies any falling.  Reports some nausea and chest chest pain.  

Denies any fever, chills, sweats, cough, bowel movement changes.  She does not 

make urine and is on peritoneal dialysis.





Objective





- Vital Signs


Vital signs: 


 Vital Signs











Temp  97.8 F   12/26/18 10:03


 


Pulse  78   12/26/18 10:03


 


Resp  21   12/26/18 10:03


 


BP  102/73   12/26/18 10:03


 


Pulse Ox  97   12/26/18 10:03








 Intake & Output











 12/25/18 12/26/18 12/26/18





 18:59 06:59 18:59


 


Intake Total 500 520 160


 


Balance 500 520 160


 


Weight 139 kg 138.9 kg 138.9 kg


 


Intake:   


 


   520 160


 


    Anidulafungin 100 mg In 100  





    Sodium Chloride 0.9% 100   





    ml @ 84 mls/hr IVPB DAILY   





    [facility] Rx#:[id number]   


 


     520 160


 


    Piperacillin-Tazobactam 3 200  





    .375 gm In Sodium   





    Chloride 0.9% 100 ml @ 25   





    mls/hr IVPB Q12H [facility] Rx#   





    :[id number]   


 


  Oral 100  


 


  Tube Feeding  0 


 


  Blood Product   0


 


    Rc Pheresis As-3  Unit   0





    [id number]   


 


  Other  0 


 


Other:   


 


  Voiding Method  CAPD 














- Exam


Head normocephalic


Neck supple


Lungs clear to auscultation bilaterally no wheezing or crackles


Heart regular rate and rhythm S1-S2, no rub or gallop


Abdomen is soft nontender nondistended positive bowel sounds no 

hepatosplenomegaly.  Obese.  Peritoneal dialysis catheter site brown crusting 

or scabbing noted around the area.  No erythema.


Extremities no edema


Neuro alert and orientated to 3


Skin extensive bruising along the upper thighs and lower back and small bruises 

on the legs.  Patient has tenderness all over the body with palpation.  Patient'

s tenderness with palpation is less severe and bruising are showing 

improvement.  Right hip debridement site with the drainage noted








- Labs


CBC & Chem 7: 


 12/26/18 08:20





 12/26/18 04:30


Labs: 


 Abnormal Lab Results - Last 24 Hours (Table)











  12/24/18 12/25/18 12/25/18 Range/Units





  09:43 16:58 20:27 


 


WBC     (3.8-10.6)  k/uL


 


RBC     (3.80-5.40)  m/uL


 


Hgb     (11.4-16.0)  gm/dL


 


Hct     (34.0-46.0)  %


 


MCHC     (31.0-37.0)  g/dL


 


RDW     (11.5-15.5)  %


 


Plt Count     (150-450)  k/uL


 


Neutrophils # (Manual)     (1.3-7.7)  k/uL


 


Monocytes # (Manual)     (0-1.0)  k/uL


 


Metamyelocytes # (Man)     (0)  k/uL


 


Nucleated RBCs     (0-0)  /100 WBC


 


Sodium     (137-145)  mmol/L


 


Chloride     ()  mmol/L


 


Carbon Dioxide     (22-30)  mmol/L


 


BUN     (7-17)  mg/dL


 


Creatinine     (0.52-1.04)  mg/dL


 


POC Glucose (mg/dL)   123 H  156 H  (75-99)  mg/dL


 


Phosphorus     (2.5-4.5)  mg/dL


 


Crossmatch  See Detail    














  12/26/18 12/26/18 12/26/18 Range/Units





  02:51 04:30 05:26 


 


WBC    21.4 H  (3.8-10.6)  k/uL


 


RBC    1.85 L  (3.80-5.40)  m/uL


 


Hgb    5.5 L* D  (11.4-16.0)  gm/dL


 


Hct    18.3 L*  (34.0-46.0)  %


 


MCHC    30.1 L  (31.0-37.0)  g/dL


 


RDW    21.2 H  (11.5-15.5)  %


 


Plt Count     (150-450)  k/uL


 


Neutrophils # (Manual)    18.60 H  (1.3-7.7)  k/uL


 


Monocytes # (Manual)    1.28 H  (0-1.0)  k/uL


 


Metamyelocytes # (Man)    0.21 H  (0)  k/uL


 


Nucleated RBCs    2 H  (0-0)  /100 WBC


 


Sodium   132 L   (137-145)  mmol/L


 


Chloride   97 L   ()  mmol/L


 


Carbon Dioxide   17 L   (22-30)  mmol/L


 


BUN   55 H   (7-17)  mg/dL


 


Creatinine   6.29 H   (0.52-1.04)  mg/dL


 


POC Glucose (mg/dL)  103 H    (75-99)  mg/dL


 


Phosphorus   7.6 H   (2.5-4.5)  mg/dL


 


Crossmatch     














  12/26/18 12/26/18 Range/Units





  08:20 12:01 


 


WBC  25.8 H   (3.8-10.6)  k/uL


 


RBC  2.09 L   (3.80-5.40)  m/uL


 


Hgb  6.0 L*   (11.4-16.0)  gm/dL


 


Hct  20.0 L   (34.0-46.0)  %


 


MCHC  29.9 L   (31.0-37.0)  g/dL


 


RDW  21.0 H   (11.5-15.5)  %


 


Plt Count  149 L   (150-450)  k/uL


 


Neutrophils # (Manual)    (1.3-7.7)  k/uL


 


Monocytes # (Manual)    (0-1.0)  k/uL


 


Metamyelocytes # (Man)    (0)  k/uL


 


Nucleated RBCs    (0-0)  /100 WBC


 


Sodium    (137-145)  mmol/L


 


Chloride    ()  mmol/L


 


Carbon Dioxide    (22-30)  mmol/L


 


BUN    (7-17)  mg/dL


 


Creatinine    (0.52-1.04)  mg/dL


 


POC Glucose (mg/dL)   107 H  (75-99)  mg/dL


 


Phosphorus    (2.5-4.5)  mg/dL


 


Crossmatch    








 Microbiology - Last 24 Hours (Table)











 12/22/18 12:03 Blood Culture - Preliminary





 Blood    No Growth after 96 hours














Assessment and Plan


Assessment: 


GI bleed





Extensive ecchymosis and sloughing of the skin in the upper thigh area





Altered mental status





Chronic anemia status post blood transfusion hemoglobin has been stable





Insulin-dependent diabetes mellitus and uncontrolled diabetes





Chronic renal failure and peritoneal dialysis





Severe morbid obesity





COPD





Hypertension and hypertensive cardiovascular disease





Chronic hypoxic respiratory failure as well as severe degree of sleep disorder 

breathing and sleep apnea





Dyslipidemia





Lower extremity pain especially on the right side patient has been treated for 

DVT presumed however no switched to DVT prophylaxis





Plan: 


Patient is poorly responsive more lethargic and lites of ongoing events as 

noted family is considering hospice awaiting other family members to be 

initiated later on today long-term prognosis as well as short-term extremely 

poor 





Glucose monitoring and adjustment of insulin





Continue CPAP each night and when necessary during the day





Continue supplemental oxygen





Continue peritoneal dialys supportive care





Long-term prognosis is very poor





Time with Patient: Greater than 30 comprehensive exam...

## 2018-12-26 NOTE — P.PN
Subjective





Patient is seen in follow-up for end-stage renal disease.  She is maintained on 

peritoneal dialysis.  She is tolerating PD exchanges well.  Most recent 

hemoglobin 6.0.  Patient is noted to have an open wound in her back.  She is 

also noted to have bruising in both her thighs.  She is maintained on Lovenox.  

Patient is quite lethargic.  Not on pressors.





Vital signs are stable.


General: The patient appeared well nourished and normally developed. 


HEENT: Head exam is unremarkable. Neck is without jugular venous distension.


LUNGS: Breath sounds decreased.


HEART: Rate and Rhythm are regular. First and second heart sounds normal. No 

murmurs, rubs or gallops. 


ABDOMEN: Abdominal exam reveals normal bowel sounds. Non-tender and non-

distended. Obese.


EXTREMITITES: Trace edema.  Bilateral upper thigh bruising noted.





Objective





- Vital Signs


Vital signs: 


 Vital Signs











Temp  98.0 F   12/26/18 09:53


 


Pulse  79   12/26/18 09:53


 


Resp  20   12/26/18 09:53


 


BP  78/28   12/26/18 09:53


 


Pulse Ox  95   12/26/18 09:53








 Intake & Output











 12/25/18 12/26/18 12/26/18





 18:59 06:59 18:59


 


Intake Total 500 520 0


 


Balance 500 520 0


 


Weight 139 kg 138.9 kg 


 


Intake:   


 


   520 


 


    Anidulafungin 100 mg In 100  





    Sodium Chloride 0.9% 100   





    ml @ 84 mls/hr IVPB DAILY   





    [facility]ERS Rx#:[id number]   


 


     520 


 


    Piperacillin-Tazobactam 3 200  





    .375 gm In Sodium   





    Chloride 0.9% 100 ml @ 25   





    mls/hr IVPB Q12H [facility] Rx#   





    :[id number]   


 


  Oral 100  


 


  Tube Feeding  0 


 


  Blood Product   0


 


    Rc Pheresis As-3  Unit   0





    [id number]   


 


  Other  0 


 


Other:   


 


  Voiding Method  CAPD 














- Labs


CBC & Chem 7: 


 12/26/18 08:20





 12/26/18 04:30


Labs: 


 Abnormal Lab Results - Last 24 Hours (Table)











  12/24/18 12/25/18 12/25/18 Range/Units





  09:43 16:58 20:27 


 


WBC     (3.8-10.6)  k/uL


 


RBC     (3.80-5.40)  m/uL


 


Hgb     (11.4-16.0)  gm/dL


 


Hct     (34.0-46.0)  %


 


MCHC     (31.0-37.0)  g/dL


 


RDW     (11.5-15.5)  %


 


Plt Count     (150-450)  k/uL


 


Neutrophils # (Manual)     (1.3-7.7)  k/uL


 


Monocytes # (Manual)     (0-1.0)  k/uL


 


Metamyelocytes # (Man)     (0)  k/uL


 


Nucleated RBCs     (0-0)  /100 WBC


 


Sodium     (137-145)  mmol/L


 


Chloride     ()  mmol/L


 


Carbon Dioxide     (22-30)  mmol/L


 


BUN     (7-17)  mg/dL


 


Creatinine     (0.52-1.04)  mg/dL


 


POC Glucose (mg/dL)   123 H  156 H  (75-99)  mg/dL


 


Phosphorus     (2.5-4.5)  mg/dL


 


Crossmatch  See Detail    














  12/26/18 12/26/18 12/26/18 Range/Units





  02:51 04:30 05:26 


 


WBC    21.4 H  (3.8-10.6)  k/uL


 


RBC    1.85 L  (3.80-5.40)  m/uL


 


Hgb    5.5 L* D  (11.4-16.0)  gm/dL


 


Hct    18.3 L*  (34.0-46.0)  %


 


MCHC    30.1 L  (31.0-37.0)  g/dL


 


RDW    21.2 H  (11.5-15.5)  %


 


Plt Count     (150-450)  k/uL


 


Neutrophils # (Manual)    18.60 H  (1.3-7.7)  k/uL


 


Monocytes # (Manual)    1.28 H  (0-1.0)  k/uL


 


Metamyelocytes # (Man)    0.21 H  (0)  k/uL


 


Nucleated RBCs    2 H  (0-0)  /100 WBC


 


Sodium   132 L   (137-145)  mmol/L


 


Chloride   97 L   ()  mmol/L


 


Carbon Dioxide   17 L   (22-30)  mmol/L


 


BUN   55 H   (7-17)  mg/dL


 


Creatinine   6.29 H   (0.52-1.04)  mg/dL


 


POC Glucose (mg/dL)  103 H    (75-99)  mg/dL


 


Phosphorus   7.6 H   (2.5-4.5)  mg/dL


 


Crossmatch     














  12/26/18 Range/Units





  08:20 


 


WBC  25.8 H  (3.8-10.6)  k/uL


 


RBC  2.09 L  (3.80-5.40)  m/uL


 


Hgb  6.0 L*  (11.4-16.0)  gm/dL


 


Hct  20.0 L  (34.0-46.0)  %


 


MCHC  29.9 L  (31.0-37.0)  g/dL


 


RDW  21.0 H  (11.5-15.5)  %


 


Plt Count  149 L  (150-450)  k/uL


 


Neutrophils # (Manual)   (1.3-7.7)  k/uL


 


Monocytes # (Manual)   (0-1.0)  k/uL


 


Metamyelocytes # (Man)   (0)  k/uL


 


Nucleated RBCs   (0-0)  /100 WBC


 


Sodium   (137-145)  mmol/L


 


Chloride   ()  mmol/L


 


Carbon Dioxide   (22-30)  mmol/L


 


BUN   (7-17)  mg/dL


 


Creatinine   (0.52-1.04)  mg/dL


 


POC Glucose (mg/dL)   (75-99)  mg/dL


 


Phosphorus   (2.5-4.5)  mg/dL


 


Crossmatch   








 Microbiology - Last 24 Hours (Table)











 12/22/18 12:03 Blood Culture - Preliminary





 Blood    No Growth after 72 hours














Assessment and Plan


Plan: 





Assessment:


1.  End-stage renal disease maintained on peritoneal dialysis.


2.  Hypervolemic hyponatremia. 


3.  Hypokalemia secondary to poor oral intake and PD losses.  Resolved.


4.  Hypomagnesemia from poor oral intake status post placement.  Better.


5.  Anemia of chronic kidney disease maintained on Aranesp.  Also receiving 

blood transfusion.


6.  Sacral pressure ulcer status post debridement.  Infectious disease 

following.


7.  Chronic kidney disease mineral bone disease.  Phosphorus level 7.6.  

Maintained on Renvela.


8.  Insulin-dependent diabetes mellitus.


9.  Bilateral thigh bruising/discoloration.  Patient is not on Coumadin.  Also 

concern for calciphylaxis.





Plan:


Maintain current PD exchanges with heparin.


Transfuse blood as needed.


Check PTH.


Avoid calcium based binders and vitamin D supplementation.


Add oral bicarbonate.


Family now becoming today to make decision regarding long-term care.  Overall 

prognosis guarded.

## 2018-12-26 NOTE — PN
PROGRESS NOTE



DATE OF SERVICE:

12/26/2018



She has indicated that she would want comfort oriented care only, which seems to be

appropriate from my perspective.  She is lying in bed.  She has pain whenever she is

touched.

Her vital signs reveal a respiratory rate of 21, pulse rate of 78, temperature 97.8,

blood pressure 102/73, but her blood pressure had been as low as 78/28.

HEENT shows no new changes.



LABS:

Reveal a white count of 258,000, hemoglobin of 6, which earlier had been 5.5, platelet

count of 149,000.



IMPRESSION:

1. Acute on chronic renal failure.

2. Right hip wound.

3. NITO.

4. Asthma.

5. Obesity.

6. Anemia with gastrointestinal bleed and anemia of chronic disease including due to

    her renal failure.

At this point in time from a pulmonary standpoint, patient and family have requested

comfort oriented care which we certainly agree with. I did talk to the family regarding

her condition and guarded prognosis.  I would like to thank you for allowing me the

privilege of participating in the care of my patient.





LILO / MARTHA: [id number] / Job#: [id number]

## 2018-12-26 NOTE — CONS
DATE OF CONSULTATION: 12/25/2018



This is a 58-year-old obese female.  Patient was seen in the intensive care 
unit for a

vascular evaluation.  Patient has history of chronic renal failure.  Patient is 
on

peritoneal dialysis, history of sleep apnea, history of diabetes mellitus,

hypertension.  The patient had a right hip decubitus ulcer debridement done by 
Dr. Matthieu Mg. The patient also under the care of Infectious Disease.  She has 
developed a

patchy distribution of both groin area and medial and lateral aspect of the 
thigh area

with some discoloration of the skin and also of the right lateral aspect of the

abdomen.  Abdomen is hard to evaluate by vascular evaluation because patient is

refusing to be touched.  The patient has some Doppler signal present on the 
posterior

tibial.  There is no evidence of any ischemic changes noted into the right and 
left

foot, but there is patchy distribution of the skin necrosis noted in the 
bilateral

thigh area, could be related to infectious.  She is not on Coumadin to rule out 
any

Coumadin related necrosis.  The patient is on Lovenox.  I have discussed with 
the

nurses taking care of her, she is refusing any kind of medication or (  ) or to 
be

examined.  Prognosis is guarded.  I will discuss with Infectious Disease.





MMODL / IJN: [id number] / Job#: [id number]

KRIS

## 2018-12-26 NOTE — PN
PROGRESS NOTE



DATE OF SERVICE:

12/25/2018



REASON FOR FOLLOW UP:

1. Sacral pressure ulcer.

2. Elevated white count.



INTERVAL HISTORY:

The patient is currently afebrile.  She has been breathing comfortably.  Denies having

any chest pain or shortness of breath or cough.  No abdominal pain.  No diarrhea.

Apparently per the nursing staff, she has been refusing most of her care and no change

in her (  ).



EXAMINATION:

Blood pressure is (  )/45 with a pulse of 75, temperature 98, she is 95% on 4 L nasal

cannula.

GENERAL DESCRIPTION:  A middle aged female lying in bed in no distress.

RESPIRATORY SYSTEM:  Unlabored breathing, clear to auscultation anteriorly.

HEART:  S1, S2.  Regular rate.

ABDOMEN:  Soft, no tenderness.

Examination of the bilateral extremities things shows that there is discoloration (  )

surrounding redness or any foul-smelling drainage.



LABS:

Hemoglobin 7.8, white count 28.2, yesterday 2.7.  CT was done, unfortunately result has

been inconclusive.



DIAGNOSTIC IMPRESSION AND PLAN:

1. Patient with sacral pressure ulcer status post primary local care, to continue with

    Santyl, frequent change of position and keep the area dry.

2. The patient with elevated white count.  So far no clear focus.  The patient did

    have this discoloration of her left medial thigh area for which we did order a CT.

    Unfortunately that has been inconclusive and she also has (  ) on the rest of her

    body.  Uche will get vascular surgery opinion on the same.  The patient currently

    with no fever and does not look septic and is covered with a broad spectrum

    antibiotic.  Overall prognosis remains to be guarded. Continue supportive care.





MMODL / IJN: [id number] / Job#: [id number]

## 2018-12-27 VITALS — RESPIRATION RATE: 12 BRPM

## 2018-12-27 RX ADMIN — MORPHINE SULFATE SCH MLS/HR: 50 INJECTION, SOLUTION, CONCENTRATE INTRAVENOUS at 01:25

## 2018-12-28 NOTE — CDI
Documentation Clarification Form



Date: 12/28/18

From: Sanjana Nina

Phone: If you have a question regarding this query, please contact Elsi Mohamud at [phone] between 8am and 5pm.

MRN: [id number]

Admit Date: 12/11/2018 11:03:00 PM

Patient Name: Bree Sargent

Visit Number: [id number]

Discharge Date:  12/26/2018 3:45:00 PM





ATTENTION: The Clinical Documentation Specialists (CDI) and [facility] Coding Staff 
appreciate your assistance in clarifying documentation. Please respond to the 
clarification below the line at the bottom and electronically sign. The CDI & 
[facility] Coding staff will review the response and follow-up if needed. Please note: 
Queries are made part of the Legal Health Record. If you have any questions, 
please contact the author of this message via ITS.



Dr. Deanne Cuadra



Postoperative hypotension is documented in the your 12/23 progress note and in 
Lyndsay Valles's 12/24 progress note.

Patients Admitting Diagnosis:  Patient was admitted for diabetes with 
hyperglycemia and also had a right hip decubitus ulcer.

Post-Operative Diagnosis:  Right hip decubitus ulcer.

Procedure performed: Debridement of the right hip decubitus.



History/Risk Factors:  Patient admitted for diabetes with hyperglycemia and 
fluid overload.  The patient has been bedbound for 2 weeks.  Patient also has 
history of end stage renal disease, CAD, hypertension and anemia.

Clinical Indicators:  Hypotensive.  Patient's blood pressure went down to 82/51 
on 12/23 and was consistently 87/66 on the same day.  

Treatment: Patient was transferred to ICU



In order to accurately reflect this patients severity of illness, please 
clarify if the post-operative hypotension is:  

    An expected post-procedural or post-surgical condition

    Integral to the procedure

    Inherent to the procedure

    An unexpected post-procedural or post-surgical condition related to 
surgical care 

    Other, please specify __________________

    Unable to determine

___________________________________________________________________________

an unexpected post-procedural or post-surgical condition related to surgical 
care
MTDD